# Patient Record
Sex: FEMALE | Race: BLACK OR AFRICAN AMERICAN | NOT HISPANIC OR LATINO | Employment: STUDENT | URBAN - METROPOLITAN AREA
[De-identification: names, ages, dates, MRNs, and addresses within clinical notes are randomized per-mention and may not be internally consistent; named-entity substitution may affect disease eponyms.]

---

## 2021-02-23 ENCOUNTER — LAB VISIT (OUTPATIENT)
Dept: ORTHOPEDICS | Facility: CLINIC | Age: 21
End: 2021-02-23
Payer: OTHER GOVERNMENT

## 2021-02-23 DIAGNOSIS — Z20.822 COVID-19 RULED OUT: Primary | ICD-10-CM

## 2021-02-23 PROCEDURE — U0003 INFECTIOUS AGENT DETECTION BY NUCLEIC ACID (DNA OR RNA); SEVERE ACUTE RESPIRATORY SYNDROME CORONAVIRUS 2 (SARS-COV-2) (CORONAVIRUS DISEASE [COVID-19]), AMPLIFIED PROBE TECHNIQUE, MAKING USE OF HIGH THROUGHPUT TECHNOLOGIES AS DESCRIBED BY CMS-2020-01-R: HCPCS

## 2021-02-23 PROCEDURE — U0005 INFEC AGEN DETEC AMPLI PROBE: HCPCS

## 2021-02-24 LAB — SARS-COV-2 RNA RESP QL NAA+PROBE: NOT DETECTED

## 2021-03-03 ENCOUNTER — LAB VISIT (OUTPATIENT)
Dept: PRIMARY CARE CLINIC | Facility: OTHER | Age: 21
End: 2021-03-03
Attending: INTERNAL MEDICINE
Payer: OTHER GOVERNMENT

## 2021-03-03 DIAGNOSIS — Z20.822 ENCOUNTER FOR LABORATORY TESTING FOR COVID-19 VIRUS: ICD-10-CM

## 2021-03-03 PROCEDURE — U0003 INFECTIOUS AGENT DETECTION BY NUCLEIC ACID (DNA OR RNA); SEVERE ACUTE RESPIRATORY SYNDROME CORONAVIRUS 2 (SARS-COV-2) (CORONAVIRUS DISEASE [COVID-19]), AMPLIFIED PROBE TECHNIQUE, MAKING USE OF HIGH THROUGHPUT TECHNOLOGIES AS DESCRIBED BY CMS-2020-01-R: HCPCS | Performed by: INTERNAL MEDICINE

## 2021-03-04 LAB — SARS-COV-2 RNA RESP QL NAA+PROBE: NOT DETECTED

## 2021-03-10 ENCOUNTER — LAB VISIT (OUTPATIENT)
Dept: PRIMARY CARE CLINIC | Facility: OTHER | Age: 21
End: 2021-03-10
Attending: INTERNAL MEDICINE
Payer: OTHER GOVERNMENT

## 2021-03-10 DIAGNOSIS — Z03.818 ENCOUNTER FOR OBSERVATION FOR SUSPECTED EXPOSURE TO OTHER BIOLOGICAL AGENTS RULED OUT: Primary | ICD-10-CM

## 2021-03-10 PROCEDURE — U0003 INFECTIOUS AGENT DETECTION BY NUCLEIC ACID (DNA OR RNA); SEVERE ACUTE RESPIRATORY SYNDROME CORONAVIRUS 2 (SARS-COV-2) (CORONAVIRUS DISEASE [COVID-19]), AMPLIFIED PROBE TECHNIQUE, MAKING USE OF HIGH THROUGHPUT TECHNOLOGIES AS DESCRIBED BY CMS-2020-01-R: HCPCS | Performed by: INTERNAL MEDICINE

## 2021-03-11 LAB — SARS-COV-2 RNA RESP QL NAA+PROBE: NOT DETECTED

## 2021-03-17 ENCOUNTER — LAB VISIT (OUTPATIENT)
Dept: PRIMARY CARE CLINIC | Facility: OTHER | Age: 21
End: 2021-03-17
Attending: INTERNAL MEDICINE
Payer: OTHER GOVERNMENT

## 2021-03-17 DIAGNOSIS — Z20.822 ENCOUNTER FOR LABORATORY TESTING FOR COVID-19 VIRUS: Primary | ICD-10-CM

## 2021-03-17 PROCEDURE — U0003 INFECTIOUS AGENT DETECTION BY NUCLEIC ACID (DNA OR RNA); SEVERE ACUTE RESPIRATORY SYNDROME CORONAVIRUS 2 (SARS-COV-2) (CORONAVIRUS DISEASE [COVID-19]), AMPLIFIED PROBE TECHNIQUE, MAKING USE OF HIGH THROUGHPUT TECHNOLOGIES AS DESCRIBED BY CMS-2020-01-R: HCPCS | Performed by: STUDENT IN AN ORGANIZED HEALTH CARE EDUCATION/TRAINING PROGRAM

## 2021-03-18 LAB — SARS-COV-2 RNA RESP QL NAA+PROBE: NOT DETECTED

## 2021-03-24 ENCOUNTER — LAB VISIT (OUTPATIENT)
Dept: PRIMARY CARE CLINIC | Facility: OTHER | Age: 21
End: 2021-03-24
Attending: INTERNAL MEDICINE
Payer: OTHER GOVERNMENT

## 2021-03-24 DIAGNOSIS — Z20.822 COVID-19 RULED OUT: Primary | ICD-10-CM

## 2021-03-24 PROCEDURE — U0003 INFECTIOUS AGENT DETECTION BY NUCLEIC ACID (DNA OR RNA); SEVERE ACUTE RESPIRATORY SYNDROME CORONAVIRUS 2 (SARS-COV-2) (CORONAVIRUS DISEASE [COVID-19]), AMPLIFIED PROBE TECHNIQUE, MAKING USE OF HIGH THROUGHPUT TECHNOLOGIES AS DESCRIBED BY CMS-2020-01-R: HCPCS | Performed by: INTERNAL MEDICINE

## 2021-03-25 LAB — SARS-COV-2 RNA RESP QL NAA+PROBE: NOT DETECTED

## 2021-03-31 ENCOUNTER — LAB VISIT (OUTPATIENT)
Dept: PRIMARY CARE CLINIC | Facility: OTHER | Age: 21
End: 2021-03-31
Attending: INTERNAL MEDICINE
Payer: OTHER GOVERNMENT

## 2021-03-31 DIAGNOSIS — Z20.822 COVID-19 RULED OUT: Primary | ICD-10-CM

## 2021-03-31 PROCEDURE — U0003 INFECTIOUS AGENT DETECTION BY NUCLEIC ACID (DNA OR RNA); SEVERE ACUTE RESPIRATORY SYNDROME CORONAVIRUS 2 (SARS-COV-2) (CORONAVIRUS DISEASE [COVID-19]), AMPLIFIED PROBE TECHNIQUE, MAKING USE OF HIGH THROUGHPUT TECHNOLOGIES AS DESCRIBED BY CMS-2020-01-R: HCPCS | Performed by: INTERNAL MEDICINE

## 2021-04-01 LAB — SARS-COV-2 RNA RESP QL NAA+PROBE: NOT DETECTED

## 2021-04-14 ENCOUNTER — LAB VISIT (OUTPATIENT)
Dept: ORTHOPEDICS | Facility: CLINIC | Age: 21
End: 2021-04-14
Payer: OTHER GOVERNMENT

## 2021-04-14 DIAGNOSIS — Z20.822 COVID-19 RULED OUT: Primary | ICD-10-CM

## 2021-04-14 PROCEDURE — U0005 INFEC AGEN DETEC AMPLI PROBE: HCPCS | Performed by: STUDENT IN AN ORGANIZED HEALTH CARE EDUCATION/TRAINING PROGRAM

## 2021-04-14 PROCEDURE — U0003 INFECTIOUS AGENT DETECTION BY NUCLEIC ACID (DNA OR RNA); SEVERE ACUTE RESPIRATORY SYNDROME CORONAVIRUS 2 (SARS-COV-2) (CORONAVIRUS DISEASE [COVID-19]), AMPLIFIED PROBE TECHNIQUE, MAKING USE OF HIGH THROUGHPUT TECHNOLOGIES AS DESCRIBED BY CMS-2020-01-R: HCPCS | Performed by: STUDENT IN AN ORGANIZED HEALTH CARE EDUCATION/TRAINING PROGRAM

## 2021-04-15 LAB — SARS-COV-2 RNA RESP QL NAA+PROBE: NOT DETECTED

## 2021-06-30 ENCOUNTER — PATIENT OUTREACH (OUTPATIENT)
Dept: ADMINISTRATIVE | Facility: HOSPITAL | Age: 21
End: 2021-06-30

## 2021-10-29 ENCOUNTER — HOSPITAL ENCOUNTER (OUTPATIENT)
Dept: RADIOLOGY | Facility: HOSPITAL | Age: 21
Discharge: HOME | End: 2021-10-29
Attending: STUDENT IN AN ORGANIZED HEALTH CARE EDUCATION/TRAINING PROGRAM
Payer: COMMERCIAL

## 2021-10-29 ENCOUNTER — OFFICE VISIT (OUTPATIENT)
Dept: SPORTS MEDICINE | Facility: CLINIC | Age: 21
End: 2021-10-29
Payer: COMMERCIAL

## 2021-10-29 VITALS — WEIGHT: 127.88 LBS | HEIGHT: 63 IN | RESPIRATION RATE: 20 BRPM | BODY MASS INDEX: 22.66 KG/M2

## 2021-10-29 DIAGNOSIS — M79.676 PAIN OF GREAT TOE, UNSPECIFIED LATERALITY: Primary | ICD-10-CM

## 2021-10-29 DIAGNOSIS — L03.032 PARONYCHIA OF GREAT TOE OF LEFT FOOT: Primary | ICD-10-CM

## 2021-10-29 DIAGNOSIS — M79.676 PAIN OF GREAT TOE, UNSPECIFIED LATERALITY: ICD-10-CM

## 2021-10-29 PROCEDURE — 73660 X-RAY EXAM OF TOE(S): CPT | Mod: TC,LT

## 2021-10-29 PROCEDURE — 73660 XR TOE 2 OR MORE VIEWS LEFT: ICD-10-PCS | Mod: 26,LT,, | Performed by: RADIOLOGY

## 2021-10-29 PROCEDURE — 99203 OFFICE O/P NEW LOW 30 MIN: CPT | Mod: S$GLB,,, | Performed by: STUDENT IN AN ORGANIZED HEALTH CARE EDUCATION/TRAINING PROGRAM

## 2021-10-29 PROCEDURE — 99212 OFFICE O/P EST SF 10 MIN: CPT | Mod: PBBFAC | Performed by: STUDENT IN AN ORGANIZED HEALTH CARE EDUCATION/TRAINING PROGRAM

## 2021-10-29 PROCEDURE — 99999 PR PBB SHADOW E&M-EST. PATIENT-LVL II: CPT | Mod: PBBFAC,,, | Performed by: STUDENT IN AN ORGANIZED HEALTH CARE EDUCATION/TRAINING PROGRAM

## 2021-10-29 PROCEDURE — 99203 PR OFFICE/OUTPT VISIT, NEW, LEVL III, 30-44 MIN: ICD-10-PCS | Mod: S$GLB,,, | Performed by: STUDENT IN AN ORGANIZED HEALTH CARE EDUCATION/TRAINING PROGRAM

## 2021-10-29 PROCEDURE — 99999 PR PBB SHADOW E&M-EST. PATIENT-LVL II: ICD-10-PCS | Mod: PBBFAC,,, | Performed by: STUDENT IN AN ORGANIZED HEALTH CARE EDUCATION/TRAINING PROGRAM

## 2021-10-29 PROCEDURE — 73660 X-RAY EXAM OF TOE(S): CPT | Mod: 26,LT,, | Performed by: RADIOLOGY

## 2021-10-29 RX ORDER — SULFAMETHOXAZOLE AND TRIMETHOPRIM 800; 160 MG/1; MG/1
1 TABLET ORAL 2 TIMES DAILY
Qty: 10 TABLET | Refills: 0 | Status: SHIPPED | OUTPATIENT
Start: 2021-10-29 | End: 2022-11-22 | Stop reason: ALTCHOICE

## 2021-10-29 RX ORDER — MEDROXYPROGESTERONE ACETATE 150 MG/ML
150 INJECTION, SUSPENSION INTRAMUSCULAR
COMMUNITY
Start: 2021-06-08 | End: 2022-09-01

## 2021-10-29 RX ORDER — IBUPROFEN 600 MG/1
600 TABLET ORAL
COMMUNITY
Start: 2021-07-22 | End: 2022-11-22 | Stop reason: ALTCHOICE

## 2022-01-20 DIAGNOSIS — M25.311 INSTABILITY OF RIGHT SHOULDER JOINT: Primary | ICD-10-CM

## 2022-01-26 ENCOUNTER — HOSPITAL ENCOUNTER (OUTPATIENT)
Dept: RADIOLOGY | Facility: HOSPITAL | Age: 22
Discharge: HOME | End: 2022-01-26
Attending: ORTHOPAEDIC SURGERY
Payer: COMMERCIAL

## 2022-01-26 DIAGNOSIS — M25.311 INSTABILITY OF RIGHT SHOULDER JOINT: ICD-10-CM

## 2022-01-26 PROCEDURE — 25500020 PHARM REV CODE 255: Performed by: ORTHOPAEDIC SURGERY

## 2022-01-26 PROCEDURE — 73040 CONTRAST X-RAY OF SHOULDER: CPT | Mod: TC,RT

## 2022-01-26 PROCEDURE — A9585 GADOBUTROL INJECTION: HCPCS | Performed by: ORTHOPAEDIC SURGERY

## 2022-01-26 PROCEDURE — 73222 MRI JOINT UPR EXTREM W/DYE: CPT | Mod: TC,RT

## 2022-01-26 RX ORDER — GADOBUTROL 604.72 MG/ML
0.2 INJECTION INTRAVENOUS
Status: COMPLETED | OUTPATIENT
Start: 2022-01-26 | End: 2022-01-26

## 2022-01-26 RX ADMIN — GADOBUTROL 0.2 ML: 604.72 INJECTION INTRAVENOUS at 02:01

## 2022-01-26 RX ADMIN — IOHEXOL 10 ML: 240 INJECTION, SOLUTION INTRATHECAL; INTRAVASCULAR; INTRAVENOUS; ORAL at 02:01

## 2022-01-26 NOTE — H&P
[facility] (Hospital)  History & Physical    Subjective:      Chief Complaint/Reason for Admission: right shoulder pain    Casandra Barragan is a 21 y.o. female.    No past medical history on file.  No past surgical history on file.  Family History   Problem Relation Age of Onset    No Known Problems Mother     No Known Problems Father      Social History     Tobacco Use    Smoking status: Never Smoker   Substance Use Topics    Alcohol use: Never    Drug use: Never       (Not in a hospital admission)    Review of patient's allergies indicates:   Allergen Reactions    Amoxicillin Rash    Clindamycin Other (See Comments)     Palm and foot skin peeling off  Palm and foot skin peeling off          Review of Systems   Constitutional: Negative.    HENT: Negative.    Genitourinary: Negative.    Musculoskeletal: Negative.    Skin: Negative.        Objective:      Vital Signs (Most Recent)       Vital Signs Range (Last 24H):  BP: ()/()   Arterial Line BP: ()/()     Physical Exam  Pulmonary:      Effort: Pulmonary effort is normal.   Musculoskeletal:         General: Normal range of motion.         Data Review:  CBC: No results found for: WBC, RBC, HGB, HCT, PLT   ECG: no prior ECG.     Assessment:      There are no hospital problems to display for this patient.      Plan:    Right shoulder arthrogram

## 2022-01-26 NOTE — DISCHARGE SUMMARY
Pre Op Diagnosis: right shoulder pain     Post Op Diagnosis: same     Procedure:  arthrogram     Procedure performed by: Shiraz BALDERRAMA, Korey KENDRICK     Written Informed Consent Obtained: Yes     Specimen Removed:  no     Estimated Blood Loss:  minimal     Findings: Local anesthesia and moderate sedation were used.     The patient tolerated the procedure well and there were no complications.      Sterile technique was performed in the anterior right shoulder, lidocaine was used as a local anesthetic.  Intra-articular contrast into right shoulder then patient to MRI.  Pt tolerated the procedure well without immediate complications.  Please see radiologist report for details. F/u with PCP and/or ordering physician.

## 2022-01-27 ENCOUNTER — HOSPITAL ENCOUNTER (OUTPATIENT)
Dept: RADIOLOGY | Facility: HOSPITAL | Age: 22
Discharge: HOME | End: 2022-01-27
Attending: ORTHOPAEDIC SURGERY
Payer: COMMERCIAL

## 2022-01-27 ENCOUNTER — OFFICE VISIT (OUTPATIENT)
Dept: ORTHOPEDICS | Facility: CLINIC | Age: 22
End: 2022-01-27
Payer: COMMERCIAL

## 2022-01-27 VITALS — BODY MASS INDEX: 22.5 KG/M2 | WEIGHT: 127 LBS | HEIGHT: 63 IN

## 2022-01-27 DIAGNOSIS — S43.491A HUMERAL AVULSION GLENOHUMERAL LIGAMENT LESION, RIGHT, INITIAL ENCOUNTER: ICD-10-CM

## 2022-01-27 DIAGNOSIS — M21.821 HILL-SACHS LESION OF RIGHT SHOULDER: Primary | ICD-10-CM

## 2022-01-27 DIAGNOSIS — M25.511 CHRONIC RIGHT SHOULDER PAIN: ICD-10-CM

## 2022-01-27 DIAGNOSIS — M25.511 RIGHT SHOULDER PAIN, UNSPECIFIED CHRONICITY: Primary | ICD-10-CM

## 2022-01-27 DIAGNOSIS — M25.511 RIGHT SHOULDER PAIN, UNSPECIFIED CHRONICITY: ICD-10-CM

## 2022-01-27 DIAGNOSIS — G89.29 CHRONIC RIGHT SHOULDER PAIN: ICD-10-CM

## 2022-01-27 PROCEDURE — 73030 X-RAY EXAM OF SHOULDER: CPT | Mod: 26,RT,, | Performed by: RADIOLOGY

## 2022-01-27 PROCEDURE — 99999 PR PBB SHADOW E&M-EST. PATIENT-LVL III: CPT | Mod: PBBFAC,,, | Performed by: ORTHOPAEDIC SURGERY

## 2022-01-27 PROCEDURE — 73030 XR SHOULDER COMPLETE 2 OR MORE VIEWS RIGHT: ICD-10-PCS | Mod: 26,RT,, | Performed by: RADIOLOGY

## 2022-01-27 PROCEDURE — 99213 PR OFFICE/OUTPT VISIT, EST, LEVL III, 20-29 MIN: ICD-10-PCS | Mod: S$GLB,,, | Performed by: ORTHOPAEDIC SURGERY

## 2022-01-27 PROCEDURE — 99213 OFFICE O/P EST LOW 20 MIN: CPT | Mod: S$GLB,,, | Performed by: ORTHOPAEDIC SURGERY

## 2022-01-27 PROCEDURE — 73030 X-RAY EXAM OF SHOULDER: CPT | Mod: TC,RT

## 2022-01-27 PROCEDURE — 99999 PR PBB SHADOW E&M-EST. PATIENT-LVL III: ICD-10-PCS | Mod: PBBFAC,,, | Performed by: ORTHOPAEDIC SURGERY

## 2022-01-27 NOTE — PROGRESS NOTES
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  There was not a specific injury.  Patient states that in March 2021 a flyer hit her in the chest with her hip and elbow.   Prior Treatment:   She has previously been treated by Dr. Fagan for 2nd rib.  Treatment included Therapy @ [facility]  with no improvement.    Current Symptoms: Her pain is currently located right shoulder.  Average level of pain is 9/10.  Pain is unchanged. Diffuse pain to the top and front of right shoulder.  She has experienced the following symptoms: weakness.  Aggravating activities include overhead activities, laying on right side.    Shoulder Pain   The pain is present in the right shoulder. The current episode started more than 1 month ago. The problem occurs intermittently. The problem has been unchanged. The quality of the pain is described as aching and sharp. The pain is at a severity of 4/10. The symptoms are aggravated by lying down and activity. She has tried NSAIDs and OTC pain meds for the symptoms. The treatment provided mild relief. Physical therapy was ineffective (PT @ [facility]).      Past Medical History:   History reviewed. No pertinent past medical history.  History reviewed. No pertinent surgical history.  Family History   Problem Relation Age of Onset    No Known Problems Mother     No Known Problems Father      Social History     Socioeconomic History    Marital status: Single   Tobacco Use    Smoking status: Never Smoker    Smokeless tobacco: Never Used   Substance and Sexual Activity    Alcohol use: Never    Drug use: Never    Sexual activity: Not Currently      Partners: Male     Medication List with Changes/Refills   Current Medications    IBUPROFEN (ADVIL,MOTRIN) 600 MG TABLET    Take 600 mg by mouth.    MEDROXYPROGESTERONE (DEPO-PROVERA) 150 MG/ML INJECTION    Inject 150 mg into the muscle.    SULFAMETHOXAZOLE-TRIMETHOPRIM 800-160MG (BACTRIM DS) 800-160 MG TAB    Take 1 tablet by mouth 2 (two) times daily.     Review of patient's allergies indicates:   Allergen Reactions    Amoxicillin Rash    Clindamycin Other (See Comments)     Palm and foot skin peeling off  Palm and foot skin peeling off         Physical Exam:   Body mass index is 22.5 kg/m².  GENERAL: Well appearing, appropriate for stated age, no acute distress.  CARDIOVASCULAR: Pulses regular by peripheral palpation.  PULMONARY: Respirations are even and non-labored.  NEURO: Awake, alert, and oriented x 3.  PSYCH: Mood & affect are appropriate.  HEENT: Head is normocephalic and atraumatic.    General    Nursing note and vitals reviewed.        Right Shoulder Exam     Tenderness   The patient is tender to palpation of the acromioclavicular joint and greater tuberosity.    Range of Motion   Active abduction: 160   Forward Elevation: 160    Tests & Signs   Apprehension: positive  Cross arm: positive  Brown test: positive  Impingement: positive  Rotator Cuff Painful Arc/Range: moderate  Active Compression Test ([place]'s Sign): positive  Yergason's Test: negative    Other   Sensation: normal    Left Shoulder Exam     Range of Motion   Active abduction: 160   Forward Elevation: 160    Tests & Signs   Cross arm: negative  Brown test: negative  Impingement: negative  Active Compression test ([place]'s Sign): negative  Speed's Test: negative    Other   Sensation: normal       Muscle Strength   Right Upper Extremity   Shoulder Abduction: 5/5   Shoulder Internal Rotation: 5/5   Shoulder External Rotation: 5/5   Supraspinatus: 5/5   Subscapularis: 5/5   Biceps: 5/5   Left Upper Extremity  Shoulder Abduction: 5/5    Shoulder Internal Rotation: 5/5   Shoulder External Rotation: 5/5   Supraspinatus: 5/5   Subscapularis: 5/5   Biceps: 5/5     Vascular Exam     Right Pulses      Radial:                    2+      Left Pulses      Radial:                    2+      Capillary Refill  Right Hand: normal capillary refill  Left Hand: normal capillary refill      Neurovascular: Intact extensor pollicis longus, flexor pollicis longus, finger flexion, finger extension, finger abduction and adduction. Sensation intact to radial, median, ulnar, and axillary nerve distributions. Hand warm and well perfused with capillary refill of less than 2 seconds, and palpable distal radial pulses.     Imaging:  XR Results:  No results found for this or any previous visit.    MRI Results:  Results for orders placed during the hospital encounter of 01/26/22    MRI Arthrogram Shoulder With Contrast Right    Narrative  EXAMINATION:  MRI ARTHROGRAM SHOULDER WITH CONTRAST RIGHT    CLINICAL HISTORY:  right shoulder instability;  Other instability, right shoulder    TECHNIQUE:  Multiplanar, multisequence imaging of the right shoulder was performed after the administration of intra-articular gadolinium contrast.    COMPARISON:  No prior.    FINDINGS:  Rotator cuff: Supraspinatus, infraspinatus, teres minor, and subscapularis tendons are intact.  Muscle bulk is preserved.    Labrum: No labral tear.  Patulous appearance of the posterior aspect of the inferior glenohumeral ligament with some irregularity at its humeral attachment in this region.    Biceps: Long head biceps tendon is intact.    Bone: Moderate osseous edema at the posterior/superior humeral head with a tiny impaction deformity concerning for a small Hill-Sachs lesion.  No suspicious osseous lesion.    Acromioclavicular joint: Satisfactory alignment.  No significant arthrosis.  Type 2 acromial arch with mild lateral downsloping.  No os acromiale.    Cartilage: Articular cartilage of the glenohumeral  joint is preserved.    Miscellaneous: Interposition of the supraspinatus insertion between the posterosuperior glenoid labrum and the humeral head with mild irregularity the labrum in this region.  Findings can be seen in the clinical setting of internal impingement.    Impression  Small Hill-Sachs lesion suggestive of recent anterior shoulder dislocation.  No labral tear.  Findings concerning for partial avulsion of the humeral attachment of the inferior glenohumeral ligament, as above.    Findings which can be seen in the clinical setting of internal impingement, as above.      Electronically signed by: Jeanmarie Olguin  Date:    01/26/2022  Time:    15:36    CT Results:  No results found for this or any previous visit.    Relevant imaging results reviewed and interpreted by me, discussed with the patient and / or family today.     Other Tests:   No other tests performed today.    Patient Instructions     Assessment:  Casandra Barragan is a RHD 21 y.o. 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    Right shoulder pain   Possible Previously right shoulder dislocation January 2021   [place] Sach Lesion right shoulder   Partial avulsion of the humeral attachment of the glenohumeral ligament     Encounter Diagnoses   Name Primary?    Hill-Sachs lesion of right shoulder Yes    Chronic right shoulder pain     Humeral avulsion glenohumeral ligament lesion, right, initial encounter         Plan:   Physical therapy at [facility] 2x   Continue to work with Justin    Deferred on an injection at this time, may reconsider CSI in the future with Dr. Fagan   School Note today   Follow up in 3 months with Dr. Bairon Dozier     Follow-up: 3 months with Dr. Dozier or sooner if there are any problems between now and then.    Thank you for choosing [facility] and Dr. Bairon Dozier for your orthopedic & sports medicine care. It is our goal to provide you with exceptional care that will help keep you healthy, active, and get you back in the game.    If you felt that you received exemplary care today, please consider leaving us feedback on [employer] at [email or web address].     Please do not hesitate to reach out to us via email, phone, or MyChart with any questions, concerns, or feedback.    If you are experiencing pain/discomfort ,or have questions after 5pm and would like to be connected to the [facility] on-call team, please call this number and specify which Sports Medicine provider is treating you: [phone]         Provider Note/Medical Decision Making:      I discussed worrisome and red flag signs and symptoms with the patient. The patient expressed understanding and agreed to alert me immediately or to go to the emergency room if they experience any of these.    Treatment plan was developed with input from the patient/family, and they expressed understanding and agreement with the plan. All questions were answered today.    Disclaimer: This note was prepared using a voice recognition system and is likely to have sound alike errors within the text.

## 2022-01-27 NOTE — PATIENT INSTRUCTIONS
Assessment:  Casandra Barragan is a RHD 21 y.o. female [place] [facility] Cheerleader (Raul) with a chief complaint of Pain of the Right Shoulder  [facility] cheroshanr, presents today for right shoulder pain and right MRI review.    Right shoulder pain   Possible Previously right shoulder dislocation January 2021   [place] Sach Lesion right shoulder   Partial avulsion of the humeral attachment of the glenohumeral ligament     Encounter Diagnoses   Name Primary?    Hill-Sachs lesion of right shoulder Yes    Chronic right shoulder pain     Humeral avulsion glenohumeral ligament lesion, right, initial encounter         Plan:   Physical therapy at [facility] 2x   Continue to work with Justin    Deferred on an injection at this time, may reconsider CSI in the future with Dr. Fagan   School Note today   Follow up in 3 months with Dr. Bairon Dozier     Follow-up: 3 months with Dr. Dozier or sooner if there are any problems between now and then.    Thank you for choosing [facility] and Dr. Bairon Dozier for your orthopedic & sports medicine care. It is our goal to provide you with exceptional care that will help keep you healthy, active, and get you back in the game.    If you felt that you received exemplary care today, please consider leaving us feedback on Healthgrades at [email or web address].     Please do not hesitate to reach out to us via email, phone, or MyChart with any questions, concerns, or feedback.    If you are experiencing pain/discomfort ,or have questions after 5pm and would like to be connected to the [facility] on-call team, please call this number and specify which Sports Medicine provider is treating you: [phone]

## 2022-01-27 NOTE — LETTER
January 27, 2022      [facility]  [id number]  [place] [address]  Phone: [phone]  Fax: [phone]       Patient: Casandra Barragan   YOB: 2000  Date of Visit: 01/27/2022    To Whom It May Concern:    PASCUAL Barragan  was at [facility] on 01/27/2022.  If you have any questions or concerns, or if I can be of further assistance, please do not hesitate to contact me.    Sincerely,          Bairon Dozier MD

## 2022-02-07 ENCOUNTER — CLINICAL SUPPORT (OUTPATIENT)
Dept: REHABILITATION | Facility: HOSPITAL | Age: 22
End: 2022-02-07
Payer: COMMERCIAL

## 2022-02-07 DIAGNOSIS — S43.491A HUMERAL AVULSION GLENOHUMERAL LIGAMENT LESION, RIGHT, INITIAL ENCOUNTER: ICD-10-CM

## 2022-02-07 DIAGNOSIS — M62.81 PROXIMAL MUSCLE WEAKNESS: ICD-10-CM

## 2022-02-07 DIAGNOSIS — M21.821 HILL-SACHS LESION OF RIGHT SHOULDER: ICD-10-CM

## 2022-02-07 DIAGNOSIS — M25.511 CHRONIC RIGHT SHOULDER PAIN: ICD-10-CM

## 2022-02-07 DIAGNOSIS — G89.29 CHRONIC RIGHT SHOULDER PAIN: ICD-10-CM

## 2022-02-07 PROCEDURE — 97110 THERAPEUTIC EXERCISES: CPT

## 2022-02-07 PROCEDURE — 97161 PT EVAL LOW COMPLEX 20 MIN: CPT

## 2022-02-07 NOTE — PLAN OF CARE
[facility]   Physical Therapy Initial Evaluation     Date: 2/7/2022   Name: Damari Barragan  [facility] Number: [id number]    Therapy Diagnosis:   Encounter Diagnoses   Name Primary?    Hill-Sachs lesion of right shoulder     Chronic right shoulder pain     Humeral avulsion glenohumeral ligament lesion, right, initial encounter     Proximal muscle weakness      Physician: Cierra Farrell PA-C    Physician Orders: PT Eval and Treat  Medical Diagnosis from Referral:   M21.821 (ICD-10-CM) - Other specified acquired deformities of right upper arm   M25.511 (ICD-10-CM) - Pain in right shoulder   G89.29 (ICD-10-CM) - Other chronic pain   S43.491A (ICD-10-CM) - Other sprain of right shoulder joint, initial encounter     Evaluation Date: 2/7/2022  Authorization Period Expiration: 12/31/2021  Plan of Care Expiration: 04/04/2022  Progress Note Due: 03/09/2022  Visit # / Visits authorized: 1/ 1   FOTO: 1/ 3     Precautions: Standard    Time In: 11:30 am  Time Out: 12:14 pm  Total Appointment Time (timed & untimed codes): 44 minutes      SUBJECTIVE   Date of onset: October 2021    History of current condition - DAMARI reports: she has a small Hillsach's lesion in her right shoulder. Initially the pain began after a second rib injury - her shoulder pain has been progressively getting worse. She is able to do some things with cheer however her pain level is variable but constantly painful. Patient reports she struggles to move her arm.    Falls: none    Imaging, MRI studies:    Prior Therapy: no  Social History:  lives alone  Occupation: student   Prior Level of Function: independent with all ADLs   Current Level of Function: patient is currently limited witrh her ADLs secondary to dysfunctuion    Pain:  Current 1/10, worst 10/10, best 1/10   Location: right shoulder  right shoulder(s)  Description: Dull and Throbbing  Aggravating Factors: reaching or moving her arm  Easing Factors: nothing    Patients  "goals: "get rid of her pain"     Medical History:   No past medical history on file.    Surgical History:   Casandra Barragan  has no past surgical history on file.    Medications:   Casandra has a current medication list which includes the following prescription(s): ibuprofen, medroxyprogesterone, and sulfamethoxazole-trimethoprim 800-160mg.    Allergies:   Review of patient's allergies indicates:   Allergen Reactions    Amoxicillin Rash    Clindamycin Other (See Comments)     Palm and foot skin peeling off  Palm and foot skin peeling off            OBJECTIVE     Active Range of Motion (Passive Range of Motion) : Measured in degrees      Shoulder Left Right Goal   Flexion full 20 (180) 180   Abduction full 45 (180) 180   ER at 90 full NT (90) 90   IR full NT (60) 80         Upper Extremity Strength    Shoulder Left Right Goals   Shoulder flexion: 4/5 3-/5 5/5   Shoulder Abduction: 4/5 3-/5 5/5   Shoulder ER 4-/5 3-/5 5/5   Shoulder IR 4/5 3-/5 5/5     Cervical range of motion - Within Functional Limits    Special Testing:  Not Tested secondary to pain - reassess next visit     Limitation/Restriction for FOTO Shoulder Survey    Therapist reviewed FOTO scores for Casandra Barragan on 2/7/2022.   FOTO documents entered into [employer] - see Media section.    Limitation Score: 55%         TREATMENT     Total Treatment time (time-based codes) separate from Evaluation: 10 minutes     CASANDRA received the treatments listed below:    THERAPEUTIC EXERCISES to develop  strength, endurance, ROM, flexibility, posture and core stabilization for 10 minutes including assessment, patient education, and the following:    Intervention    Performed Today   Sets/Reps/Resistance     Home Exercise Program x Assessed for proficiency and understanding of each exercise     Plan for Next Visit:        PATIENT EDUCATION AND HOME EXERCISES   Education provided:   Role of Physical Therapist  Physical Therapy Plan Of Care  home exercise program "       Written Home Exercises Provided: yes.  Exercises were reviewed and CASANDRA was able to demonstrate them prior to the end of the session.  CASANDRA demonstrated good  understanding of the education provided.     See EMR under Patient Instructions for exercises provided 2/7/2022    ASSESSMENT     Casandra is a 21 y.o. female referred to outpatient Physical Therapy with a medical diagnosis of Hillsach's lesion of right shoulder. Patient presents with signs and symptoms consistent with a physical therapy diagnosis of right shoulder pain with associated right rotator cuff weakness including the above listed objective test and measures. During today's session patient demonstrated understanding of home exercise program and plan of care.    Patient prognosis is Good.   Patientt will benefit from skilled outpatient Physical Therapy to address the deficits stated above and in the chart below, provide patient /family education, and to maximize patientt's level of independence.     Plan of care discussed with patient: Yes  Patient's spiritual, cultural and educational needs considered and patient is agreeable to the plan of care and goals as stated below:     Anticipated Barriers for therapy: chronicity and coping    Medical Necessity is demonstrated by the following  History  Co-morbidities and personal factors that may impact the plan of care Co-morbidities:   young age    Personal Factors:   age  coping style     low   Examination  Body Structures and Functions, activity limitations and participation restrictions that may impact the plan of care Body Regions:   upper extremities    Body Systems:    gross symmetry  ROM  strength  gross coordinated movement  motor control    Participation Restrictions:   none    Activity limitations:   Learning and applying knowledge  no deficits    General Tasks and Commands  no deficits    Communication  no deficits    Mobility  no deficits    Self care  no deficits    Domestic Life  no  deficits    Interactions/Relationships  no deficits    Life Areas  no deficits    Community and Social Life  community life  recreation and leisure         low   Clinical Presentation stable and uncomplicated low   Decision Making/ Complexity Score: low     Goals:    SHORT TERM GOALS:  4 weeks 2/7/2022   1. Recent signs and systems trend is improving in order to progress towards LTG's.    2. Patient will be independent with HEP in order to further progress and return to maximal function.    3. Pain rating at Worst: 5/10 in order to progress towards increased independence with activity.    4. Patient will be able to correct postural deviations in sitting and standing, to decrease pain and promote postural awareness for injury prevention.       LONG TERM GOALS: 8 weeks 2/7/2022   1. Patient will return to normal ADL, recreational, and work related activities with less pain and limitation.     2. Patient will improve AROM to stated goals in order to return to maximal functional potential.     3. Patient will improve Strength to stated goals of appropriate musculature in order to improve functional independence.     4. Pain Rating at Best: 1/10 to improve Quality of Life.     5. Patient will meet predicted functional outcome (FOTO) score: 71% to increase self-worth & perceived functional ability.    6. Patient will have met/partially met personal goal of: returning to ADLs with minimal pain and improved ability to use her shoulder        PLAN   Plan of care Certification: 2/7/2022 to 04/04/2022    Outpatient Physical Therapy 2 times weekly for 8 weeks to include the following interventions: Electrical Stimulation IFC, Manual Therapy, Moist Heat/ Ice, Neuromuscular Re-ed, Patient Education, Self Care, Therapeutic Activities and Therapeutic Exercise.     Reece Srivastava, PT      I CERTIFY THE NEED FOR THESE SERVICES FURNISHED UNDER THIS PLAN OF TREATMENT AND WHILE UNDER MY CARE   Physician's comments:     Physician's  Signature: ___________________________________________________

## 2022-02-08 PROBLEM — M62.81 PROXIMAL MUSCLE WEAKNESS: Status: ACTIVE | Noted: 2022-02-08

## 2022-02-08 PROBLEM — M25.511 CHRONIC RIGHT SHOULDER PAIN: Status: ACTIVE | Noted: 2022-02-08

## 2022-02-08 PROBLEM — G89.29 CHRONIC RIGHT SHOULDER PAIN: Status: ACTIVE | Noted: 2022-02-08

## 2022-02-09 ENCOUNTER — CLINICAL SUPPORT (OUTPATIENT)
Dept: REHABILITATION | Facility: HOSPITAL | Age: 22
End: 2022-02-09
Payer: COMMERCIAL

## 2022-02-09 DIAGNOSIS — M25.511 CHRONIC RIGHT SHOULDER PAIN: ICD-10-CM

## 2022-02-09 DIAGNOSIS — M62.81 PROXIMAL MUSCLE WEAKNESS: ICD-10-CM

## 2022-02-09 DIAGNOSIS — G89.29 CHRONIC RIGHT SHOULDER PAIN: ICD-10-CM

## 2022-02-09 PROCEDURE — 97110 THERAPEUTIC EXERCISES: CPT

## 2022-02-09 NOTE — PROGRESS NOTES
[facility]   Physical Therapy Treatment Note     Name: Casandra [facility] Number: [id number]    Therapy Diagnosis:   Encounter Diagnoses   Name Primary?    Chronic right shoulder pain     Proximal muscle weakness      Physician: Cierra Farrell PA-C    Visit Date: 2/9/2022       Physician: Cierra Farrell PA-C     Physician Orders: PT Eval and Treat  Medical Diagnosis from Referral:   M21.821 (ICD-10-CM) - Other specified acquired deformities of right upper arm   M25.511 (ICD-10-CM) - Pain in right shoulder   G89.29 (ICD-10-CM) - Other chronic pain   S43.491A (ICD-10-CM) - Other sprain of right shoulder joint, initial encounter      Evaluation Date: 2/7/2022  Authorization Period Expiration: 12/31/2021  Plan of Care Expiration: 04/04/2022  Progress Note Due: 03/09/2022  Visit # / Visits authorized: 1/20 (+ Eval)  FOTO: 1/ 3      Precautions: Standard    PTA Visit #: 0/5     Time In: 2:35 pm  Time Out: 3:15 pm   Total Billable Time: 35 minutes    SUBJECTIVE     Patient reports: no adverse reaction to last visit - patient reports that she went to the training room and was able to perform home exercise program.    He/She was compliant with home exercise program.  Response to previous treatment: n/a  Functional change: n/a    Pain: 5/10     Location: right shoulder    OBJECTIVE     Objective Measures updated at progress report unless specified.       TREATMENT     CASANDRA received the treatments listed below:     MANUAL THERAPY TECHNIQUES including Joint mobilizations, Myofacial release and Soft tissue Mobilization were applied to right shoulder for 0 minutes.    Manual Intervention Performed Today    Soft Tissue Mobilization     Joint Mobilizations     Mobilization with movement          Functional Dry Needling       Plan for Next Visit: prn       THERAPEUTIC EXERCISES to develop  strength, endurance, ROM, flexibility, posture and core stabilization for 35 minutes including patient  education, assessment, and the following:    Intervention    Performed Today Sets/Reps/Resistance     active assited range of motion - mat slides - no incline x 30x   active assited range of motion - mat slides incline  x 30x   external rotation isometrics x 3x30s   Internal rotation isometrics x 3x30s   Side lying external rotation  x 3x10 - 0#   theraband row  x 3x10 - Yellow Theraband    Prone row  x 3x10 - 0#     Plan for Next Visit:        PATIENT EDUCATION AND HOME EXERCISES     Home Exercises Provided and Patient Education Provided     Education provided: (included in home exercise program) minutes  Biomechanical implications of exercise    Written Home Exercises Provided: continue prior home exercise program   Exercises were reviewed and DAMARI was able to demonstrate them prior to the end of the session.  DAMARI demonstrated good  understanding of the education provided. See EMR under Patient Instructions for exercises provided during therapy sessions.      ASSESSMENT     Patient tolerated the addition of all exercises with minor discomfort throughout session - modifications made. Patient required verbal cueing in order to ensure proper positioning during all exercises. Patient remains limited with tolerance to exercise, right upper extremity strength, and pain. Continue to progress patient's POC as tolerated.    DAMARI is progressing well towards her goals.   Pt prognosis is Good.     Pt will continue to benefit from skilled outpatient physical therapy to address the deficits listed in the problem list box on initial evaluation, provide pt/family education and to maximize pt's level of independence in the home and community environment.     Pt's spiritual, cultural and educational needs considered and pt agreeable to plan of care and goals.     Anticipated Barriers for therapy: chronicity and coping       Goals:     SHORT TERM GOALS:  4 weeks 2/7/2022   1. Recent signs and systems trend is improving in  order to progress towards LTG's.     2. Patient will be independent with HEP in order to further progress and return to maximal function.     3. Pain rating at Worst: 5/10 in order to progress towards increased independence with activity.     4. Patient will be able to correct postural deviations in sitting and standing, to decrease pain and promote postural awareness for injury prevention.         LONG TERM GOALS: 8 weeks 2/7/2022   1. Patient will return to normal ADL, recreational, and work related activities with less pain and limitation.      2. Patient will improve AROM to stated goals in order to return to maximal functional potential.      3. Patient will improve Strength to stated goals of appropriate musculature in order to improve functional independence.      4. Pain Rating at Best: 1/10 to improve Quality of Life.      5. Patient will meet predicted functional outcome (FOTO) score: 71% to increase self-worth & perceived functional ability.     6. Patient will have met/partially met personal goal of: returning to ADLs with minimal pain and improved ability to use her shoulder           Goals Key:  PC= progressing/continue; PM= partially met;        DC= discontinue    PLAN     Continue Plan of Care (POC) and progress per patient tolerance. See treatment section for details on planned progressions next session.      Reece Srivastava, PT

## 2022-02-14 ENCOUNTER — CLINICAL SUPPORT (OUTPATIENT)
Dept: REHABILITATION | Facility: HOSPITAL | Age: 22
End: 2022-02-14
Payer: COMMERCIAL

## 2022-02-14 DIAGNOSIS — M62.81 PROXIMAL MUSCLE WEAKNESS: ICD-10-CM

## 2022-02-14 DIAGNOSIS — M25.511 CHRONIC RIGHT SHOULDER PAIN: ICD-10-CM

## 2022-02-14 DIAGNOSIS — G89.29 CHRONIC RIGHT SHOULDER PAIN: ICD-10-CM

## 2022-02-14 PROCEDURE — 97110 THERAPEUTIC EXERCISES: CPT

## 2022-02-14 NOTE — PROGRESS NOTES
[facility]   Physical Therapy Treatment Note     Name: Casandra [facility] Number: [id number]    Therapy Diagnosis:   Encounter Diagnoses   Name Primary?    Chronic right shoulder pain     Proximal muscle weakness      Physician: Cierra Farrell PA-C    Visit Date: 2/14/2022       Physician: Cierra Farrell PA-C     Physician Orders: PT Eval and Treat  Medical Diagnosis from Referral:   M21.821 (ICD-10-CM) - Other specified acquired deformities of right upper arm   M25.511 (ICD-10-CM) - Pain in right shoulder   G89.29 (ICD-10-CM) - Other chronic pain   S43.491A (ICD-10-CM) - Other sprain of right shoulder joint, initial encounter      Evaluation Date: 2/7/2022  Authorization Period Expiration: 12/31/2021  Plan of Care Expiration: 04/04/2022  Progress Note Due: 03/09/2022  Visit # / Visits authorized: 2/20 (+ Eval)  FOTO: 1/ 3      Precautions: Standard    PTA Visit #: 0/5     Time In: 12:16 pm  Time Out: 1:00 pm   Total Billable Time: 38 minutes    SUBJECTIVE     Patient reports: patient reports no significant change in her status since last week. She helped run a cheer camp this weekend but was only involved with the registration.    He/She was compliant with home exercise program.  Response to previous treatment: n/a  Functional change: n/a    Pain: 5/10     Location: right shoulder    OBJECTIVE     Objective Measures updated at progress report unless specified.       TREATMENT     CASANDRA received the treatments listed below:     MANUAL THERAPY TECHNIQUES including Joint mobilizations, Myofacial release and Soft tissue Mobilization were applied to right shoulder for 0 minutes.    Manual Intervention Performed Today    Soft Tissue Mobilization     Joint Mobilizations     Mobilization with movement          Functional Dry Needling       Plan for Next Visit: prn       THERAPEUTIC EXERCISES to develop  strength, endurance, ROM, flexibility, posture and core stabilization for 38  minutes including patient education, assessment, and the following:    Intervention    Performed Today Sets/Reps/Resistance     active assited range of motion - mat slides - no incline x 30x   active assited range of motion - mat slides incline  x 30x   external rotation isometrics x 3x30s   Internal rotation isometrics x 3x30s   Side lying external rotation  x 3x10 - 0#   theraband row  x 3x10 - Red Theraband    Biceps Curls x    Prone row  x 3x10 - 0#     Plan for Next Visit:        PATIENT EDUCATION AND HOME EXERCISES     Home Exercises Provided and Patient Education Provided     Education provided: (included in home exercise program) minutes  Biomechanical implications of exercise    Written Home Exercises Provided: continue prior home exercise program   Exercises were reviewed and DAMARI was able to demonstrate them prior to the end of the session.  DAMARI demonstrated good  understanding of the education provided. See EMR under Patient Instructions for exercises provided during therapy sessions.      ASSESSMENT     Patient tolerated the addition of all exercises with minor discomfort throughout session - modifications made. Patient required verbal cueing in order to ensure proper positioning during all exercises. Addition of biceps curls in order to promote right upper extremity strength gains. Patient remains limited with tolerance to exercise, right upper extremity strength, and pain. Continue to progress patient's POC as tolerated.    DAMARI is progressing well towards her goals.   Pt prognosis is Good.     Pt will continue to benefit from skilled outpatient physical therapy to address the deficits listed in the problem list box on initial evaluation, provide pt/family education and to maximize pt's level of independence in the home and community environment.     Pt's spiritual, cultural and educational needs considered and pt agreeable to plan of care and goals.     Anticipated Barriers for therapy:  chronicity and coping       Goals:     SHORT TERM GOALS:  4 weeks 2/7/2022   1. Recent signs and systems trend is improving in order to progress towards LTG's.  PC   2. Patient will be independent with HEP in order to further progress and return to maximal function.  PC   3. Pain rating at Worst: 5/10 in order to progress towards increased independence with activity.  PC   4. Patient will be able to correct postural deviations in sitting and standing, to decrease pain and promote postural awareness for injury prevention.   PC      LONG TERM GOALS: 8 weeks 2/7/2022   1. Patient will return to normal ADL, recreational, and work related activities with less pain and limitation.   PC   2. Patient will improve AROM to stated goals in order to return to maximal functional potential.   PC   3. Patient will improve Strength to stated goals of appropriate musculature in order to improve functional independence.   PC   4. Pain Rating at Best: 1/10 to improve Quality of Life.   PC   5. Patient will meet predicted functional outcome (FOTO) score: 71% to increase self-worth & perceived functional ability.  PC   6. Patient will have met/partially met personal goal of: returning to ADLs with minimal pain and improved ability to use her shoulder  PC         Goals Key:  PC= progressing/continue; PM= partially met;        DC= discontinue    PLAN     Continue Plan of Care (POC) and progress per patient tolerance. See treatment section for details on planned progressions next session.      Reece Srivastava, PT

## 2022-02-16 ENCOUNTER — CLINICAL SUPPORT (OUTPATIENT)
Dept: REHABILITATION | Facility: HOSPITAL | Age: 22
End: 2022-02-16
Payer: COMMERCIAL

## 2022-02-16 DIAGNOSIS — M25.511 CHRONIC RIGHT SHOULDER PAIN: ICD-10-CM

## 2022-02-16 DIAGNOSIS — G89.29 CHRONIC RIGHT SHOULDER PAIN: ICD-10-CM

## 2022-02-16 DIAGNOSIS — M62.81 PROXIMAL MUSCLE WEAKNESS: ICD-10-CM

## 2022-02-16 PROCEDURE — 97110 THERAPEUTIC EXERCISES: CPT | Performed by: PHYSICAL THERAPIST

## 2022-02-16 NOTE — PROGRESS NOTES
[facility]   Physical Therapy Treatment Note     Name: Casandra [facility] Number: [id number]    Therapy Diagnosis:   Encounter Diagnoses   Name Primary?   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 Feels like she guards movements because it hurts when she does move it.     He/She was compliant with home exercise program.  Response to previous treatment: n/a  Functional change: n/a    Pain: 5/10     Location: right shoulder    OBJECTIVE     Objective Measures updated at progress report unless specified.       TREATMENT     CASANDRA received the treatments listed below:     MANUAL THERAPY TECHNIQUES including Joint mobilizations, Myofacial release and Soft tissue Mobilization were applied to right shoulder for 0 minutes.    Manual Intervention Performed Today    Soft Tissue Mobilization     Joint Mobilizations     Mobilization with movement          Functional Dry Needling       Plan for Next Visit: prn       THERAPEUTIC EXERCISES to develop  strength, endurance, ROM, flexibility, posture and core stabilization for 45 minutes including patient education, assessment, and the  following:    Intervention    Performed Today Sets/Reps/Resistance     active assited range of motion - mat slides - no incline  30x   active assited range of motion - mat slides incline   30x   external rotation isometrics  3x30s   Internal rotation isometrics  3x30s   Side lying external rotation  x 3x10 - 1#   theraband row  x 3x10 - Green Theraband    Biceps Curls     Prone 90 x 20x   Prone EXT x 20x   Prone row x 20x   Shoulder taps on wall x 20x each way, assist to get arm on wall   OH pulleys x 4 mins   Standing ER with band x YTB 20x               Plan for Next Visit:        PATIENT EDUCATION AND HOME EXERCISES     Home Exercises Provided and Patient Education Provided     Education provided: (included in home exercise program) minutes  Biomechanical implications of exercise    Written Home Exercises Provided: continue prior home exercise program   Exercises were reviewed and DAMARI was able to demonstrate them prior to the end of the session.  DAMARI demonstrated good  understanding of the education provided. See EMR under Patient Instructions for exercises provided during therapy sessions.      ASSESSMENT     Patient added more strengthening today to further improve shoulder function. Pt does have pain with all movements only in shoulder no further pain in chest / ribs. Pt educated to continue moving shoulder to decrease stiffness overall and begin graded exposure to improve pain tolerance.     DAMARI is progressing well towards her goals.   Pt prognosis is Good.     Pt will continue to benefit from skilled outpatient physical therapy to address the deficits listed in the problem list box on initial evaluation, provide pt/family education and to maximize pt's level of independence in the home and community environment.     Pt's spiritual, cultural and educational needs considered and pt agreeable to plan of care and goals.     Anticipated Barriers for therapy: chronicity and coping       Goals:     SHORT  TERM GOALS:  4 weeks 2/7/2022   1. Recent signs and systems trend is improving in order to progress towards LTG's.  PC   2. Patient will be independent with HEP in order to further progress and return to maximal function.  PC   3. Pain rating at Worst: 5/10 in order to progress towards increased independence with activity.  PC   4. Patient will be able to correct postural deviations in sitting and standing, to decrease pain and promote postural awareness for injury prevention.   PC      LONG TERM GOALS: 8 weeks 2/7/2022   1. Patient will return to normal ADL, recreational, and work related activities with less pain and limitation.   PC   2. Patient will improve AROM to stated goals in order to return to maximal functional potential.   PC   3. Patient will improve Strength to stated goals of appropriate musculature in order to improve functional independence.   PC   4. Pain Rating at Best: 1/10 to improve Quality of Life.   PC   5. Patient will meet predicted functional outcome (FOTO) score: 71% to increase self-worth & perceived functional ability.  PC   6. Patient will have met/partially met personal goal of: returning to ADLs with minimal pain and improved ability to use her shoulder  PC         Goals Key:  PC= progressing/continue; PM= partially met;        DC= discontinue    PLAN     Continue Plan of Care (POC) and progress per patient tolerance. See treatment section for details on planned progressions next session.      Luiz Castillo, PT

## 2022-02-21 ENCOUNTER — CLINICAL SUPPORT (OUTPATIENT)
Dept: REHABILITATION | Facility: HOSPITAL | Age: 22
End: 2022-02-21
Payer: COMMERCIAL

## 2022-02-21 DIAGNOSIS — M62.81 PROXIMAL MUSCLE WEAKNESS: ICD-10-CM

## 2022-02-21 DIAGNOSIS — M25.511 CHRONIC RIGHT SHOULDER PAIN: Primary | ICD-10-CM

## 2022-02-21 DIAGNOSIS — G89.29 CHRONIC RIGHT SHOULDER PAIN: Primary | ICD-10-CM

## 2022-02-21 PROCEDURE — 97110 THERAPEUTIC EXERCISES: CPT

## 2022-02-21 NOTE — PROGRESS NOTES
[facility]   Physical Therapy Treatment Note     Name: Casandra [facility] Number: [id number]    Therapy Diagnosis:   Encounter Diagnoses   Name Primary?    Chronic right shoulder pain Yes    Proximal muscle weakness      Physician: Cierra Farrell PA-C    Visit Date: 2/21/2022       Physician: Cierra Farrell PA-C     Physician Orders: PT Eval and Treat  Medical Diagnosis from Referral:   M21.821 (ICD-10-CM) - Other specified acquired deformities of right upper arm   M25.511 (ICD-10-CM) - Pain in right shoulder   G89.29 (ICD-10-CM) - Other chronic pain   S43.491A (ICD-10-CM) - Other sprain of right shoulder joint, initial encounter      Evaluation Date: 2/7/2022  Authorization Period Expiration: 12/31/2021  Plan of Care Expiration: 04/04/2022  Progress Note Due: 03/09/2022  Visit # / Visits authorized: 4/20 (+ Eval)  FOTO: 1/ 3      Precautions: Standard    PTA Visit #: 0/5     Time In: 12:25 pm  Time Out: 1:00 pm   Total Billable Time: 30 minutes    SUBJECTIVE     Patient reports: right shoulder pain with movement. Feels like she guards movements because it hurts when she does move it.     He/She was compliant with home exercise program.  Response to previous treatment: n/a  Functional change: n/a    Pain: 5/10     Location: right shoulder    OBJECTIVE     Objective Measures updated at progress report unless specified.       TREATMENT     CASANDRA received the treatments listed below:     MANUAL THERAPY TECHNIQUES including Joint mobilizations, Myofacial release and Soft tissue Mobilization were applied to right shoulder for 0 minutes.    Manual Intervention Performed Today    Soft Tissue Mobilization     Joint Mobilizations     Mobilization with movement          Functional Dry Needling       Plan for Next Visit: prn       THERAPEUTIC EXERCISES to develop  strength, endurance, ROM, flexibility, posture and core stabilization for 30 minutes including patient education, assessment,  and the following:    Intervention    Performed Today Sets/Reps/Resistance     active assited range of motion - mat slides - no incline  30x   active assited range of motion - mat slides incline   30x   external rotation isometrics x 3x30s   Internal rotation isometrics  3x30s   Side lying external rotation  x 3x10 - 1#   theraband row  x 3x10 - Green Theraband    Biceps Curls     Prone 90 x 30x   Prone EXT x 30x   Prone row x 30x   Shoulder taps on wall x 20x each way, assist to get arm on wall   OH pulleys x 4 mins   Standing ER with band x YTB 20x               Plan for Next Visit:        PATIENT EDUCATION AND HOME EXERCISES     Home Exercises Provided and Patient Education Provided     Education provided: (included in home exercise program) minutes  Biomechanical implications of exercise    Written Home Exercises Provided: continue prior home exercise program   Exercises were reviewed and DAMARI was able to demonstrate them prior to the end of the session.  DAMARI demonstrated good  understanding of the education provided. See EMR under Patient Instructions for exercises provided during therapy sessions.      ASSESSMENT     Patient added more strengthening today to further improve shoulder function. Pt does have pain with all movements in both her shoulder and her right upper chest. Pt educated to continue moving shoulder to decrease stiffness overall and begin graded exposure to improve pain tolerance. Discussed potential for referral after the next two visits if no change in status.    DAMARI is progressing well towards her goals.   Pt prognosis is Good.     Pt will continue to benefit from skilled outpatient physical therapy to address the deficits listed in the problem list box on initial evaluation, provide pt/family education and to maximize pt's level of independence in the home and community environment.     Pt's spiritual, cultural and educational needs considered and pt agreeable to plan of care and  goals.     Anticipated Barriers for therapy: chronicity and coping       Goals:     SHORT TERM GOALS:  4 weeks 2/7/2022   1. Recent signs and systems trend is improving in order to progress towards LTG's.  PC   2. Patient will be independent with HEP in order to further progress and return to maximal function.  PC   3. Pain rating at Worst: 5/10 in order to progress towards increased independence with activity.  PC   4. Patient will be able to correct postural deviations in sitting and standing, to decrease pain and promote postural awareness for injury prevention.   PC      LONG TERM GOALS: 8 weeks 2/7/2022   1. Patient will return to normal ADL, recreational, and work related activities with less pain and limitation.   PC   2. Patient will improve AROM to stated goals in order to return to maximal functional potential.   PC   3. Patient will improve Strength to stated goals of appropriate musculature in order to improve functional independence.   PC   4. Pain Rating at Best: 1/10 to improve Quality of Life.   PC   5. Patient will meet predicted functional outcome (FOTO) score: 71% to increase self-worth & perceived functional ability.  PC   6. Patient will have met/partially met personal goal of: returning to ADLs with minimal pain and improved ability to use her shoulder  PC         Goals Key:  PC= progressing/continue; PM= partially met;        DC= discontinue    PLAN     Continue Plan of Care (POC) and progress per patient tolerance. See treatment section for details on planned progressions next session.      Reece Srivastava, PT

## 2022-02-25 ENCOUNTER — CLINICAL SUPPORT (OUTPATIENT)
Dept: REHABILITATION | Facility: HOSPITAL | Age: 22
End: 2022-02-25
Payer: COMMERCIAL

## 2022-02-25 DIAGNOSIS — M62.81 PROXIMAL MUSCLE WEAKNESS: ICD-10-CM

## 2022-02-25 DIAGNOSIS — G89.29 CHRONIC RIGHT SHOULDER PAIN: Primary | ICD-10-CM

## 2022-02-25 DIAGNOSIS — M25.511 CHRONIC RIGHT SHOULDER PAIN: Primary | ICD-10-CM

## 2022-02-25 PROCEDURE — 97110 THERAPEUTIC EXERCISES: CPT

## 2022-02-25 PROCEDURE — 97140 MANUAL THERAPY 1/> REGIONS: CPT

## 2022-02-25 NOTE — PROGRESS NOTES
[facility]   Physical Therapy Treatment Note     Name: Casandra [facility] Number: [id number]    Therapy Diagnosis:   Encounter Diagnoses   Name Primary?   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   M25.511 (ICD-10-CM) - Pain in right shoulder   G89.29 (ICD-10-CM) - Other chronic pain   S43.491A (ICD-10-CM) - Other sprain of right shoulder joint, initial encounter      Evaluation Date: 2/7/2022  Authorization Period Expiration: 12/31/2021  Plan of Care Expiration: 04/04/2022  Progress Note Due: 03/09/2022  Visit # / Visits authorized: 5/20 (+ Eval)  FOTO: 1/ 3      Precautions: Standard    PTA Visit #: 0/5     Time In: 1:02 pm  Time Out: 1:47 pm   Total Billable Time: 40 minutes    SUBJECTIVE     Patient reports: patient reports that her pain has decreased some - notices that she has been able to move it a bit more.     He/She was compliant with home exercise program.  Response to previous treatment: n/a  Functional change: n/a    Pain: 5/10     Location: right shoulder    OBJECTIVE     Objective Measures updated at progress report unless specified.       TREATMENT     CASANDRA received the treatments listed below:     MANUAL THERAPY TECHNIQUES including Joint mobilizations, Myofacial release and Soft tissue Mobilization were applied to right shoulder for 8 minutes.    Manual Intervention Performed Today    Soft Tissue Mobilization     Joint Mobilizations x GH Mobilizations, Scapular mobilization, Passive range of motion all planes   Mobilization with movement          Functional Dry Needling       Plan for Next Visit: prn       THERAPEUTIC EXERCISES to develop  strength, endurance, ROM, flexibility, posture and  core stabilization for 32 minutes including patient education, assessment, and the following:    Intervention    Performed Today Sets/Reps/Resistance     active assited range of motion - mat slides - no incline  30x   active assited range of motion - mat slides incline   30x   external rotation isometrics x 3x30s   Internal rotation isometrics  3x30s   Side lying external rotation  x 3x10 - 1#   theraband row  x 3x10 - Green Theraband    Biceps Curls     Prone 90 x 30x   Prone EXT x 30x   Prone row x 30x - 5#   Shoulder taps on wall x 20x each way, assist to get arm on wall   OH pulleys  4 mins   Standing ER with band x YTB 20x               Plan for Next Visit:        PATIENT EDUCATION AND HOME EXERCISES     Home Exercises Provided and Patient Education Provided     Education provided: (included in home exercise program) minutes  Biomechanical implications of exercise    Written Home Exercises Provided: continue prior home exercise program   Exercises were reviewed and DAMARI was able to demonstrate them prior to the end of the session.  DAMARI demonstrated good  understanding of the education provided. See EMR under Patient Instructions for exercises provided during therapy sessions.      ASSESSMENT     Patient tolerated the addition of load during prone rows with reports of only muscle fatigue. Patient continues to be limited by pain with active range of motion. Scapular mobility improved after manual therapy. Continue to progress plan of care as tolerated with an emphasis on improving strength.    DAMARI is progressing well towards her goals.   Pt prognosis is Good.     Pt will continue to benefit from skilled outpatient physical therapy to address the deficits listed in the problem list box on initial evaluation, provide pt/family education and to maximize pt's level of independence in the home and community environment.     Pt's spiritual, cultural and educational needs considered and pt agreeable to plan of  care and goals.     Anticipated Barriers for therapy: chronicity and coping       Goals:     SHORT TERM GOALS:  4 weeks 2/7/2022   1. Recent signs and systems trend is improving in order to progress towards LTG's.  PC   2. Patient will be independent with HEP in order to further progress and return to maximal function.  PC   3. Pain rating at Worst: 5/10 in order to progress towards increased independence with activity.  PC   4. Patient will be able to correct postural deviations in sitting and standing, to decrease pain and promote postural awareness for injury prevention.   PC      LONG TERM GOALS: 8 weeks 2/7/2022   1. Patient will return to normal ADL, recreational, and work related activities with less pain and limitation.   PC   2. Patient will improve AROM to stated goals in order to return to maximal functional potential.   PC   3. Patient will improve Strength to stated goals of appropriate musculature in order to improve functional independence.   PC   4. Pain Rating at Best: 1/10 to improve Quality of Life.   PC   5. Patient will meet predicted functional outcome (FOTO) score: 71% to increase self-worth & perceived functional ability.  PC   6. Patient will have met/partially met personal goal of: returning to ADLs with minimal pain and improved ability to use her shoulder  PC         Goals Key:  PC= progressing/continue; PM= partially met;        DC= discontinue    PLAN     Continue Plan of Care (POC) and progress per patient tolerance. See treatment section for details on planned progressions next session.      Reece Srivastava, PT

## 2022-03-02 ENCOUNTER — CLINICAL SUPPORT (OUTPATIENT)
Dept: REHABILITATION | Facility: HOSPITAL | Age: 22
End: 2022-03-02
Payer: COMMERCIAL

## 2022-03-02 DIAGNOSIS — M62.81 PROXIMAL MUSCLE WEAKNESS: ICD-10-CM

## 2022-03-02 DIAGNOSIS — M25.511 CHRONIC RIGHT SHOULDER PAIN: Primary | ICD-10-CM

## 2022-03-02 DIAGNOSIS — G89.29 CHRONIC RIGHT SHOULDER PAIN: Primary | ICD-10-CM

## 2022-03-02 PROCEDURE — 97110 THERAPEUTIC EXERCISES: CPT | Performed by: PHYSICAL THERAPIST

## 2022-03-02 PROCEDURE — 97140 MANUAL THERAPY 1/> REGIONS: CPT | Performed by: PHYSICAL THERAPIST

## 2022-03-02 NOTE — PROGRESS NOTES
[facility]   Physical Therapy Treatment Note     Name: Casandra [facility] Number: [id number]    Therapy Diagnosis:   Encounter Diagnoses   Name Primary?   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 Able to move it more than last week    He/She was compliant with home exercise program.  Response to previous treatment: n/a  Functional change: n/a    Pain: 5/10     Location: right shoulder    OBJECTIVE     Objective Measures updated at progress report unless specified.       TREATMENT     CASANDRA received the treatments listed below:     MANUAL THERAPY TECHNIQUES including Joint mobilizations, Myofacial release and Soft tissue Mobilization were applied to right shoulder for 8 minutes.    Manual Intervention Performed Today    Soft Tissue Mobilization     Joint Mobilizations x GH Mobilizations, Scapular mobilization, Passive range of motion all planes   Mobilization with movement          Functional Dry Needling       Plan for Next Visit: prn       THERAPEUTIC EXERCISES to develop  strength, endurance, ROM, flexibility, posture and core stabilization for 38  minutes including patient education, assessment, and the following:    Intervention    Performed Today Sets/Reps/Resistance     active assited range of motion - mat slides - no incline  30x   active assited range of motion - mat slides incline   30x   external rotation isometrics x 3x30s   Internal rotation isometrics  3x30s   Side lying external rotation  x 3x10 - 1#   theraband row  x 3x10 - Green Theraband    Biceps Curls     Prone 90 x 30x 1#   Prone EXT x 30x 1#   Prone row x 30x - 5# KB   Shoulder taps on mat x On mat 20x each   Standing ER with band x YTB 20x   ube X 3 mins foward   Arm raises YTB x Elbow bent: 4x5     Plan for Next Visit:        PATIENT EDUCATION AND HOME EXERCISES     Home Exercises Provided and Patient Education Provided     Education provided: (included in home exercise program) minutes  Biomechanical implications of exercise    Written Home Exercises Provided: continue prior home exercise program   Exercises were reviewed and DAMARI was able to demonstrate them prior to the end of the session.  DAMARI demonstrated good  understanding of the education provided. See EMR under Patient Instructions for exercises provided during therapy sessions.      ASSESSMENT     Pt presents with improved motion and strength since previous week. Increase in fatigue during session but no sharp pains. Pt educated to continue HEP without sharp pain. Pt will continue to benefit from flexibility and strengthening of right shoulder to normalize ADLs.    DAMARI is progressing well towards her goals.   Pt prognosis is Good.     Pt will continue to benefit from skilled outpatient physical therapy to address the deficits listed in the problem list box on initial evaluation, provide pt/family education and to maximize pt's level of independence in the home and community environment.     Pt's spiritual, cultural and educational needs considered and pt agreeable to plan of care and goals.     Anticipated Barriers for  therapy: chronicity and coping       Goals:     SHORT TERM GOALS:  4 weeks 2/7/2022   1. Recent signs and systems trend is improving in order to progress towards LTG's.  PC   2. Patient will be independent with HEP in order to further progress and return to maximal function.  PC   3. Pain rating at Worst: 5/10 in order to progress towards increased independence with activity.  PC   4. Patient will be able to correct postural deviations in sitting and standing, to decrease pain and promote postural awareness for injury prevention.   PC      LONG TERM GOALS: 8 weeks 2/7/2022   1. Patient will return to normal ADL, recreational, and work related activities with less pain and limitation.   PC   2. Patient will improve AROM to stated goals in order to return to maximal functional potential.   PC   3. Patient will improve Strength to stated goals of appropriate musculature in order to improve functional independence.   PC   4. Pain Rating at Best: 1/10 to improve Quality of Life.   PC   5. Patient will meet predicted functional outcome (FOTO) score: 71% to increase self-worth & perceived functional ability.  PC   6. Patient will have met/partially met personal goal of: returning to ADLs with minimal pain and improved ability to use her shoulder  PC         Goals Key:  PC= progressing/continue; PM= partially met;        DC= discontinue    PLAN     Continue Plan of Care (POC) and progress per patient tolerance. See treatment section for details on planned progressions next session.      Luiz Castillo, PT

## 2022-03-04 ENCOUNTER — CLINICAL SUPPORT (OUTPATIENT)
Dept: REHABILITATION | Facility: HOSPITAL | Age: 22
End: 2022-03-04
Payer: COMMERCIAL

## 2022-03-04 DIAGNOSIS — M25.511 CHRONIC RIGHT SHOULDER PAIN: Primary | ICD-10-CM

## 2022-03-04 DIAGNOSIS — G89.29 CHRONIC RIGHT SHOULDER PAIN: Primary | ICD-10-CM

## 2022-03-04 DIAGNOSIS — M62.81 PROXIMAL MUSCLE WEAKNESS: ICD-10-CM

## 2022-03-04 PROCEDURE — 97140 MANUAL THERAPY 1/> REGIONS: CPT

## 2022-03-04 PROCEDURE — 97110 THERAPEUTIC EXERCISES: CPT

## 2022-03-04 NOTE — PROGRESS NOTES
[facility]   Physical Therapy Treatment Note     Name: Casandra [facility] Number: [id number]    Therapy Diagnosis:   Encounter Diagnoses   Name Primary?   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   M25.511 (ICD-10-CM) - Pain in right shoulder   G89.29 (ICD-10-CM) - Other chronic pain   S43.491A (ICD-10-CM) - Other sprain of right shoulder joint, initial encounter      Evaluation Date: 2/7/2022  Authorization Period Expiration: 12/31/2021  Plan of Care Expiration: 04/04/2022  Progress Note Due: 04/02/2022  Visit # / Visits authorized: 7/20 (+ Eval)  FOTO: 1/ 3      Precautions: Standard    PTA Visit #: 0/5     Time In: 11:45 am  Time Out: 12:30 pm   Total Billable Time: 38 minutes    SUBJECTIVE     Patient reports: feels as though we have made some progress with active range of motion and strength. Slow but sure    He/She was compliant with home exercise program.  Response to previous treatment: n/a  Functional change: n/a    Pain: 3/10     Location: right shoulder    OBJECTIVE     Objective Measures updated at progress report unless specified.       TREATMENT     CASANDRA received the treatments listed below:     MANUAL THERAPY TECHNIQUES including Joint mobilizations, Myofacial release and Soft tissue Mobilization were applied to right shoulder for 8 minutes.    Manual Intervention Performed Today    Soft Tissue Mobilization     Joint Mobilizations x GH Mobilizations, Scapular mobilization, Passive range of motion all planes   Mobilization with movement          Functional Dry Needling       Plan for Next Visit: prn       THERAPEUTIC EXERCISES to develop  strength, endurance, ROM, flexibility, posture and core  stabilization for 30 minutes including patient education, assessment, and the following:    Intervention    Performed Today Sets/Reps/Resistance     UBE x 4 minutes - Level 1 - forward   external rotation isometrics x 3x30s   Side lying external rotation  x 3x10 - 1#   theraband row  x 3x10 - Green Theraband    Prone 90 x 30x 1#   Prone EXT x 30x 1#   Prone row x 30x - 5# KB   Shoulder taps on mat x On mat 20x each   Standing ER with band x YTB 20x   Arm raises Yellow Theraband - control eccentric x Elbow bent: 3x5   theraband punches - single arm  x 3x10 - Red Theraband      Plan for Next Visit:        PATIENT EDUCATION AND HOME EXERCISES     Home Exercises Provided and Patient Education Provided     Education provided: (included in home exercise program) minutes  Biomechanical implications of exercise    Written Home Exercises Provided: continue prior home exercise program   Exercises were reviewed and DAMARI was able to demonstrate them prior to the end of the session.  DAMARI demonstrated good  understanding of the education provided. See EMR under Patient Instructions for exercises provided during therapy sessions.      ASSESSMENT     Pt presents with improved motion and strength since previous week. Addition of theraband punches in order to improve shoulder strength. No sharp pains during today's session. Re-assess patient next visit for range of motion and strength. Continue to progress plan of care as tolerated.    DAMARI is progressing well towards her goals.   Pt prognosis is Good.     Pt will continue to benefit from skilled outpatient physical therapy to address the deficits listed in the problem list box on initial evaluation, provide pt/family education and to maximize pt's level of independence in the home and community environment.     Pt's spiritual, cultural and educational needs considered and pt agreeable to plan of care and goals.     Anticipated Barriers for therapy: chronicity and coping        Goals:     SHORT TERM GOALS:  4 weeks 2/7/2022   1. Recent signs and systems trend is improving in order to progress towards LTG's.  PC   2. Patient will be independent with HEP in order to further progress and return to maximal function.  PC   3. Pain rating at Worst: 5/10 in order to progress towards increased independence with activity.  PC   4. Patient will be able to correct postural deviations in sitting and standing, to decrease pain and promote postural awareness for injury prevention.   PC      LONG TERM GOALS: 8 weeks 2/7/2022   1. Patient will return to normal ADL, recreational, and work related activities with less pain and limitation.   PC   2. Patient will improve AROM to stated goals in order to return to maximal functional potential.   PC   3. Patient will improve Strength to stated goals of appropriate musculature in order to improve functional independence.   PC   4. Pain Rating at Best: 1/10 to improve Quality of Life.   PC   5. Patient will meet predicted functional outcome (FOTO) score: 71% to increase self-worth & perceived functional ability.  PC   6. Patient will have met/partially met personal goal of: returning to ADLs with minimal pain and improved ability to use her shoulder  PC         Goals Key:  PC= progressing/continue; PM= partially met;        DC= discontinue    PLAN     Continue Plan of Care (POC) and progress per patient tolerance. See treatment section for details on planned progressions next session.      Reece Srivastava, PT

## 2022-03-07 ENCOUNTER — CLINICAL SUPPORT (OUTPATIENT)
Dept: REHABILITATION | Facility: HOSPITAL | Age: 22
End: 2022-03-07
Payer: COMMERCIAL

## 2022-03-07 DIAGNOSIS — G89.29 CHRONIC RIGHT SHOULDER PAIN: Primary | ICD-10-CM

## 2022-03-07 DIAGNOSIS — M25.511 CHRONIC RIGHT SHOULDER PAIN: Primary | ICD-10-CM

## 2022-03-07 DIAGNOSIS — M62.81 PROXIMAL MUSCLE WEAKNESS: ICD-10-CM

## 2022-03-07 PROCEDURE — 97110 THERAPEUTIC EXERCISES: CPT | Performed by: PHYSICAL THERAPIST

## 2022-03-07 PROCEDURE — 97140 MANUAL THERAPY 1/> REGIONS: CPT | Performed by: PHYSICAL THERAPIST

## 2022-03-07 NOTE — PROGRESS NOTES
[facility]   Physical Therapy Treatment Note     Name: Casandra [facility] Number: [id number]    Therapy Diagnosis:   Encounter Diagnoses   Name Primary?   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 Pt feels improved motion and pain today.     She was compliant with home exercise program.  Response to previous treatment: n/a  Functional change: n/a    Pain: 3/10     Location: right shoulder    OBJECTIVE     Objective Measures updated at progress report unless specified.       TREATMENT     CASANDRA received the treatments listed below:     MANUAL THERAPY TECHNIQUES including Joint mobilizations, Myofacial release and Soft tissue Mobilization were applied to right shoulder for 10 minutes.    Manual Intervention Performed Today    Soft Tissue Mobilization     Joint Mobilizations x GH Mobilizations, Scapular mobilization, Passive range of motion all planes   Mobilization with movement          Functional Dry Needling       Plan for Next Visit: prn       THERAPEUTIC EXERCISES to develop  strength, endurance, ROM, flexibility, posture and core stabilization  for 30 minutes including patient education, assessment, and the following:    Intervention    Performed Today Sets/Reps/Resistance     UBE x 4 minutes - Level 1 - forward   external rotation isometrics  3x30s   Side lying external rotation  x 3x10 - 1#   theraband row  x 3x10 - Green Theraband    Prone 90 x 30x 1#   Prone EXT x 30x 1#   Prone row x 30x - 5# KB   Wall Push ups x 20x   Standing ER with band x YTB 20x   Arm raises Yellow Theraband - control eccentric x Elbow bent: 3x5   theraband punches - single arm   3x10 - Red Theraband      Plan for Next Visit:        PATIENT EDUCATION AND HOME EXERCISES     Home Exercises Provided and Patient Education Provided     Education provided: (included in home exercise program) minutes  Biomechanical implications of exercise    Written Home Exercises Provided: continue prior home exercise program   Exercises were reviewed and DAMARI was able to demonstrate them prior to the end of the session.  DAMARI demonstrated good  understanding of the education provided. See EMR under Patient Instructions for exercises provided during therapy sessions.      ASSESSMENT     Pt tolerated full session without significant pain overall. Pt feels improving motion but still has weakness along shoulder external rotators. Pt feels improving motion overall and feels progress is slow but steady.    DAMARI is progressing well towards her goals.   Pt prognosis is Good.     Pt will continue to benefit from skilled outpatient physical therapy to address the deficits listed in the problem list box on initial evaluation, provide pt/family education and to maximize pt's level of independence in the home and community environment.     Pt's spiritual, cultural and educational needs considered and pt agreeable to plan of care and goals.     Anticipated Barriers for therapy: chronicity and coping       Goals:     SHORT TERM GOALS:  4 weeks 2/7/2022   1. Recent signs and systems trend is improving in order  to progress towards LTG's.  PC   2. Patient will be independent with HEP in order to further progress and return to maximal function.  PC   3. Pain rating at Worst: 5/10 in order to progress towards increased independence with activity.  PC   4. Patient will be able to correct postural deviations in sitting and standing, to decrease pain and promote postural awareness for injury prevention.   PC      LONG TERM GOALS: 8 weeks 2/7/2022   1. Patient will return to normal ADL, recreational, and work related activities with less pain and limitation.   PC   2. Patient will improve AROM to stated goals in order to return to maximal functional potential.   PC   3. Patient will improve Strength to stated goals of appropriate musculature in order to improve functional independence.   PC   4. Pain Rating at Best: 1/10 to improve Quality of Life.   PC   5. Patient will meet predicted functional outcome (FOTO) score: 71% to increase self-worth & perceived functional ability.  PC   6. Patient will have met/partially met personal goal of: returning to ADLs with minimal pain and improved ability to use her shoulder  PC         Goals Key:  PC= progressing/continue; PM= partially met;        DC= discontinue    PLAN     Continue Plan of Care (POC) and progress per patient tolerance. See treatment section for details on planned progressions next session.      Luiz Castillo, PT

## 2022-03-09 ENCOUNTER — CLINICAL SUPPORT (OUTPATIENT)
Dept: REHABILITATION | Facility: HOSPITAL | Age: 22
End: 2022-03-09
Payer: COMMERCIAL

## 2022-03-09 DIAGNOSIS — M25.511 CHRONIC RIGHT SHOULDER PAIN: Primary | ICD-10-CM

## 2022-03-09 DIAGNOSIS — M62.81 PROXIMAL MUSCLE WEAKNESS: ICD-10-CM

## 2022-03-09 DIAGNOSIS — G89.29 CHRONIC RIGHT SHOULDER PAIN: Primary | ICD-10-CM

## 2022-03-09 PROCEDURE — 97110 THERAPEUTIC EXERCISES: CPT | Performed by: PHYSICAL THERAPIST

## 2022-03-09 NOTE — PROGRESS NOTES
[facility]   Physical Therapy Treatment Note     Name: Casandra [facility] Number: [id number]    Therapy Diagnosis:   Encounter Diagnoses   Name Primary?   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 Still very weak but feels motion getting better.    She was compliant with home exercise program.  Response to previous treatment: n/a  Functional change: n/a    Pain: 3/10     Location: right shoulder    OBJECTIVE     Objective Measures updated at progress report unless specified.       TREATMENT     CASANDRA received the treatments listed below:     MANUAL THERAPY TECHNIQUES including Joint mobilizations, Myofacial release and Soft tissue Mobilization were applied to right shoulder for 10 minutes.    Manual Intervention Performed Today    Soft Tissue Mobilization     Joint Mobilizations x GH Mobilizations, Scapular mobilization, Passive range of motion all planes   Mobilization with movement          Functional Dry Needling       Plan for Next Visit: prn       THERAPEUTIC EXERCISES to develop  strength, endurance, ROM, flexibility, posture and core stabilization for 30 minutes including  patient education, assessment, and the following:    Intervention    Performed Today Sets/Reps/Resistance     UBE x 4 minutes - Level 1 - forward   external rotation isometrics  3x30s   Side lying external rotation  x 3x10 - 1#   theraband row  x 3x10 - Green Theraband    Prone 90 x 30x 5# KB   Prone EXT x 30x 1#   Prone row x 30x - 5# KB   Wall Push ups x 20x   Standing ER with band x YTB 20x   Arm raises Yellow Theraband - control eccentric x Elbow bent: 3x5   Shoulder taps x On mat 20x each   UE series x YTB 2x6        theraband punches - single arm   3x10 - Red Theraband      Plan for Next Visit:        PATIENT EDUCATION AND HOME EXERCISES     Home Exercises Provided and Patient Education Provided     Education provided: (included in home exercise program) minutes  Biomechanical implications of exercise    Written Home Exercises Provided: continue prior home exercise program   Exercises were reviewed and DAMARI was able to demonstrate them prior to the end of the session.  DAMARI demonstrated good  understanding of the education provided. See EMR under Patient Instructions for exercises provided during therapy sessions.      ASSESSMENT     Increased RTC strengthening today with improving motions. Pt able to tolerate full session with no sharp pains. Pt has difficulty getting arm up up to shoulder height but no further discomfort with therex.     DAMARI is progressing well towards her goals.   Pt prognosis is Good.     Pt will continue to benefit from skilled outpatient physical therapy to address the deficits listed in the problem list box on initial evaluation, provide pt/family education and to maximize pt's level of independence in the home and community environment.     Pt's spiritual, cultural and educational needs considered and pt agreeable to plan of care and goals.     Anticipated Barriers for therapy: chronicity and coping       Goals:     SHORT TERM GOALS:  4 weeks 2/7/2022   1. Recent signs and  systems trend is improving in order to progress towards LTG's.  PC   2. Patient will be independent with HEP in order to further progress and return to maximal function.  PC   3. Pain rating at Worst: 5/10 in order to progress towards increased independence with activity.  PC   4. Patient will be able to correct postural deviations in sitting and standing, to decrease pain and promote postural awareness for injury prevention.   PC      LONG TERM GOALS: 8 weeks 2/7/2022   1. Patient will return to normal ADL, recreational, and work related activities with less pain and limitation.   PC   2. Patient will improve AROM to stated goals in order to return to maximal functional potential.   PC   3. Patient will improve Strength to stated goals of appropriate musculature in order to improve functional independence.   PC   4. Pain Rating at Best: 1/10 to improve Quality of Life.   PC   5. Patient will meet predicted functional outcome (FOTO) score: 71% to increase self-worth & perceived functional ability.  PC   6. Patient will have met/partially met personal goal of: returning to ADLs with minimal pain and improved ability to use her shoulder  PC         Goals Key:  PC= progressing/continue; PM= partially met;        DC= discontinue    PLAN     Continue Plan of Care (POC) and progress per patient tolerance. See treatment section for details on planned progressions next session.      Luiz Castillo, PT

## 2022-03-14 ENCOUNTER — CLINICAL SUPPORT (OUTPATIENT)
Dept: REHABILITATION | Facility: HOSPITAL | Age: 22
End: 2022-03-14
Payer: COMMERCIAL

## 2022-03-14 DIAGNOSIS — M62.81 PROXIMAL MUSCLE WEAKNESS: ICD-10-CM

## 2022-03-14 DIAGNOSIS — M25.511 CHRONIC RIGHT SHOULDER PAIN: Primary | ICD-10-CM

## 2022-03-14 DIAGNOSIS — G89.29 CHRONIC RIGHT SHOULDER PAIN: Primary | ICD-10-CM

## 2022-03-14 PROCEDURE — 97110 THERAPEUTIC EXERCISES: CPT

## 2022-03-14 NOTE — PROGRESS NOTES
[facility]   Physical Therapy Treatment Note     Name: Casandra [facility] Number: [id number]    Therapy Diagnosis:   Encounter Diagnoses   Name Primary?   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   M25.511 (ICD-10-CM) - Pain in right shoulder   G89.29 (ICD-10-CM) - Other chronic pain   S43.491A (ICD-10-CM) - Other sprain of right shoulder joint, initial encounter      Evaluation Date: 2/7/2022  Authorization Period Expiration: 12/31/2021  Plan of Care Expiration: 04/04/2022  Progress Note Due: 04/02/2022  Visit # / Visits authorized: 10/20 (+ Eval)  FOTO: 1/ 3      Precautions: Standard    PTA Visit #: 0/5     Time In: 12:20 pm  Time Out: 1:05 pm   Total Billable Time: 40 minutes    SUBJECTIVE     Patient reports: minor improvements with overall symptoms - reports some paraesthesia in he right arm.     She was compliant with home exercise program.  Response to previous treatment: n/a  Functional change: n/a    Pain: 3/10     Location: right shoulder    OBJECTIVE     Objective Measures updated at progress report unless specified.       TREATMENT     CASANDRA received the treatments listed below:     MANUAL THERAPY TECHNIQUES including Joint mobilizations, Myofacial release and Soft tissue Mobilization were applied to right shoulder for 0 minutes.    Manual Intervention Performed Today    Soft Tissue Mobilization     Joint Mobilizations  GH Mobilizations, Scapular mobilization, Passive range of motion all planes   Mobilization with movement          Functional Dry Needling       Plan for Next Visit: prn       THERAPEUTIC EXERCISES to develop  strength, endurance, ROM, flexibility, posture and core stabilization for  40 minutes including patient education, assessment, and the following:    Intervention    Performed Today Sets/Reps/Resistance     UBE x 6 minutes - Level 1 - forward   external rotation isometrics  3x30s   Side lying external rotation  x 3x10 - 1#   Cable row  x 3x10 - 20#   Cable Extensions x 3x10 - 20#   Prone 90 x 30x 5# KB   Prone EXT x 30x 1#   Prone row x 30x - 5# KB   Wall Push ups x 20x   Supine ABCs x 1x -1#   Standing ER with band x YTB 20x   Arm raises Yellow Theraband - control eccentric x Elbow bent: 3x5   Shoulder taps x On mat 20x each   UE series x YTB 2x6        theraband punches - single arm   3x10 - Red Theraband      Plan for Next Visit:        PATIENT EDUCATION AND HOME EXERCISES     Home Exercises Provided and Patient Education Provided     Education provided: (included in home exercise program) minutes  Biomechanical implications of exercise    Written Home Exercises Provided: continue prior home exercise program   Exercises were reviewed and DAMARI was able to demonstrate them prior to the end of the session.  DAMARI demonstrated good  understanding of the education provided. See EMR under Patient Instructions for exercises provided during therapy sessions.      ASSESSMENT     Increased RTC strengthening today with improving motions. Addition of cable extensions in order to improve rotator cuff and scapular stability. Patient tolerated increased resistance used with Rows. Continue to progress plan of care as tolerated as movement begins to improve.    DAMARI is progressing well towards her goals.   Pt prognosis is Good.     Pt will continue to benefit from skilled outpatient physical therapy to address the deficits listed in the problem list box on initial evaluation, provide pt/family education and to maximize pt's level of independence in the home and community environment.     Pt's spiritual, cultural and educational needs considered and pt agreeable to plan of care and goals.      Anticipated Barriers for therapy: chronicity and coping       Goals:     SHORT TERM GOALS:  4 weeks 2/7/2022   1. Recent signs and systems trend is improving in order to progress towards LTG's.  PC   2. Patient will be independent with HEP in order to further progress and return to maximal function.  PC   3. Pain rating at Worst: 5/10 in order to progress towards increased independence with activity.  PC   4. Patient will be able to correct postural deviations in sitting and standing, to decrease pain and promote postural awareness for injury prevention.   PC      LONG TERM GOALS: 8 weeks 2/7/2022   1. Patient will return to normal ADL, recreational, and work related activities with less pain and limitation.   PC   2. Patient will improve AROM to stated goals in order to return to maximal functional potential.   PC   3. Patient will improve Strength to stated goals of appropriate musculature in order to improve functional independence.   PC   4. Pain Rating at Best: 1/10 to improve Quality of Life.   PC   5. Patient will meet predicted functional outcome (FOTO) score: 71% to increase self-worth & perceived functional ability.  PC   6. Patient will have met/partially met personal goal of: returning to ADLs with minimal pain and improved ability to use her shoulder  PC         Goals Key:  PC= progressing/continue; PM= partially met;        DC= discontinue    PLAN     Continue Plan of Care (POC) and progress per patient tolerance. See treatment section for details on planned progressions next session.      Reece Srivastava, PT

## 2022-03-16 ENCOUNTER — CLINICAL SUPPORT (OUTPATIENT)
Dept: REHABILITATION | Facility: HOSPITAL | Age: 22
End: 2022-03-16
Payer: COMMERCIAL

## 2022-03-16 DIAGNOSIS — G89.29 CHRONIC RIGHT SHOULDER PAIN: Primary | ICD-10-CM

## 2022-03-16 DIAGNOSIS — M62.81 PROXIMAL MUSCLE WEAKNESS: ICD-10-CM

## 2022-03-16 DIAGNOSIS — M25.511 CHRONIC RIGHT SHOULDER PAIN: Primary | ICD-10-CM

## 2022-03-16 PROCEDURE — 97110 THERAPEUTIC EXERCISES: CPT | Performed by: PHYSICAL THERAPIST

## 2022-03-16 NOTE — PROGRESS NOTES
[facility]   Physical Therapy Treatment Note     Name: Casandra [facility] Number: [id number]    Therapy Diagnosis:   Encounter Diagnoses   Name Primary?   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 Does have pain with movements but no sharp, acute pains.      She was compliant with home exercise program.  Response to previous treatment: n/a  Functional change: n/a    Pain: 3/10     Location: right shoulder    OBJECTIVE     Objective Measures updated at progress report unless specified.       TREATMENT     CASANDRA received the treatments listed below:     MANUAL THERAPY TECHNIQUES including Joint mobilizations, Myofacial release and Soft tissue Mobilization were applied to right shoulder for 0 minutes.    Manual Intervention Performed Today    Soft Tissue Mobilization     Joint Mobilizations  GH Mobilizations, Scapular mobilization, Passive range of motion all planes   Mobilization with movement          Functional Dry Needling       Plan for Next Visit: prn       THERAPEUTIC EXERCISES to develop  strength, endurance, ROM, flexibility, posture and  core stabilization for 45 minutes including patient education, assessment, and the following:    Intervention    Performed Today Sets/Reps/Resistance     UBE x 6 minutes - Level 1 - forward   external rotation isometrics  3x30s   Side lying external rotation   3x10 - 1#   Cable row  x 3x10 - 20#   Cable Extensions x 3x10 - 20#   Prone 90  30x 5# KB   Prone EXT  30x 1#   Prone row  30x - 5# KB   Wall Push ups x 20x   Standing ABCs x 2x  Red ball   Standing ER with band x YTB 20x   Arm raises Yellow Theraband - control eccentric  Elbow bent: 3x5   Mat push ups x On mat 3x6   UE series x YTB 1x12   PROM all directions x 5 minutes   Standing ER/IR x YTB 20x, GTB 20x   theraband punches - single arm   3x10 - Red Theraband      Plan for Next Visit:        PATIENT EDUCATION AND HOME EXERCISES     Home Exercises Provided and Patient Education Provided     Education provided: (included in home exercise program) minutes  Biomechanical implications of exercise    Written Home Exercises Provided: continue prior home exercise program   Exercises were reviewed and DAMARI was able to demonstrate them prior to the end of the session.  DAMARI demonstrated good  understanding of the education provided. See EMR under Patient Instructions for exercises provided during therapy sessions.      ASSESSMENT     Pt presents with improving ROM and strength overall. Pt able to lift arm actively but still struggles with resistive movements. Pt will continue to benefit from strengthening progressions to improve functional activity.     DAMARI is progressing well towards her goals.   Pt prognosis is Good.     Pt will continue to benefit from skilled outpatient physical therapy to address the deficits listed in the problem list box on initial evaluation, provide pt/family education and to maximize pt's level of independence in the home and community environment.     Pt's spiritual, cultural and educational needs considered and pt agreeable to plan  of care and goals.     Anticipated Barriers for therapy: chronicity and coping       Goals:     SHORT TERM GOALS:  4 weeks 2/7/2022   1. Recent signs and systems trend is improving in order to progress towards LTG's.  PC   2. Patient will be independent with HEP in order to further progress and return to maximal function.  PC   3. Pain rating at Worst: 5/10 in order to progress towards increased independence with activity.  PC   4. Patient will be able to correct postural deviations in sitting and standing, to decrease pain and promote postural awareness for injury prevention.   PC      LONG TERM GOALS: 8 weeks 2/7/2022   1. Patient will return to normal ADL, recreational, and work related activities with less pain and limitation.   PC   2. Patient will improve AROM to stated goals in order to return to maximal functional potential.   PC   3. Patient will improve Strength to stated goals of appropriate musculature in order to improve functional independence.   PC   4. Pain Rating at Best: 1/10 to improve Quality of Life.   PC   5. Patient will meet predicted functional outcome (FOTO) score: 71% to increase self-worth & perceived functional ability.  PC   6. Patient will have met/partially met personal goal of: returning to ADLs with minimal pain and improved ability to use her shoulder  PC         Goals Key:  PC= progressing/continue; PM= partially met;        DC= discontinue    PLAN     Continue Plan of Care (POC) and progress per patient tolerance. See treatment section for details on planned progressions next session.      Luiz Castillo, PT

## 2022-03-23 ENCOUNTER — CLINICAL SUPPORT (OUTPATIENT)
Dept: REHABILITATION | Facility: HOSPITAL | Age: 22
End: 2022-03-23
Payer: COMMERCIAL

## 2022-03-23 DIAGNOSIS — M62.81 PROXIMAL MUSCLE WEAKNESS: ICD-10-CM

## 2022-03-23 DIAGNOSIS — M25.511 CHRONIC RIGHT SHOULDER PAIN: Primary | ICD-10-CM

## 2022-03-23 DIAGNOSIS — G89.29 CHRONIC RIGHT SHOULDER PAIN: Primary | ICD-10-CM

## 2022-03-23 PROCEDURE — 97110 THERAPEUTIC EXERCISES: CPT | Performed by: PHYSICAL THERAPIST

## 2022-03-23 NOTE — PROGRESS NOTES
[facility]   Physical Therapy Treatment Note     Name: Casandra [facility] Number: [id number]    Therapy Diagnosis:   Encounter Diagnoses   Name Primary?   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 Will begin getting FDN again from .      She was compliant with home exercise program.  Response to previous treatment: n/a  Functional change: n/a    Pain: 3/10     Location: right shoulder    OBJECTIVE     Objective Measures updated at progress report unless specified.       TREATMENT     CASANDRA received the treatments listed below:     MANUAL THERAPY TECHNIQUES including Joint mobilizations, Myofacial release and Soft tissue Mobilization were applied to right shoulder for 0 minutes.    Manual Intervention Performed Today    Soft Tissue Mobilization     Joint Mobilizations  GH Mobilizations, Scapular mobilization, Passive range of motion all planes   Mobilization with movement          Functional Dry Needling       Plan for Next Visit: prn       THERAPEUTIC EXERCISES to develop  strength, endurance, ROM, flexibility,  posture and core stabilization for 45 minutes including patient education, assessment, and the following:    Intervention    Performed Today Sets/Reps/Resistance     UBE x 6 minutes - Level 1 - forward   external rotation isometrics  3x30s   Side lying external rotation  x 3x10 - 1#   Cable row  x 3x10 - 20#   Cable Extensions x 3x10 - 20#   Prone 90 x 30x     Prone EXT x 30x 1#   Prone row x 30x - 7.5# KB   Wall Push ups x 20x   Standing ABCs x 2x  Red ball   Standing ER with band x YTB 20x   Arm raises Yellow Theraband - control eccentric x Elbow bent: 3x5   Mat push ups x On mat 3x6   UE series x YTB 1x12   PROM all directions x 5 minutes   Standing ER/IR x YTB 20x, GTB 20x   theraband punches - single arm   3x10 - Red Theraband      Plan for Next Visit:        PATIENT EDUCATION AND HOME EXERCISES     Home Exercises Provided and Patient Education Provided     Education provided: (included in home exercise program) minutes  Biomechanical implications of exercise    Written Home Exercises Provided: continue prior home exercise program   Exercises were reviewed and DAMARI was able to demonstrate them prior to the end of the session.  DAMARI demonstrated good  understanding of the education provided. See EMR under Patient Instructions for exercises provided during therapy sessions.      ASSESSMENT     Pt presents with improving strength overall. Pt does still have significant limitation with strength and will begin getting FDN again by . Pt educated to keep strengthening up at home and in training room to sustain gains made through strengthening.     DAMARI is progressing well towards her goals.   Pt prognosis is Good.     Pt will continue to benefit from skilled outpatient physical therapy to address the deficits listed in the problem list box on initial evaluation, provide pt/family education and to maximize pt's level of independence in the home and community environment.     Pt's spiritual,  cultural and educational needs considered and pt agreeable to plan of care and goals.     Anticipated Barriers for therapy: chronicity and coping       Goals:     SHORT TERM GOALS:  4 weeks 2/7/2022   1. Recent signs and systems trend is improving in order to progress towards LTG's.  PC   2. Patient will be independent with HEP in order to further progress and return to maximal function.  PC   3. Pain rating at Worst: 5/10 in order to progress towards increased independence with activity.  PC   4. Patient will be able to correct postural deviations in sitting and standing, to decrease pain and promote postural awareness for injury prevention.   PC      LONG TERM GOALS: 8 weeks 2/7/2022   1. Patient will return to normal ADL, recreational, and work related activities with less pain and limitation.   PC   2. Patient will improve AROM to stated goals in order to return to maximal functional potential.   PC   3. Patient will improve Strength to stated goals of appropriate musculature in order to improve functional independence.   PC   4. Pain Rating at Best: 1/10 to improve Quality of Life.   PC   5. Patient will meet predicted functional outcome (FOTO) score: 71% to increase self-worth & perceived functional ability.  PC   6. Patient will have met/partially met personal goal of: returning to ADLs with minimal pain and improved ability to use her shoulder  PC         Goals Key:  PC= progressing/continue; PM= partially met;        DC= discontinue    PLAN     Continue Plan of Care (POC) and progress per patient tolerance. See treatment section for details on planned progressions next session.      Luiz Castillo, PT

## 2022-04-01 ENCOUNTER — TELEPHONE (OUTPATIENT)
Dept: REHABILITATION | Facility: HOSPITAL | Age: 22
End: 2022-04-01

## 2022-04-01 ENCOUNTER — CLINICAL SUPPORT (OUTPATIENT)
Dept: REHABILITATION | Facility: HOSPITAL | Age: 22
End: 2022-04-01
Payer: COMMERCIAL

## 2022-04-01 DIAGNOSIS — M62.81 PROXIMAL MUSCLE WEAKNESS: ICD-10-CM

## 2022-04-01 DIAGNOSIS — G89.29 CHRONIC RIGHT SHOULDER PAIN: Primary | ICD-10-CM

## 2022-04-01 DIAGNOSIS — M25.511 CHRONIC RIGHT SHOULDER PAIN: Primary | ICD-10-CM

## 2022-04-01 PROCEDURE — 97110 THERAPEUTIC EXERCISES: CPT

## 2022-04-01 NOTE — PLAN OF CARE
Outpatient Therapy Updated Plan of Care     Visit Date: 4/1/2022    Name: Casandra Barragan  [facility] Number: [id number]    Therapy Diagnosis:   Encounter Diagnoses   Name Primary?    Chronic right shoulder pain Yes    Proximal muscle weakness      Physician: Cierra Farrell PA-C        Physician: Cierra Farrell PA-C     Physician Orders: PT Eval and Treat  Medical Diagnosis from Referral:   M21.821 (ICD-10-CM) - Other specified acquired deformities of right upper arm   M25.511 (ICD-10-CM) - Pain in right shoulder   G89.29 (ICD-10-CM) - Other chronic pain   S43.491A (ICD-10-CM) - Other sprain of right shoulder joint, initial encounter      Evaluation Date: 2/7/2022  Authorization Period Expiration: 12/31/2021  Visit # / Visits authorized: 13/20 (+ Eval)  FOTO: 2/ 3      Precautions: Standard    Current Certification Period:  02/09/2022 to 12/31/2022    Precautions: Standard  Functional Level Prior to Evaluation:  Independent with all ADLs    Subjective     Update: please see daily note - 04/01/2022    Objective     Update:   Please see daily note - 04/01/2022    Assessment     Update: Casandra is progressing well with physical therapy, with minimal complaints of pain or discomfort and significant improvements noted in range of motion and strength since initial evaluation; please see exam for details. Casandra continues to have difficulty with gross shoulder stress and range of motion, due to pain and weakness of the rotator cuff.  FOTO scores noted above indicate the patients perceived functional levels are improving since prior assessment. The above impairments and functional deficits will continue to be addressed over the course of this plan of care. Casandra was educated on continued progression of PT, expectations for the duration of care, updates on goals set at initial evaluation, and home exercise program.  Casandra will continue to benefit from physical therapy in order to further address goals, maximize function  and quality of life.    Goals:     SHORT TERM GOALS:  4 weeks 2/7/2022   1. Recent signs and systems trend is improving in order to progress towards LTG's.  Progressing   2. Patient will be independent with HEP in order to further progress and return to maximal function.   Progressing   3. Pain rating at Worst: 5/10 in order to progress towards increased independence with activity.  Progressing    4. Patient will be able to correct postural deviations in sitting and standing, to decrease pain and promote postural awareness for injury prevention.   Progressing       LONG TERM GOALS: 8 weeks 2/7/2022   1. Patient will return to normal ADL, recreational, and work related activities with less pain and limitation.   Progressing    2. Patient will improve AROM to stated goals in order to return to maximal functional potential.   Progressing    3. Patient will improve Strength to stated goals of appropriate musculature in order to improve functional independence.   Progressing    4. Pain Rating at Best: 1/10 to improve Quality of Life.    Progressing   5. Patient will meet predicted functional outcome (FOTO) score: 71% to increase self-worth & perceived functional ability.   Progressing   6. Patient will have met/partially met personal goal of: returning to ADLs with minimal pain and improved ability to use her shoulder   Progressing        Long Term Goal Status:   continue per initial plan of care.  Reasons for Recertification of Therapy:   Pt will continue to benefit from skilled outpatient physical therapy to address the deficits listed in the problem list box on initial evaluation, provide pt/family education and to maximize pt's level of independence in the home and community environment.     Plan     Updated Certification Period: 4/1/2022 to 05/13/2022  Recommended Treatment Plan: 2 times per week for 6 weeks to include any combination of the following interventions: virtual visits, dry needling, modalities,  electrical stimulation (IFC, Pre-Mod, Attended with Functional Dry Needling), Gait Training, Manual Therapy, Moist Heat/ Ice, Neuromuscular Re-ed, Patient Education, Self Care, Therapeutic Exercise and Therapeutic Activites    Reece Srivastava, PT  4/1/2022      I CERTIFY THE NEED FOR THESE SERVICES FURNISHED UNDER THIS PLAN OF TREATMENT AND WHILE UNDER MY CARE    Physician's comments:        Physician's Signature: ___________________________________________________

## 2022-04-01 NOTE — PROGRESS NOTES
[facility]   Physical Therapy Progress Note     Name: Damari [facility] Number: [id number]    Therapy Diagnosis:   Encounter Diagnoses   Name Primary?    Chronic right shoulder pain Yes    Proximal muscle weakness      Physician: Cierra Farrell PA-C    Visit Date: 4/1/2022       Physician: Cierra Farrell PA-C     Physician Orders: PT Eval and Treat  Medical Diagnosis from Referral:   M21.821 (ICD-10-CM) - Other specified acquired deformities of right upper arm   M25.511 (ICD-10-CM) - Pain in right shoulder   G89.29 (ICD-10-CM) - Other chronic pain   S43.491A (ICD-10-CM) - Other sprain of right shoulder joint, initial encounter      Evaluation Date: 2/7/2022  Authorization Period Expiration: 12/31/2022  Plan of Care Expiration: 05/13/2022  Progress Note Due: 05/01/2022  Visit # / Visits authorized: 13/20 (+ Eval)  FOTO: 2/ 3      Precautions: Standard    PTA Visit #: 0/5     Time In: 11:15 am   Time Out: 11:50 am   Total Billable Time: 30 minutes    SUBJECTIVE     Patient reports: she worked out this weekend for the first time in a long time.      She was compliant with home exercise program.  Response to previous treatment: n/a  Functional change: n/a    Pain: 3/10     Location: right shoulder    OBJECTIVE     Objective Measures updated at progress report unless specified.     Active Range of Motion (Passive Range of Motion) : Measured in degrees        Shoulder Left Right Goal   Flexion full 90 (180) 180   Abduction full 70 (180) 180   ER at 90 full 45 (90) 90   IR full 45 (60) 80            Upper Extremity Strength     Shoulder Left Right Goals   Shoulder flexion: 4/5 3-/5 5/5   Shoulder Abduction: 4/5 3-/5 5/5   Shoulder ER 4-/5 3-/5 5/5   Shoulder IR 4/5 3-/5 5/5      Cervical range of motion - Within Functional Limits     Special Testing:  Not Tested secondary to pain         TREATMENT     DAMARI received the treatments listed below:     MANUAL THERAPY TECHNIQUES including  Joint mobilizations, Myofacial release and Soft tissue Mobilization were applied to right shoulder for 0 minutes.    Manual Intervention Performed Today    Soft Tissue Mobilization     Joint Mobilizations  GH Mobilizations, Scapular mobilization, Passive range of motion all planes   Mobilization with movement          Functional Dry Needling       Plan for Next Visit: prn       THERAPEUTIC EXERCISES to develop  strength, endurance, ROM, flexibility, posture and core stabilization for 30 minutes including patient education, assessment, and the following:    Intervention    Performed Today Sets/Reps/Resistance     UBE x 6 minutes - Level 1.5 - forward/reverse   Active range of motion - Flexion x 3x10 - 90 degrees - 0#   Side lying external rotation  x 3x10 - 1#   Cable row  x 3x10 - 20#   Cable Extensions x 3x10 - 20#   Prone 90 x 30x     Prone EXT x 30x - 2#   Prone row x 30x - 7.5# KB   Wall Push ups  20x   Standing ABCs x 2x  Red ball   Standing ER with band  YTB 20x   Arm raises Yellow Theraband - control eccentric  Elbow bent: 3x5   Mat push ups  On mat 3x6   UE series  YTB 1x12   PROM all directions  5 minutes   Standing ER/IR  YTB 20x, GTB 20x   theraband punches - single arm   3x10 - Red Theraband      Plan for Next Visit:        PATIENT EDUCATION AND HOME EXERCISES     Home Exercises Provided and Patient Education Provided     Education provided: (included in home exercise program) minutes  Biomechanical implications of exercise    Written Home Exercises Provided: continue prior home exercise program   Exercises were reviewed and DAMARI was able to demonstrate them prior to the end of the session.  DAMARI demonstrated good  understanding of the education provided. See EMR under Patient Instructions for exercises provided during therapy sessions.      ASSESSMENT     Patient active range of motion is improving along with her rotator cuff and gross upper extremity strength - albeit she still remains limited  overall as demonstrated in the objective findings. Patient's FOTO score has shown improvement and plan of care is to be continued with an emphasis on addressing remaining impairments, improving gross upper extremity strength, and improving motor control of the rotator cuff and scapulothoracic complex. Continue to progress plan of care as tolerated.    DAMARI is progressing well towards her goals.   Pt prognosis is Good.     Pt will continue to benefit from skilled outpatient physical therapy to address the deficits listed in the problem list box on initial evaluation, provide pt/family education and to maximize pt's level of independence in the home and community environment.     Pt's spiritual, cultural and educational needs considered and pt agreeable to plan of care and goals.     Anticipated Barriers for therapy: chronicity and coping       Goals:     SHORT TERM GOALS:  4 weeks 2/7/2022   1. Recent signs and systems trend is improving in order to progress towards LTG's.  PC   2. Patient will be independent with HEP in order to further progress and return to maximal function.  PC   3. Pain rating at Worst: 5/10 in order to progress towards increased independence with activity.  PC   4. Patient will be able to correct postural deviations in sitting and standing, to decrease pain and promote postural awareness for injury prevention.   PC      LONG TERM GOALS: 8 weeks 2/7/2022   1. Patient will return to normal ADL, recreational, and work related activities with less pain and limitation.   PC   2. Patient will improve AROM to stated goals in order to return to maximal functional potential.   PC   3. Patient will improve Strength to stated goals of appropriate musculature in order to improve functional independence.   PC   4. Pain Rating at Best: 1/10 to improve Quality of Life.   PC   5. Patient will meet predicted functional outcome (FOTO) score: 71% to increase self-worth & perceived functional ability.  PC    6. Patient will have met/partially met personal goal of: returning to ADLs with minimal pain and improved ability to use her shoulder  PC         Goals Key:  PC= progressing/continue; PM= partially met;        DC= discontinue    PLAN     Continue Plan of Care (POC) and progress per patient tolerance. See treatment section for details on planned progressions next session.      Reece Srivastava, PT

## 2022-04-04 ENCOUNTER — CLINICAL SUPPORT (OUTPATIENT)
Dept: REHABILITATION | Facility: HOSPITAL | Age: 22
End: 2022-04-04
Payer: COMMERCIAL

## 2022-04-04 DIAGNOSIS — M25.511 CHRONIC RIGHT SHOULDER PAIN: Primary | ICD-10-CM

## 2022-04-04 DIAGNOSIS — M62.81 PROXIMAL MUSCLE WEAKNESS: ICD-10-CM

## 2022-04-04 DIAGNOSIS — G89.29 CHRONIC RIGHT SHOULDER PAIN: Primary | ICD-10-CM

## 2022-04-04 PROCEDURE — 97110 THERAPEUTIC EXERCISES: CPT

## 2022-04-04 NOTE — PROGRESS NOTES
[facility] OUTPATIENT THERAPY AND [facility]LLNESS   Physical Therapy Daily Note     Name: Casandra [facility] Number: [id number]    Therapy Diagnosis:   Encounter Diagnoses   Name Primary?    Chronic right shoulder pain Yes    Proximal muscle weakness      Physician: Cierra Frarell PA-C    Visit Date: 4/4/2022       Physician: Cierra Farrell PA-C     Physician Orders: PT Eval and Treat  Medical Diagnosis from Referral:   M21.821 (ICD-10-CM) - Other specified acquired deformities of right upper arm   M25.511 (ICD-10-CM) - Pain in right shoulder   G89.29 (ICD-10-CM) - Other chronic pain   S43.491A (ICD-10-CM) - Other sprain of right shoulder joint, initial encounter      Evaluation Date: 2/7/2022  Authorization Period Expiration: 12/31/2022  Plan of Care Expiration: 05/13/2022  Progress Note Due: 05/01/2022  Visit # / Visits authorized: 14/20 (+ Eval)  FOTO: 2/ 3      Precautions: Standard    PTA Visit #: 0/5     Time In: 11:30 am   Time Out: 12:14 am   Total Billable Time: 38 minutes    SUBJECTIVE     Patient reports: no adverse reactions since last visit     She was compliant with home exercise program.  Response to previous treatment: n/a  Functional change: n/a    Pain: 3/10     Location: right shoulder    OBJECTIVE     Objective Measures updated at progress report unless specified.     TREATMENT     CASANDRA received the treatments listed below:     MANUAL THERAPY TECHNIQUES including Joint mobilizations, Myofacial release and Soft tissue Mobilization were applied to right shoulder for 0 minutes.    Manual Intervention Performed Today    Soft Tissue Mobilization     Joint Mobilizations  GH Mobilizations, Scapular mobilization, Passive range of motion all planes   Mobilization with movement          Functional Dry Needling       Plan for Next Visit: prn       THERAPEUTIC EXERCISES to develop  strength, endurance, ROM, flexibility, posture and core stabilization for 38 minutes including patient education, assessment,  and the following:    Intervention    Performed Today Sets/Reps/Resistance     UBE x 6 minutes - Level 1.5 - forward/reverse   Active range of motion - Flexion x 3x10 - 90 degrees - 0#   Active range of motion - scaption x 3x10 - as tolerated   Side lying external rotation  x 3x12 - 1#   Cable row  x 3x10 - 20#   Cable Extensions x 3x10 - 20#   Prone 90 x 30x     Prone Extensions x 30x - 3#   Prone row x 30x - 10# KB   Wall Push ups  20x   Standing ABCs x 2x  Red ball   Standing ER with band  YTB 20x   Arm raises Yellow Theraband - control eccentric x Elbow bent: 3x5   Mat push ups x On mat 3x6   UE series  YTB 1x12   PROM all directions x 5 minutes   Standing ER/IR  YTB 20x, GTB 20x   theraband punches - single arm   3x10 - Red Theraband      Plan for Next Visit:        PATIENT EDUCATION AND HOME EXERCISES     Home Exercises Provided and Patient Education Provided     Education provided: (included in home exercise program) minutes  Biomechanical implications of exercise    Written Home Exercises Provided: continue prior home exercise program   Exercises were reviewed and DAMARI was able to demonstrate them prior to the end of the session.  DAMARI demonstrated good  understanding of the education provided. See EMR under Patient Instructions for exercises provided during therapy sessions.      ASSESSMENT     Patient active range of motion is improving along with her rotator cuff and gross upper extremity strength - albeit she still remains limited overall as demonstrated in the objective findings. Increased volume with side lying external rotation and increased load with prone rows in order to improve gross rotator cuff and scapular stabilizers strength. Continue to progress plan of care as tolerated.    DAMARI is progressing well towards her goals.   Pt prognosis is Good.     Pt will continue to benefit from skilled outpatient physical therapy to address the deficits listed in the problem list box on initial  evaluation, provide pt/family education and to maximize pt's level of independence in the home and community environment.     Pt's spiritual, cultural and educational needs considered and pt agreeable to plan of care and goals.     Anticipated Barriers for therapy: chronicity and coping       Goals:     SHORT TERM GOALS:  4 weeks 2/7/2022   1. Recent signs and systems trend is improving in order to progress towards LTG's.  PC   2. Patient will be independent with HEP in order to further progress and return to maximal function.  PC   3. Pain rating at Worst: 5/10 in order to progress towards increased independence with activity.  PC   4. Patient will be able to correct postural deviations in sitting and standing, to decrease pain and promote postural awareness for injury prevention.   PC      LONG TERM GOALS: 8 weeks 2/7/2022   1. Patient will return to normal ADL, recreational, and work related activities with less pain and limitation.   PC   2. Patient will improve AROM to stated goals in order to return to maximal functional potential.   PC   3. Patient will improve Strength to stated goals of appropriate musculature in order to improve functional independence.   PC   4. Pain Rating at Best: 1/10 to improve Quality of Life.   PC   5. Patient will meet predicted functional outcome (FOTO) score: 71% to increase self-worth & perceived functional ability.  PC   6. Patient will have met/partially met personal goal of: returning to ADLs with minimal pain and improved ability to use her shoulder  PC         Goals Key:  PC= progressing/continue; PM= partially met;        DC= discontinue    PLAN     Continue Plan of Care (POC) and progress per patient tolerance. See treatment section for details on planned progressions next session.      Reece Srivastava, PT

## 2022-04-11 ENCOUNTER — CLINICAL SUPPORT (OUTPATIENT)
Dept: REHABILITATION | Facility: HOSPITAL | Age: 22
End: 2022-04-11
Payer: COMMERCIAL

## 2022-04-11 DIAGNOSIS — G89.29 CHRONIC RIGHT SHOULDER PAIN: Primary | ICD-10-CM

## 2022-04-11 DIAGNOSIS — M25.511 CHRONIC RIGHT SHOULDER PAIN: Primary | ICD-10-CM

## 2022-04-11 DIAGNOSIS — M62.81 PROXIMAL MUSCLE WEAKNESS: ICD-10-CM

## 2022-04-11 PROCEDURE — 97110 THERAPEUTIC EXERCISES: CPT

## 2022-04-11 NOTE — PROGRESS NOTES
[facility]   Physical Therapy Daily Note     Name: [facility] Number: [id number]    Therapy Diagnosis:   Encounter Diagnoses   Name Primary?    Chronic right shoulder pain Yes    Proximal muscle weakness      Physician: Cierra Farrell PA-C    Visit Date: 4/11/2022       Physician: Cierra Farrell PA-C     Physician Orders: PT Eval and Treat  Medical Diagnosis from Referral:   M21.821 (ICD-10-CM) - Other specified acquired deformities of right upper arm   M25.511 (ICD-10-CM) - Pain in right shoulder   G89.29 (ICD-10-CM) - Other chronic pain   S43.491A (ICD-10-CM) - Other sprain of right shoulder joint, initial encounter      Evaluation Date: 2/7/2022  Authorization Period Expiration: 12/31/2022  Plan of Care Expiration: 05/13/2022  Progress Note Due: 05/01/2022  Visit # / Visits authorized: 15/20 (+ Eval)  FOTO: 2/ 3      Precautions: Standard    PTA Visit #: 0/5     Time In: 11:45 am   Time Out: 12:30 pm   Total Billable Time: 38 minutes    SUBJECTIVE     Patient reports: no adverse reactions since last visit - she just returned from a cheer competition     She was compliant with home exercise program.  Response to previous treatment: n/a  Functional change: n/a    Pain: 3/10     Location: right shoulder    OBJECTIVE     Objective Measures updated at progress report unless specified.     TREATMENT     CASANDRA received the treatments listed below:     MANUAL THERAPY TECHNIQUES including Joint mobilizations, Myofacial release and Soft tissue Mobilization were applied to right shoulder for 0 minutes.    Manual Intervention Performed Today    Soft Tissue Mobilization     Joint Mobilizations  GH Mobilizations, Scapular mobilization, Passive range of motion all planes   Mobilization with movement          Functional Dry Needling       Plan for Next Visit: prn       THERAPEUTIC EXERCISES to develop  strength, endurance, ROM, flexibility, posture and core stabilization for 38  minutes including patient education, assessment, and the following:    Intervention    Performed Today Sets/Reps/Resistance     UBE x 6 minutes - Level 1.5 - forward/reverse   Active range of motion - Flexion x 3x10 - 90 degrees - 0#   Active range of motion - scaption x 3x10 - as tolerated   Side lying external rotation   3x12 - 1#   Cable row  x 3x10 - 30#   Cable Extensions x 3x10 - 20#   Prone 90 x 30x     Prone Extensions x 30x - 3#   Prone row x 30x - 20# KB   Mat Push ups x 2x15   Standing ABCs x 2x  Red ball   Standing ER with band  YTB 20x   Arm raises Yellow Theraband - control eccentric x 2x10 - 3 second hold    UE series  YTB 1x12   PROM all directions x 5 minutes   Standing ER/IR  YTB 20x, GTB 20x   theraband punches - single arm   3x10 - Red Theraband      Plan for Next Visit:        PATIENT EDUCATION AND HOME EXERCISES     Home Exercises Provided and Patient Education Provided     Education provided: (included in home exercise program) minutes  Biomechanical implications of exercise    Written Home Exercises Provided: continue prior home exercise program   Exercises were reviewed and DAMARI was able to demonstrate them prior to the end of the session.  DAMARI demonstrated good  understanding of the education provided. See EMR under Patient Instructions for exercises provided during therapy sessions.      ASSESSMENT     Patient active range of motion is improving along with her rotator cuff and gross upper extremity strength - albeit she still remains limited overall. Progressed pushup to include weight bearing on the mat in order to improve gross upper extremity strength. Increased weight with prone row in order to improve rotator cuff strength. Continue to progress plan of care as tolerated.    DAMARI is progressing well towards her goals.   Pt prognosis is Good.     Pt will continue to benefit from skilled outpatient physical therapy to address the deficits listed in the problem list box on initial  evaluation, provide pt/family education and to maximize pt's level of independence in the home and community environment.     Pt's spiritual, cultural and educational needs considered and pt agreeable to plan of care and goals.     Anticipated Barriers for therapy: chronicity and coping       Goals:     SHORT TERM GOALS:  4 weeks 2/7/2022   1. Recent signs and systems trend is improving in order to progress towards LTG's.  PC   2. Patient will be independent with HEP in order to further progress and return to maximal function.  PC   3. Pain rating at Worst: 5/10 in order to progress towards increased independence with activity.  PC   4. Patient will be able to correct postural deviations in sitting and standing, to decrease pain and promote postural awareness for injury prevention.   PC      LONG TERM GOALS: 8 weeks 2/7/2022   1. Patient will return to normal ADL, recreational, and work related activities with less pain and limitation.   PC   2. Patient will improve AROM to stated goals in order to return to maximal functional potential.   PC   3. Patient will improve Strength to stated goals of appropriate musculature in order to improve functional independence.   PC   4. Pain Rating at Best: 1/10 to improve Quality of Life.   PC   5. Patient will meet predicted functional outcome (FOTO) score: 71% to increase self-worth & perceived functional ability.  PC   6. Patient will have met/partially met personal goal of: returning to ADLs with minimal pain and improved ability to use her shoulder  PC         Goals Key:  PC= progressing/continue; PM= partially met;        DC= discontinue    PLAN     Continue Plan of Care (POC) and progress per patient tolerance. See treatment section for details on planned progressions next session.      Reece Srivastava, PT

## 2022-04-13 ENCOUNTER — CLINICAL SUPPORT (OUTPATIENT)
Dept: REHABILITATION | Facility: HOSPITAL | Age: 22
End: 2022-04-13
Payer: COMMERCIAL

## 2022-04-13 DIAGNOSIS — M25.511 CHRONIC RIGHT SHOULDER PAIN: Primary | ICD-10-CM

## 2022-04-13 DIAGNOSIS — G89.29 CHRONIC RIGHT SHOULDER PAIN: Primary | ICD-10-CM

## 2022-04-13 DIAGNOSIS — M62.81 PROXIMAL MUSCLE WEAKNESS: ICD-10-CM

## 2022-04-13 PROCEDURE — 97110 THERAPEUTIC EXERCISES: CPT | Performed by: PHYSICAL THERAPIST

## 2022-04-13 NOTE — PROGRESS NOTES
[facility]   Physical Therapy Daily Note     Name: Casandra [facility] Number: [id number]    Therapy Diagnosis:   Encounter Diagnoses   Name Primary?    Chronic right shoulder pain Yes    Proximal muscle weakness      Physician: Cierra Farrell PA-C    Visit Date: 4/13/2022       Physician: Cierra Farrell PA-C     Physician Orders: PT Eval and Treat  Medical Diagnosis from Referral:   M21.821 (ICD-10-CM) - Other specified acquired deformities of right upper arm   M25.511 (ICD-10-CM) - Pain in right shoulder   G89.29 (ICD-10-CM) - Other chronic pain   S43.491A (ICD-10-CM) - Other sprain of right shoulder joint, initial encounter      Evaluation Date: 2/7/2022  Authorization Period Expiration: 12/31/2022  Plan of Care Expiration: 05/13/2022  Progress Note Due: 05/01/2022  Visit # / Visits authorized: 16/20 (+ Eval)  FOTO: 2/ 3      Precautions: Standard    PTA Visit #: 0/5     Time In: 1200  Time Out: 1245    Total Billable Time: 45 minutes    SUBJECTIVE     Patient reports: moving arm better than before. Sore from dry needling of previous day.    She was compliant with home exercise program.  Response to previous treatment: n/a  Functional change: n/a    Pain: 3/10     Location: right shoulder    OBJECTIVE     Objective Measures updated at progress report unless specified.     TREATMENT     CASANDRA received the treatments listed below:     MANUAL THERAPY TECHNIQUES including Joint mobilizations, Myofacial release and Soft tissue Mobilization were applied to right shoulder for 0 minutes.    Manual Intervention Performed Today    Soft Tissue Mobilization     Joint Mobilizations  GH Mobilizations, Scapular mobilization, Passive range of motion all planes   Mobilization with movement          Functional Dry Needling       Plan for Next Visit: prn       THERAPEUTIC EXERCISES to develop  strength, endurance, ROM, flexibility, posture and core stabilization for 45 minutes including patient  education, assessment, and the following:    Intervention    Performed Today Sets/Reps/Resistance     UBE x 6 minutes - Level 1.5 - forward/reverse   Active range of motion - Flexion x 3x10 - 90 degrees - 0#   Active range of motion - scaption x 3x10 - as tolerated   Side lying external rotation  x 3x12 - 1#   Cable row  x 3x10 - 30#   Cable Extensions x 3x10 - 20#   Prone 90 x 30x     Prone Extensions x 30x - 3#   Prone row x 30x - 20# KB   Mat Push ups x 2x15   Standing ABCs x 2x  Red ball   Standing ER with band  YTB 20x   Arm raises Yellow Theraband - control eccentric x 2x10 - 3 second hold    UE series x YTB 1x12   PROM all directions x 5 minutes   Standing ER/IR  YTB 20x, GTB 20x   theraband punches - single arm   3x10 - Red Theraband      Plan for Next Visit:        PATIENT EDUCATION AND HOME EXERCISES     Home Exercises Provided and Patient Education Provided     Education provided: (included in home exercise program) minutes  Biomechanical implications of exercise    Written Home Exercises Provided: continue prior home exercise program   Exercises were reviewed and DAMARI was able to demonstrate them prior to the end of the session.  DAMARI demonstrated good  understanding of the education provided. See EMR under Patient Instructions for exercises provided during therapy sessions.      ASSESSMENT     Patient continues to improve active motions and resisted motions. No increase in pain today just significant fatigue and weakness. No numbness/tingling. Pt will continue to get worked on in training room as well as perform strengthening HEP.    DAMARI is progressing well towards her goals.   Pt prognosis is Good.     Pt will continue to benefit from skilled outpatient physical therapy to address the deficits listed in the problem list box on initial evaluation, provide pt/family education and to maximize pt's level of independence in the home and community environment.     Pt's spiritual, cultural and  educational needs considered and pt agreeable to plan of care and goals.     Anticipated Barriers for therapy: chronicity and coping       Goals:     SHORT TERM GOALS:  4 weeks 2/7/2022   1. Recent signs and systems trend is improving in order to progress towards LTG's.  PC   2. Patient will be independent with HEP in order to further progress and return to maximal function.  PC   3. Pain rating at Worst: 5/10 in order to progress towards increased independence with activity.  PC   4. Patient will be able to correct postural deviations in sitting and standing, to decrease pain and promote postural awareness for injury prevention.   PC      LONG TERM GOALS: 8 weeks 2/7/2022   1. Patient will return to normal ADL, recreational, and work related activities with less pain and limitation.   PC   2. Patient will improve AROM to stated goals in order to return to maximal functional potential.   PC   3. Patient will improve Strength to stated goals of appropriate musculature in order to improve functional independence.   PC   4. Pain Rating at Best: 1/10 to improve Quality of Life.   PC   5. Patient will meet predicted functional outcome (FOTO) score: 71% to increase self-worth & perceived functional ability.  PC   6. Patient will have met/partially met personal goal of: returning to ADLs with minimal pain and improved ability to use her shoulder  PC         Goals Key:  PC= progressing/continue; PM= partially met;        DC= discontinue    PLAN     Continue Plan of Care (POC) and progress per patient tolerance. See treatment section for details on planned progressions next session.      Luiz Castillo, PT

## 2022-04-18 ENCOUNTER — CLINICAL SUPPORT (OUTPATIENT)
Dept: REHABILITATION | Facility: HOSPITAL | Age: 22
End: 2022-04-18
Payer: COMMERCIAL

## 2022-04-18 DIAGNOSIS — M62.81 PROXIMAL MUSCLE WEAKNESS: ICD-10-CM

## 2022-04-18 DIAGNOSIS — M25.511 CHRONIC RIGHT SHOULDER PAIN: Primary | ICD-10-CM

## 2022-04-18 DIAGNOSIS — G89.29 CHRONIC RIGHT SHOULDER PAIN: Primary | ICD-10-CM

## 2022-04-18 PROCEDURE — 97110 THERAPEUTIC EXERCISES: CPT

## 2022-04-18 NOTE — PROGRESS NOTES
[facility]   Physical Therapy Daily Note     Name: Casandra [facility] Number: [id number]    Therapy Diagnosis:   Encounter Diagnoses   Name Primary?   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   M25.511 (ICD-10-CM) - Pain in right shoulder   G89.29 (ICD-10-CM) - Other chronic pain   S43.491A (ICD-10-CM) - Other sprain of right shoulder joint, initial encounter      Evaluation Date: 2/7/2022  Authorization Period Expiration: 12/31/2022  Plan of Care Expiration: 05/13/2022  Progress Note Due: 05/01/2022  Visit # / Visits authorized: 17/20 (+ Eval)  FOTO: 2/ 3      Precautions: Standard    PTA Visit #: 0/5     Time In: 12:19 pm   Time Out: 1:03 pm   Total Billable Time: 38 minutes    SUBJECTIVE     Patient reports: no adverse reactions since last visit    She was compliant with home exercise program.  Response to previous treatment: n/a  Functional change: n/a    Pain: 3/10     Location: right shoulder    OBJECTIVE     Objective Measures updated at progress report unless specified.     TREATMENT     CASANDRA received the treatments listed below:     MANUAL THERAPY TECHNIQUES including Joint mobilizations, Myofacial release and Soft tissue Mobilization were applied to right shoulder for 0 minutes.    Manual Intervention Performed Today    Soft Tissue Mobilization     Joint Mobilizations  GH Mobilizations, Scapular mobilization, Passive range of motion all planes   Mobilization with movement          Functional Dry Needling       Plan for Next Visit: prn       THERAPEUTIC EXERCISES to develop  strength, endurance, ROM, flexibility, posture and core stabilization for 38 minutes including patient education, assessment, and  the following:    Intervention    Performed Today Sets/Reps/Resistance     UBE x 6 minutes - Level 1.5 - forward/reverse   Active range of motion - Flexion x 3x10 - 90 degrees - 0#   Active range of motion - scaption x 3x10 - as tolerated   Side lying external rotation  x 3x12 - 1#   Cable row  x 3x10 - 30#   Cable Extensions x 3x10 - 20#   Prone 90 x 30x - 1#   Prone Extensions x 30x - 3#   Prone row x 30x - 20# KB   Mat Push ups x 2x15   Standing ABCs x 3x - Red ball   Standing ER with band X YTB 20x   Arm raises Yellow Theraband - control eccentric x 2x10 - 3 second hold    UE series  YTB 1x12   PROM all directions x 5 minutes   Standing ER/IR  YTB 20x, GTB 20x   theraband punches - single arm   3x10 - Red Theraband      Plan for Next Visit:        PATIENT EDUCATION AND HOME EXERCISES     Home Exercises Provided and Patient Education Provided     Education provided: (included in home exercise program) minutes  Biomechanical implications of exercise    Written Home Exercises Provided: continue prior home exercise program   Exercises were reviewed and CASANDRA was able to demonstrate them prior to the end of the session.  CASANDRA demonstrated good  understanding of the education provided. See EMR under Patient Instructions for exercises provided during therapy sessions.      ASSESSMENT     Casandra tolerated the addition of all exercises with reports of only muscle fatigue. Patient's volitional control is improving although she continues to remain limited with gross shoulder strength and range of motion. Patient to continue to benefit from skilled PT in order to address these limitations. Continue to progress plan of care as tolerated.    CASANDRA is progressing well towards her goals.   Pt prognosis is Good.     Pt will continue to benefit from skilled outpatient physical therapy to address the deficits listed in the problem list box on initial evaluation, provide pt/family education and to maximize pt's level of  independence in the home and community environment.     Pt's spiritual, cultural and educational needs considered and pt agreeable to plan of care and goals.     Anticipated Barriers for therapy: chronicity and coping       Goals:     SHORT TERM GOALS:  4 weeks 2/7/2022   1. Recent signs and systems trend is improving in order to progress towards LTG's.  PC   2. Patient will be independent with HEP in order to further progress and return to maximal function.  PC   3. Pain rating at Worst: 5/10 in order to progress towards increased independence with activity.  PC   4. Patient will be able to correct postural deviations in sitting and standing, to decrease pain and promote postural awareness for injury prevention.   PC      LONG TERM GOALS: 8 weeks 2/7/2022   1. Patient will return to normal ADL, recreational, and work related activities with less pain and limitation.   PC   2. Patient will improve AROM to stated goals in order to return to maximal functional potential.   PC   3. Patient will improve Strength to stated goals of appropriate musculature in order to improve functional independence.   PC   4. Pain Rating at Best: 1/10 to improve Quality of Life.   PC   5. Patient will meet predicted functional outcome (FOTO) score: 71% to increase self-worth & perceived functional ability.  PC   6. Patient will have met/partially met personal goal of: returning to ADLs with minimal pain and improved ability to use her shoulder  PC         Goals Key:  PC= progressing/continue; PM= partially met;        DC= discontinue    PLAN     Continue Plan of Care (POC) and progress per patient tolerance. See treatment section for details on planned progressions next session.      Reece Srivastava, PT

## 2022-04-19 ENCOUNTER — PATIENT MESSAGE (OUTPATIENT)
Dept: ORTHOPEDICS | Facility: CLINIC | Age: 22
End: 2022-04-19
Payer: COMMERCIAL

## 2022-04-25 ENCOUNTER — CLINICAL SUPPORT (OUTPATIENT)
Dept: REHABILITATION | Facility: HOSPITAL | Age: 22
End: 2022-04-25
Payer: COMMERCIAL

## 2022-04-25 DIAGNOSIS — M62.81 PROXIMAL MUSCLE WEAKNESS: ICD-10-CM

## 2022-04-25 DIAGNOSIS — M25.511 CHRONIC RIGHT SHOULDER PAIN: Primary | ICD-10-CM

## 2022-04-25 DIAGNOSIS — G89.29 CHRONIC RIGHT SHOULDER PAIN: Primary | ICD-10-CM

## 2022-04-25 PROCEDURE — 97110 THERAPEUTIC EXERCISES: CPT

## 2022-04-25 NOTE — PROGRESS NOTES
[facility]   Physical Therapy Daily Note     Name: Casandra [facility] Number: [id number]    Therapy Diagnosis:   Encounter Diagnoses   Name Primary?   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   M25.511 (ICD-10-CM) - Pain in right shoulder   G89.29 (ICD-10-CM) - Other chronic pain   S43.491A (ICD-10-CM) - Other sprain of right shoulder joint, initial encounter      Evaluation Date: 2/7/2022  Authorization Period Expiration: 12/31/2022  Plan of Care Expiration: 05/13/2022  Progress Note Due: 05/01/2022  Visit # / Visits authorized: 18/20 (+ Eval)  FOTO: 2/ 3      Precautions: Standard    PTA Visit #: 0/5     Time In: 2:15 pm   Time Out: 3:00 pm   Total Billable Time: 38 minutes    SUBJECTIVE     Patient reports: no adverse reactions since last visit    She was compliant with home exercise program.  Response to previous treatment: n/a  Functional change: n/a    Pain: 3/10     Location: right shoulder    OBJECTIVE     Objective Measures updated at progress report unless specified.     TREATMENT     CASANDRA received the treatments listed below:     MANUAL THERAPY TECHNIQUES including Joint mobilizations, Myofacial release and Soft tissue Mobilization were applied to right shoulder for 0 minutes.    Manual Intervention Performed Today    Soft Tissue Mobilization     Joint Mobilizations  GH Mobilizations, Scapular mobilization, Passive range of motion all planes   Mobilization with movement          Functional Dry Needling       Plan for Next Visit: prn       THERAPEUTIC EXERCISES to develop  strength, endurance, ROM, flexibility, posture and core stabilization for 38 minutes including patient education, assessment, and  the following:    Intervention    Performed Today Sets/Reps/Resistance     UBE x 6 minutes - Level 1.5 - forward/reverse   Active range of motion - Flexion x 3x10 - 110 degrees acheived - 0#   Active range of motion - scaption x 3x10 - as tolerated   Side lying external rotation  x 3x12 - 0# - 3 second hold   Cable row  x 3x10 - 30#   Cable Extensions x 3x10 - 20#   Prone 90 x 30x - 1#   Prone Extensions x 30x - 3#   Prone row x 30x - 20# KB   Mat Push ups x 2x15   Wall Circles x 15x CW/15xCCW - Red Ball   Standing ABCs x 3x - Red ball   Standing ER with band X Red Theraband 20x   Arm raises Yellow Theraband - control eccentric x 2x10 - 3 second hold    UE series  YTB 1x12   PROM all directions x 5 minutes   Standing ER/IR  YTB 20x, GTB 20x   theraband punches - single arm   3x10 - Red Theraband      Plan for Next Visit:        PATIENT EDUCATION AND HOME EXERCISES     Home Exercises Provided and Patient Education Provided     Education provided: (included in home exercise program) minutes  Biomechanical implications of exercise    Written Home Exercises Provided: continue prior home exercise program.  Exercises were reviewed and CASANDRA was able to demonstrate them prior to the end of the session.  CASANDRA demonstrated good  understanding of the education provided. See EMR under Patient Instructions for exercises provided during therapy sessions.      ASSESSMENT     Casandra tolerated the addition of all exercises with reports of only muscle fatigue. Patient's volitional control is improving having reached 110 degrees of active flexion although she continues to remain limited with gross shoulder strength and range of motion. Patient presents with pain at end range passive external rotation and passive shoulder flexion. Continue to progress plan of care as tolerated with an emphasis on pushing rotator cuff strength and shoulder muscle endurance.    CASANDRA is progressing well towards her goals.   Pt prognosis is Good.      Pt will continue to benefit from skilled outpatient physical therapy to address the deficits listed in the problem list box on initial evaluation, provide pt/family education and to maximize pt's level of independence in the home and community environment.     Pt's spiritual, cultural and educational needs considered and pt agreeable to plan of care and goals.     Anticipated Barriers for therapy: chronicity and coping       Goals:     SHORT TERM GOALS:  4 weeks 2/7/2022   1. Recent signs and systems trend is improving in order to progress towards LTG's.  PC   2. Patient will be independent with HEP in order to further progress and return to maximal function.  PC   3. Pain rating at Worst: 5/10 in order to progress towards increased independence with activity.  PC   4. Patient will be able to correct postural deviations in sitting and standing, to decrease pain and promote postural awareness for injury prevention.   PC      LONG TERM GOALS: 8 weeks 2/7/2022   1. Patient will return to normal ADL, recreational, and work related activities with less pain and limitation.   PC   2. Patient will improve AROM to stated goals in order to return to maximal functional potential.   PC   3. Patient will improve Strength to stated goals of appropriate musculature in order to improve functional independence.   PC   4. Pain Rating at Best: 1/10 to improve Quality of Life.   PC   5. Patient will meet predicted functional outcome (FOTO) score: 71% to increase self-worth & perceived functional ability.  PC   6. Patient will have met/partially met personal goal of: returning to ADLs with minimal pain and improved ability to use her shoulder  PC         Goals Key:  PC= progressing/continue; PM= partially met;        DC= discontinue    PLAN     Continue Plan of Care (POC) and progress per patient tolerance. See treatment section for details on planned progressions next session.      Reece Srivastava, PT

## 2022-04-27 ENCOUNTER — CLINICAL SUPPORT (OUTPATIENT)
Dept: REHABILITATION | Facility: HOSPITAL | Age: 22
End: 2022-04-27
Payer: COMMERCIAL

## 2022-04-27 DIAGNOSIS — M25.511 CHRONIC RIGHT SHOULDER PAIN: Primary | ICD-10-CM

## 2022-04-27 DIAGNOSIS — G89.29 CHRONIC RIGHT SHOULDER PAIN: Primary | ICD-10-CM

## 2022-04-27 DIAGNOSIS — M62.81 PROXIMAL MUSCLE WEAKNESS: ICD-10-CM

## 2022-04-27 PROCEDURE — 97110 THERAPEUTIC EXERCISES: CPT

## 2022-04-27 NOTE — PROGRESS NOTES
[facility]   Physical Therapy Progress Note     Name: Damari [facility] Number: [id number]    Therapy Diagnosis:   No diagnosis found.  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   M25.511 (ICD-10-CM) - Pain in right shoulder   G89.29 (ICD-10-CM) - Other chronic pain   S43.491A (ICD-10-CM) - Other sprain of right shoulder joint, initial encounter      Evaluation Date: 2/7/2022  Authorization Period Expiration: 12/31/2022  Plan of Care Expiration: 05/13/2022  Progress Note Due: 05/01/2022  Visit # / Visits authorized: 19/20 (+ Eval)  FOTO: 2/ 3      Precautions: Standard    PTA Visit #: 0/5     Time In: 2:15 pm   Time Out: 3:05 pm   Total Billable Time: 42 minutes    SUBJECTIVE     Patient reports: no adverse reactions since last visit - only tired today     She was compliant with home exercise program.  Response to previous treatment: n/a  Functional change: n/a    Pain: 0/10     Location: right shoulder    OBJECTIVE     Objective Measures updated at progress report unless specified.         Active Range of Motion (Passive Range of Motion) : Measured in degrees        Shoulder Left Right Goal   Flexion full 135 (180) 180   Abduction full 110 (180) 180   ER at 90 full 80 (90) 90   IR full 50 (80) 80            Upper Extremity Strength     Shoulder Left Right Goals   Shoulder flexion: 4/5 4-/5 5/5   Shoulder Abduction: 4/5 4-/5 5/5   Shoulder ER 4-/5 4-/5 5/5   Shoulder IR 4/5 4-/5 5/5      Cervical range of motion - Within Functional Limits     Special Testing:  Not Tested secondary to pain - reassess next visit       TREATMENT     DAMARI received the treatments listed below:     MANUAL THERAPY TECHNIQUES including Joint mobilizations, Myofacial release and Soft tissue Mobilization  were applied to right shoulder for 0 minutes.    Manual Intervention Performed Today    Soft Tissue Mobilization     Joint Mobilizations  GH Mobilizations, Scapular mobilization, Passive range of motion all planes   Mobilization with movement          Functional Dry Needling       Plan for Next Visit: prn       THERAPEUTIC EXERCISES to develop  strength, endurance, ROM, flexibility, posture and core stabilization for 42 minutes including patient education, assessment, and the following:    Intervention    Performed Today Sets/Reps/Resistance     UBE x 6 minutes - Level 1.5 - forward/reverse   Active range of motion - Flexion x 3x10 - 110 degrees acheived - 0#   Active range of motion - scaption x 3x10 - as tolerated   Side lying external rotation  x 3x12 - 0# - 3 second hold   Cable row  x 3x10 - 30#   Cable Extensions x 3x10 - 20#   Prone Y x 3x10 - 0#   Prone 90 x 30x - 1#   Prone Extensions x 30x - 3#   Prone row x 3x10 - 20# KB   Mat Push ups x 2x15   Wall Circles x 15x CW/15xCCW - Red Ball   Standing ABCs x 3x - Red ball   Standing ER with band X Red Theraband 20x   Arm raises Yellow Theraband - control eccentric x 2x10 - 3 second hold    UE series  YTB 1x12   Standing ER/IR  YTB 20x, GTB 20x   theraband punches - single arm   3x10 - Red Theraband      Plan for Next Visit:        PATIENT EDUCATION AND HOME EXERCISES     Home Exercises Provided and Patient Education Provided     Education provided: (included in home exercise program) minutes  Biomechanical implications of exercise    Written Home Exercises Provided: continue prior home exercise program.  Exercises were reviewed and DAMARI was able to demonstrate them prior to the end of the session.  DAMARI demonstrated good  understanding of the education provided. See EMR under Patient Instructions for exercises provided during therapy sessions.      ASSESSMENT     Patient tolerated all exercises with reports of muscle fatigue. Addition of prone Y's in order  to improve patients upper extremity strength. Patient's active range of motion is progressing and will continue to benefit from skilled PT in order to address the impairments listed above. Continue rotator cuff strengthening as tolerated.    DAMARI is progressing well towards her goals.   Pt prognosis is Good.     Pt will continue to benefit from skilled outpatient physical therapy to address the deficits listed in the problem list box on initial evaluation, provide pt/family education and to maximize pt's level of independence in the home and community environment.     Pt's spiritual, cultural and educational needs considered and pt agreeable to plan of care and goals.     Anticipated Barriers for therapy: chronicity and coping       Goals:     SHORT TERM GOALS:  4 weeks 2/7/2022   1. Recent signs and systems trend is improving in order to progress towards LTG's.  PC   2. Patient will be independent with HEP in order to further progress and return to maximal function.  PC   3. Pain rating at Worst: 5/10 in order to progress towards increased independence with activity.  PC   4. Patient will be able to correct postural deviations in sitting and standing, to decrease pain and promote postural awareness for injury prevention.   PC      LONG TERM GOALS: 8 weeks 2/7/2022   1. Patient will return to normal ADL, recreational, and work related activities with less pain and limitation.   PC   2. Patient will improve AROM to stated goals in order to return to maximal functional potential.   PC   3. Patient will improve Strength to stated goals of appropriate musculature in order to improve functional independence.   PC   4. Pain Rating at Best: 1/10 to improve Quality of Life.   PC   5. Patient will meet predicted functional outcome (FOTO) score: 71% to increase self-worth & perceived functional ability.  PC   6. Patient will have met/partially met personal goal of: returning to ADLs with minimal pain and improved ability to  use her shoulder  PC         Goals Key:  PC= progressing/continue; PM= partially met;        DC= discontinue    PLAN     Continue Plan of Care (POC) and progress per patient tolerance. See treatment section for details on planned progressions next session.      Reece Srivastava, PT

## 2022-05-03 ENCOUNTER — CLINICAL SUPPORT (OUTPATIENT)
Dept: REHABILITATION | Facility: HOSPITAL | Age: 22
End: 2022-05-03
Payer: COMMERCIAL

## 2022-05-03 DIAGNOSIS — M62.81 PROXIMAL MUSCLE WEAKNESS: ICD-10-CM

## 2022-05-03 DIAGNOSIS — G89.29 CHRONIC RIGHT SHOULDER PAIN: Primary | ICD-10-CM

## 2022-05-03 DIAGNOSIS — M25.511 CHRONIC RIGHT SHOULDER PAIN: Primary | ICD-10-CM

## 2022-05-03 PROCEDURE — 97110 THERAPEUTIC EXERCISES: CPT

## 2022-05-03 PROCEDURE — 97140 MANUAL THERAPY 1/> REGIONS: CPT

## 2022-05-03 NOTE — PROGRESS NOTES
[facility]   Physical Therapy Daily Note     Name: Casandra [facility] Number: [id number]    Therapy Diagnosis:   Encounter Diagnoses   Name Primary?    Chronic right shoulder pain Yes    Proximal muscle weakness      Physician: Cierra Farrell PA-C    Visit Date: 5/3/2022       Physician: Cierra Farrell PA-C     Physician Orders: PT Eval and Treat  Medical Diagnosis from Referral:   M21.821 (ICD-10-CM) - Other specified acquired deformities of right upper arm   M25.511 (ICD-10-CM) - Pain in right shoulder   G89.29 (ICD-10-CM) - Other chronic pain   S43.491A (ICD-10-CM) - Other sprain of right shoulder joint, initial encounter      Evaluation Date: 2/7/2022  Authorization Period Expiration: 12/31/2022  Plan of Care Expiration: 05/13/2022  Progress Note Due: 06/01/2022  Visit # / Visits authorized: 20/20 (+ Eval)  FOTO: 2/ 3      Precautions: Standard    PTA Visit #: 0/5     Time In: 11:00 am   Time Out: 11:45 am   Total Billable Time: 38 minutes    SUBJECTIVE     Patient reports: overall she feels good - slow improvements continue - no adverse reactions to last therapy session    She was compliant with home exercise program.  Response to previous treatment: n/a  Functional change: n/a    Pain: 0/10     Location: right shoulder    OBJECTIVE     Objective Measures updated at progress report unless specified.     TREATMENT     CASANDRA received the treatments listed below:     MANUAL THERAPY TECHNIQUES including Joint mobilizations, Myofacial release and Soft tissue Mobilization were applied to right shoulder for 8 minutes.    Manual Intervention Performed Today    Soft Tissue Mobilization     Joint Mobilizations x GH Mobilizations, Scapular mobilization, Passive range of motion all planes  Cervical UPAs, Cervical Distraction   Mobilization with movement          Functional Dry Needling       Plan for Next Visit: prn       THERAPEUTIC EXERCISES to develop  strength, endurance, ROM,  flexibility, posture and core stabilization for 30 minutes including patient education, assessment, and the following:    Intervention    Performed Today Sets/Reps/Resistance     UBE x 6 minutes - Level 1.5 - forward/reverse   Active range of motion - Flexion x 3x10 - 120 degrees acheived - 0#   Active range of motion - scaption x 3x10 - 110 degrees - as tolerated   Side lying external rotation   3x12 - 0# - 3 second hold   Cable row  x 3x10 - 30#   Cable Extensions x 3x10 - 20#   Prone Y x 3x10 - 0#   Prone 90 x 30x - 1#   Prone Extensions x 30x - 3#   Prone row x 3x10 - 20# KB   Mat Push ups x 2x15   Wall Circles  15x CW/15xCCW - Red Ball   Standing ABCs  3x - Red ball   Standing ER with band X Red Theraband 20x   Arm raises Yellow Theraband - control eccentric  2x10 - 3 second hold    Wall Slides - Flexion x 20x   Standing ER/IR  YTB 20x, GTB 20x   theraband punches - single arm   3x10 - Red Theraband      Plan for Next Visit:        PATIENT EDUCATION AND HOME EXERCISES     Home Exercises Provided and Patient Education Provided     Education provided: (included in home exercise program) minutes  Biomechanical implications of exercise    Written Home Exercises Provided: continue prior home exercise program.  Exercises were reviewed and DAMARI was able to demonstrate them prior to the end of the session.  DAMARI demonstrated good  understanding of the education provided. See EMR under Patient Instructions for exercises provided during therapy sessions.      ASSESSMENT     Patient tolerated all exercises with reports of muscle fatigue. range of motion improved slightly after performance of cervical unilateral posterior to anterior mobilization. Patient's range of motion is progressing slowly albeit she is making steady progress each visit. Continue to progress plan of care as tolerated.    DAMARI is progressing well towards her goals.   Pt prognosis is Good.     Pt will continue to benefit from skilled outpatient  physical therapy to address the deficits listed in the problem list box on initial evaluation, provide pt/family education and to maximize pt's level of independence in the home and community environment.     Pt's spiritual, cultural and educational needs considered and pt agreeable to plan of care and goals.     Anticipated Barriers for therapy: chronicity and coping       Goals:     SHORT TERM GOALS:  4 weeks 2/7/2022   1. Recent signs and systems trend is improving in order to progress towards LTG's.  PC   2. Patient will be independent with HEP in order to further progress and return to maximal function.  PC   3. Pain rating at Worst: 5/10 in order to progress towards increased independence with activity.  PC   4. Patient will be able to correct postural deviations in sitting and standing, to decrease pain and promote postural awareness for injury prevention.   PC      LONG TERM GOALS: 8 weeks 2/7/2022   1. Patient will return to normal ADL, recreational, and work related activities with less pain and limitation.   PC   2. Patient will improve AROM to stated goals in order to return to maximal functional potential.   PC   3. Patient will improve Strength to stated goals of appropriate musculature in order to improve functional independence.   PC   4. Pain Rating at Best: 1/10 to improve Quality of Life.   PC   5. Patient will meet predicted functional outcome (FOTO) score: 71% to increase self-worth & perceived functional ability.  PC   6. Patient will have met/partially met personal goal of: returning to ADLs with minimal pain and improved ability to use her shoulder  PC         Goals Key:  PC= progressing/continue; PM= partially met;        DC= discontinue    PLAN     Continue Plan of Care (POC) and progress per patient tolerance. See treatment section for details on planned progressions next session.      Reece Srivastava, PT

## 2022-05-04 ENCOUNTER — CLINICAL SUPPORT (OUTPATIENT)
Dept: REHABILITATION | Facility: HOSPITAL | Age: 22
End: 2022-05-04
Payer: COMMERCIAL

## 2022-05-04 DIAGNOSIS — G89.29 CHRONIC RIGHT SHOULDER PAIN: Primary | ICD-10-CM

## 2022-05-04 DIAGNOSIS — M62.81 PROXIMAL MUSCLE WEAKNESS: ICD-10-CM

## 2022-05-04 DIAGNOSIS — M25.511 CHRONIC RIGHT SHOULDER PAIN: Primary | ICD-10-CM

## 2022-05-04 PROCEDURE — 97110 THERAPEUTIC EXERCISES: CPT

## 2022-05-04 NOTE — PROGRESS NOTES
[facility]   Physical Therapy Daily Note     Name: Casandra [facility] Number: [id number]    Therapy Diagnosis:   Encounter Diagnoses   Name Primary?    Chronic right shoulder pain Yes    Proximal muscle weakness      Physician: Cierra Farrell PA-C    Visit Date: 5/4/2022       Physician: Cierra Farrell PA-C     Physician Orders: PT Eval and Treat  Medical Diagnosis from Referral:   M21.821 (ICD-10-CM) - Other specified acquired deformities of right upper arm   M25.511 (ICD-10-CM) - Pain in right shoulder   G89.29 (ICD-10-CM) - Other chronic pain   S43.491A (ICD-10-CM) - Other sprain of right shoulder joint, initial encounter      Evaluation Date: 2/7/2022  Authorization Period Expiration: 12/31/2022  Plan of Care Expiration: 05/13/2022  Progress Note Due: 06/01/2022  Visit # / Visits authorized: 21/30 (+ Eval)  FOTO: 2/ 3      Precautions: Standard    PTA Visit #: 0/5     Time In: 3:15 pm   Time Out: 4:00 pm   Total Billable Time: 39 minutes    SUBJECTIVE     Patient reports: overall she feels good - slow improvements continue - no adverse reactions to last therapy session    She was compliant with home exercise program.  Response to previous treatment: n/a  Functional change: n/a    Pain: 0/10     Location: right shoulder    OBJECTIVE     Objective Measures updated at progress report unless specified.     TREATMENT     CASANDRA received the treatments listed below:     MANUAL THERAPY TECHNIQUES including Joint mobilizations, Myofacial release and Soft tissue Mobilization were applied to right shoulder for 0 minutes.    Manual Intervention Performed Today    Soft Tissue Mobilization     Joint Mobilizations  GH Mobilizations, Scapular mobilization, Passive range of motion all planes  Cervical UPAs, Cervical Distraction   Mobilization with movement          Functional Dry Needling       Plan for Next Visit: prn       THERAPEUTIC EXERCISES to develop  strength, endurance, ROM,  flexibility, posture and core stabilization for 39 minutes including patient education, assessment, and the following:    Intervention    Performed Today Sets/Reps/Resistance     UBE x 6 minutes - Level 1.5 - forward/reverse   Active range of motion - Flexion x 2x10 - 1#    Active range of motion - scaption x 2x10 - 1#   Side lying external rotation   3x12 - 0# - 3 second hold   Cable row   3x10 - 30#   Cable Extensions  3x10 - 20#   Prone Y x 3x10 - 0#   Prone 90 x 30x - 1#   Prone Extensions  30x - 3#   Prone row x 3x10 - 20# KB   Mat Push ups x 3x12   Wall Circles  15x CW/15xCCW - Red Ball   Standing ABCs  3x - Red ball   Standing ER with band X Red Theraband 20x   Arm raises Yellow Theraband - control eccentric  2x10 - 3 second hold    Serratus Wall Slide  x 2x12 - Foam Roller   Wall Slides - Flexion x 20x   Standing ER/IR  YTB 20x, GTB 20x   theraband punches - single arm   3x10 - Red Theraband      Plan for Next Visit:        PATIENT EDUCATION AND HOME EXERCISES     Home Exercises Provided and Patient Education Provided     Education provided: (included in home exercise program) minutes  Biomechanical implications of exercise    Written Home Exercises Provided: continue prior home exercise program.  Exercises were reviewed and DAMARI was able to demonstrate them prior to the end of the session.  DAMARI demonstrated good  understanding of the education provided. See EMR under Patient Instructions for exercises provided during therapy sessions.      ASSESSMENT     Patient tolerated all exercises with reports of muscle fatigue. Patient's range of motion is progressing slowly albeit she is making steady progress each visit. Progressed exercises to incorporate serratus wall slides in order to improve functional strength and motor control of rotator cuff and associated stabilizers. Increased volume of mat push-ups in order to improve gross upper extremity strength. Continue to progress plan of care as  tolerated.    DAMARI is progressing well towards her goals.   Pt prognosis is Good.     Pt will continue to benefit from skilled outpatient physical therapy to address the deficits listed in the problem list box on initial evaluation, provide pt/family education and to maximize pt's level of independence in the home and community environment.     Pt's spiritual, cultural and educational needs considered and pt agreeable to plan of care and goals.     Anticipated Barriers for therapy: chronicity and coping       Goals:     SHORT TERM GOALS:  4 weeks 2/7/2022   1. Recent signs and systems trend is improving in order to progress towards LTG's.  PC   2. Patient will be independent with HEP in order to further progress and return to maximal function.  PC   3. Pain rating at Worst: 5/10 in order to progress towards increased independence with activity.  PC   4. Patient will be able to correct postural deviations in sitting and standing, to decrease pain and promote postural awareness for injury prevention.   PC      LONG TERM GOALS: 8 weeks 2/7/2022   1. Patient will return to normal ADL, recreational, and work related activities with less pain and limitation.   PC   2. Patient will improve AROM to stated goals in order to return to maximal functional potential.   PC   3. Patient will improve Strength to stated goals of appropriate musculature in order to improve functional independence.   PC   4. Pain Rating at Best: 1/10 to improve Quality of Life.   PC   5. Patient will meet predicted functional outcome (FOTO) score: 71% to increase self-worth & perceived functional ability.  PC   6. Patient will have met/partially met personal goal of: returning to ADLs with minimal pain and improved ability to use her shoulder  PC         Goals Key:  PC= progressing/continue; PM= partially met;        DC= discontinue    PLAN     Continue Plan of Care (POC) and progress per patient tolerance. See treatment section for details on  planned progressions next session.      Reece Srivastava, PT

## 2022-05-09 ENCOUNTER — OFFICE VISIT (OUTPATIENT)
Dept: ORTHOPEDICS | Facility: CLINIC | Age: 22
End: 2022-05-09
Payer: COMMERCIAL

## 2022-05-09 ENCOUNTER — CLINICAL SUPPORT (OUTPATIENT)
Dept: REHABILITATION | Facility: HOSPITAL | Age: 22
End: 2022-05-09
Payer: COMMERCIAL

## 2022-05-09 VITALS — BODY MASS INDEX: 22.5 KG/M2 | HEIGHT: 63 IN | WEIGHT: 127 LBS

## 2022-05-09 DIAGNOSIS — M25.511 CHRONIC RIGHT SHOULDER PAIN: Primary | ICD-10-CM

## 2022-05-09 DIAGNOSIS — M62.81 PROXIMAL MUSCLE WEAKNESS: ICD-10-CM

## 2022-05-09 DIAGNOSIS — M21.821 HILL-SACHS LESION OF RIGHT SHOULDER: Primary | ICD-10-CM

## 2022-05-09 DIAGNOSIS — G89.29 CHRONIC RIGHT SHOULDER PAIN: Primary | ICD-10-CM

## 2022-05-09 DIAGNOSIS — S43.491A HUMERAL AVULSION GLENOHUMERAL LIGAMENT LESION, RIGHT, INITIAL ENCOUNTER: ICD-10-CM

## 2022-05-09 PROCEDURE — 99999 PR PBB SHADOW E&M-EST. PATIENT-LVL III: ICD-10-PCS | Mod: PBBFAC,,, | Performed by: ORTHOPAEDIC SURGERY

## 2022-05-09 PROCEDURE — 99214 PR OFFICE/OUTPT VISIT, EST, LEVL IV, 30-39 MIN: ICD-10-PCS | Mod: S$GLB,,, | Performed by: ORTHOPAEDIC SURGERY

## 2022-05-09 PROCEDURE — 99999 PR PBB SHADOW E&M-EST. PATIENT-LVL III: CPT | Mod: PBBFAC,,, | Performed by: ORTHOPAEDIC SURGERY

## 2022-05-09 PROCEDURE — 97110 THERAPEUTIC EXERCISES: CPT

## 2022-05-09 PROCEDURE — 99214 OFFICE O/P EST MOD 30 MIN: CPT | Mod: S$GLB,,, | Performed by: ORTHOPAEDIC SURGERY

## 2022-05-09 NOTE — PROGRESS NOTES
[facility]   Physical Therapy Daily Note     Name: Casandra [facility] Number: [id number]    Therapy Diagnosis:   Encounter Diagnoses   Name Primary?    Chronic right shoulder pain Yes    Proximal muscle weakness      Physician: Cierra Farrell PA-C    Visit Date: 5/9/2022       Physician: Cierra Farrell PA-C     Physician Orders: PT Eval and Treat  Medical Diagnosis from Referral:   M21.821 (ICD-10-CM) - Other specified acquired deformities of right upper arm   M25.511 (ICD-10-CM) - Pain in right shoulder   G89.29 (ICD-10-CM) - Other chronic pain   S43.491A (ICD-10-CM) - Other sprain of right shoulder joint, initial encounter      Evaluation Date: 2/7/2022  Authorization Period Expiration: 12/31/2022  Plan of Care Expiration: 05/13/2022  Progress Note Due: 06/01/2022  Visit # / Visits authorized: 22/30 (+ Eval)  FOTO: 2/ 3      Precautions: Standard    PTA Visit #: 0/5     Time In: 8:36 am  Time Out:  9:15 am  Total Billable Time: 33 minutes    SUBJECTIVE     Patient reports: overall she feels okay - 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 She is willing to give therapy a try today.  She was compliant with home exercise program.  Response to previous treatment: n/a  Functional change: n/a    Pain: 0/10     Location: right shoulder    OBJECTIVE     Objective Measures updated at progress report unless specified.     TREATMENT     CASANDRA received the treatments listed below:     MANUAL THERAPY TECHNIQUES including Joint mobilizations, Myofacial release and Soft tissue Mobilization were applied to right shoulder for 0 minutes.    Manual Intervention Performed Today    Soft Tissue Mobilization     Joint Mobilizations  GH Mobilizations, Scapular mobilization, Passive range of motion all planes  Cervical UPAs, Cervical Distraction   Mobilization with movement          Functional Dry Needling       Plan for Next Visit: prn       THERAPEUTIC EXERCISES  to develop  strength, endurance, ROM, flexibility, posture and core stabilization for 33 minutes including patient education, assessment, and the following:    Intervention    Performed Today Sets/Reps/Resistance     UBE x 6 minutes - Level 1.5 - forward/reverse   Active range of motion - Flexion x 3x10 - 1#    Active range of motion - scaption x 3x10 - 1#   Side lying external rotation   3x12 - 0# - 3 second hold   Cable row   3x10 - 30#   Cable Extensions  3x10 - 20#   Prone Y x 3x8 - 1#   Prone 90 x 30x - 1#   Prone Extensions  30x - 3#   Prone row x 3x12-15 - 20# KB   Mat Push ups x 3x12   Wall Circles  15x CW/15xCCW - Red Ball   Standing ABCs  3x - Red ball   Breuggars  X 2x10 - Yellow Theraband    Arm raises Yellow Theraband - control eccentric  2x10 - 3 second hold    Serratus Wall Slide  x 2x12 - Foam Roller   Wall Slides - Flexion x 20x   theraband punches - single arm   3x10 - Red Theraband      Plan for Next Visit:        PATIENT EDUCATION AND HOME EXERCISES     Home Exercises Provided and Patient Education Provided     Education provided: (included in home exercise program) minutes  Biomechanical implications of exercise    Written Home Exercises Provided: continue prior home exercise program.  Exercises were reviewed and DAMARI was able to demonstrate them prior to the end of the session.  DAMARI demonstrated good  understanding of the education provided. See EMR under Patient Instructions for exercises provided during therapy sessions.      ASSESSMENT     Patient tolerated all exercises with reports of muscle fatigue. Patient's range of motion is progressing slowly albeit she is making steady progress each visit reached 130 degrees of shoulder flexion today. Progressed resistance used with exercises today in order to target rotator cuff strengthening and improve motor control. Continue to progress plan of care as tolerated.    DAMARI is progressing well towards her goals.   Pt prognosis is Good.     Pt  will continue to benefit from skilled outpatient physical therapy to address the deficits listed in the problem list box on initial evaluation, provide pt/family education and to maximize pt's level of independence in the home and community environment.     Pt's spiritual, cultural and educational needs considered and pt agreeable to plan of care and goals.     Anticipated Barriers for therapy: chronicity and coping       Goals:     SHORT TERM GOALS:  4 weeks 2/7/2022   1. Recent signs and systems trend is improving in order to progress towards LTG's.  PC   2. Patient will be independent with HEP in order to further progress and return to maximal function.  PC   3. Pain rating at Worst: 5/10 in order to progress towards increased independence with activity.  PC   4. Patient will be able to correct postural deviations in sitting and standing, to decrease pain and promote postural awareness for injury prevention.   PC      LONG TERM GOALS: 8 weeks 2/7/2022   1. Patient will return to normal ADL, recreational, and work related activities with less pain and limitation.   PC   2. Patient will improve AROM to stated goals in order to return to maximal functional potential.   PC   3. Patient will improve Strength to stated goals of appropriate musculature in order to improve functional independence.   PC   4. Pain Rating at Best: 1/10 to improve Quality of Life.   PC   5. Patient will meet predicted functional outcome (FOTO) score: 71% to increase self-worth & perceived functional ability.  PC   6. Patient will have met/partially met personal goal of: returning to ADLs with minimal pain and improved ability to use her shoulder  PC         Goals Key:  PC= progressing/continue; PM= partially met;        DC= discontinue    PLAN     Continue Plan of Care (POC) and progress per patient tolerance. See treatment section for details on planned progressions next session.      Reece Srivastava, PT

## 2022-05-09 NOTE — PROGRESS NOTES
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 Her pain is located vertically on her Anterior Right Shoulder. She goes to Physical therapy at [facility] with Taz. She is also being treated by the [facility] with dry needling and other modalities. Overall, she feels that she has improved since her last visit.     HPI    Past Medical History:   History reviewed. No pertinent past medical history.  History reviewed. No pertinent surgical history.  Family History   Problem Relation Age of Onset    No Known Problems Mother     No Known Problems Father      Social History     Socioeconomic History    Marital status: Single   Tobacco Use    Smoking status: Never Smoker    Smokeless tobacco: Never Used   Substance and Sexual Activity    Alcohol use: Never    Drug use: Never    Sexual activity: Not Currently     Partners: Male     Medication List with Changes/Refills   Current Medications    IBUPROFEN (ADVIL,MOTRIN) 600 MG TABLET    Take 600 mg by mouth.    MEDROXYPROGESTERONE (DEPO-PROVERA) 150 MG/ML INJECTION    Inject 150 mg into the muscle.    NORETHINDRONE-ETHINYL ESTRADIOL (ORTHO-NOVUM 7/7/7, 28,) 0.5/0.75/1 MG- 35 MCG PER TABLET    Take 1 tablet by mouth once daily.    SULFAMETHOXAZOLE-TRIMETHOPRIM 800-160MG (BACTRIM DS) 800-160 MG TAB    Take 1 tablet by mouth 2 (two) times daily.     Review of patient's allergies indicates:   Allergen Reactions    Amoxicillin Rash    Clindamycin Other (See Comments)     Palm and foot skin peeling off  Palm and foot skin peeling off       ROS    Physical Exam:   Body mass index is 22.5  kg/m².  There were no vitals filed for this visit.   GENERAL: Well appearing, appropriate for stated age, no acute distress.  CARDIOVASCULAR: Pulses regular by peripheral palpation.  PULMONARY: Respirations are even and non-labored.  NEURO: Awake, alert, and oriented x 3.  PSYCH: Mood & affect are appropriate.  HEENT: Head is normocephalic and atraumatic.  Ortho/SPM Exam  Right shoulder: has some pain with circumduction  Full and symmetric ROM  No gross instability with anterior or posterior load and shift, but significant guarding  5/5 cuff strength  + ABER, some pain with werner/Jerk but not as significant    Imaging:    X-ray Shoulder 2 or More Views Right  Narrative: EXAMINATION:  XR SHOULDER COMPLETE 2 OR MORE VIEWS RIGHT    CLINICAL HISTORY:  Pain in right shoulder    TECHNIQUE:  Two or three views of the right shoulder were performed.    COMPARISON:  None    FINDINGS:  AC and glenohumeral joints are intact.  No acute osseous findings.  Visualized right lung is clear.  Soft tissues are within normal limits.  Impression: Please see above    Electronically signed by: Brian Latham MD  Date:    01/27/2022  Time:    08:51      Relevant imaging results reviewed and interpreted by me, discussed with the patient and / or family today.     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   · Right shoulder pain  · Possible Previously right shoulder dislocation January 2021  · [place] Sach Lesion right shoulder  · Partial avulsion of the humeral attachment of the glenohumeral ligament           Encounter Diagnoses   Name Primary?    Hill-Sachs lesion of right shoulder Yes    Chronic right shoulder pain      Humeral avulsion glenohumeral ligament lesion, right, initial encounter            Plan:  · Although she is still symptomatic, she is making good progress  · Discussed her plans for possibly returning to [facility] this fall  · She would like to see how her symptoms come along and participate if possible  · Continue rehab at [facility] and [facility] training room 2x/wk  · Refer to Dr. Fagan for US guided GH injection  · School Note today  · Follow up in late July/early Aug with Dr. Bairon Dozier       Thank you for choosing [facility] and Dr. Bairon Dozier for your orthopedic & sports medicine care. It is our goal to provide you with exceptional care that will help keep you healthy, active, and get you back in the game.    If you felt that you received exemplary care today, please consider leaving us feedback on [employer] at [email or web address].     Please do not hesitate to reach out to us via email, phone, or MyChart with any questions, concerns, or feedback.    If you are experiencing pain/discomfort ,or have questions after 5pm and would like to be connected to the [facility] on-call team, please call this number and specify which Sports Medicine provider is treating you: [phone]         Provider Note/Medical Decision Making:  She is still symptomatic but her strength and range of motion has improved significantly.  Due the fact that she seems to be inflammatory in terms of shoulder motion going to send her to Dr. Fagan to see if she would be a good candidate for an ultrasound-guided injection.  We discussed the pros and cons of this.  She did appear to have at least a partial injury to the humeral side of her glenohumeral ligament and she is symptomatic with provocative measures today but no gross instability in her shoulder and overall she has made progress.  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       I discussed worrisome and red flag signs and symptoms with the patient. The patient expressed understanding and agreed to alert me immediately or to go to the emergency room if they experience any of these.    Treatment plan was developed with input from the patient/family, and they expressed understanding and agreement with the plan. All questions were answered today.    Disclaimer: This note was prepared using a voice recognition system and is likely to have sound alike errors within the text.

## 2022-05-09 NOTE — PATIENT INSTRUCTIONS
ssessment:  Casandra Barragan is a RHD 21 y.o. 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   Right shoulder pain  Possible Previously right shoulder dislocation January 2021  [place] Sach Lesion right shoulder  Partial avulsion of the humeral attachment of the glenohumeral ligament           Encounter Diagnoses   Name Primary?    Hill-Sachs lesion of right shoulder Yes    Chronic right shoulder pain      Humeral avulsion glenohumeral ligament lesion, right, initial encounter           Plan:  Although she is still symptomatic, she is making good progress  Discussed her plans for possibly returning to [facility] this fall  She would like to see how her symptoms come along and participate if possible  Continue rehab at [facility] and [facility] training room 2x/wk  Refer to Dr. Fagan for US guided GH injection  School Note today  Follow up in late July/early Aug with Dr. Bairon Dozier       Thank you for choosing [facility] and Dr. Bairon Dozier for your orthopedic & sports medicine care. It is our goal to provide you with exceptional care that will help keep you healthy, active, and get you back in the game.    If you felt that you received exemplary care today, please consider leaving us feedback on Healthgrades at [email or web address].     Please do not hesitate to reach out to us via email, phone, or MyChart with any questions, concerns, or feedback.    If you are experiencing pain/discomfort ,or have questions after 5pm and would like to be connected to the [facility] on-call team, please call this number and specify which Sports Medicine provider is treating you: [phone]

## 2022-05-11 ENCOUNTER — CLINICAL SUPPORT (OUTPATIENT)
Dept: REHABILITATION | Facility: HOSPITAL | Age: 22
End: 2022-05-11
Payer: COMMERCIAL

## 2022-05-11 DIAGNOSIS — M62.81 PROXIMAL MUSCLE WEAKNESS: ICD-10-CM

## 2022-05-11 DIAGNOSIS — G89.29 CHRONIC RIGHT SHOULDER PAIN: Primary | ICD-10-CM

## 2022-05-11 DIAGNOSIS — M25.511 CHRONIC RIGHT SHOULDER PAIN: Primary | ICD-10-CM

## 2022-05-11 PROCEDURE — 97110 THERAPEUTIC EXERCISES: CPT

## 2022-05-11 NOTE — PROGRESS NOTES
[facility]   Physical Therapy Daily Note     Name: Casandra [facility] Number: [id number]    Therapy Diagnosis:   Encounter Diagnoses   Name Primary?    Chronic right shoulder pain Yes    Proximal muscle weakness      Physician: Cierra Farrell PA-C    Visit Date: 5/11/2022       Physician: Cierra Farrell PA-C     Physician Orders: PT Eval and Treat  Medical Diagnosis from Referral:   M21.821 (ICD-10-CM) - Other specified acquired deformities of right upper arm   M25.511 (ICD-10-CM) - Pain in right shoulder   G89.29 (ICD-10-CM) - Other chronic pain   S43.491A (ICD-10-CM) - Other sprain of right shoulder joint, initial encounter      Evaluation Date: 2/7/2022  Authorization Period Expiration: 12/31/2022  Plan of Care Expiration: 05/13/2022  Progress Note Due: 06/01/2022  Visit # / Visits authorized: 23/30 (+ Eval)  FOTO: 2/ 3      Precautions: Standard    PTA Visit #: 0/5     Time In: 1:45 pm  Time Out:  2:30 pm  Total Billable Time: 38 minutes    SUBJECTIVE     Patient reports: that she feels as though she may have regressed some slightly since our last visit. She went to the training room and now she is in some discomfort.  She was compliant with home exercise program.  Response to previous treatment: n/a  Functional change: n/a    Pain: 0/10     Location: right shoulder    OBJECTIVE     Objective Measures updated at progress report unless specified.     TREATMENT     CASANDRA received the treatments listed below:     MANUAL THERAPY TECHNIQUES including Joint mobilizations, Myofacial release and Soft tissue Mobilization were applied to right shoulder for 0 minutes.    Manual Intervention Performed Today    Soft Tissue Mobilization     Joint Mobilizations  GH Mobilizations, Scapular mobilization, Passive range of motion all planes  Cervical UPAs, Cervical Distraction   Mobilization with movement          Functional Dry Needling       Plan for Next Visit: prn       THERAPEUTIC  EXERCISES to develop  strength, endurance, ROM, flexibility, posture and core stabilization for 38 minutes including patient education, assessment, and the following:    Intervention    Performed Today Sets/Reps/Resistance     UBE x 6 minutes - Level 1.5 - forward/reverse   Active range of motion - Flexion x 3x10 - 1#    Active range of motion - scaption x 3x10 - 1#   Side lying external rotation   3x12 - 0# - 3 second hold   Cable row  X 3x10 - 30#   Cable Extensions X 3x10 - 20#   Prone Y x 3x8 - 1#   Prone 90 x 30x - 1#   Prone Extensions  30x - 3#   Prone row x 3x12-15 - 20# KB   Mat Push ups x 3x12   Wall Circles  15x CW/15xCCW - Red Ball   Standing ABCs  3x - Red ball   Breuggars  X 2x10 - Yellow Theraband    Arm raises Yellow Theraband - control eccentric  2x10 - 3 second hold    Serratus Wall Slide  x 2x12 - Foam Roller   Wall Slides - Flexion x 20x   theraband punches - single arm   3x10 - Red Theraband      Plan for Next Visit:        PATIENT EDUCATION AND HOME EXERCISES     Home Exercises Provided and Patient Education Provided     Education provided: (included in home exercise program) minutes  Biomechanical implications of exercise    Written Home Exercises Provided: continue prior home exercise program.  Exercises were reviewed and DAMARI was able to demonstrate them prior to the end of the session.  DAMARI demonstrated good  understanding of the education provided. See EMR under Patient Instructions for exercises provided during therapy sessions.      ASSESSMENT     Patient tolerated all exercises with reports of muscle fatigue. Patient was quite sore after her follow up appointment with the doctor. Continued to work strengthening during today's session. Continue to progress plan of care as tolerated.    DAMARI is progressing well towards her goals.   Pt prognosis is Good.     Pt will continue to benefit from skilled outpatient physical therapy to address the deficits listed in the problem list box on  initial evaluation, provide pt/family education and to maximize pt's level of independence in the home and community environment.     Pt's spiritual, cultural and educational needs considered and pt agreeable to plan of care and goals.     Anticipated Barriers for therapy: chronicity and coping       Goals:     SHORT TERM GOALS:  4 weeks 2/7/2022   1. Recent signs and systems trend is improving in order to progress towards LTG's.  PC   2. Patient will be independent with HEP in order to further progress and return to maximal function.  PC   3. Pain rating at Worst: 5/10 in order to progress towards increased independence with activity.  PC   4. Patient will be able to correct postural deviations in sitting and standing, to decrease pain and promote postural awareness for injury prevention.   PC      LONG TERM GOALS: 8 weeks 2/7/2022   1. Patient will return to normal ADL, recreational, and work related activities with less pain and limitation.   PC   2. Patient will improve AROM to stated goals in order to return to maximal functional potential.   PC   3. Patient will improve Strength to stated goals of appropriate musculature in order to improve functional independence.   PC   4. Pain Rating at Best: 1/10 to improve Quality of Life.   PC   5. Patient will meet predicted functional outcome (FOTO) score: 71% to increase self-worth & perceived functional ability.  PC   6. Patient will have met/partially met personal goal of: returning to ADLs with minimal pain and improved ability to use her shoulder  PC         Goals Key:  PC= progressing/continue; PM= partially met;        DC= discontinue    PLAN     Continue Plan of Care (POC) and progress per patient tolerance. See treatment section for details on planned progressions next session.      Reece Srivastava, PT

## 2022-05-16 ENCOUNTER — CLINICAL SUPPORT (OUTPATIENT)
Dept: REHABILITATION | Facility: HOSPITAL | Age: 22
End: 2022-05-16
Payer: COMMERCIAL

## 2022-05-16 DIAGNOSIS — G89.29 CHRONIC RIGHT SHOULDER PAIN: Primary | ICD-10-CM

## 2022-05-16 DIAGNOSIS — M25.511 CHRONIC RIGHT SHOULDER PAIN: Primary | ICD-10-CM

## 2022-05-16 DIAGNOSIS — M62.81 PROXIMAL MUSCLE WEAKNESS: ICD-10-CM

## 2022-05-16 PROCEDURE — 97110 THERAPEUTIC EXERCISES: CPT

## 2022-05-16 NOTE — PLAN OF CARE
Outpatient Therapy Updated Plan of Care     Visit Date: 5/16/2022    Name: Casandra [facility] Number: [id number]    Therapy Diagnosis:   Encounter Diagnoses   Name Primary?    Chronic right shoulder pain Yes    Proximal muscle weakness      Physician: Cierra Farrell PA-C        Physician: Cierra Farrell PA-C     Physician Orders: PT Eval and Treat  Medical Diagnosis from Referral:   M21.821 (ICD-10-CM) - Other specified acquired deformities of right upper arm   M25.511 (ICD-10-CM) - Pain in right shoulder   G89.29 (ICD-10-CM) - Other chronic pain   S43.491A (ICD-10-CM) - Other sprain of right shoulder joint, initial encounter      Evaluation Date: 2/7/2022  Authorization Period Expiration: 12/31/2021  Visit # / Visits authorized: 24/30 (+ Eval)     Precautions: Standard         Current Certification Period:  02/09/2022 to 12/31/2022    Precautions: Standard  Functional Level Prior to Evaluation:  Independent with all ADLs and recreational activities    Subjective     Update: Patient reports that she has been noticing improvements in her range of motion although her strength continues to lack behind. She is scheduled for an injection with Dr. Fagan this week.    Objective     Update:   See daily note - 05/16/2022    Assessment     Update: Casandra is progressing well with physical therapy, with minimal complaints of pain or discomfort and significant improvements noted in range of motion and strength since initial evaluation; please see exam for details. Casandra continues to have difficulty with volitional control of her right upper extremity especially with overhead movements, due to weakness and pain.  FOTO scores noted above indicate the patients perceived functional levels are improving since prior assessment. The above impairments and functional deficits will continue to be addressed over the course of this plan of care. Casandra was educated on continued progression of PT, expectations for the duration  of care, updates on goals set at initial evaluation, and home exercise program.  Casandra will continue to benefit from physical therapy in order to further address goals, maximize function and quality of life.    Goals:     SHORT TERM GOALS:  4 weeks 2/7/2022   1. Recent signs and systems trend is improving in order to progress towards LTG's. PM    2. Patient will be independent with HEP in order to further progress and return to maximal function.  MET   3. Pain rating at Worst: 5/10 in order to progress towards increased independence with activity.  MET   4. Patient will be able to correct postural deviations in sitting and standing, to decrease pain and promote postural awareness for injury prevention.   MET      LONG TERM GOALS: 8 weeks 2/7/2022   1. Patient will return to normal ADL, recreational, and work related activities with less pain and limitation.   PM   2. Patient will improve AROM to stated goals in order to return to maximal functional potential.   PM   3. Patient will improve Strength to stated goals of appropriate musculature in order to improve functional independence.   PM   4. Pain Rating at Best: 1/10 to improve Quality of Life.   PC   5. Patient will meet predicted functional outcome (FOTO) score: 71% to increase self-worth & perceived functional ability.  PM   6. Patient will have met/partially met personal goal of: returning to ADLs with minimal pain and improved ability to use her shoulder  PM        Long Term Goal Status:   continue per initial plan of care.  Reasons for Recertification of Therapy:   Pt will continue to benefit from skilled outpatient physical therapy to address the deficits listed in the problem list box on initial evaluation, provide pt/family education and to maximize pt's level of independence in the home and community environment.     Plan     Updated Certification Period: 5/16/2022 to 07/04/2022  Recommended Treatment Plan: 2 times per week for 8 weeks to include any  combination of the following interventions: virtual visits, dry needling, modalities, electrical stimulation (IFC, Pre-Mod, Attended with Functional Dry Needling), Manual Therapy, Moist Heat/ Ice, Neuromuscular Re-ed, Patient Education, Self Care, Therapeutic Exercise and Therapeutic Activites    Reece Srivastava, PT  5/16/2022      I CERTIFY THE NEED FOR THESE SERVICES FURNISHED UNDER THIS PLAN OF TREATMENT AND WHILE UNDER MY CARE    Physician's comments:        Physician's Signature: ___________________________________________________

## 2022-05-16 NOTE — PROGRESS NOTES
[facility]   Physical Therapy Daily Note     Name: Casandra [facility] Number: [id number]    Therapy Diagnosis:   Encounter Diagnoses   Name Primary?    Chronic right shoulder pain Yes    Proximal muscle weakness      Physician: Cierra Farrell PA-C    Visit Date: 5/16/2022       Physician: Cierra Farrell PA-C     Physician Orders: PT Eval and Treat  Medical Diagnosis from Referral:   M21.821 (ICD-10-CM) - Other specified acquired deformities of right upper arm   M25.511 (ICD-10-CM) - Pain in right shoulder   G89.29 (ICD-10-CM) - Other chronic pain   S43.491A (ICD-10-CM) - Other sprain of right shoulder joint, initial encounter      Evaluation Date: 2/7/2022  Authorization Period Expiration: 12/31/2022  Plan of Care Expiration: 05/13/2022  Progress Note Due: 06/01/2022  Visit # / Visits authorized: 24/30 (+ Eval)  FOTO: 2/ 3      Precautions: Standard    PTA Visit #: 0/5     Time In: 3:04 pm  Time Out:  3:44 pm  Total Billable Time: 38 minutes    SUBJECTIVE     Patient reports: that she is feeling a little better than last visit - no adverse reactions to last visit.  She was compliant with home exercise program.  Response to previous treatment: n/a  Functional change: n/a    Pain: 0/10     Location: right shoulder    OBJECTIVE     Objective Measures updated at progress report unless specified.        Active Range of Motion (Passive Range of Motion) : Measured in degrees        Shoulder Left Right Goal   Flexion full 135 (180) 180   Abduction full 110 (180) 180   ER at 90 full 80 (90) 90   IR full 50 (80) 80            Upper Extremity Strength     Shoulder Left Right Goals   Shoulder flexion: 4/5 4-/5 5/5   Shoulder Abduction: 4/5 4-/5 5/5   Shoulder ER 4-/5 4-/5 5/5   Shoulder IR 4/5 4-/5 5/5         TREATMENT     CASANDRA received the treatments listed below:     MANUAL THERAPY TECHNIQUES including Joint mobilizations, Myofacial release and Soft tissue Mobilization were applied to  right shoulder for 0 minutes.    Manual Intervention Performed Today    Soft Tissue Mobilization     Joint Mobilizations  GH Mobilizations, Scapular mobilization, Passive range of motion all planes  Cervical UPAs, Cervical Distraction   Mobilization with movement          Functional Dry Needling       Plan for Next Visit: prn       THERAPEUTIC EXERCISES to develop  strength, endurance, ROM, flexibility, posture and core stabilization for 38 minutes including patient education, assessment, and the following:    Intervention    Performed Today Sets/Reps/Resistance     UBE x 6 minutes - Level 1.5 - forward/reverse   Active range of motion - Flexion x 3x10 - 1#    Active range of motion - scaption x 3x10 - 1#   Side lying external rotation   3x12 - 0# - 3 second hold   Cable row  X 3x10 - 40#   Cable Extensions X 3x10 - 30#   Prone Y x 2x10 - 1#   Prone 90 x 30x - 1#   Prone Extensions  30x - 3#   Prone row x 3x12-15 - 20# KB   Mat Push ups x 2x15   Wall Circles  15x CW/15xCCW - Red Ball   Wall Slides + Lift Off x 20x -    Breuggars  X 3x10 - Yellow Theraband    Arm raises Yellow Theraband - control eccentric  2x10 - 3 second hold    Serratus Wall Slide  NEXT VISIT 2x12 - Foam Roller   theraband punches - single arm   3x10 - Red Theraband      Plan for Next Visit:        PATIENT EDUCATION AND HOME EXERCISES     Home Exercises Provided and Patient Education Provided     Education provided: (included in home exercise program) minutes  Biomechanical implications of exercise    Written Home Exercises Provided: continue prior home exercise program.  Exercises were reviewed and DAMARI was able to demonstrate them prior to the end of the session.  DAMARI demonstrated good  understanding of the education provided. See EMR under Patient Instructions for exercises provided during therapy sessions.      ASSESSMENT     See plan of care update - 05/16/2022    DAMARI is progressing well towards her goals.   Pt prognosis is Good.      Pt will continue to benefit from skilled outpatient physical therapy to address the deficits listed in the problem list box on initial evaluation, provide pt/family education and to maximize pt's level of independence in the home and community environment.     Pt's spiritual, cultural and educational needs considered and pt agreeable to plan of care and goals.     Anticipated Barriers for therapy: chronicity and coping       Goals:     SHORT TERM GOALS:  4 weeks 2/7/2022   1. Recent signs and systems trend is improving in order to progress towards LTG's.  PC   2. Patient will be independent with HEP in order to further progress and return to maximal function.  PC   3. Pain rating at Worst: 5/10 in order to progress towards increased independence with activity.  PC   4. Patient will be able to correct postural deviations in sitting and standing, to decrease pain and promote postural awareness for injury prevention.   PC      LONG TERM GOALS: 8 weeks 2/7/2022   1. Patient will return to normal ADL, recreational, and work related activities with less pain and limitation.   PC   2. Patient will improve AROM to stated goals in order to return to maximal functional potential.   PC   3. Patient will improve Strength to stated goals of appropriate musculature in order to improve functional independence.   PC   4. Pain Rating at Best: 1/10 to improve Quality of Life.   PC   5. Patient will meet predicted functional outcome (FOTO) score: 71% to increase self-worth & perceived functional ability.  PC   6. Patient will have met/partially met personal goal of: returning to ADLs with minimal pain and improved ability to use her shoulder  PC         Goals Key:  PC= progressing/continue; PM= partially met;        DC= discontinue    PLAN     Continue Plan of Care (POC) and progress per patient tolerance. See treatment section for details on planned progressions next session.      Reece Srivastava, PT

## 2022-05-18 ENCOUNTER — CLINICAL SUPPORT (OUTPATIENT)
Dept: REHABILITATION | Facility: HOSPITAL | Age: 22
End: 2022-05-18
Payer: COMMERCIAL

## 2022-05-18 DIAGNOSIS — G89.29 CHRONIC RIGHT SHOULDER PAIN: Primary | ICD-10-CM

## 2022-05-18 DIAGNOSIS — M62.81 PROXIMAL MUSCLE WEAKNESS: ICD-10-CM

## 2022-05-18 DIAGNOSIS — M25.511 CHRONIC RIGHT SHOULDER PAIN: Primary | ICD-10-CM

## 2022-05-18 PROCEDURE — 97110 THERAPEUTIC EXERCISES: CPT

## 2022-05-18 NOTE — PROGRESS NOTES
[facility]   Physical Therapy Daily Note     Name: Casandra [facility] Number: [id number]    Therapy Diagnosis:   Encounter Diagnoses   Name Primary?    Chronic right shoulder pain Yes    Proximal muscle weakness      Physician: Cierra Farrell PA-C    Visit Date: 5/18/2022       Physician: Cierra Farrell PA-C     Physician Orders: PT Eval and Treat  Medical Diagnosis from Referral:   M21.821 (ICD-10-CM) - Other specified acquired deformities of right upper arm   M25.511 (ICD-10-CM) - Pain in right shoulder   G89.29 (ICD-10-CM) - Other chronic pain   S43.491A (ICD-10-CM) - Other sprain of right shoulder joint, initial encounter      Evaluation Date: 2/7/2022  Authorization Period Expiration: 12/31/2022  Plan of Care Expiration: 07/04/2022  Progress Note Due: 06/16/2022  Visit # / Visits authorized: 25/30 (+ Eval)  FOTO: 2/ 3      Precautions: Standard    PTA Visit #: 0/5     Time In: 1:49 pm  Time Out:  2:29 pm  Total Billable Time: 35 minutes    SUBJECTIVE     Patient reports: noticing more improvement - no adverse reactions to last visit. Has an injection tomorrow morning with Dr. Fagan  She was compliant with home exercise program.  Response to previous treatment: n/a  Functional change: n/a    Pain: 0/10     Location: right shoulder    OBJECTIVE     Objective Measures updated at progress report unless specified.     TREATMENT     CASANDRA received the treatments listed below:     MANUAL THERAPY TECHNIQUES including Joint mobilizations, Myofacial release and Soft tissue Mobilization were applied to right shoulder for 0 minutes.    Manual Intervention Performed Today    Soft Tissue Mobilization     Joint Mobilizations  GH Mobilizations, Scapular mobilization, Passive range of motion all planes  Cervical UPAs, Cervical Distraction   Mobilization with movement          Functional Dry Needling       Plan for Next Visit: prn       THERAPEUTIC EXERCISES to develop  strength,  endurance, ROM, flexibility, posture and core stabilization for 35 minutes including patient education, assessment, and the following:    Intervention    Performed Today Sets/Reps/Resistance     UBE x 6 minutes - Level 1.5 - forward/reverse   Active range of motion - Flexion x 3x10 - 1#    Active range of motion - scaption x 3x10 - 1#   Theraband X's x 2x10 - Yellow Theraband    Side lying external rotation   3x12 - 0# - 3 second hold   Cable row   3x10 - 40#   Cable Extensions  3x10 - 30#   Prone Y  2x10 - 1#   Prone 90  30x - 1#   Prone Extensions  30x - 3#   Prone row  3x12-15 - 20# KB   Mat Push ups x 2x15   Wall Circles  15x CW/15xCCW - Red Ball   Wall Slides + Lift Off  20x -    Breuggars  X 3x10 - Yellow Theraband    Arm raises Yellow Theraband - control eccentric x 2x10 - 3 second hold    Serratus Wall Slide  x 2x12 - Foam Roller   theraband punches - single arm   3x10 - Red Theraband      Plan for Next Visit:        PATIENT EDUCATION AND HOME EXERCISES     Home Exercises Provided and Patient Education Provided     Education provided: (included in home exercise program) minutes  Biomechanical implications of exercise    Written Home Exercises Provided: continue prior home exercise program.  Exercises were reviewed and DAMARI was able to demonstrate them prior to the end of the session.  DAMARI demonstrated good  understanding of the education provided. See EMR under Patient Instructions for exercises provided during therapy sessions.      ASSESSMENT     See plan of care update - 05/16/2022    DAMARI is progressing well towards her goals.   Pt prognosis is Good.     Pt will continue to benefit from skilled outpatient physical therapy to address the deficits listed in the problem list box on initial evaluation, provide pt/family education and to maximize pt's level of independence in the home and community environment.     Pt's spiritual, cultural and educational needs considered and pt agreeable to plan of  care and goals.     Anticipated Barriers for therapy: chronicity and coping       Goals:     SHORT TERM GOALS:  4 weeks 2/7/2022   1. Recent signs and systems trend is improving in order to progress towards LTG's.  PC   2. Patient will be independent with HEP in order to further progress and return to maximal function.  PC   3. Pain rating at Worst: 5/10 in order to progress towards increased independence with activity.  PC   4. Patient will be able to correct postural deviations in sitting and standing, to decrease pain and promote postural awareness for injury prevention.   PC      LONG TERM GOALS: 8 weeks 2/7/2022   1. Patient will return to normal ADL, recreational, and work related activities with less pain and limitation.   PC   2. Patient will improve AROM to stated goals in order to return to maximal functional potential.   PC   3. Patient will improve Strength to stated goals of appropriate musculature in order to improve functional independence.   PC   4. Pain Rating at Best: 1/10 to improve Quality of Life.   PC   5. Patient will meet predicted functional outcome (FOTO) score: 71% to increase self-worth & perceived functional ability.  PC   6. Patient will have met/partially met personal goal of: returning to ADLs with minimal pain and improved ability to use her shoulder  PC         Goals Key:  PC= progressing/continue; PM= partially met;        DC= discontinue    PLAN     Continue Plan of Care (POC) and progress per patient tolerance. See treatment section for details on planned progressions next session.      Reece Srivastava, PT

## 2022-05-19 ENCOUNTER — OFFICE VISIT (OUTPATIENT)
Dept: SPORTS MEDICINE | Facility: CLINIC | Age: 22
End: 2022-05-19
Payer: COMMERCIAL

## 2022-05-19 VITALS — HEIGHT: 63 IN | WEIGHT: 117.31 LBS | BODY MASS INDEX: 20.79 KG/M2 | RESPIRATION RATE: 20 BRPM

## 2022-05-19 DIAGNOSIS — S43.084D: Primary | ICD-10-CM

## 2022-05-19 PROCEDURE — 20611 DRAIN/INJ JOINT/BURSA W/US: CPT | Mod: RT,S$GLB,, | Performed by: STUDENT IN AN ORGANIZED HEALTH CARE EDUCATION/TRAINING PROGRAM

## 2022-05-19 PROCEDURE — 99999 PR PBB SHADOW E&M-EST. PATIENT-LVL III: ICD-10-PCS | Mod: PBBFAC,,, | Performed by: STUDENT IN AN ORGANIZED HEALTH CARE EDUCATION/TRAINING PROGRAM

## 2022-05-19 PROCEDURE — 99499 NO LOS: ICD-10-PCS | Mod: S$GLB,,, | Performed by: STUDENT IN AN ORGANIZED HEALTH CARE EDUCATION/TRAINING PROGRAM

## 2022-05-19 PROCEDURE — 99999 PR PBB SHADOW E&M-EST. PATIENT-LVL III: CPT | Mod: PBBFAC,,, | Performed by: STUDENT IN AN ORGANIZED HEALTH CARE EDUCATION/TRAINING PROGRAM

## 2022-05-19 PROCEDURE — 99499 UNLISTED E&M SERVICE: CPT | Mod: S$GLB,,, | Performed by: STUDENT IN AN ORGANIZED HEALTH CARE EDUCATION/TRAINING PROGRAM

## 2022-05-19 PROCEDURE — 20611 LARGE JOINT ASPIRATION/INJECTION: R GLENOHUMERAL: ICD-10-PCS | Mod: RT,S$GLB,, | Performed by: STUDENT IN AN ORGANIZED HEALTH CARE EDUCATION/TRAINING PROGRAM

## 2022-05-19 RX ORDER — BUPIVACAINE HYDROCHLORIDE 5 MG/ML
2 INJECTION, SOLUTION PERINEURAL
Status: DISCONTINUED | OUTPATIENT
Start: 2022-05-19 | End: 2022-05-19 | Stop reason: HOSPADM

## 2022-05-19 RX ORDER — LIDOCAINE HYDROCHLORIDE 10 MG/ML
2 INJECTION INFILTRATION; PERINEURAL
Status: DISCONTINUED | OUTPATIENT
Start: 2022-05-19 | End: 2022-05-19 | Stop reason: HOSPADM

## 2022-05-19 RX ORDER — TRIAMCINOLONE ACETONIDE 40 MG/ML
40 INJECTION, SUSPENSION INTRA-ARTICULAR; INTRAMUSCULAR
Status: DISCONTINUED | OUTPATIENT
Start: 2022-05-19 | End: 2022-05-19 | Stop reason: HOSPADM

## 2022-05-19 RX ADMIN — LIDOCAINE HYDROCHLORIDE 2 ML: 10 INJECTION INFILTRATION; PERINEURAL at 07:05

## 2022-05-19 RX ADMIN — TRIAMCINOLONE ACETONIDE 40 MG: 40 INJECTION, SUSPENSION INTRA-ARTICULAR; INTRAMUSCULAR at 07:05

## 2022-05-19 RX ADMIN — BUPIVACAINE HYDROCHLORIDE 2 ML: 5 INJECTION, SOLUTION PERINEURAL at 07:05

## 2022-05-19 NOTE — PROCEDURES
Large Joint Aspiration/Injection: R glenohumeral    Date/Time: 5/19/2022 7:30 AM  Performed by: Andrea Fagan MD  Authorized by: Andrea Fagan MD     Consent Done?:  Yes (Verbal)  Indications:  Pain  Site marked: the procedure site was marked    Timeout: prior to procedure the correct patient, procedure, and site was verified    Prep: patient was prepped and draped in usual sterile fashion      Local anesthesia used?: Yes    Local anesthetic:  Topical anesthetic    Details:  Needle Size:  21 G  Ultrasonic Guidance for needle placement?: Yes    Images are saved and documented.  Approach:  Anterolateral  Location:  Shoulder  Site:  R glenohumeral  Medications:  2 mL bupivacaine 0.5 % (5 mg/mL); 2 mL lidocaine HCL 10 mg/ml (1%) 10 mg/mL (1 %); 40 mg triamcinolone acetonide 40 mg/mL  Patient tolerance:  Patient tolerated the procedure well with no immediate complications     Ultrasound guidance was used for needle localization. Images were saved and stored for documentation. The appropriate structures were visualized. Dynamic visualization of the needle was continuous throughout the procedures and maintained good position.     We discussed the proper protocols after the injection such as no submerging pools, baths tubs, or hot tubs for 24 hr.  Showering is okay today.  We also discussed that blood sugars can be elevated after an injection and asked patient to properly checked her sugars over the next few days and contact their PCP if there are any concerns.  We discussed red flags such as fevers, chills, red, warm, tender joint at the area of injection to please seek medical care immediately.

## 2022-05-19 NOTE — PROGRESS NOTES
Patient ID: Casandra Barragan  YOB: 2000  MRN: [id number]    Chief Complaint: Pain of the Right Shoulder      History of Present Illness: Casandra Barragan is a right-hand dominant 21 y.o. female who presents today with 6/10 pain c/o Right Shoulder Pain .     The patient is active in [employer].  Occupation: Ath[employer]     ***    Past Medical History:   History reviewed. No pertinent past medical history.  History reviewed. No pertinent surgical history.  Family History   Problem Relation Age of Onset    No Known Problems Mother     No Known Problems Father      Social History     Socioeconomic History    Marital status: Single   Tobacco Use    Smoking status: Never Smoker    Smokeless tobacco: Never Used   Substance and Sexual Activity    Alcohol use: Never    Drug use: Never    Sexual activity: Not Currently     Partners: Male     Medication List with Changes/Refills   Current Medications    IBUPROFEN (ADVIL,MOTRIN) 600 MG TABLET    Take 600 mg by mouth.    MEDROXYPROGESTERONE (DEPO-PROVERA) 150 MG/ML INJECTION    Inject 150 mg into the muscle.    NORETHINDRONE-ETHINYL ESTRADIOL (ORTHO-NOVUM 7/7/7, 28,) 0.5/0.75/1 MG- 35 MCG PER TABLET    Take 1 tablet by mouth once daily.    SULFAMETHOXAZOLE-TRIMETHOPRIM 800-160MG (BACTRIM DS) 800-160 MG TAB    Take 1 tablet by mouth 2 (two) times daily.     Review of patient's allergies indicates:   Allergen Reactions    Amoxicillin Rash    Clindamycin Other (See Comments)     Palm and foot skin peeling off  Palm and foot skin peeling off         Physical Exam:   Body mass index is 20.78 kg/m².    GENERAL: Well appearing, in no acute distress.  HEAD: Normocephalic and atraumatic.  ENT: External ears and nose grossly normal.  EYES: EOMI bilaterally  PULMONARY: Respirations are grossly even and non-labored.  NEURO: Awake, alert, and oriented x 3.  SKIN: No obvious rashes appreciated.  PSYCH: Mood & affect are appropriate.    Detailed MSK  exam:     ***    Imaging:    ***    Relevant imaging results were reviewed and interpreted by me and per my read ***.  This was discussed with the patient and / or family today.     Assessment:  Casandra Barragan is a 21 y.o. female ***    There are no diagnoses linked to this encounter.       Andrea Fagan MD    Disclaimer: This note was prepared using a voice recognition system and is likely to have sound alike errors within the text.

## 2022-06-06 ENCOUNTER — CLINICAL SUPPORT (OUTPATIENT)
Dept: REHABILITATION | Facility: HOSPITAL | Age: 22
End: 2022-06-06
Payer: COMMERCIAL

## 2022-06-06 DIAGNOSIS — M25.511 CHRONIC RIGHT SHOULDER PAIN: Primary | ICD-10-CM

## 2022-06-06 DIAGNOSIS — M62.81 PROXIMAL MUSCLE WEAKNESS: ICD-10-CM

## 2022-06-06 DIAGNOSIS — G89.29 CHRONIC RIGHT SHOULDER PAIN: Primary | ICD-10-CM

## 2022-06-06 PROCEDURE — 97140 MANUAL THERAPY 1/> REGIONS: CPT

## 2022-06-06 PROCEDURE — 97110 THERAPEUTIC EXERCISES: CPT

## 2022-06-06 NOTE — PROGRESS NOTES
[facility]   Physical Therapy Daily Note     Name: Casandra [facility] Number: [id number]    Therapy Diagnosis:   Encounter Diagnoses   Name Primary?    Chronic right shoulder pain Yes    Proximal muscle weakness      Physician: Cierra Farrell PA-C    Visit Date: 6/6/2022       Physician: Cierra Farrell PA-C     Physician Orders: PT Eval and Treat  Medical Diagnosis from Referral:   M21.821 (ICD-10-CM) - Other specified acquired deformities of right upper arm   M25.511 (ICD-10-CM) - Pain in right shoulder   G89.29 (ICD-10-CM) - Other chronic pain   S43.491A (ICD-10-CM) - Other sprain of right shoulder joint, initial encounter      Evaluation Date: 2/7/2022  Authorization Period Expiration: 12/31/2022  Plan of Care Expiration: 07/04/2022  Progress Note Due: 06/16/2022  Visit # / Visits authorized: 26/30 (+ Eval)  FOTO: 2/ 3      Precautions: Standard    PTA Visit #: 0/5     Time In: 4:01 pm  Time Out:  4:48 pm  Total Billable Time: 40 minutes    SUBJECTIVE     Patient reports: her shoulder has been about the same with no real improvement - she reports the injection decreased her symptoms briefly but it was not long lasting.  She was compliant with home exercise program.  Response to previous treatment: n/a  Functional change: n/a    Pain: 0/10     Location: right shoulder    OBJECTIVE     Objective Measures updated at progress report unless specified.     TREATMENT     CASANDRA received the treatments listed below:     MANUAL THERAPY TECHNIQUES including Joint mobilizations, Myofacial release and Soft tissue Mobilization were applied to right shoulder for 12 minutes.    Manual Intervention Performed Today    Soft Tissue Mobilization     Joint Mobilizations x GH Mobilizations, Scapular mobilization, Passive range of motion all planes  Cervical UPAs, Cervical Distraction   Mobilization with movement          Functional Dry Needling       Plan for Next Visit: prn       THERAPEUTIC  "EXERCISES to develop  strength, endurance, ROM, flexibility, posture and core stabilization for 28 minutes including patient education, assessment, and the following:    UBE - 3'/3' - Endurance  Upper Trap Stretch - 3x30" - bilateral   Levator Scap Stretch - 3x30" - bilateral   Chin Tucks - 3 minutes   Open Books - 10x5" - bilateral   Thoracic Extension Seated - 3 minutes  Chin Tucks + Lift - 2 minutes   Prone Row - 3x10 - 7.5#  theraband scaption - 3x10 - Yellow Theraband     Plan for Next Visit:        PATIENT EDUCATION AND HOME EXERCISES     Home Exercises Provided and Patient Education Provided     Education provided: (included in home exercise program) minutes  Biomechanical implications of exercise    Written Home Exercises Provided: continue prior home exercise program.  Exercises were reviewed and CASANDRA was able to demonstrate them prior to the end of the session.  CASANDRA demonstrated good  understanding of the education provided. See EMR under Patient Instructions for exercises provided during therapy sessions.      ASSESSMENT     CASANDRA Barragan tolerated PT session well with minimal  complaints of pain or discomfort, secondary to improving GH range of motion. Objective findings are improving with of range of motion and functional mobility albeit Casandra continues to remain limited with gross shoulder strength.  Therapy exercises were reviewed by revisiting exercises given from previous home exercise program while adding thoracic and cervical .  Handouts were not issued during today's visit. Casandra demonstrated good understanding of new exercises and will continue to progress at home until next follow-up.        CASANDRA is progressing well towards her goals.   Pt prognosis is Good.     Pt will continue to benefit from skilled outpatient physical therapy to address the deficits listed in the problem list box on initial evaluation, provide pt/family education and to maximize pt's level of independence in the " home and community environment.     Pt's spiritual, cultural and educational needs considered and pt agreeable to plan of care and goals.     Anticipated Barriers for therapy: chronicity and coping       Goals:     SHORT TERM GOALS:  4 weeks 2/7/2022   1. Recent signs and systems trend is improving in order to progress towards LTG's.  PC   2. Patient will be independent with HEP in order to further progress and return to maximal function.  PC   3. Pain rating at Worst: 5/10 in order to progress towards increased independence with activity.  PC   4. Patient will be able to correct postural deviations in sitting and standing, to decrease pain and promote postural awareness for injury prevention.   PC      LONG TERM GOALS: 8 weeks 2/7/2022   1. Patient will return to normal ADL, recreational, and work related activities with less pain and limitation.   PC   2. Patient will improve AROM to stated goals in order to return to maximal functional potential.   PC   3. Patient will improve Strength to stated goals of appropriate musculature in order to improve functional independence.   PC   4. Pain Rating at Best: 1/10 to improve Quality of Life.   PC   5. Patient will meet predicted functional outcome (FOTO) score: 71% to increase self-worth & perceived functional ability.  PC   6. Patient will have met/partially met personal goal of: returning to ADLs with minimal pain and improved ability to use her shoulder  PC         Goals Key:  PC= progressing/continue; PM= partially met;        DC= discontinue    PLAN     Continue Plan of Care (POC) and progress per patient tolerance. See treatment section for details on planned progressions next session.      Reece Srivastava, PT

## 2022-06-08 ENCOUNTER — CLINICAL SUPPORT (OUTPATIENT)
Dept: REHABILITATION | Facility: HOSPITAL | Age: 22
End: 2022-06-08
Payer: COMMERCIAL

## 2022-06-08 DIAGNOSIS — G89.29 CHRONIC RIGHT SHOULDER PAIN: Primary | ICD-10-CM

## 2022-06-08 DIAGNOSIS — M62.81 PROXIMAL MUSCLE WEAKNESS: ICD-10-CM

## 2022-06-08 DIAGNOSIS — M25.511 CHRONIC RIGHT SHOULDER PAIN: Primary | ICD-10-CM

## 2022-06-08 PROCEDURE — 97110 THERAPEUTIC EXERCISES: CPT | Performed by: PHYSICAL THERAPIST

## 2022-06-08 NOTE — PROGRESS NOTES
[facility]   Physical Therapy Daily Note     Name: Casandra Barragan  [facility] Number: [id number]    Therapy Diagnosis:   Encounter Diagnoses   Name Primary?    Chronic right shoulder pain Yes    Proximal muscle weakness      Physician: Cierra Farrell PA-C    Visit Date: 6/8/2022       Physician: Cierra Farrell PA-C     Physician Orders: PT Eval and Treat  Medical Diagnosis from Referral:   M21.821 (ICD-10-CM) - Other specified acquired deformities of right upper arm   M25.511 (ICD-10-CM) - Pain in right shoulder   G89.29 (ICD-10-CM) - Other chronic pain   S43.491A (ICD-10-CM) - Other sprain of right shoulder joint, initial encounter      Evaluation Date: 2/7/2022  Authorization Period Expiration: 12/31/2022  Plan of Care Expiration: 07/04/2022  Progress Note Due: 06/16/2022  Visit # / Visits authorized: 27/30 (+ Eval)  FOTO: 2/ 3      Precautions: Standard    PTA Visit #: 0/5     Time In: 2:00 pm  Time Out:  3:03 pm  Total Billable Time: 60 minutes    SUBJECTIVE     Patient reports: her shoulder pain is inconsistent but has decreased at rest. She is unsure if she is staying in the area or moving to [place] for the summer.     She was compliant with home exercise program.  Response to previous treatment: n/a  Functional change: n/a    Pain: 0/10     Location: right shoulder    OBJECTIVE     Objective Measures updated at progress report unless specified.    Active Range of Motion (Passive Range of Motion) : Measured in degrees        Shoulder Left Right Goal   Flexion full 145 (180) 180   Abduction full 115 (180) 180   ER at 90 full 80 (90) 90   IR full 50 (80) 80            Upper Extremity Strength     Shoulder Left Right Goals   Shoulder flexion: 4/5 4-/5 5/5   Shoulder Abduction: 4/5 4-/5 5/5   Shoulder ER 4-/5 4-/5 5/5   Shoulder IR 4/5 4-/5 5/5      Full AROM of cervical spine noted with no increase in symptoms in shoulder/arm    Full thoracic rotation with no increase in  "symptoms    2nd rib alignment altered from right shoulder to left shoulder with right rib displaced more anteriorly.        TREATMENT     CASANDRA received the treatments listed below:     MANUAL THERAPY TECHNIQUES including Joint mobilizations, Myofacial release and Soft tissue Mobilization were applied to right shoulder for 0 minutes.    Manual Intervention Performed Today    Soft Tissue Mobilization     Joint Mobilizations     Mobilization with movement          Functional Dry Needling       Plan for Next Visit: prn       THERAPEUTIC EXERCISES to develop  strength, endurance, ROM, flexibility, posture and core stabilization for 60 minutes including patient education, assessment, and the following:    UBE - 3'/3' - Endurance  Upper Trap Stretch - 3x30" - bilateral   Levator Scap Stretch - 3x30" - bilateral   Chin Tucks - 3 minutes   Open Books - 10x5" - bilateral   Thoracic Extension Seated - 3 minutes  Chin Tucks + Lift - 2 minutes   Prone Row - 3x10 - 15#   Prone Scaption - 3x10 - 2#   Prone Extension - 3x10 - 2#   Prone Abduction - 3x10 - BW  theraband scaption - 3x10 - Yellow Theraband   ER Walkouts - 2x10 - Yellow TB   Scapular Push Ups - mat - 2x15   Mat Push Ups - mat - 2x15     Plan for Next Visit: GH Mobilizations, PROM all planes of motion         PATIENT EDUCATION AND HOME EXERCISES     Home Exercises Provided and Patient Education Provided     Education provided: (included in home exercise program) minutes  Biomechanical implications of exercise    Written Home Exercises Provided: continue prior home exercise program.  Exercises were reviewed and CASANDRA was able to demonstrate them prior to the end of the session.  CASANDRA demonstrated good  understanding of the education provided. See EMR under Patient Instructions for exercises provided during therapy sessions.      ASSESSMENT     CASANDRA Barragan tolerated PT session well with minimal complaints of pain or discomfort. Casandra continues to remain limited " with gross shoulder strength and her gross range of motion is improving. She demonstrated a good understanding of new exercises and of the need to continue progressing both in therapy and at home.       DAMARI is progressing well towards her goals.   Pt prognosis is Good.     Pt will continue to benefit from skilled outpatient physical therapy to address the deficits listed in the problem list box on initial evaluation, provide pt/family education and to maximize pt's level of independence in the home and community environment.     Pt's spiritual, cultural and educational needs considered and pt agreeable to plan of care and goals.     Anticipated Barriers for therapy: chronicity and coping       Goals:     SHORT TERM GOALS:  4 weeks 2/7/2022   1. Recent signs and systems trend is improving in order to progress towards LTG's.  PC   2. Patient will be independent with HEP in order to further progress and return to maximal function.  PC   3. Pain rating at Worst: 5/10 in order to progress towards increased independence with activity.  PC   4. Patient will be able to correct postural deviations in sitting and standing, to decrease pain and promote postural awareness for injury prevention.   PC      LONG TERM GOALS: 8 weeks 2/7/2022   1. Patient will return to normal ADL, recreational, and work related activities with less pain and limitation.   PC   2. Patient will improve AROM to stated goals in order to return to maximal functional potential.   PC   3. Patient will improve Strength to stated goals of appropriate musculature in order to improve functional independence.   PC   4. Pain Rating at Best: 1/10 to improve Quality of Life.   PC   5. Patient will meet predicted functional outcome (FOTO) score: 71% to increase self-worth & perceived functional ability.  PC   6. Patient will have met/partially met personal goal of: returning to ADLs with minimal pain and improved ability to use her shoulder  PC         Goals  Key:  PC= progressing/continue; PM= partially met;        DC= discontinue    PLAN     Continue Plan of Care (POC) and progress per patient tolerance. See treatment section for details on planned progressions next session.      Luiz Castillo PT

## 2022-06-30 ENCOUNTER — CLINICAL SUPPORT (OUTPATIENT)
Dept: REHABILITATION | Facility: HOSPITAL | Age: 22
End: 2022-06-30
Payer: COMMERCIAL

## 2022-06-30 DIAGNOSIS — M62.81 PROXIMAL MUSCLE WEAKNESS: ICD-10-CM

## 2022-06-30 DIAGNOSIS — G89.29 CHRONIC RIGHT SHOULDER PAIN: Primary | ICD-10-CM

## 2022-06-30 DIAGNOSIS — M25.511 CHRONIC RIGHT SHOULDER PAIN: Primary | ICD-10-CM

## 2022-06-30 PROCEDURE — 97110 THERAPEUTIC EXERCISES: CPT

## 2022-06-30 NOTE — PROGRESS NOTES
[facility]   Physical Therapy Progress Note     Name: Casandra Barragan  [facility] Number: [id number]    Therapy Diagnosis:   Encounter Diagnoses   Name Primary?    Chronic right shoulder pain Yes    Proximal muscle weakness      Physician: Cierra Farrell PA-C    Visit Date: 6/30/2022       Physician: Cierra Farrell PA-C     Physician Orders: PT Eval and Treat  Medical Diagnosis from Referral:   M21.821 (ICD-10-CM) - Other specified acquired deformities of right upper arm   M25.511 (ICD-10-CM) - Pain in right shoulder   G89.29 (ICD-10-CM) - Other chronic pain   S43.491A (ICD-10-CM) - Other sprain of right shoulder joint, initial encounter      Evaluation Date: 2/7/2022  Authorization Period Expiration: 12/31/2022  Plan of Care Expiration: 07/04/2022  Progress Note Due: 07/30/2022  Visit # / Visits authorized: 28/30 (+ Eval)  FOTO: 3/ 3      Precautions: Standard    PTA Visit #: 0/5     Time In: 2:08 pm  Time Out:  3:00 pm  Total Billable Time: 46 minutes    SUBJECTIVE     Patient reports: overall her shoulder has been feeling goof however she has been having some rib pain again - variable throughout the day and with variable activities.    She was compliant with home exercise program.  Response to previous treatment: n/a  Functional change: n/a    Pain: 0/10     Location: right shoulder    OBJECTIVE     Objective Measures updated at progress report unless specified.    Active Range of Motion (Passive Range of Motion) : Measured in degrees      Shoulder Left Right Goal   Flexion full 155 (180) 180   Abduction full 130 (180) 180   ER at 90 full 90 (90) 90   IR full 80 (80) 80      Upper Extremity Strength     Shoulder Left Right Goals   Shoulder flexion: 4/5 4-/5 5/5   Shoulder Abduction: 4/5 4-/5 5/5   Shoulder ER 4-/5 4-/5 5/5   Shoulder IR 4/5 4-/5 5/5      Full AROM of cervical spine noted with no increase in symptoms in shoulder/arm    Full thoracic rotation with no increase in  "symptoms    2nd rib alignment altered from right shoulder to left shoulder with right rib displaced more anteriorly - tenderness to palpation of right rib (6/10)         TREATMENT     DAMARI received the treatments listed below:     MANUAL THERAPY TECHNIQUES including Joint mobilizations, Myofacial release and Soft tissue Mobilization were applied to right shoulder for 0 minutes.    Manual Intervention Performed Today    Soft Tissue Mobilization     Joint Mobilizations     Mobilization with movement          Functional Dry Needling       Plan for Next Visit: prn       THERAPEUTIC EXERCISES to develop  strength, endurance, ROM, flexibility, posture and core stabilization for 46 minutes including patient education, assessment, and the following:    UBE - 3'/3' - Endurance  Upper Trap Stretch - 3x30" - bilateral   Levator Scap Stretch - 3x30" - bilateral   Cable Row - 3x10 - 30# - 3x10  Cable Extensions - 30# - 3x10  Cable external rotation - 10# - 2x10  Prone Row - 3x10 - 15#   theraband scaption - 3x10 - Yellow Theraband   ER Walkouts - 2x10 - Yellow TB   Scapular Push Ups - mat - 2x15   Mat Push Ups - mat - 2x15   Prone Y - 2x10 - 0#    HELD:  Prone Scaption - 3x10 - 2#   Prone Extension - 3x10 - 2#   Prone Abduction - 3x10 - BW    Plan for Next Visit: GH Mobilizations, PROM all planes of motion         PATIENT EDUCATION AND HOME EXERCISES     Home Exercises Provided and Patient Education Provided     Education provided: (included in home exercise program) minutes  Biomechanical implications of exercise    Written Home Exercises Provided: continue prior home exercise program.  Exercises were reviewed and DAMARI was able to demonstrate them prior to the end of the session.  DAMARI demonstrated good  understanding of the education provided. See EMR under Patient Instructions for exercises provided during therapy sessions.      ASSESSMENT     DAMARI Barragan tolerated PT session well with minimal complaints of pain or " discomfort, secondary to pain over the second rib. Objective findings are improving with of range of motion, strength and functional mobility since our last progress note.  Therapy exercises were reviewed by revisiting exercises given from previous home exercise program while adding cable rows, extensions, and external rotations.  Handouts were not issued during today's visit. Casandra demonstrated good understanding of new exercises and will continue to progress at home until next follow-up.      CASANDRA is progressing well towards her goals.   Pt prognosis is Good.     Pt will continue to benefit from skilled outpatient physical therapy to address the deficits listed in the problem list box on initial evaluation, provide pt/family education and to maximize pt's level of independence in the home and community environment.     Pt's spiritual, cultural and educational needs considered and pt agreeable to plan of care and goals.     Anticipated Barriers for therapy: chronicity and coping       Goals:     SHORT TERM GOALS:  4 weeks 2/7/2022   1. Recent signs and systems trend is improving in order to progress towards LTG's.  PC   2. Patient will be independent with HEP in order to further progress and return to maximal function.  PC   3. Pain rating at Worst: 5/10 in order to progress towards increased independence with activity.  PC   4. Patient will be able to correct postural deviations in sitting and standing, to decrease pain and promote postural awareness for injury prevention.   PC      LONG TERM GOALS: 8 weeks 2/7/2022   1. Patient will return to normal ADL, recreational, and work related activities with less pain and limitation.   PC   2. Patient will improve AROM to stated goals in order to return to maximal functional potential.   PC   3. Patient will improve Strength to stated goals of appropriate musculature in order to improve functional independence.   PC   4. Pain Rating at Best: 1/10 to improve Quality of  Life.   PC   5. Patient will meet predicted functional outcome (FOTO) score: 71% to increase self-worth & perceived functional ability.  PC   6. Patient will have met/partially met personal goal of: returning to ADLs with minimal pain and improved ability to use her shoulder  PC         Goals Key:  PC= progressing/continue; PM= partially met;        DC= discontinue    PLAN     Continue Plan of Care (POC) and progress per patient tolerance. See treatment section for details on planned progressions next session.      Reece Srivastava, PT

## 2022-07-25 ENCOUNTER — OFFICE VISIT (OUTPATIENT)
Dept: ORTHOPEDICS | Facility: CLINIC | Age: 22
End: 2022-07-25
Payer: COMMERCIAL

## 2022-07-25 VITALS — WEIGHT: 117 LBS | HEIGHT: 63 IN | BODY MASS INDEX: 20.73 KG/M2

## 2022-07-25 DIAGNOSIS — R07.1 CHEST PAIN ON BREATHING: Primary | ICD-10-CM

## 2022-07-25 PROCEDURE — 99213 OFFICE O/P EST LOW 20 MIN: CPT | Mod: S$GLB,,, | Performed by: ORTHOPAEDIC SURGERY

## 2022-07-25 PROCEDURE — 99213 OFFICE O/P EST LOW 20 MIN: CPT | Mod: PBBFAC | Performed by: ORTHOPAEDIC SURGERY

## 2022-07-25 PROCEDURE — 99213 PR OFFICE/OUTPT VISIT, EST, LEVL III, 20-29 MIN: ICD-10-PCS | Mod: S$GLB,,, | Performed by: ORTHOPAEDIC SURGERY

## 2022-07-25 PROCEDURE — 99999 PR PBB SHADOW E&M-EST. PATIENT-LVL III: ICD-10-PCS | Mod: PBBFAC,,, | Performed by: ORTHOPAEDIC SURGERY

## 2022-07-25 PROCEDURE — 99999 PR PBB SHADOW E&M-EST. PATIENT-LVL III: CPT | Mod: PBBFAC,,, | Performed by: ORTHOPAEDIC SURGERY

## 2022-07-25 NOTE — PATIENT INSTRUCTIONS
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   Right shoulder pain, improving   Possible Previously right shoulder dislocation January 2021  Chest pain with breathing          Encounter Diagnosis   Name Primary?    Chest pain on breathing Yes           Plan:  Referral to Dr. Fagan to discuss chest pain; negative for thoracic outlet   Okay to try out for cheer from a shoulder standpoint; will refer to urszula on RTP from chest standpoint  Continue rehab at [facility] and [facility] training room     Follow up: with Dr. Fagan to discuss anterior chest pain.         Thank you for choosing [facility] and Dr. Bairon Dozier for your orthopedic & sports medicine care. It is our goal to provide you with exceptional care that will help keep you healthy, active, and get you back in the game.    If you felt that you received exemplary care today, please consider leaving us feedback on [employer] at [email or web address].     Please do not hesitate to reach out to us via email, phone, or MyChart with any questions, concerns, or feedback.    If you are experiencing pain/discomfort ,or have questions after 5pm and would like to be connected to the [facility] on-call team, please call this number and specify which Sports Medicine provider is treating you: [phone]

## 2022-07-25 NOTE — PROGRESS NOTES
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 Her pain is located vertically on her Anterior Right Shoulder. She goes to Physical therapy at [facility] with Taz. She is also being treated by the [facility] with dry needling and other modalities. Overall, she feels that she has improved since her last visit.  Chest pain is positional.  Not with exertion but just with certain motions of the arm.  Describes as radiating across the front of the chest.  Has been evaluated by Dr. Fagan in past.  She says her shoulder pain is essentially resolved and that the chest pain while not worsening has not improved.    HPI    Past Medical History:   History reviewed. No pertinent past medical history.  History reviewed. No pertinent surgical history.  Family History   Problem Relation Age of Onset    No Known Problems Mother     No Known Problems Father      Social History     Socioeconomic History    Marital status: Single   Tobacco Use    Smoking status: Never Smoker    Smokeless tobacco: Never Used   Substance and Sexual Activity    Alcohol use: Never    Drug use: Never    Sexual activity: Not Currently     Partners: Male     Medication List with Changes/Refills   Current Medications    IBUPROFEN (ADVIL,MOTRIN) 600 MG TABLET    Take 600 mg by mouth.    MEDROXYPROGESTERONE (DEPO-PROVERA) 150 MG/ML INJECTION    Inject 150 mg into the muscle.    NORETHINDRONE-ETHINYL ESTRADIOL (ORTHO-NOVUM 7/7/7, 28,) 0.5/0.75/1 MG- 35 MCG PER TABLET    Take 1 tablet by mouth once daily.    SULFAMETHOXAZOLE-TRIMETHOPRIM 800-160MG (BACTRIM DS)  800-160 MG TAB    Take 1 tablet by mouth 2 (two) times daily.     Review of patient's allergies indicates:   Allergen Reactions    Amoxicillin Rash    Clindamycin Other (See Comments)     Palm and foot skin peeling off  Palm and foot skin peeling off       ROS    Physical Exam:   Body mass index is 20.73 kg/m².  There were no vitals filed for this visit.   GENERAL: Well appearing, appropriate for stated age, no acute distress.  CARDIOVASCULAR: Pulses regular by peripheral palpation.  PULMONARY: Respirations are even and non-labored.  NEURO: Awake, alert, and oriented x 3.  PSYCH: Mood & affect are appropriate.  HEENT: Head is normocephalic and atraumatic.  Ortho/SPM Exam  Right shoulder: has some pain with circumduction  Full and symmetric ROM  No gross instability with anterior or posterior load and shift  5/5 cuff strength:   + OREILLY   + ABER, some pain with werner/Jerk but not as significant  - Tabatha  Tenderness over 2nd rib    Imaging:    X-ray Shoulder 2 or More Views Right  Narrative: EXAMINATION:  XR SHOULDER COMPLETE 2 OR MORE VIEWS RIGHT    CLINICAL HISTORY:  Pain in right shoulder    TECHNIQUE:  Two or three views of the right shoulder were performed.    COMPARISON:  None    FINDINGS:  AC and glenohumeral joints are intact.  No acute osseous findings.  Visualized right lung is clear.  Soft tissues are within normal limits.  Impression: Please see above    Electronically signed by: Brian Latham MD  Date:    01/27/2022  Time:    08:51      Relevant imaging results reviewed and interpreted by me, discussed with the patient and / or family today.     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   · Right shoulder pain, improving   · Possible Previously right  shoulder dislocation January 2021  · Chest pain with breathing          Encounter Diagnosis   Name Primary?    Chest pain on breathing Yes           Plan:  · Referral to Dr. Fagan to discuss chest pain; negative for thoracic outlet   · Okay to try out for cheer from a shoulder standpoint; will refer to urszula on RTP from chest standpoint  · Continue rehab at [facility] and [facility] training room     Follow up: with Dr. Fagan to discuss anterior chest pain.         Thank you for choosing [facility] and Dr. Bairon Dozier for your orthopedic & sports medicine care. It is our goal to provide you with exceptional care that will help keep you healthy, active, and get you back in the game.    If you felt that you received exemplary care today, please consider leaving us feedback on [employer] at [email or web address].     Please do not hesitate to reach out to us via email, phone, or MyChart with any questions, concerns, or feedback.    If you are experiencing pain/discomfort ,or have questions after 5pm and would like to be connected to the [facility] on-call team, please call this number and specify which Sports Medicine provider is treating you: [phone]         Provider Note/Medical Decision Making:  She is still symptomatic but her strength and range of motion has improved significantly.  Due the fact that she seems to be inflammatory in terms of shoulder motion going to send her to Dr. Fagan to see if she would be a good candidate for an ultrasound-guided injection.  We discussed the pros and cons of this.  She did appear to have at least a partial injury to the humeral side of her glenohumeral ligament and she is symptomatic with provocative measures today but no gross instability in her shoulder and overall she has made progress.  She is not currently tearing but may consider tearing in the fall the pin on  her shoulder though she will be a senior.       I discussed worrisome and red flag signs and symptoms with the patient. The patient expressed understanding and agreed to alert me immediately or to go to the emergency room if they experience any of these.    Treatment plan was developed with input from the patient/family, and they expressed understanding and agreement with the plan. All questions were answered today.    Disclaimer: This note was prepared using a voice recognition system and is likely to have sound alike errors within the text.     I, Dayana Lee, acted as a scribe for Bairon Dozier MD for the duration of this office visit.

## 2022-07-27 ENCOUNTER — CLINICAL SUPPORT (OUTPATIENT)
Dept: REHABILITATION | Facility: HOSPITAL | Age: 22
End: 2022-07-27
Payer: COMMERCIAL

## 2022-07-27 DIAGNOSIS — G89.29 CHRONIC RIGHT SHOULDER PAIN: Primary | ICD-10-CM

## 2022-07-27 DIAGNOSIS — M62.81 PROXIMAL MUSCLE WEAKNESS: ICD-10-CM

## 2022-07-27 DIAGNOSIS — M25.511 CHRONIC RIGHT SHOULDER PAIN: Primary | ICD-10-CM

## 2022-07-27 PROCEDURE — 97110 THERAPEUTIC EXERCISES: CPT

## 2022-07-27 NOTE — PROGRESS NOTES
[facility]   Physical Therapy Daily Note     Name: [facility] Number: [id number]    Therapy Diagnosis:   Encounter Diagnoses   Name Primary?    Chronic right shoulder pain Yes    Proximal muscle weakness      Physician: Cierra Boss, *    Visit Date: 7/27/2022       Physician: Cierra Farrell PA-C     Physician Orders: PT Eval and Treat  Medical Diagnosis from Referral:   M21.821 (ICD-10-CM) - Other specified acquired deformities of right upper arm   M25.511 (ICD-10-CM) - Pain in right shoulder   G89.29 (ICD-10-CM) - Other chronic pain   S43.491A (ICD-10-CM) - Other sprain of right shoulder joint, initial encounter      Evaluation Date: 2/7/2022  Authorization Period Expiration: 12/31/2022  Plan of Care Expiration: 07/04/2022  Progress Note Due: 07/30/2022  Visit # / Visits authorized: 29/30 (+ Eval)  FOTO: 3/ 3      Precautions: Standard    PTA Visit #: 0/5     Time In: 1:02 pm  Time Out:  2:00 pm  Total Billable Time: 53 minutes    SUBJECTIVE     Patient reports: overall her shoulder has been feeling good however she has been having some rib pain again - variable throughout the day and with variable activities.    She was compliant with home exercise program.  Response to previous treatment: n/a  Functional change: n/a    Pain: 0/10     Location: right shoulder    OBJECTIVE     Objective Measures updated at progress report unless specified.     TREATMENT     Casandra received the treatments listed below:     MANUAL THERAPY TECHNIQUES including Joint mobilizations, Myofacial release and Soft tissue Mobilization were applied to right shoulder for 0 minutes.    Manual Intervention Performed Today    Soft Tissue Mobilization     Joint Mobilizations     Mobilization with movement          Functional Dry Needling       Plan for Next Visit: prn       THERAPEUTIC EXERCISES to develop  strength, endurance, ROM, flexibility, posture and core stabilization for 53 minutes  "including patient education, assessment, and the following:    UBE - 3'/3'  Shoulder Flexion - 2# - 3x10  Upper Trap Stretch - 3x30" - bilateral    Levator Scap Stretch - 3x30" - bilateral   Cable Row - 3x10 - 40# - 3x10  Cable Extensions - 40# - 3x10  external rotation - Yellow Theraband - 2x10  Prone Row - 3x10 - 15#   theraband scaption - 3x10 - Yellow Theraband   Scapular Push Ups - mat - 2x15   Mat Push Ups - mat - 2x15   Prone Y - 2x10 - 0#    HELD:  Prone Scaption - 3x10 - 2#   Prone Extension - 3x10 - 2#   Prone Abduction - 3x10 - BW      PATIENT EDUCATION AND HOME EXERCISES     Home Exercises Provided and Patient Education Provided     Education provided: (included in home exercise program) minutes  Biomechanical implications of exercise    Written Home Exercises Provided: continue prior home exercise program.  Exercises were reviewed and Casandra was able to demonstrate them prior to the end of the session.  Casandra demonstrated good  understanding of the education provided. See EMR under Patient Instructions for exercises provided during therapy sessions.      ASSESSMENT     Casandra Barragan tolerated PT session well with minimal complaints of pain or discomfort, secondary to pain over the second rib. Objective findings are improving with of range of motion, strength and functional mobility since our last progress note.  Therapy exercises were reviewed by revisiting exercises given from previous home exercise program while adding rotator cuff and continuing to improve range of motion and strength.  Handouts were not issued during today's visit. Casandra demonstrated good understanding of new exercises and will continue to progress at home until next follow-up.      Casandra is progressing well towards her goals.   Pt prognosis is Good.     Pt will continue to benefit from skilled outpatient physical therapy to address the deficits listed in the problem list box on initial evaluation, provide pt/family education " and to maximize pt's level of independence in the home and community environment.     Pt's spiritual, cultural and educational needs considered and pt agreeable to plan of care and goals.     Anticipated Barriers for therapy: chronicity and coping       Goals:     SHORT TERM GOALS:  4 weeks 2/7/2022   1. Recent signs and systems trend is improving in order to progress towards LTG's.  PC   2. Patient will be independent with HEP in order to further progress and return to maximal function.  PC   3. Pain rating at Worst: 5/10 in order to progress towards increased independence with activity.  PC   4. Patient will be able to correct postural deviations in sitting and standing, to decrease pain and promote postural awareness for injury prevention.   PC      LONG TERM GOALS: 8 weeks 2/7/2022   1. Patient will return to normal ADL, recreational, and work related activities with less pain and limitation.   PC   2. Patient will improve AROM to stated goals in order to return to maximal functional potential.   PC   3. Patient will improve Strength to stated goals of appropriate musculature in order to improve functional independence.   PC   4. Pain Rating at Best: 1/10 to improve Quality of Life.   PC   5. Patient will meet predicted functional outcome (FOTO) score: 71% to increase self-worth & perceived functional ability.  PC   6. Patient will have met/partially met personal goal of: returning to ADLs with minimal pain and improved ability to use her shoulder  PC         Goals Key:  PC= progressing/continue; PM= partially met;        DC= discontinue    PLAN     Continue Plan of Care (POC) and progress per patient tolerance. See treatment section for details on planned progressions next session.      Reece Srivastava, PT

## 2022-07-29 ENCOUNTER — CLINICAL SUPPORT (OUTPATIENT)
Dept: REHABILITATION | Facility: HOSPITAL | Age: 22
End: 2022-07-29
Payer: COMMERCIAL

## 2022-07-29 DIAGNOSIS — G89.29 CHRONIC RIGHT SHOULDER PAIN: Primary | ICD-10-CM

## 2022-07-29 DIAGNOSIS — M62.81 PROXIMAL MUSCLE WEAKNESS: ICD-10-CM

## 2022-07-29 DIAGNOSIS — M25.511 CHRONIC RIGHT SHOULDER PAIN: Primary | ICD-10-CM

## 2022-07-29 PROCEDURE — 97110 THERAPEUTIC EXERCISES: CPT

## 2022-07-29 NOTE — PROGRESS NOTES
[facility]   Physical Therapy Daily Note     Name: Casandra [facility] Number: [id number]    Therapy Diagnosis:   Encounter Diagnoses   Name Primary?    Chronic right shoulder pain Yes    Proximal muscle weakness      Physician: Cierra Boss, *    Visit Date: 7/29/2022       Physician: Cierra Farrell PA-C     Physician Orders: PT Eval and Treat  Medical Diagnosis from Referral:   M21.821 (ICD-10-CM) - Other specified acquired deformities of right upper arm   M25.511 (ICD-10-CM) - Pain in right shoulder   G89.29 (ICD-10-CM) - Other chronic pain   S43.491A (ICD-10-CM) - Other sprain of right shoulder joint, initial encounter      Evaluation Date: 2/7/2022  Authorization Period Expiration: 12/31/2022  Plan of Care Expiration: 07/04/2022  Progress Note Due: 07/30/2022  Visit # / Visits authorized: 30/40 (+ Eval)  FOTO: 3/ 3      Precautions: Standard    PTA Visit #: 0/5     Time In: 1:46 pm  Time Out:  2:30 pm  Total Billable Time: 38 minutes    SUBJECTIVE     Patient reports: overall her shoulder has been feeling good - continues to have some rib pain.    She was compliant with home exercise program.  Response to previous treatment: n/a  Functional change: n/a    Pain: 0/10     Location: right shoulder    OBJECTIVE     Objective Measures updated at progress report unless specified.     TREATMENT     Casandra received the treatments listed below:     MANUAL THERAPY TECHNIQUES including Joint mobilizations, Myofacial release and Soft tissue Mobilization were applied to right shoulder for 0 minutes.    Manual Intervention Performed Today    Soft Tissue Mobilization     Joint Mobilizations     Mobilization with movement          Functional Dry Needling       Plan for Next Visit: prn       THERAPEUTIC EXERCISES to develop  strength, endurance, ROM, flexibility, posture and core stabilization for 38 minutes including patient education, assessment, and the following:    UBE -  "3'/3'  Shoulder Flexion - Yellow Theraband - 3x10  Shoulder abduction - Yellow Theraband - 3x10   Cable Row - 3x10 - 40# - 3x10  Cable Extensions - 40# - 3x10  external rotation - Yellow Theraband - 2x10  Prone Row - 3x12 - 20#   side lying external rotation - 3x10 - 2#  theraband scaption - 3x10 - Yellow Theraband   High Plank Shoulder Taps - 16" Box - 2x10  Prone Y - 2x10 - 0#    HELD:  Upper Trap Stretch - 3x30" - bilateral    Levator Scap Stretch - 3x30" - bilateral   Prone Scaption - 3x10 - 2#   Prone Extension - 3x10 - 2#   Prone Abduction - 3x10 - BW      PATIENT EDUCATION AND HOME EXERCISES     Home Exercises Provided and Patient Education Provided     Education provided: (included in home exercise program) minutes  Biomechanical implications of exercise    Written Home Exercises Provided: continue prior home exercise program.  Exercises were reviewed and Casandra was able to demonstrate them prior to the end of the session.  Casandra demonstrated good  understanding of the education provided. See EMR under Patient Instructions for exercises provided during therapy sessions.      ASSESSMENT     Casandra Barragan tolerated PT session well with minimal complaints of pain or discomfort, secondary to pain over the second rib. Objective findings are improving with of range of motion, strength and functional mobility since our last progress note.  Therapy exercises were reviewed by revisiting exercises given from previous home exercise program while reintroducing side lying external rotation and increasing resistance used with prone rows in order to continue to progress rotator cuff strengthening.  Handouts were not issued during today's visit. Casandra demonstrated good understanding of new exercises and will continue to progress at home until next follow-up.      Casandra is progressing well towards her goals.   Pt prognosis is Good.     Pt will continue to benefit from skilled outpatient physical therapy to address the " deficits listed in the problem list box on initial evaluation, provide pt/family education and to maximize pt's level of independence in the home and community environment.     Pt's spiritual, cultural and educational needs considered and pt agreeable to plan of care and goals.     Anticipated Barriers for therapy: chronicity and coping       Goals:     SHORT TERM GOALS:  4 weeks 2/7/2022   1. Recent signs and systems trend is improving in order to progress towards LTG's.  PC   2. Patient will be independent with HEP in order to further progress and return to maximal function.  PC   3. Pain rating at Worst: 5/10 in order to progress towards increased independence with activity.  PC   4. Patient will be able to correct postural deviations in sitting and standing, to decrease pain and promote postural awareness for injury prevention.   PC      LONG TERM GOALS: 8 weeks 2/7/2022   1. Patient will return to normal ADL, recreational, and work related activities with less pain and limitation.   PC   2. Patient will improve AROM to stated goals in order to return to maximal functional potential.   PC   3. Patient will improve Strength to stated goals of appropriate musculature in order to improve functional independence.   PC   4. Pain Rating at Best: 1/10 to improve Quality of Life.   PC   5. Patient will meet predicted functional outcome (FOTO) score: 71% to increase self-worth & perceived functional ability.  PC   6. Patient will have met/partially met personal goal of: returning to ADLs with minimal pain and improved ability to use her shoulder  PC         Goals Key:  PC= progressing/continue; PM= partially met;        DC= discontinue    PLAN     Continue Plan of Care (POC) and progress per patient tolerance. See treatment section for details on planned progressions next session.      Reece Srivastava, PT

## 2022-08-08 ENCOUNTER — TELEPHONE (OUTPATIENT)
Dept: SPORTS MEDICINE | Facility: CLINIC | Age: 22
End: 2022-08-08
Payer: COMMERCIAL

## 2022-08-08 NOTE — TELEPHONE ENCOUNTER
Spoke with patient to reschedule 8/10 appt. Pt agreed to 8/12 at 2pm with Dr. Fagan.    ----- Message from Dee Dee Brady sent at 8/8/2022 10:24 AM CDT -----  Type:  Sooner Apoointment Request    Caller is requesting a sooner appointment.  Caller declined first available appointment listed below.  Caller will not accept being placed on the waitlist and is requesting a message be sent to doctor.  Name of Caller:patient  When is the first available appointment?8/10  Symptoms:follow up for pain  Would the patient rather a call back or a response via [facility]? Call back  Best Call Back Number:[phone]  Additional Information: pt need next available and after 12noon if its' Wednesday

## 2022-08-12 ENCOUNTER — OFFICE VISIT (OUTPATIENT)
Dept: SPORTS MEDICINE | Facility: CLINIC | Age: 22
End: 2022-08-12
Payer: COMMERCIAL

## 2022-08-12 ENCOUNTER — HOSPITAL ENCOUNTER (OUTPATIENT)
Dept: RADIOLOGY | Facility: HOSPITAL | Age: 22
Discharge: HOME | End: 2022-08-12
Attending: STUDENT IN AN ORGANIZED HEALTH CARE EDUCATION/TRAINING PROGRAM
Payer: COMMERCIAL

## 2022-08-12 VITALS
DIASTOLIC BLOOD PRESSURE: 69 MMHG | WEIGHT: 115.75 LBS | HEART RATE: 90 BPM | SYSTOLIC BLOOD PRESSURE: 103 MMHG | BODY MASS INDEX: 20.51 KG/M2 | HEIGHT: 63 IN

## 2022-08-12 DIAGNOSIS — M25.511 PAIN OF RIGHT STERNOCLAVICULAR JOINT: ICD-10-CM

## 2022-08-12 DIAGNOSIS — R07.81 PAIN IN RIB: Primary | ICD-10-CM

## 2022-08-12 DIAGNOSIS — R07.81 PAIN IN RIB: ICD-10-CM

## 2022-08-12 PROCEDURE — 99999 PR PBB SHADOW E&M-EST. PATIENT-LVL IV: ICD-10-PCS | Mod: PBBFAC,,, | Performed by: STUDENT IN AN ORGANIZED HEALTH CARE EDUCATION/TRAINING PROGRAM

## 2022-08-12 PROCEDURE — 99999 PR PBB SHADOW E&M-EST. PATIENT-LVL IV: CPT | Mod: PBBFAC,,, | Performed by: STUDENT IN AN ORGANIZED HEALTH CARE EDUCATION/TRAINING PROGRAM

## 2022-08-12 PROCEDURE — 99214 OFFICE O/P EST MOD 30 MIN: CPT | Mod: S$GLB,,, | Performed by: STUDENT IN AN ORGANIZED HEALTH CARE EDUCATION/TRAINING PROGRAM

## 2022-08-12 PROCEDURE — 99214 PR OFFICE/OUTPT VISIT, EST, LEVL IV, 30-39 MIN: ICD-10-PCS | Mod: S$GLB,,, | Performed by: STUDENT IN AN ORGANIZED HEALTH CARE EDUCATION/TRAINING PROGRAM

## 2022-08-12 PROCEDURE — 71048 X-RAY EXAM CHEST 4+ VIEWS: CPT | Mod: 26,,, | Performed by: RADIOLOGY

## 2022-08-12 PROCEDURE — 71048 X-RAY EXAM CHEST 4+ VIEWS: CPT | Mod: TC

## 2022-08-12 PROCEDURE — 71048 XR CHEST 4 OR MORE VIEW: ICD-10-PCS | Mod: 26,,, | Performed by: RADIOLOGY

## 2022-08-12 NOTE — PROGRESS NOTES
Patient ID: Casandra Barragan  YOB: 2000  MRN: [id number]    Chief Complaint: Chest Pain    History of Present Illness: Casandra Barragan is a right-hand dominant 21 y.o. female who presents today with 7/10 pain intermittently aching . Patient indicates pain worsen with deep breathing and coughing. She tried prescribed medication Ibuprofen 600mg with mild relief.     The patient is active in [employer].  Occupation: [employer]     21-year-old female presenting today for recurrent left upper chest pain sternal pain.  Has been seen by Dr. Dozier the past conservative treatment for shoulder dislocation doing better but still has occasional pains.  Finished therapy a few weeks ago.  She still notes pain over the right anterior chest and SC joint.  Denies any persistent numbness and tingling in the right upper extremity.  Has been taking meloxicam in the past but has been bothering her now for a few months.    Past Medical History:   History reviewed. No pertinent past medical history.  History reviewed. No pertinent surgical history.  Family History   Problem Relation Age of Onset    No Known Problems Mother     No Known Problems Father      Social History     Socioeconomic History    Marital status: Single   Tobacco Use    Smoking status: Never Smoker    Smokeless tobacco: Never Used   Substance and Sexual Activity    Alcohol use: Never    Drug use: Never    Sexual activity: Not Currently     Partners: Male     Medication List with Changes/Refills   Current Medications    IBUPROFEN (ADVIL,MOTRIN) 600 MG TABLET    Take 600 mg by mouth.    MEDROXYPROGESTERONE (DEPO-PROVERA) 150 MG/ML INJECTION    Inject 150 mg into the muscle.    NORETHINDRONE-ETHINYL ESTRADIOL (ORTHO-NOVUM 7/7/7, 28,) 0.5/0.75/1 MG- 35 MCG PER TABLET    Take 1 tablet by mouth once daily.    SULFAMETHOXAZOLE-TRIMETHOPRIM 800-160MG (BACTRIM DS) 800-160 MG TAB    Take 1 tablet by mouth 2 (two) times daily.     Review of  patient's allergies indicates:   Allergen Reactions    Amoxicillin Rash    Clindamycin Other (See Comments)     Palm and foot skin peeling off  Palm and foot skin peeling off         Physical Exam:   Body mass index is 20.5 kg/m².    GENERAL: Well appearing, in no acute distress.  HEAD: Normocephalic and atraumatic.  ENT: External ears and nose grossly normal.  EYES: EOMI bilaterally  PULMONARY: Respirations are grossly even and non-labored.  NEURO: Awake, alert, and oriented x 3.  SKIN: No obvious rashes appreciated.  PSYCH: Mood & affect are appropriate.    Detailed MSK exam:     Tenderness over the SC joint and mild anterior protuberances of the clavicle at the SC joint tenderness over the 1st and 2nd rib space as well as over the 2nd rib and sternal junction.  No redness or warmth appreciated over the SC joint.    Imaging:  X-ray Shoulder 2 or More Views Right  Narrative: EXAMINATION:  XR SHOULDER COMPLETE 2 OR MORE VIEWS RIGHT    CLINICAL HISTORY:  Pain in right shoulder    TECHNIQUE:  Two or three views of the right shoulder were performed.    COMPARISON:  None    FINDINGS:  AC and glenohumeral joints are intact.  No acute osseous findings.  Visualized right lung is clear.  Soft tissues are within normal limits.  Impression: Please see above    Electronically signed by: Brian Latham MD  Date:    01/27/2022  Time:    08:51      Relevant imaging results were reviewed and interpreted by me and per my read as above.  This was discussed with the patient and / or family today.     Assessment:  Casandra Barragan is a 21 y.o. female presents today for a probable anterior subluxation of the right SC joint.  She has some costochondral pains as well over the 1st 2nd rib areas anteriorly.  Could be sequelae from recent shoulder dislocation verses a chronic injury.  She has done therapy for this in the past would like to get a CT for further evaluation and clarification of the underlying pathology and displacement.   Follow-up with me after CT scan chest x-ray today.    Pain in rib  -     X-Ray Chest 4 Or More Views; Future; Expected date: 08/12/2022  -     CT Chest Abdomen Pelvis W W/O Contrast (XPD); Future; Expected date: 08/12/2022    Pain of right sternoclavicular joint  -     X-Ray Chest 4 Or More Views; Future; Expected date: 08/12/2022  -     CT Chest Abdomen Pelvis W W/O Contrast (XPD); Future; Expected date: 08/12/2022           Andrea Fagan MD    Disclaimer: This note was prepared using a voice recognition system and is likely to have sound alike errors within the text.

## 2022-08-12 NOTE — PATIENT INSTRUCTIONS
Assessment:  Casandra Barragan is a 21 y.o. female   Chief Complaint   Patient presents with    Chest Pain       Encounter Diagnoses   Name Primary?    Pain in rib Yes    Pain of right sternoclavicular joint         Plan:  Reviewed your x-rays with you today and discussed pertinent findings.   Apply topical diclofenac (Voltaren) up to 4 times a day to the affected area.  It can be bought over the counter at any local pharmacy.    Placed a referral for an CT scan within the system today. We will follow-up in clinic 24-48 hours after these images are obtained.   Please reach out to us through [facility] messages if you have any questions or concerns. We will gladly answer you in a timely manner.     Follow-up: After CT scan or sooner if there are any problems between now and then.    Thank you for choosing [facility] and Dr. Andrea Fagan for your orthopedic & sports medicine care. It is our goal to provide you with exceptional care that will help keep you healthy, active, and get you back in the game.    Please do not hesitate to reach out to us via email, phone, or [facility] with any questions, concerns, or feedback.    If you felt that you received exemplary care today, please consider leaving us feedback on [employer] at:  [email or web address]    If you are experiencing pain/discomfort ,or have questions after 5pm and would like to be connected to the [facility] on-call team, please call this number and specify which Sports Medicine provider is treating you: [phone]

## 2022-08-25 DIAGNOSIS — R07.1 CHEST PAIN ON BREATHING: ICD-10-CM

## 2022-08-26 ENCOUNTER — PATIENT MESSAGE (OUTPATIENT)
Dept: SPORTS MEDICINE | Facility: CLINIC | Age: 22
End: 2022-08-26
Payer: COMMERCIAL

## 2022-08-30 ENCOUNTER — OFFICE VISIT (OUTPATIENT)
Dept: ORTHOPEDICS | Facility: CLINIC | Age: 22
End: 2022-08-30
Payer: COMMERCIAL

## 2022-08-30 DIAGNOSIS — R20.2 NUMBNESS AND TINGLING OF RIGHT UPPER EXTREMITY: ICD-10-CM

## 2022-08-30 DIAGNOSIS — R20.0 NUMBNESS AND TINGLING OF RIGHT UPPER EXTREMITY: ICD-10-CM

## 2022-08-30 DIAGNOSIS — S43.61XS: Primary | ICD-10-CM

## 2022-08-30 PROCEDURE — 99213 OFFICE O/P EST LOW 20 MIN: CPT | Mod: 95,,, | Performed by: STUDENT IN AN ORGANIZED HEALTH CARE EDUCATION/TRAINING PROGRAM

## 2022-08-30 PROCEDURE — 99213 PR OFFICE/OUTPT VISIT, EST, LEVL III, 20-29 MIN: ICD-10-PCS | Mod: 95,,, | Performed by: STUDENT IN AN ORGANIZED HEALTH CARE EDUCATION/TRAINING PROGRAM

## 2022-08-30 NOTE — PROGRESS NOTES
The patient location is:  [facility]  The chief complaint leading to consultation is:  Right arm and upper extremity numbness and tingling    Visit type: audiovisual    Face to Face time with patient: 10-15  20 minutes of total time spent on the encounter, which includes face to face time and non-face to face time preparing to see the patient (eg, review of tests), Obtaining and/or reviewing separately obtained history, Documenting clinical information in the electronic or other health record, Independently interpreting results (not separately reported) and communicating results to the patient/family/caregiver, or Care coordination (not separately reported).         Each patient to whom he or she provides medical services by telemedicine is:  (1) informed of the relationship between the physician and patient and the respective role of any other health care provider with respect to management of the patient; and (2) notified that he or she may decline to receive medical services by telemedicine and may withdraw from such care at any time.    Notes:     HPI:  Patient concerned as she has been having some numbness and tingling extending down the biceps muscle into anterior elbow at times sometimes with specific positioning but also notes that it sometimes will extend all the way down her forearm to her wrist.  Has not had issues like this in the past and notes the other day she had some significant symptoms in her upper trapezius and right cervical paraspinal muscles as well she denies any MVAs whiplash injuries in the past.  Notes that she may be holding herself nerve positions try protector SC joint which we have CT scan for on Friday.    No new imaging     Assessment and plan   Discussed assessing her in clinic and gave red flags for things were about for right upper extremity numbness tingling weakness over the next few days.  We will see her in clinic to follow up her CT scan over SC joint injury and re-evaluate her  in her upper extremity at that time consider EMG in the future.    Andrea Fagan

## 2022-08-31 ENCOUNTER — CLINICAL SUPPORT (OUTPATIENT)
Dept: REHABILITATION | Facility: HOSPITAL | Age: 22
End: 2022-08-31
Payer: COMMERCIAL

## 2022-08-31 DIAGNOSIS — G89.29 CHRONIC RIGHT SHOULDER PAIN: Primary | ICD-10-CM

## 2022-08-31 DIAGNOSIS — M25.511 CHRONIC RIGHT SHOULDER PAIN: Primary | ICD-10-CM

## 2022-08-31 DIAGNOSIS — M62.81 PROXIMAL MUSCLE WEAKNESS: ICD-10-CM

## 2022-08-31 PROCEDURE — 97110 THERAPEUTIC EXERCISES: CPT | Performed by: PHYSICAL THERAPIST

## 2022-08-31 NOTE — PROGRESS NOTES
[facility]   Physical Therapy Daily Note     Name: Casandra Barragan  [facility] [place] Number: [id number]    Therapy Diagnosis:   Encounter Diagnoses   Name Primary?    Chronic right shoulder pain Yes    Proximal muscle weakness      Physician: Cierra Boss, *    Visit Date: 8/31/2022       Physician: Cierra Farrell PA-C     Physician Orders: PT Eval and Treat  Medical Diagnosis from Referral:   M21.821 (ICD-10-CM) - Other specified acquired deformities of right upper arm   M25.511 (ICD-10-CM) - Pain in right shoulder   G89.29 (ICD-10-CM) - Other chronic pain   S43.491A (ICD-10-CM) - Other sprain of right shoulder joint, initial encounter      Evaluation Date: 2/7/2022  Authorization Period Expiration: 12/31/2022  Plan of Care Expiration: 10/26/2022  Progress Note Due: 9/30/2022  Visit # / Visits authorized: 31/40 (+ Eval)  FOTO: 3/ 3      Precautions: Standard    PTA Visit #: 0/5     Time In: 4:00 pm  Time Out:  4:43 pm  Total Billable Time: 40 minutes    SUBJECTIVE     Patient reports: she has been having some rib and right arm pain lately - she is scheduled for a CT scan on Friday of her chest.    She was compliant with home exercise program.  Response to previous treatment: n/a  Functional change: n/a    Pain: 0/10     Location: right shoulder    OBJECTIVE     Objective Measures updated at progress report unless specified.      Active Range of Motion (Passive Range of Motion) : Measured in degrees      Shoulder Left Right Goal   Flexion full 155 (180) 180   Abduction full 130 (180) 180   ER at 90 full 90 (90) 90   IR full 80 (80) 80      Upper Extremity Strength     Shoulder Left Right Goals   Shoulder flexion: 4/5 4-/5 5/5   Shoulder Abduction: 4/5 4-/5 5/5   Shoulder ER 4-/5 4-/5 5/5   Shoulder IR 4/5 4-/5 5/5      Full AROM of cervical spine noted with no increase in symptoms in shoulder/arm    Full thoracic rotation with no increase in symptoms    2nd rib alignment altered from right  "shoulder to left shoulder with right rib displaced more anteriorly - tenderness to palpation of right rib (6/10)         TREATMENT     Casandra received the treatments listed below:     MANUAL THERAPY TECHNIQUES including Joint mobilizations, Myofacial release and Soft tissue Mobilization were applied to right shoulder for 0 minutes.    Manual Intervention Performed Today    Soft Tissue Mobilization     Joint Mobilizations     Mobilization with movement          Functional Dry Needling       Plan for Next Visit: prn       THERAPEUTIC EXERCISES to develop  strength, endurance, ROM, flexibility, posture and core stabilization for 38 minutes including patient education, assessment, and the following:    Reassessment    UBE - 3'/3'  Shoulder Flexion - Yellow Theraband - 3x10  Shoulder abduction - Yellow Theraband - 3x10   Theraband Rows - Green Theraband - 3x10   Theraband extensions - Green Theraband - 2x10   external rotation - Yellow Theraband - 2x10  Prone Row - 3x12 - 7.5#   Prone Y - 2x10  Prone T - 2x10     HELD  side lying external rotation - 3x10 - 2#  theraband scaption - 3x10 - Yellow Theraband   High Plank Shoulder Taps - 16" Box - 2x10  Prone Y - 2x10 - 0#    Upper Trap Stretch - 3x30" - bilateral    Levator Scap Stretch - 3x30" - bilateral   Prone Scaption - 3x10 - 2#   Prone Extension - 3x10 - 2#   Prone Abduction - 3x10 - BW      PATIENT EDUCATION AND HOME EXERCISES     Home Exercises Provided and Patient Education Provided     Education provided: (included in home exercise program) minutes  Biomechanical implications of exercise    Written Home Exercises Provided: continue prior home exercise program.  Exercises were reviewed and Casandra was able to demonstrate them prior to the end of the session.  Casandra demonstrated good  understanding of the education provided. See EMR under Patient Instructions for exercises provided during therapy sessions.      ASSESSMENT     See plan of care update - " 08/31/2022    Casandra is progressing well towards her goals.   Pt prognosis is Good.     Pt will continue to benefit from skilled outpatient physical therapy to address the deficits listed in the problem list box on initial evaluation, provide pt/family education and to maximize pt's level of independence in the home and community environment.     Pt's spiritual, cultural and educational needs considered and pt agreeable to plan of care and goals.     Anticipated Barriers for therapy: chronicity and coping       Goals:     SHORT TERM GOALS:  4 weeks 2/7/2022   Recent signs and systems trend is improving in order to progress towards LTG's.  PC   Patient will be independent with HEP in order to further progress and return to maximal function.  PC   Pain rating at Worst: 5/10 in order to progress towards increased independence with activity.  PC   Patient will be able to correct postural deviations in sitting and standing, to decrease pain and promote postural awareness for injury prevention.   PC      LONG TERM GOALS: 8 weeks 2/7/2022   Patient will return to normal ADL, recreational, and work related activities with less pain and limitation.   PC   Patient will improve AROM to stated goals in order to return to maximal functional potential.   PC   Patient will improve Strength to stated goals of appropriate musculature in order to improve functional independence.   PC   Pain Rating at Best: 1/10 to improve Quality of Life.   PC   Patient will meet predicted functional outcome (FOTO) score: 71% to increase self-worth & perceived functional ability.  PC   Patient will have met/partially met personal goal of: returning to ADLs with minimal pain and improved ability to use her shoulder  PC         Goals Key:  PC= progressing/continue; PM= partially met;        DC= discontinue    PLAN     Continue Plan of Care (POC) and progress per patient tolerance. See treatment section for details on planned progressions next  session.      Reece Srivastava, PT

## 2022-08-31 NOTE — PLAN OF CARE
Outpatient Therapy Updated Plan of Care     Visit Date: 8/31/2022    Name: Casandra [facility] Number: [id number]    Therapy Diagnosis:   Encounter Diagnoses   Name Primary?    Chronic right shoulder pain Yes    Proximal muscle weakness      Physician: Cierra Boss, *        Physician: Cierra Farrell PA-C     Physician Orders: PT Eval and Treat  Medical Diagnosis from Referral:   M21.821 (ICD-10-CM) - Other specified acquired deformities of right upper arm   M25.511 (ICD-10-CM) - Pain in right shoulder   G89.29 (ICD-10-CM) - Other chronic pain   S43.491A (ICD-10-CM) - Other sprain of right shoulder joint, initial encounter      Evaluation Date: 2/7/2022  Authorization Period Expiration: 12/31/2021  Visit # / Visits authorized: 31/40      Precautions: Standard       Current Certification Period:  2/9/2022 to 8/31/2022    Precautions: Standard  Functional Level Prior to Evaluation:  independent with ADLs    Subjective     Update: patient continues to report right arm and chest pain - she is currently experiencing a recent exacerbation in symptoms. She is scheduled to have a CT scan on Friday 9/2/2022    Objective     Update:   See daily note 8/31/2022    Assessment     Update: Casandra is progressing well with physical therapy, with moderate complaints of pain or discomfort and significant improvements noted in right arm since initial evaluation; please see exam for details. Casandra continues to have difficulty with gross shoulder strength, shoulder function, due to decreased endurance and right shoulder pain.  FOTO scores noted above indicate the patients perceived functional levels show no change since prior assessment. The above impairments and functional deficits will continue to be addressed over the course of this plan of care. Casandra was educated on continued progression of PT, expectations for the duration of care, updates on goals set at initial evaluation, and home exercise program.  Casandra will  continue to benefit from physical therapy in order to further address goals, maximize function and quality of life.    See Daily Note - 08/31/2022  Long Term Goal Status:   continue per initial plan of care.  Reasons for Recertification of Therapy:   Pt will continue to benefit from skilled outpatient physical therapy to address the deficits listed in the problem list box on initial evaluation, provide pt/family education and to maximize pt's level of independence in the home and community environment.     Plan     Updated Certification Period: 8/31/2022 to 10/26/2022  Recommended Treatment Plan: 2 times per week for 8 weeks to include any combination of the following interventions: virtual visits, dry needling, modalities, electrical stimulation (IFC, Pre-Mod, Attended with Functional Dry Needling), Manual Therapy, Moist Heat/ Ice, Neuromuscular Re-ed, Patient Education, Self Care, Therapeutic Exercise, and Therapeutic Activites    Reece Srivastava, PT  8/31/2022      I CERTIFY THE NEED FOR THESE SERVICES FURNISHED UNDER THIS PLAN OF TREATMENT AND WHILE UNDER MY CARE    Physician's comments:        Physician's Signature: ___________________________________________________

## 2022-09-02 ENCOUNTER — HOSPITAL ENCOUNTER (OUTPATIENT)
Dept: RADIOLOGY | Facility: HOSPITAL | Age: 22
Discharge: HOME | End: 2022-09-02
Attending: STUDENT IN AN ORGANIZED HEALTH CARE EDUCATION/TRAINING PROGRAM
Payer: COMMERCIAL

## 2022-09-02 DIAGNOSIS — R07.1 CHEST PAIN ON BREATHING: ICD-10-CM

## 2022-09-02 PROCEDURE — 71250 CT THORAX DX C-: CPT | Mod: TC

## 2022-09-02 PROCEDURE — 71250 CT CHEST WITHOUT CONTRAST: ICD-10-PCS | Mod: 26,,, | Performed by: RADIOLOGY

## 2022-09-02 PROCEDURE — 71250 CT THORAX DX C-: CPT | Mod: 26,,, | Performed by: RADIOLOGY

## 2022-09-07 ENCOUNTER — OFFICE VISIT (OUTPATIENT)
Dept: SPORTS MEDICINE | Facility: CLINIC | Age: 22
End: 2022-09-07
Payer: COMMERCIAL

## 2022-09-07 VITALS — WEIGHT: 120.56 LBS | RESPIRATION RATE: 20 BRPM | BODY MASS INDEX: 21.36 KG/M2 | HEIGHT: 63 IN

## 2022-09-07 DIAGNOSIS — S43.61XD STERNOCLAVICULAR (JOINT) (LIGAMENT) SPRAIN, RIGHT, SUBSEQUENT ENCOUNTER: Primary | ICD-10-CM

## 2022-09-07 PROCEDURE — 20606 INTERMEDIATE JOINT ASPIRATION/INJECTION: ICD-10-PCS | Mod: RT,S$GLB,, | Performed by: STUDENT IN AN ORGANIZED HEALTH CARE EDUCATION/TRAINING PROGRAM

## 2022-09-07 PROCEDURE — 99214 OFFICE O/P EST MOD 30 MIN: CPT | Mod: 25,S$GLB,, | Performed by: STUDENT IN AN ORGANIZED HEALTH CARE EDUCATION/TRAINING PROGRAM

## 2022-09-07 PROCEDURE — 20606 DRAIN/INJ JOINT/BURSA W/US: CPT | Mod: RT,S$GLB,, | Performed by: STUDENT IN AN ORGANIZED HEALTH CARE EDUCATION/TRAINING PROGRAM

## 2022-09-07 PROCEDURE — 99999 PR PBB SHADOW E&M-EST. PATIENT-LVL III: CPT | Mod: PBBFAC,,, | Performed by: STUDENT IN AN ORGANIZED HEALTH CARE EDUCATION/TRAINING PROGRAM

## 2022-09-07 PROCEDURE — 99999 PR PBB SHADOW E&M-EST. PATIENT-LVL III: ICD-10-PCS | Mod: PBBFAC,,, | Performed by: STUDENT IN AN ORGANIZED HEALTH CARE EDUCATION/TRAINING PROGRAM

## 2022-09-07 PROCEDURE — 99214 PR OFFICE/OUTPT VISIT, EST, LEVL IV, 30-39 MIN: ICD-10-PCS | Mod: 25,S$GLB,, | Performed by: STUDENT IN AN ORGANIZED HEALTH CARE EDUCATION/TRAINING PROGRAM

## 2022-09-07 RX ORDER — TRIAMCINOLONE ACETONIDE 40 MG/ML
40 INJECTION, SUSPENSION INTRA-ARTICULAR; INTRAMUSCULAR
Status: DISCONTINUED | OUTPATIENT
Start: 2022-09-07 | End: 2022-09-07 | Stop reason: HOSPADM

## 2022-09-07 RX ORDER — LIDOCAINE HYDROCHLORIDE 10 MG/ML
1 INJECTION INFILTRATION; PERINEURAL
Status: DISCONTINUED | OUTPATIENT
Start: 2022-09-07 | End: 2022-09-07 | Stop reason: HOSPADM

## 2022-09-07 RX ADMIN — TRIAMCINOLONE ACETONIDE 40 MG: 40 INJECTION, SUSPENSION INTRA-ARTICULAR; INTRAMUSCULAR at 09:09

## 2022-09-07 RX ADMIN — LIDOCAINE HYDROCHLORIDE 1 ML: 10 INJECTION INFILTRATION; PERINEURAL at 09:09

## 2022-09-07 NOTE — PROCEDURES
Intermediate Joint Aspiration/Injection    Date/Time: 9/7/2022 9:00 AM  Performed by: Andrea Fagan MD  Authorized by: Andrea Fagan MD     Consent Done?:  Yes (Verbal)  Indications:  Diagnostic evaluation, joint swelling and pain  Site marked: The procedure site was marked    Timeout: Prior to procedure the correct patient, procedure, and site was verified      Location:  Shoulder  Acromioclavicular joint: Right sternoclavicular joint.  Prep: Patient was prepped and draped in usual sterile fashion    Ultrasonic Guidance for needle placement: Yes  Images are saved and documented.    Needle size:  18 G  Approach:  Anterolateral  Medications:  1 mL LIDOcaine HCL 10 mg/ml (1%) 10 mg/mL (1 %); 40 mg triamcinolone acetonide 40 mg/mL  Aspirate amount (ml):  0  Patient tolerance:  Patient tolerated the procedure well with no immediate complications       Additional Comments: Ultrasound guidance was used for needle localization. Images were saved and stored for documentation. The appropriate structures were visualized. Dynamic visualization of the needle was continuous throughout the procedures and maintained good position.       We discussed the proper protocols after the injection such as no submerging pools, baths tubs, or hot tubs for 24 hr.  Showering is okay today.  We also discussed that blood sugars can be elevated after an injection and asked patient to properly checked her sugars over the next few days and contact their PCP if there are any concerns.  We discussed red flags such as fevers, chills, red, warm, tender joint at the area of injection to please seek medical care immediately.

## 2022-09-07 NOTE — PATIENT INSTRUCTIONS
Assessment:  Casandra Barragan is a 21 y.o. female   Chief Complaint   Patient presents with    Follow-up     Pain of right sternoclavicular joint ,discuss MRI findings        Encounter Diagnosis   Name Primary?    Sternoclavicular (joint) (ligament) sprain, right, subsequent encounter Yes        Plan:  We discussed the proper protocols after the injection such as no submerging pools, baths tubs, or hot tubs for 24 hr.  Showering is okay today.  We also discussed that blood sugars can be elevated after an injection and asked patient to properly checked her sugars over the next few days and contact their PCP if there are any concerns.  We discussed red flags such as fevers, chills, red, warm, tender joint at the area of injection to please seek medical care immediately.    Please reach out to us through [employer] messages if you have any questions or concerns. We will gladly answer you in a timely manner.   Time was set aside to ask questions during the encounter. All questions were answered and a verbal understanding of your care plan was given.     Follow-up: 3 weeks or sooner if there are any problems between now and then.    Thank you for choosing [facility] and Dr. Andrea Fagan for your orthopedic & sports medicine care. It is our goal to provide you with exceptional care that will help keep you healthy, active, and get you back in the game.    Please do not hesitate to reach out to us via email, phone, or [employer] with any questions, concerns, or feedback.    If you felt that you received exemplary care today, please consider leaving us feedback on [employer] at:  [email or web address]    If you are experiencing pain/discomfort ,or have questions after 5pm and would like to be connected to the [facility] on-call team, please call this number and specify which Sports Medicine provider is treating you: [phone]

## 2022-09-07 NOTE — PROGRESS NOTES
Patient ID: Casandra Barragan  YOB: 2000  MRN: [id number]    Chief Complaint: Follow-up (Pain of right sternoclavicular joint ,discuss MRI findings )    History of Present Illness: Casandra Barragan is a right-hand dominant 21 y.o. female who presents today with 5/10 pain c/o  Follow-up Pain of right sternoclavicular joint ,discuss MRI findings .     The patient is active in cheerleading.  Occupation: [employer]     21-year-old female presenting today for follow-up for right sternoclavicular joint pain.  CT results in follow-up.  Notes some positional changes at times with some arm numbness and tingling that goes away when she moves again does not note any of the symptoms today.  Only start of last few days and she notes that her sc pain is been going on for over last 3-4 months.     Past Medical History:   History reviewed. No pertinent past medical history.  History reviewed. No pertinent surgical history.  Family History   Problem Relation Age of Onset    No Known Problems Mother     No Known Problems Father      Social History     Socioeconomic History    Marital status: Single   Tobacco Use    Smoking status: Never     Passive exposure: Never    Smokeless tobacco: Never   Substance and Sexual Activity    Alcohol use: Never    Drug use: Never    Sexual activity: Not Currently     Partners: Male     Medication List with Changes/Refills   Current Medications    IBUPROFEN (ADVIL,MOTRIN) 600 MG TABLET    Take 600 mg by mouth.    MEDROXYPROGESTERONE (DEPO-PROVERA) 150 MG/ML INJECTION    Inject 150 mg into the muscle.    NORETHINDRONE-ETHINYL ESTRADIOL (ORTHO-NOVUM 7/7/7, 28,) 0.5/0.75/1 MG- 35 MCG PER TABLET    Take 1 tablet by mouth once daily.    SULFAMETHOXAZOLE-TRIMETHOPRIM 800-160MG (BACTRIM DS) 800-160 MG TAB    Take 1 tablet by mouth 2 (two) times daily.     Review of patient's allergies indicates:   Allergen Reactions    Amoxicillin Rash    Clindamycin Other (See Comments)      Palm and foot skin peeling off  Palm and foot skin peeling off         Physical Exam:   Body mass index is 21.36 kg/m².    GENERAL: Well appearing, in no acute distress.  HEAD: Normocephalic and atraumatic.  ENT: External ears and nose grossly normal.  EYES: EOMI bilaterally  PULMONARY: Respirations are grossly even and non-labored.  NEURO: Awake, alert, and oriented x 3.  SKIN: No obvious rashes appreciated.  PSYCH: Mood & affect are appropriate.    Detailed MSK exam:     Tenderness and swelling over the SC joint appreciated    Imaging:  CT Chest Without Contrast  Narrative: EXAMINATION:  CT CHEST WITHOUT CONTRAST    CLINICAL HISTORY:  Nonspecific sternoclavicular joint pain, chest pain    TECHNIQUE:  Axial CT images were obtained through the chest without contrast.  Coronal and sagittal reconstructions submitted and interpreted.  Total .  Automated exposure control utilized.    COMPARISON:  Chest x-ray 08/12/2022    FINDINGS:  No focal consolidation, pneumothorax or pleural effusion.  No suspicious lung nodule or mass.    Heart is normal size.  Thoracic aorta is normal in caliber.  Central airways are clear.    No enlarged lymph nodes.    No acute osseous findings.  Alignment is normal.  At the right sternoclavicular joint, mild periarticular erosive changes are present with sclerotic margins and mild hypertrophic changes.  No soft tissue mass or destructive lesion identified.    Visualized portions of the upper abdomen are normal.  Impression: 1. Hypertrophic right sternoclavicular joint changes with periarticular erosions and sclerotic margins could represent an inflammatory process.  No fluid collection or soft tissue mass.  2. No acute findings in the chest.    Electronically signed by: Benito Clinton MD  Date:    09/02/2022  Time:    09:09    Relevant imaging results were reviewed and interpreted by me and per my read as above.  This was discussed with the patient and / or family today.     Assessment:   Casandra Barragan is a 21 y.o. female presents today for inflammatory changes to the right sternoclavicular joint. need to rule out infection discussed aspiration and potential injection today if dry tap.  Please refer to procedure note for the details.  Unable to aspirate any fluid and injected 1 cc of Kenalog.  We will check for symptomatic relief.  Nonspecific postural upper extremity changes but low threshold for CT angiogram of worsening or persisting.  Good radial pulse today and no symptoms noted today on exam.    Sternoclavicular (joint) (ligament) sprain, right, subsequent encounter  -     Intermediate Joint Aspiration/Injection         Andrea Fagan MD    Disclaimer: This note was prepared using a voice recognition system and is likely to have sound alike errors within the text.

## 2022-09-22 ENCOUNTER — TELEPHONE (OUTPATIENT)
Dept: PHYSICAL MEDICINE AND REHAB | Facility: CLINIC | Age: 22
End: 2022-09-22
Payer: COMMERCIAL

## 2022-09-22 DIAGNOSIS — G56.91 UNSPECIFIED MONONEUROPATHY OF RIGHT UPPER LIMB: Primary | ICD-10-CM

## 2022-09-22 NOTE — TELEPHONE ENCOUNTER
----- Message from Karen Enciso sent at 9/22/2022  3:41 PM CDT -----  Regarding: RE: EMG  Can we book that? I'll speak with Harshad to see if anything else is needed in interim.   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 Would it be possible to try and work her in soon? She will either follow up with Rylan or Jonh. He's a new PCSM with us!    Thank you,  Karen OLVERA, ATC, OTC  Sports Medicine Assistant to Andrea Fagan MD  [facility]

## 2022-09-27 ENCOUNTER — PATIENT MESSAGE (OUTPATIENT)
Dept: ORTHOPEDICS | Facility: CLINIC | Age: 22
End: 2022-09-27
Payer: COMMERCIAL

## 2022-09-27 DIAGNOSIS — S43.61XS: Primary | ICD-10-CM

## 2022-11-22 ENCOUNTER — HOSPITAL ENCOUNTER (OUTPATIENT)
Dept: RADIOLOGY | Facility: HOSPITAL | Age: 22
Discharge: HOME | End: 2022-11-22
Attending: STUDENT IN AN ORGANIZED HEALTH CARE EDUCATION/TRAINING PROGRAM
Payer: COMMERCIAL

## 2022-11-22 ENCOUNTER — OFFICE VISIT (OUTPATIENT)
Dept: SPORTS MEDICINE | Facility: CLINIC | Age: 22
End: 2022-11-22
Payer: COMMERCIAL

## 2022-11-22 VITALS — BODY MASS INDEX: 21.26 KG/M2 | WEIGHT: 120 LBS | HEIGHT: 63 IN

## 2022-11-22 DIAGNOSIS — G89.29 CHRONIC BILATERAL LOW BACK PAIN WITH BILATERAL SCIATICA: ICD-10-CM

## 2022-11-22 DIAGNOSIS — M54.50 ACUTE LOW BACK PAIN, UNSPECIFIED BACK PAIN LATERALITY, UNSPECIFIED WHETHER SCIATICA PRESENT: ICD-10-CM

## 2022-11-22 DIAGNOSIS — M54.41 CHRONIC BILATERAL LOW BACK PAIN WITH BILATERAL SCIATICA: ICD-10-CM

## 2022-11-22 DIAGNOSIS — M54.42 CHRONIC BILATERAL LOW BACK PAIN WITH BILATERAL SCIATICA: ICD-10-CM

## 2022-11-22 DIAGNOSIS — M53.3 SACROILIAC JOINT DYSFUNCTION OF BOTH SIDES: ICD-10-CM

## 2022-11-22 DIAGNOSIS — M54.16 LUMBAR RADICULOPATHY: ICD-10-CM

## 2022-11-22 DIAGNOSIS — M54.50 ACUTE LOW BACK PAIN, UNSPECIFIED BACK PAIN LATERALITY, UNSPECIFIED WHETHER SCIATICA PRESENT: Primary | ICD-10-CM

## 2022-11-22 DIAGNOSIS — M54.12 RIGHT CERVICAL RADICULOPATHY: ICD-10-CM

## 2022-11-22 DIAGNOSIS — M54.9 DORSALGIA, UNSPECIFIED: Primary | ICD-10-CM

## 2022-11-22 PROCEDURE — 99999 PR PBB SHADOW E&M-EST. PATIENT-LVL IV: ICD-10-PCS | Mod: PBBFAC,,, | Performed by: STUDENT IN AN ORGANIZED HEALTH CARE EDUCATION/TRAINING PROGRAM

## 2022-11-22 PROCEDURE — 99214 PR OFFICE/OUTPT VISIT, EST, LEVL IV, 30-39 MIN: ICD-10-PCS | Mod: S$GLB,,, | Performed by: STUDENT IN AN ORGANIZED HEALTH CARE EDUCATION/TRAINING PROGRAM

## 2022-11-22 PROCEDURE — 72114 XR LUMBAR SPINE 5 VIEW WITH FLEX AND EXT: ICD-10-PCS | Mod: 26,,, | Performed by: RADIOLOGY

## 2022-11-22 PROCEDURE — 72114 X-RAY EXAM L-S SPINE BENDING: CPT | Mod: 26,,, | Performed by: RADIOLOGY

## 2022-11-22 PROCEDURE — 72114 X-RAY EXAM L-S SPINE BENDING: CPT | Mod: TC

## 2022-11-22 PROCEDURE — 99214 OFFICE O/P EST MOD 30 MIN: CPT | Mod: S$GLB,,, | Performed by: STUDENT IN AN ORGANIZED HEALTH CARE EDUCATION/TRAINING PROGRAM

## 2022-11-22 PROCEDURE — 99999 PR PBB SHADOW E&M-EST. PATIENT-LVL IV: CPT | Mod: PBBFAC,,, | Performed by: STUDENT IN AN ORGANIZED HEALTH CARE EDUCATION/TRAINING PROGRAM

## 2022-11-22 RX ORDER — INDOMETHACIN 50 MG/1
50 CAPSULE ORAL 2 TIMES DAILY WITH MEALS
Qty: 60 CAPSULE | Refills: 2 | Status: SHIPPED | OUTPATIENT
Start: 2022-11-22 | End: 2023-05-02

## 2022-11-22 NOTE — PROGRESS NOTES
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  female being seen in clinic today for evaluation of Pain and Injury of the Spine   Patient presents to clinic with 5/10 pain in the lumbar region. Patient states that pain began in 2019 after she was dropped twice while cheerleading at [employer]. She previously saw the chiropractor at [employer] with minimal relief. She feels sharp pain in the lower lumbar region with movement. Pain in creases with bending, walking, going up and down stairs, and prolonged sitting. Patient is not currently taking pain medication. She has stopped cheering this year do to her injuries.      The problem began 4 years ago in 2019. She feels sharp and aching pain in her lower back . The symptoms show no change. She has tried chiropractic with improvement. She has not tried therapy .     History obtained from patient.      Past family, medical, social, surgical history, and vital signs reviewed in chart.      ROS          General Musculoskeletal Exam   Gait: antalgic     Back (L-Spine & T-Spine) / Neck (C-Spine) Exam     Tenderness Posterior midline palpation reveals tenderness of the Lower L-Spine and Sacrum. Right paramedian tenderness of the Lower L-Spine and Sacrum. Left paramedian tenderness of the Lower L-Spine and Sacrum.     Back (L-Spine & T-Spine) Range of Motion   Extension:  normal Back extension: with exacerbation of pain.  Flexion:  normal Back flexion: with exacerbation of pain.  Lateral bend right:  normal   Lateral bend left:  normal   Rotation right:  normal   Rotation left:  normal     Spinal Sensation   Right Side Sensation  L-Spine Level: normal  S-Spine Level: normal  Left Side Sensation  L-Spine  Level: normal  S-Spine Level: normal    Other   Spinal Kyphosis:  Absent  Spinal Lordosis:  Absent    Comments:  + b/l Stork test  + b/l Slump test  + b/l straight leg raise test    +b/l SI tenderness  +b/l VITO/FADIR reproducing SI joint pain    +b/l posterior iliac crest pain      Muscle Strength   Right Lower Extremity   Hip Abduction: 5/5   Hip Flexion: 5/5   Hip Extensors: 5/5  Quadriceps:  5/5   Hamstrin/5   Left Lower Extremity   Hip Abduction: 5/5   Hip Flexion: 5/5   Hip Extensors: 5/5  Quadriceps:  5/5   Hamstrin/5       IMAGING:  X-ray  X-Ray Lumbar Complete Including Flex And Ext  Narrative: EXAMINATION:  XR LUMBAR SPINE 5 VIEW WITH FLEX AND EXT    CLINICAL HISTORY:  Low back pain, unspecified    TECHNIQUE:  Five views of the lumbar spine plus flexion extension views were performed.    COMPARISON:  None.    FINDINGS:  Lumbar vertebral bodies are normal in height and alignment.  No acute fracture or subluxation.  Intervertebral disc spaces are well preserved.  Bilateral oblique views demonstrate the facet joints to be intact.    Flexion and extension views demonstrate no significant subluxation.    SI joints are intact.  There is a large amount of stool throughout the visualized loops of colon and rectum.  Impression: 1. No acute radiographic findings of the lumbar spine.  2. Increased colonic stool volume.  Correlate clinically for constipation.    Electronically signed by: Jt Spann  Date:    2022  Time:    14:14        Relevant imaging results were reviewed and interpreted by me and per my read: Normal appearing radiographs of the lumbar spine.  Normal alignment on the AP and lateral views.  Normal flexion and extension views.  No significant degenerative changes.  No apparent pars defects.  No spondylolisthesis.    This was discussed with the patient and / or family today.         IMPRESSION/PLAN: This is a 22 y.o.  female with:    Patient Instructions   Assessment:  Casandra  Yung is a 22 y.o. female with a chief complaint of Pain and Injury of the Spine    Encounter Diagnoses   Name Primary?    Dorsalgia, unspecified Yes    Chronic bilateral low back pain with bilateral sciatica     Lumbar radiculopathy     Right cervical radiculopathy     Sacroiliac joint dysfunction of both sides       Plan:  XR lumbar spine reviewed today - no acute findings  Patient with persistent lumbar spine pain with b/l sciatica vs radicular symptoms in addition to existing RUE parasthesia that has not been fully evaluated to date.  I am concerned for possible stress injury/fracture of her lumbar spine, as well as degenerative disc disease/radiculopathy, and SI joint dysfunction contributing to her low back pain  Recommend MRI of lumbar spine to evaluate for structural spinal injury.  Will call with results.  Given continued RUE paresthesias, will get EMG to further evaluate. This will be performed by Dr. Yeni Alvarado.  Her staff will call you to schedule this appointment.  We will call back with results.  Indocin 50 mg BID Rx given today.  Not cleared for activity at this time.    Follow-up: I will call with results of MRI/EMG to determine treatment plan or sooner if there are any problems between now and then.    With patient's permission, will communicate treatment plan with athletic trainers at [facility]    Thank you for choosing [facility] and Dr. Marcos Borja for your orthopedic & sports medicine care. It is our goal to provide you with exceptional care that will help keep you healthy, active, and get you back in the game.    Please do not hesitate to reach out to us via email, phone, or MyChart with any questions, concerns, or feedback.    If you are experiencing pain/discomfort ,or have questions after 5pm and would like to be connected to the [facility] on-call team, please call this number and specify which Sports Medicine provider is  treating you: [phone]        The diagnosis and treatment options were discussed with Casandra in detail.      She was provided with this plan in writing. All of her questions were answered. She will follow up with me after MRI/EMG.       Marcos Borja MD  Primary Care Sports Medicine

## 2022-11-22 NOTE — PATIENT INSTRUCTIONS
Assessment:  Casandra Barragan is a 22 y.o. female with a chief complaint of Pain and Injury of the Spine    Encounter Diagnoses   Name Primary?    Dorsalgia, unspecified Yes    Chronic bilateral low back pain with bilateral sciatica     Lumbar radiculopathy     Right cervical radiculopathy     Sacroiliac joint dysfunction of both sides       Plan:  XR lumbar spine reviewed today - no acute findings  Patient with persistent lumbar spine pain with b/l sciatica vs radicular symptoms in addition to existing RUE parasthesia that has not been fully evaluated to date.  I am concerned for possible stress injury/fracture of her lumbar spine, as well as degenerative disc disease/radiculopathy, and SI joint dysfunction contributing to her low back pain  Recommend MRI of lumbar spine to evaluate for structural spinal injury.  Will call with results.  Given continued RUE paresthesias, will get EMG to further evaluate. This will be performed by Dr. Yeni Alvarado.  Her staff will call you to schedule this appointment.  We will call back with results.  Indocin 50 mg BID Rx given today.  Not cleared for activity at this time.    Follow-up: I will call with results of MRI/EMG to determine treatment plan or sooner if there are any problems between now and then.    With patient's permission, will communicate treatment plan with athletic trainers at [facility]    Thank you for choosing [facility] and Dr. Marcos Borja for your orthopedic & sports medicine care. It is our goal to provide you with exceptional care that will help keep you healthy, active, and get you back in the game.    Please do not hesitate to reach out to us via email, phone, or MyChart with any questions, concerns, or feedback.    If you are experiencing pain/discomfort ,or have questions after 5pm and would like to be connected to the [facility] on-call team, please call this number and specify which Sports  Medicine provider is treating you: [phone]

## 2022-11-25 ENCOUNTER — HOSPITAL ENCOUNTER (OUTPATIENT)
Dept: RADIOLOGY | Facility: HOSPITAL | Age: 22
Discharge: HOME | End: 2022-11-25
Attending: STUDENT IN AN ORGANIZED HEALTH CARE EDUCATION/TRAINING PROGRAM
Payer: COMMERCIAL

## 2022-11-25 DIAGNOSIS — M54.9 DORSALGIA, UNSPECIFIED: ICD-10-CM

## 2022-11-25 PROCEDURE — 72148 MRI LUMBAR SPINE WITHOUT CONTRAST: ICD-10-PCS | Mod: 26,,, | Performed by: RADIOLOGY

## 2022-11-25 PROCEDURE — 72148 MRI LUMBAR SPINE W/O DYE: CPT | Mod: TC

## 2022-11-25 PROCEDURE — 72148 MRI LUMBAR SPINE W/O DYE: CPT | Mod: 26,,, | Performed by: RADIOLOGY

## 2022-11-28 ENCOUNTER — TELEPHONE (OUTPATIENT)
Dept: PHYSICAL MEDICINE AND REHAB | Facility: CLINIC | Age: 22
End: 2022-11-28
Payer: COMMERCIAL

## 2022-11-28 DIAGNOSIS — M54.41 CHRONIC BILATERAL LOW BACK PAIN WITH BILATERAL SCIATICA: Primary | ICD-10-CM

## 2022-11-28 DIAGNOSIS — S43.61XD STERNOCLAVICULAR (JOINT) (LIGAMENT) SPRAIN, RIGHT, SUBSEQUENT ENCOUNTER: ICD-10-CM

## 2022-11-28 DIAGNOSIS — G89.29 CHRONIC BILATERAL LOW BACK PAIN WITH BILATERAL SCIATICA: Primary | ICD-10-CM

## 2022-11-28 DIAGNOSIS — M54.12 RIGHT CERVICAL RADICULOPATHY: ICD-10-CM

## 2022-11-28 DIAGNOSIS — M53.3 SACROILIAC JOINT DYSFUNCTION OF BOTH SIDES: ICD-10-CM

## 2022-11-28 DIAGNOSIS — M54.42 CHRONIC BILATERAL LOW BACK PAIN WITH BILATERAL SCIATICA: Primary | ICD-10-CM

## 2022-12-07 ENCOUNTER — OFFICE VISIT (OUTPATIENT)
Dept: PHYSICAL MEDICINE AND REHAB | Facility: CLINIC | Age: 22
End: 2022-12-07
Payer: COMMERCIAL

## 2022-12-07 VITALS
SYSTOLIC BLOOD PRESSURE: 108 MMHG | WEIGHT: 120 LBS | HEART RATE: 106 BPM | RESPIRATION RATE: 13 BRPM | HEIGHT: 63 IN | BODY MASS INDEX: 21.26 KG/M2 | DIASTOLIC BLOOD PRESSURE: 68 MMHG

## 2022-12-07 DIAGNOSIS — R29.898 ARM WEAKNESS: ICD-10-CM

## 2022-12-07 DIAGNOSIS — G56.01 CARPAL TUNNEL SYNDROME OF RIGHT WRIST: ICD-10-CM

## 2022-12-07 DIAGNOSIS — M79.18 CERVICAL MYOFASCIAL PAIN SYNDROME: Primary | ICD-10-CM

## 2022-12-07 PROCEDURE — 99202 PR OFFICE/OUTPT VISIT, NEW, LEVL II, 15-29 MIN: ICD-10-PCS | Mod: 25,S$GLB,, | Performed by: PHYSICAL MEDICINE & REHABILITATION

## 2022-12-07 PROCEDURE — 95886 PR EMG COMPLETE, W/ NERVE CONDUCTION STUDIES, 5+ MUSCLES: ICD-10-PCS | Mod: S$GLB,,, | Performed by: PHYSICAL MEDICINE & REHABILITATION

## 2022-12-07 PROCEDURE — 99999 PR PBB SHADOW E&M-EST. PATIENT-LVL IV: CPT | Mod: PBBFAC,,, | Performed by: PHYSICAL MEDICINE & REHABILITATION

## 2022-12-07 PROCEDURE — 95912 PR NERVE CONDUCTION STUDY; 11 -12 STUDIES: ICD-10-PCS | Mod: S$GLB,,, | Performed by: PHYSICAL MEDICINE & REHABILITATION

## 2022-12-07 PROCEDURE — 95912 NRV CNDJ TEST 11-12 STUDIES: CPT | Mod: S$GLB,,, | Performed by: PHYSICAL MEDICINE & REHABILITATION

## 2022-12-07 PROCEDURE — 99202 OFFICE O/P NEW SF 15 MIN: CPT | Mod: 25,S$GLB,, | Performed by: PHYSICAL MEDICINE & REHABILITATION

## 2022-12-07 PROCEDURE — 95886 MUSC TEST DONE W/N TEST COMP: CPT | Mod: S$GLB,,, | Performed by: PHYSICAL MEDICINE & REHABILITATION

## 2022-12-07 PROCEDURE — 99999 PR PBB SHADOW E&M-EST. PATIENT-LVL IV: ICD-10-PCS | Mod: PBBFAC,,, | Performed by: PHYSICAL MEDICINE & REHABILITATION

## 2022-12-07 RX ORDER — NORELGESTROMIN AND ETHINYL ESTRADIOL 150; 35 UG/D; UG/D
1 PATCH TRANSDERMAL
COMMUNITY
Start: 2022-09-24 | End: 2023-08-22

## 2022-12-07 NOTE — PATIENT INSTRUCTIONS
Myofascial Pain Syndrome: Fibrositis  Your pain is caused by a state of chronic muscle tension. This condition is called by various names: myofascial pain, fibrositis and trigger point pain. This can also be due to mechanical stress (such as working at a computer terminal for long periods; or work that requires repetitive motions of the arms or hands) or emotional stress (such as problems on the job or in your personal life). Sometimes there is no obvious cause. The pain can occur in the area of the muscle spasm or at a site distant to it. For example, spasm of a neck muscle can cause headache. Spasm of the muscle near the shoulder blade can cause pain shooting down the arm.  Home Care:  Try to identify the factors that may be causing your problem and change them:  If you feel that emotional stress is a cause of your pain, learn methods to deal more effectively with the stress in your life. These may include regular exercise, muscle relaxation techniques, meditation or simply taking time out for yourself. Consult your doctor or go to a local bookstore and review the many books and tapes available on the subject of stress reduction.  If you feel that physical stress is a cause for your pain, try to modify any poor work habits.  You may use acetaminophen (Tylenol) or ibuprofen (Motrin, Advil) to control pain, unless another medicine was prescribed. [NOTE: If you have chronic liver or kidney disease or ever had a stomach ulcer or GI bleeding, talk with your doctor before using these medicines.]  The use of heat to the muscle (hot compress or heating pad) will be helpful to reduce muscle spasm. Some persons get relief with ice packs. Apply an ice pack (crushed or cubed ice in a plastic bag, wrapped in a towel) for 20 minutes at a time as needed. Use the method that feels best to you.  Massaging the trigger point and stretching out the muscle are an important parts of prevention and treatment. Trigger point massage can  be done by first applying heat to the area to warm and prepare the muscle. Have someone apply steady thumb pressure directly on the knot in the muscle (the most tender point) for 30 seconds. Release the pressure, then massage the surrounding muscle. Repeat the process, applying more pressure to the trigger point each time. Do this up to the limit of pain. With each treatment, the trigger point should become less tender and the pain should decrease. You can apply local pressure to trigger points in the back by lying on the floor with a tennis ball under the trigger point.  Follow Up  with your doctor as advised or if not improving within the next week. It may be necessary for you to receive physical therapy if you do not respond to home treatment alone.  Get Prompt Medical Attention  if any of the following occur:  If your trigger point is in the chest muscles, observe for pain that becomes more severe, lasts longer, or spreads into your shoulder/arm, neck or back; you develop trouble breathing, sweating, nausea or vomiting in association with chest pain  If you develop weakness or numbness in an extremity  If your pain worsens, regardless of its location  © 4902-5129 [employer]. [address] [place] [address]. All rights reserved. This information is not intended as a substitute for professional medical care. Always follow your healthcare professional's instructions.          Trigger Point Injection  The cause of your muscle pain or spasms may be one or more trigger points. Your health care provider may decide to inject the painful spots to relax the muscle. This can help relieve your pain. Relaxing the muscle can also make movement easier. You may then be able to exercise to strengthen the muscle and help it heal.    What is a trigger point?  A trigger point is a tight, painful knot of muscle fiber. It can form where a muscle is strained or injured. The knot can sometimes be felt under  the skin. A trigger point is very tender to the touch. Pain may also spread to other parts of the affected muscle. Muscles around a knee, shoulder blade, or other bones are prone to trigger points. This is because these muscles are more likely to be injured.    About the injections  Any muscle in the body can have one or more trigger points. Several injections may be needed in each trigger point to best relieve pain. These injections may be given in sessions about 2 weeks apart, depending on the preference of your health care provider. In some cases, you may not feel much change in your symptoms until after the third injection.     © 4471-7475 [employer]. [address] [place] [address]. All rights reserved. This information is not intended as a substitute for professional medical care. Always follow your healthcare professional's instructions.            Your Neck Muscles  The muscles in the neck and shoulders need to be strong to hold the neck and head in place. These muscles also help move the neck and shoulders. Your health care provider can recommend exercises to help stretch and strengthen your neck muscles.    © 3773-8238 [employer]. [address] [place] [address]. All rights reserved. This information is not intended as a substitute for professional medical care. Always follow your healthcare professional's instructions.          Neck Problems: Relieving Your Symptoms  The first goal of treatment is to relieve your symptoms. Your health care provider may recommend self-care treatments. These include resting, applying ice and heat, taking medication, and doing exercises. Your health care provider may also recommend that you see a physical therapist, who can teach you ways to care for and strengthen your neck.    Self-Care Treatments  Pain can end quickly or last awhile. Either way, youll want relief as soon as possible. Your health care provider can tell you  which treatments to do at home to help relieve your pain.  Lying down for a short time takes pressure from the head off the neck.  Ice and heat can help reduce pain. To bring down swelling, rest an ice pack wrapped in a thin towel on your neck for 15 minutes. To relax sore muscles, apply a warm, wet towel to the area. Or take a warm bath or shower.  Over-the-counter medications, such as ibuprofen, naproxen, and aspirin, can help reduce pain and swelling. Acetaminophen can help relieve pain. Use these only as directed.  Exercises can relax muscles and prevent stiffness. To prepare, drape a warm, wet towel around your neck and shoulders for 5 minutes. Remove the towel. Then do any exercises recommended to you by your health care provider.  Physical Therapy  If self-care treatments arent helping relieve neck pain, your health care provider may suggest one or more sessions of physical therapy. Physical therapy is performed by a specialist trained to treat injuries. Your physical therapist (PT) will teach you how to strengthen muscles, improve the spines alignment, and help you move properly. Treatment methods used in physical therapy may include:  Heat. A special heating pad called a neck pack may be applied to your neck.  Exercises. Your PT will teach you exercises to help strengthen your neck and improve its range of motion.  Joint mobilization. The PT gently moves your vertebrae to help restore motion in your neck joints and reduce neck pain.  Soft tissue mobilization. The PT massages and stretches the muscles in your neck and shoulders.  Electrical stimulation. Electrical impulses are sent into your neck. This helps reduce soreness and inflammation.  Education in body mechanics. The PT shows you ways to position and move your body that protect the neck.  Other Treatments  If physical therapy doesnt relieve your neck pain, your health care provider may suggest other treatments. For example, medications or  injections can help relieve pain and swelling. In some cases, surgery may be needed to treat neck problems.  © 5291-0287 [employer]. [address] [place] [address], [place] [address]. All rights reserved. This information is not intended as a substitute for professional medical care. Always follow your healthcare professional's instructions.          Understanding Neck Problems       If you suffer from neck pain, youre not alone. Many people have neck pain at some point in their lives. Problems such as poor posture, injury, and wear and tear can lead to neck pain. Your health care provider will work with you to find the treatment thats best for your neck.  Types of Neck Problems  The following problems can cause pain or injury in your neck:  Strains and sprains: Strains (stretched or torn muscles) and sprains (stretched or torn ligaments) can cause neck pain. Strains and sprains can occur during an accident, or when you overuse your neck through repetitive motion. They can also cause your muscles and ligaments to become inflamed (swollen and painful).  Whiplash and other injuries: Whiplash can result when an impact throws your head, forcing your neck too far forward (hyperflexion), then too far backward (hyperextension). When combined, the two motions can cause a painful injury to different parts of your neck, such as muscles, ligaments, or joints. The most common cause of whiplash is a car accident. But it can also happen during a fall or sports injury.  Weakened disks: A simple action, such as a sneeze or a cough, can cause one of your disks to bulge (herniate). A herniated disk can put pressure on your nerve and cause pain. Over time, disks can also thin out (degenerate). Flattened disks dont cushion vertebrae well and can cause vertebrae to rub together. Rubbing vertebrae can pinch nerves and cause pain.  Weakened joints: Aging and injury can cause joints to slowly degenerate. Thinned joints can also  cause vertebrae to rub together. This can cause abnormal growths of bone (bone spurs) to form on vertebrae. Bone spurs put pressure on nerves, causing pain.  Common Symptoms  If you have a neck problem, you may have one or more of the following symptoms:  Muscle tension and spasm: You may not be able to move your neck, arms, or shoulders comfortably if you have muscle tension or stiffness in your neck. If your symptoms arent relieved, you may experience muscle spasms, or knots of contracted tissue (trigger points) in areas of your neck and shoulders.  Aches and pains: Dull aches in your head or neck, sharp pains, and swelling of the soft tissue of your neck and shoulders are common symptoms. If theres pressure on the nerves in your neck, you may feel pain in your arms or hands (referred pain).  Numbness or weakness: If you injure the nerves in your neck, you may experience numbness, tingling, or weakness in your shoulders, arms, or hands. These symptoms arise when disks or bone spurs press on the nerves in your neck.  © 9119-6178 [employer]. [address] [place], [place] [id number]. All rights reserved. This information is not intended as a substitute for professional medical care. Always follow your healthcare professional's instructions.          Neck Spasm [No Trauma]    Spasm of the neck muscles can occur after a sudden awkward neck movement. Sleeping with your neck in a crooked position can also cause spasm. Some persons respond to emotional stress by tensing the muscles of their neck, shoulders and upper back. If neck spasm lasts long enough, it can cause headache.  The treatment described below will usually help the pain to go away in 5-7 days. Pain that continues may require further evaluation or other types of treatment such as physical therapy.  Home Care:  Rest and relax the muscles. Use a comfortable pillow that supports the head and keeps the spine in a neutral position. The position  of the head should not be tilted forward or backward. A rolled up towel may help for a custom fit.  Some persons find relief with heat (hot shower, hot bath or heating pad) and massage, while others prefer cold packs (crushed or cubed ice in a plastic bag, wrapped in a towel). Try both and use the method that feels best for 20 minutes several times a day.  You may use acetaminophen (Tylenol) or ibuprofen (Motrin, Advil) to control pain, unless another medicine was prescribed. [NOTE: If you have chronic liver or kidney disease or ever had a stomach ulcer or GI bleeding, talk with your doctor before using these medicines.]  Follow Up  with your doctor or this facility if your symptoms do not show signs of improvement after one week. Physical therapy or further evaluation may be needed.  [NOTE: If x-rays were taken, they will be reviewed by a radiologist. You will be notified of any new findings that may affect your care.]  Return Promptly  or contact your doctor if any of the following occurs:  Pain becomes worse or spreads into one or both arms  Weakness or numbness in one or both arms  Increasing headache with nausea or vomiting  Fever over 100.4ºF (38.0ºC)  © 0349-0084 [employer]. [address] [place] [address], [place] [address]. All rights reserved. This information is not intended as a substitute for professional medical care. Always follow your healthcare professional's instructions.          Know Your Neck: The Cervical Spine  By learning about the parts of the neck, you can better understand your neck problem. The bones of the neck are called cervical vertebrae, commonly identified as C1 through C7. Together, they form a bony column called the spine. Vertebrae also protect the spinal cord, a pathway for messages to reach the brain. Surrounding the spine are soft tissues such as muscles, tendons, and nerves.        Flexibility Is Key  For the neck to function normally, it has to be flexible enough to  move without discomfort. A healthy neck can move easily in six different directions.    © 1505-4369 [employer]. [address] [place] [address], [place] [address]. All rights reserved. This information is not intended as a substitute for professional medical care. Always follow your healthcare professional's instructions.          Protecting Your Neck: Posture and Body Mechanics  Protecting your neck from injuries and pain involves practicing good posture and body mechanics. This may mean correcting bad habits you have related to the way you hold and move your body. The tips below can help you improve your posture and body mechanics.    What Is Posture and Why Does It Matter?  Posture is the way you hold your body. For many of us, this means hunching over, thrusting the chin forward, and slouching the shoulders. But this kind of poor posture keeps muscles from properly supporting the neck and puts stress on muscles, disks, ligaments, and joints in your neck. As a result, injury and pain can occur.  How Is Your Posture?  Use a full-length mirror to check your posture. To begin, stand normally. Then slowly back up against a wall. Is there space between your head and the wall? Do you slouch your shoulders? Is your chin pointing up or down? All these can cause neck pain and injury.  Improving Your Posture  Follow these steps to improve your posture:  Pull your shoulders back.  Think of the ears, shoulders, and hips as a series of dots. Now, adjust your body to connect the dots in a straight line.  Keep your chin level.  What Are Body Mechanics and Why Do They Matter?  The way you move and position your body during daily activities is called body mechanics. Good body mechanics help protect the neck. This means learning the right ways to stand, sit, and even sleep. So do whats best for your neck and practice good body mechanics.  Standing   To protect your neck while standing:  Carry objects close to your  body.  Keep your ears and shoulders in a line while standing or walking.  To lower yourself, bend at the knees with a straight back. Do this instead of looking down and reaching for objects.  Work at eye level. Dont reach above your head or tilt your head back.  Sitting   To protect your neck while sitting:  Set up your workstation so your monitor is at eye level. Also, use a document ayon when viewing papers or books.  Keep your knees at or slightly below the level of your hips.  Sit up straight, with feet flat on the floor. If your feet dont touch the floor, use a footrest.  Avoid sitting or driving for long periods. Take frequent breaks.  Sleeping   To protect your neck while sleeping:  Sleep on your back with a pillow under your knees, or on your side with a pillow between bent knees. This helps align the spine.  Avoid using pillows that are too high or too low. Instead, use a neck roll or pillow under your neck while you sleep to keep the neck straight.  Sleep on a mattress that supports you, with a pillow under your neck.  © 6913-8519 [employer]. [address] [place] [address]. All rights reserved. This information is not intended as a substitute for professional medical care. Always follow your healthcare professional's instructions.          Exercises at Your Workstation: Eyes, Neck, and Head     Tired eyes? Stiff neck? A few easy moves can help prevent these kinds of problems. Take a few minutes during your day to do these exercises--right at your desk. They'll loosen up your muscles, keep you more alert, and make a big difference in how you work and feel.    For your eyes  Eye cup  Lean forward with your elbows on your desk.  Cup your hands and place them lightly over your closed eyes. Hold for a minute, while breathing deeply in and out.  Slowly uncover and open your eyes. Repeat 2 times.  Eye roll  Close your eyes. Slowly roll your eyeballs clockwise all the way around.  Repeat 3 times.  Now slowly roll them all the way around counterclockwise. Repeat 3 times.  Eye rest  Every 20 minutes, look away from the computer screen. Focus on an object at least 20 feet away. Stay focused on this object for a full 20 seconds.    For your neck and head  Warm-up  Drop your head gently to your chest. While breathing in, slowly roll your head up to your left shoulder. While breathing out, slowly roll your head back to center. Repeat to the right.  Repeat 3 times on each side.  Head tilt  Sit up straight. Tuck in your chin.  Slowly tip your head to the left. Return to the center. Then, tip your head to the right.  Repeat 3 times on each side.    Head turn  Sit up straight.  Slowly turn your head and look over your left shoulder. Hold for a few seconds. Go back to the center, then repeat to your right.  Repeat 3 times on each side.  © 2770-1795 [employer]. [address] [place] [address]. All rights reserved. This information is not intended as a substitute for professional medical care. Always follow your healthcare professional's instructions.          Reach and Hold Exercise    Do this exercise on your hands and knees. Keep your knees under your hips and your hands under your shoulders. Keep your spine in a neutral position (not arched or sagging). Keep your ears in line with your shoulders. Hold for a few seconds before starting the exercise:  Tighten your abdominal muscles and raise one arm straight in front of you, palm down. Hold for 5 seconds, then lower. Repeat 5 times.  Do the exercise again, this time lifting your arm to the side. Repeat 5 times.  Do the exercise again, this time lifting your arm backward, palm up. Repeat 5 times.  Switch sides and do each exercise with the other arm.  © 5625-5112 [employer]. [address] [place] [address]. All rights reserved. This information is not intended as a substitute for professional medical  care. Always follow your healthcare professional's instructions.        Shoulder and Upper Back Stretch  To start, stand tall with your ears, shoulders, and hips in line. Your feet should be slightly apart, positioned just under your hips. Focus your eyes directly in front of you.  this position for a few seconds before starting your exercise. This helps increase your awareness of proper posture.  Reach overhead and slightly back with both arms. Keep your shoulders and neck aligned and your elbows behind your shoulders:  With your palms facing the ceiling, turn your fingers inward.  Take a deep breath. Breathe out, and lower your elbows toward your buttocks. Hold for 5 seconds, then return to starting position.  Repeat 3 times.    © 8506-9819 [employer]. [address] [place] [address]. All rights reserved. This information is not intended as a substitute for professional medical care. Always follow your healthcare professional's instructions.          Shoulder Clock Exercise  To start, stand tall with your ears, shoulders, and hips in line. Your feet should be slightly apart, positioned just under your hips. Focus your eyes directly in front of you.  this position for a few seconds before starting your exercise. This helps increase your awareness of proper posture.  Imagine that your right shoulder is the center of a clock. With the outer point of your shoulder, roll it around to slowly trace the outer edge of the clock.  Move clockwise first, then counterclockwise.  Repeat 3 times. Switch shoulders.   © 9729-8935 [employer]. [address] [place] [address]. All rights reserved. This information is not intended as a substitute for professional medical care. Always follow your healthcare professional's instructions.          Shoulder Girdle Stretch     To start, sit in a chair with your feet flat on the floor. Your weight should be slightly forward so  that youre balanced evenly on your buttocks. Relax your shoulders and keep your head level. Using a chair with arms may help you keep your balance:  Place 1 hand on the outside elbow of the other arm.  Pull the arm across your body. Hold for 30 to 60 seconds. Repeat once.  Switch sides.    © 4951-0493 [employer] [address] [place] [address]. All rights reserved. This information is not intended as a substitute for professional medical care. Always follow your healthcare professional's instructions.          Shoulder Exercises      To start, sit in a chair with your feet flat on the floor. Your weight should be slightly forward so that youre balanced evenly on your buttocks. Relax your shoulders and keep your head level. Avoid arching your back or rounding your shoulders. Using a chair with arms may help you keep your balance.  Raise your arms, elbows bent, to shoulder height.  Slowly move your forearms together. Hold for 5 seconds.  Return to starting position. Repeat 5 times.  © 9640-4635 [employer] [address] [place] [address]. All rights reserved. This information is not intended as a substitute for professional medical care. Always follow your healthcare professional's instructions.        Shoulder Shrug Exercise  To start, sit in a chair with your feet flat on the floor. Shift your weight slightly forward to avoid rounding your back. Relax. Keep your ears, shoulders, and hips aligned:  Raise both of your shoulders as high as you can, as if you were trying to touch them to your ears. Keep your head and neck still and relaxed.  Hold for a count of 10. Release.  Repeat 5 times.    © 7649-6282 [employer] [address] [place] [address]. All rights reserved. This information is not intended as a substitute for professional medical care. Always follow your healthcare professional's instructions.          Shoulder Squeeze Exercise      To start, sit in a chair with your feet flat on the floor. Shift your weight slightly forward to avoid rounding your back. Relax. Keep your ears, shoulders, and hips aligned:  Raise your arms to shoulder height, elbows bent and palms forward.  Move your arms back, squeezing your shoulder blades together.  Hold for 10 seconds. Return to starting position.   Repeat 5 times.     © 0969-2284 [employer]. [address] [place] [address], [place] [address]. All rights reserved. This information is not intended as a substitute for professional medical care. Always follow your healthcare professional's instructions.

## 2022-12-07 NOTE — PROGRESS NOTES
[facility]   [address]  [place] [address]  Phone: [phone]        Full Name: Casandra Barragan YOB: 2000  Patient ID: [id number]      Visit Date: 12/7/2022 14:01  Age: 22 Years  Examining Physician:   Referring Physician:   Conclusion: ue pain    Chief Complaint   Patient presents with    Arm Pain     HPI: This is a 22 y.o.  female being seen in clinic today for evaluation of chronic right arm/neck achy pain and tightness that was worse with increased activity and certain positions.  More recently it hasn't bothered her as much, but is still present.    History obtained from patient    Past family, medical, social, and surgical history reviewed in chart    Review of Systems:     General- denies lethargy, weight change, fever, chills  Head/neck- denies swallowing difficulties  ENT- denies hearing changes  Cardiovascular-denies chest pain  Pulmonary- denies shortness of breath  GI- denies constipation or bowel incontinence  - denies bladder incontinence  Skin- denies wounds or rashes  Musculoskeletal- denies weakness, denies pain  Neurologic- denies numbness and tingling  Psychiatric- denies depressive or psychotic features, denies anxiety  Lymphatic-denies swelling  Endocrine- denies hypoglycemic symptoms/DM history  All other pertinent systems negative     Physical Examination:  General: Well developed, well nourished female, NAD  HEENT:NCAT EOMI bilaterally   Pulmonary:Normal respirations    Spinal Examination: CERVICAL  Active ROM is within normal limits.  Inspection: No deformity of spinal alignment.  Palpation: No vertebral tenderness to percussion.  Extremely ttp and tight at right trapezius, levator scapula     Musculoskeletal Tests:  Phalen: +on right  Elbow compression (ulnar): neg  Tinels at wrist: neg    Bilateral Upper and Lower Extremities:  Pulses are 2+ at radial bilaterally.  Shoulder/Elbow/Wrist/Hand ROM wnl except rotator cuff  weakness on right  Hip/Knee/Ankle ROM   Bilateral Extremities show normal capillary refill.  No signs of cyanosis, rubor, edema, skin changes, or dysvascular changes of appendages.  Nails appear intact.    Neurological Exam:  Cranial Nerves:  II-XII grossly intact    Manual Muscle Testing: (Motor 5=normal)  5/5 strength bilateral upper extremities    No focal atrophy is noted of either upper extremity.    Bilateral Reflexes:  Rocha's response is absent bilaterally.  Sensation: tested to light touch  - intact in arms except dec at right 1st digit    Gait: Narrow base and good arm swing.      Entire procedure explained to patient prior to proceeding.  Verbal consent obtained        Sensory NCS      Nerve / Sites Rec. Site Onset Lat Peak Lat NP Amp PP Amp Segments Distance Velocity     ms ms µV µV  mm m/s   R Median - Digit II (Antidromic)      Wrist Dig II 2.69 3.21 29.7 31.5 Wrist - Dig  52   L Median - Digit II (Antidromic)      Wrist Dig II 2.67 3.38 23.1 47.9 Wrist - Dig  53   R Ulnar - Digit V (Antidromic)      Wrist Dig V 2.27 2.90 42.8 42.9 Wrist - Dig V 140 62   L Ulnar - Digit V (Antidromic)      Wrist Dig V 2.56 3.15 34.6 36.6 Wrist - Dig V 140 55   R Radial - Anatomical snuff box (Forearm)      Forearm Wrist 1.65 2.35 15.6 10.9 Forearm - Wrist 100 61   L Radial - Anatomical snuff box (Forearm)      Forearm Wrist 1.73 2.44 17.2 10.5 Forearm - Wrist 100 58                   Combined Sensory Index      Nerve / Sites Rec. Site Peak Lat NP Amp PP Amp Segments Peak Diff     ms µV µV  ms   R Median - CSI      Median Thumb 2.73 64.1 40.5 Median - Radial 0.52      Radial Thumb 2.21 16.9 17.1 Median - Ulnar 0.17      Median Ring 2.96 15.3 75.7 Median palm - Ulnar palm 0.46      Ulnar Ring 2.79 29.8 54.7        Median palm Wrist 1.96 38.2 53.4        Ulnar palm Wrist 1.50 38.7 54.8        CSI     CSI 1.15   L Median - CSI      Median Thumb 2.50 47.9 44.6 Median - Radial 0.10      Radial Thumb 2.40 13.1  5.5 Median - Ulnar 0.08      Median Ring 3.35 27.4 30.2 Median palm - Ulnar palm 0.21      Ulnar Ring 3.27 36.9 52.5        Median palm Wrist 1.77 24.5 40.5        Ulnar palm Wrist 1.56 16.5 17.6        CSI     CSI 0.40           Motor NCS      Nerve / Sites Muscle Latency Amplitude Amp % Duration Segments Distance Lat Diff Velocity     ms mV % ms  mm ms m/s   R Median - APB      Wrist APB 2.77 10.5 100 5.38 Wrist - APB 80        Elbow APB 6.50 7.8 74.1 5.54 Elbow - Wrist 210 3.73 56   L Median - APB      Wrist APB 2.94 7.5 100 5.21 Wrist - APB 80        Elbow APB 6.75 8.1 107 5.15 Elbow - Wrist 210 3.81 55   R Ulnar - ADM      Wrist ADM 2.13 13.3 100 5.17 Wrist - ADM 80        B.Elbow ADM 5.40 11.2 84.7 5.25 B.Elbow - Wrist 220 3.27 67      A.Elbow ADM 6.96 11.3 84.9 5.38 A.Elbow - B.Elbow 95 1.56 61   L Ulnar - ADM      Wrist ADM 2.29 13.0 100 6.48 Wrist - ADM 80        B.Elbow ADM 5.29 11.1 85.4 6.46 B.Elbow - Wrist 210 3.00 70      A.Elbow ADM 6.98 11.0 84.5 6.48 A.Elbow - B.Elbow 120 1.69 71               EMG Summary Table     Spontaneous MUAP Recruitment   Muscle Nerve Roots IA Fib PSW Fasc H.F. Amp Dur. PPP Pattern   R. Deltoid Axillary C5-C6 N None None None None N N N N   R. Brachioradialis Radial C5-C6 N None None None None N N N N   R. Pronator teres Median C6-C7 N None None None None N N N N   R. Adductor pollicis Ulnar C8-T1 N None None None None N N N N   R. First dorsal interosseous Ulnar C8-T1 N None None None None N N N N   R. Trapezius (upper) Accessory (spinal) C3-C4 N None None None None N N N N       INTERPRETATION  -Bilateral median motor nerve conduction study showed normal latency, amplitude, and conduction velocity  -Bilateral median sensory nerve conduction study showed normal peak latency and amplitude  -Bilateral ulnar motor nerve conduction study showed normal latency, amplitude, and conduction velocity  -Bilateral ulnar sensory nerve conduction study showed normal peak latency and  amplitude  -Bilateral radial sensory nerve conduction study showed normal peak latency and amplitude  -Right combined sensory index was significant, left was non significant  -Needle EMG examination performed to above mentioned muscles     IMPRESSION  ABNORMAL study  2. There is electrodiagnostic evidence of a very mild demyelinating median neuropathy (Carpal tunnel syndrome) across the right wrist  3. There is clinical evidence of cervical myofascial pain and rotator cuff weakness    PLAN  Discussed in detail for greater than 30 minutes about diagnosis and treatment plan    1. Follow up with referring provider: Dr. Marcos Borja  2. Handouts on CTS , cervical myofasical pain, exercise, stretch provided. Pt has upcoming appt with PT-consider including focus on myofascial issues as well.  3. This study is good for one year. If symptoms worsen or do not improve, please re-consult.    Yeni Alvarado M.D.  Physical Medicine and Rehab

## 2022-12-08 ENCOUNTER — CLINICAL SUPPORT (OUTPATIENT)
Dept: REHABILITATION | Facility: HOSPITAL | Age: 22
End: 2022-12-08
Payer: COMMERCIAL

## 2022-12-08 DIAGNOSIS — R68.89 DECREASED STRENGTH, ENDURANCE, AND MOBILITY: ICD-10-CM

## 2022-12-08 DIAGNOSIS — Z74.09 DECREASED STRENGTH, ENDURANCE, AND MOBILITY: ICD-10-CM

## 2022-12-08 DIAGNOSIS — M54.42 CHRONIC BILATERAL LOW BACK PAIN WITH BILATERAL SCIATICA: Primary | ICD-10-CM

## 2022-12-08 DIAGNOSIS — M54.41 CHRONIC BILATERAL LOW BACK PAIN WITH BILATERAL SCIATICA: Primary | ICD-10-CM

## 2022-12-08 DIAGNOSIS — M54.12 RIGHT CERVICAL RADICULOPATHY: ICD-10-CM

## 2022-12-08 DIAGNOSIS — M53.3 SACROILIAC JOINT DYSFUNCTION OF BOTH SIDES: ICD-10-CM

## 2022-12-08 DIAGNOSIS — G89.29 CHRONIC BILATERAL LOW BACK PAIN WITH BILATERAL SCIATICA: Primary | ICD-10-CM

## 2022-12-08 DIAGNOSIS — R53.1 DECREASED STRENGTH, ENDURANCE, AND MOBILITY: ICD-10-CM

## 2022-12-08 PROCEDURE — 97110 THERAPEUTIC EXERCISES: CPT

## 2022-12-08 PROCEDURE — 97162 PT EVAL MOD COMPLEX 30 MIN: CPT

## 2022-12-08 NOTE — PATIENT INSTRUCTIONS
LBP- Pilates Table Top Program - No Resistance [T86TZLR]    Table top -  Repeat 10 Times, Hold 10 Seconds, Perform 3 Times a Week    Dead Bug Level One -  Repeat 10 Times, Complete 3 Sets, Perform 3 Times a Week    Table top toe taps -  Repeat 10 Times, Complete 3 Sets, Perform 3 Times a Week

## 2022-12-08 NOTE — PLAN OF CARE
[facility]   Physical Therapy Initial Evaluation     Date: 12/8/2022     Name: Casandra Barragan  [facility] Number: [id number]  Therapy Diagnosis:   Encounter Diagnoses   Name Primary?    Chronic bilateral low back pain with bilateral sciatica Yes    Sacroiliac joint dysfunction of both sides     Right cervical radiculopathy     Decreased strength, endurance, and mobility       Physician: Marcos Borja MD      Physician Orders: PT Eval and Treat  Medical Diagnosis from Referral: Chronic bilateral low back pain with bilateral sciatica [M54.42, M54.41, G89.29], Sacroiliac joint dysfunction of both sides [M53.3], Right cervical radiculopathy [M54.12]  Evaluation Date: 12/8/2022  Authorization Period Expiration: 11/28/2023  Plan of Care Expiration: 3/8/2023    Progress Update: 1/8/2023   Visit # / Visits authorized: 1 / 1   FOTO: Visit #1 12/8/2022 - Scored: 1 / 3     Cheered:     PRECAUTIONS: Standard Precautions     MD Follow up: No scheduled follow-up    Time In: 1440  Time Out: 1530  Total Appointment Time (timed & untimed codes): 50    SUBJECTIVE     Date of onset: over a year ago    History of current condition - Casandra is a 22 y.o. female whom presets to Physical Therapy with complaints of multi-level back pain with a long history of events and pain that led to her symptoms. She has had therapy on a few different occasions at the [place], but would like to continue working on strength and core control for daily and recreational activities.     Lumbar- 2019:- dropped when doing a secondary base lift, landing on her side two times. S/s currently are radiating down her left leg    Concussion: knee 'ed in the head 2021 (spring of 2021)- cleared by Gibran (fellow) - hit head again later 2 weeks. Symptoms were worsened and she was held out for a longer time.     Neck: 2nd rib out of place, started having issues on the right shoulder. 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  School/Work Restrictions: [] none  Prior Level of Function: Independent with all activities of daily living  Current Level of Function: 30% of prior level of function  - Gym/Home Equipment: Weight Room at the apartment- resistance bands (from Physical Therapy)    Pain:  Current 5/10, worst 8/10, best 0 /10   Location: [x] Right [] Left : shoulder, left leg and low back and neck  Description: nub mess, tingling, dull/achy  Aggravating Factors: sleeping on it  Easing Factors: activity avoidance, rest    Pts goals: Pt reported goals are to decrease pain to increase functional with daily and recreational activities.     _______________________________________________________  Medical History:   No past medical history on file.    Surgical History:   Casandra Barragan  has no past surgical history on file.    Medications:   Casandra has a current medication list which includes the following prescription(s): indomethacin, medroxyprogesterone, norethindrone-ethinyl estradiol, and xulane.    Allergies:   Review of patient's allergies indicates:   Allergen Reactions    Amoxicillin Rash    Clindamycin Other (See Comments)     Palm and foot skin peeling off  Palm and foot skin peeling off            OBJECTIVE     Posture:  Pt presents with postural abnormalities which include:    [x] Forward Head   [] Increased Lumbar Lordosis   [x] Rounded Shoulder   []  Flat Back Posture   [] Increased Thoracic Kyphosis [] Inominate Rotation   [] Increased Trunk Sway  [] Genu Recurvatum   [] Increased Trunk Rotation  [] Pes Planus   [] Other:     Sensation:  Sensation is [x] Intact [] Impaired-- to light touch    Palpation: Increased tone and tenderness noted with palpation to: right shoulder    CERVICAL-   Cervical  Range of Motion Right   (spine) Left    Function & Pain Goal   Cervical Flexion (60º)   [x]Functional  []Dysfunctional  []Painful  [x]Non-Painful 45º  Functional   Nonpainful   Cervical Extension (90º)   [x]Functional  []Dysfunctional  []Painful  [x]Non-Painful 45º  Functional   Nonpainful   Cervical Side Bending (45º)   [x]Functional  []Dysfunctional  []Painful  [x]Non-Painful 45º  Functional   Nonpainful   Cervical Rotation (75º)   [x]Functional  []Dysfunctional  []Painful  [x]Non-Painful 60º  Functional   Nonpainful      SHOULDER-   Shoulder   Range of Motion Right  Active     Passive Left  Active     Passive Goal   Forward Flexion (180º) 130  180  170º   External Rotation at 90º (90º) 65  90  80º   External Rotation at 45º (45º)  60  67  45º   Internal Rotation at 90º (90º) -  -  70º   Functional External Rotation (C7) T5  T8  C7   Functional Internal Rotation (T10) T12  T5  T10   (*) pain and/or dysfunction) pain and/or dysfunction   LUMBAR-   Lumbar   Range of Motion Right  (spine) Left Function   & Pain Goal   Lumbar Flexion (60º) 60  []Functional  []Dysfunctional  []Painful  []Non-Painful 50º  Functional   Nonpainful   Lumbar Extension (30º)   []Functional  []Dysfunctional  []Painful  []Non-Painful 20º  Functional   Nonpainful   Lumbar Side Bending (25º)   []Functional  []Dysfunctional  []Painful  []Non-Painful 20º  Functional   Nonpainful   Lumbar Rotation   []Functional  []Dysfunctional  []Painful  []Non-Painful 45º  Functional   Nonpainful      HIP-   Hip   Range of Motion Right   Left   Goal   Hip Flexion (120º)   120º   Hip Abduction (45º) Not tested Not  tested  30º   Hip Extension (20º) Not tested  Not tested  15º   Hip internal rotaiton  (45º)   40º   Hip external rotation  (45º)   40º    (*) pain and/or dysfunction(*) pain and/or dysfunction      LE STRENGTH -   Lower Extremity  Strength RIGHT   Goal   Hip Flexion  []1  []2  []3  [x]4  []5                []+ []- 5/5 B    Hip Extension  []1  []2  []3  [x]4  []5                []+ []- 5/5 B   Hip Abduction  []1  []2  []3  [x]4  []5                []+ []- 5/5 B   Hip Internal rotaiton  []1  []2  []3  [x]4  []5                []+ []- 5/5 B   Hip External rotation  []1  []2  []3  [x]4  []5                []+ []- 5/5 B   Knee Flexion []1  []2  []3  [x]4  []5                []+ []- 5/5 B   Knee Extension []1  []2  []3  [x]4  []5                []+ []- 5/5 B   Ankle Dorsiflexion []1  []2  []3  [x]4  []5                []+ []- 5/5 B   Ankle Plantarflexion []1  []2  []3  [x]4  []5                []+ []- 5/5 B   Ankle Inversion []1  []2  []3  [x]4  []5                []+ []- 5/5 B   Ankle Eversion []1  []2  []3  [x]4  []5                []+ []- 5/5 B   Big Toe Extension []1  []2  []3  [x]4  []5                []+ []- 5/5 B     Lower Extremity  Strength LEFT   Goal   Hip Flexion  []1  []2  []3  [x]4  []5                []+ []- 5/5 B    Hip Extension  []1  []2  []3  [x]4  []5                []+ []- 5/5 B   Hip Abduction  []1  []2  []3  [x]4  []5                []+ []- 5/5 B   Hip Internal rotaiton  []1  []2  []3  [x]4  []5                []+ []- 5/5 B   Hip External rotation  []1  []2  []3  [x]4  []5                []+ []- 5/5 B   Knee Flexion []1  []2  []3  [x]4  []5                []+ []- 5/5 B   Knee Extension []1  []2  []3  [x]4  []5                []+ []- 5/5 B   Ankle Dorsiflexion []1  []2  []3  [x]4  []5                []+ []- 5/5 B   Ankle Plantarflexion []1  []2  []3  [x]4  []5                []+ []- 5/5 B   Ankle Inversion []1  []2  []3  [x]4  []5                []+ []- 5/5 B   Ankle Eversion []1  []2  []3  [x]4   []5                []+ []- 5/5 B   Big Toe Extension []1  []2  []3  [x]4  []5                []+ []- 5/5 B        UE FLEXIBILITY-   Muscle Length  Upper Extremity Right   Limitation Left    Limitation Goal   Upper Trapezius  [] Normal  [x] Limited   inches [] Normal  [x] Limited   inches Normal     Levator Scapulae  [] Normal  [x] Limited  [] Normal  [x] Limited  Normal    Pectoralis Minor  [] Normal  [x] Limited  [] Normal  [x] Limited  Normal   Pectoralis Major [] Normal  [x] Limited  [] Normal  [x] Limited  Normal       Additional Objective Tests and Meausres: Functional Tests-   Movement Analysis:  Functional Test  Outcome   Double Leg Squat []Functional  [x]Dysfunctional: increased head raise, improper weight shifting  []Painful  [x]Non-Painful   Single Leg Step Down []Functional  [x]Dysfunctional: increased valgus and trunk lean  []Painful  [x]Non-Painful     Core Endurance Test  Table Top: fair, 10s holds, but shaking  Endurance drop: 45*, fair      FUNCTION:     CMS Impairment/Limitation/Restriction for FOTO Neck Survey    Therapist reviewed FOTO scores for Casandra Barragan on 12/8/2022.   FOTO documents entered into [employer] - see Media section.    Limitation Score: 28%           TREATMENT     Total Treatment time separate from Evaluation: (15) minutes    Casandra received the following interventions:     THERAPEUTIC EXERCISES: to develop strength, endurance, range of motion, flexibility, posture and core stabilization for (0)  minutes including: x = performed today    TherEx Performed    Mobility / Chondral: Bike next Home exercise program review - all                    BOLD = new this visit  Plan for Next Visit: miniband series     NEUROMUSCULAR RE-EDUCATION activities to improve: Coordination, Kinesthetic, Sense, and Proprioception for (15) minutes. The following activities were included: x = exercises performed     Neuro Re-Ed 12/8/2022    Transverse Abdominal Bracing (TA) in Table Top x Home exercise program  review    TA Brace in Table Top + Arm Lifts x Home exercise program review    TA Brace in Table Top + Marching x Home exercise program review          BOLD= new this visit  Plan for Next Visit: progress bracing series, begin hip program         PATIENT EDUCATION AND HOME EXERCISES     Education/Self-Care provided:  (included in treatment) minutes   Patient educated on the impairments noted above and the effects of physical therapy intervention to improve overall condition and Quality of Life  Patient was educated on all the above exercise prior/during/after for proper posture, positioning, and execution for safe performance with home exercise program.     Written Home Exercises Provided: yes. Prefers: [x] Printed [x] Electronic  Exercises were reviewed and Casandra was able to demonstrate them prior to the end of the session.  Casandra demonstrated good understanding of the education provided. See EMR under Patient Instructions for exercises provided during therapy sessions.      ASSESSMENT     Casandra is a 22 y.o. female referred to outpatient Physical Therapy with a medical diagnosis of   Encounter Diagnoses   Name Primary?    Chronic bilateral low back pain with bilateral sciatica Yes    Sacroiliac joint dysfunction of both sides     Right cervical radiculopathy     Decreased strength, endurance, and mobility    .    Physical exam supports multi-spinal and back impairments including: decreased range of motion, decreased muscular strength, decreased muscular and core endurance, decreased muscular length/flexibility, impaired joint mobility, and impaired functional mobility. The above impairments will be addressed through manual therapy techniques, therapeutic exercises, functional training, and modalities as necessary. Patient was treated and educated on exercises for home program, progression of therapy, and benefits of therapy to achieve full functional mobility.       Pt prognosis is Good.   Pt will benefit from  "skilled outpatient Physical Therapy to address the deficits stated above and in the chart below, provide pt/family education, and to maximize pt's level of independence.     Plan of care discussed with patient: Yes  Pt's spiritual, cultural and educational needs considered and patient is agreeable to the plan of care and goals as stated below:     Anticipated Barriers for therapy: chronicity of condition, lack of understanding of condition, adherence to treatment plan, and coping style    Medical Necessity is demonstrated by the following:   Medical Necessity Boxes: GENERAL   History  Co-morbidities and personal factors that may impact the plan of care Co-morbidities:     No past medical history on file.     Personal Factors:   coping style  social background  lifestyle  character     moderate   Examination  Body Structures and Functions, activity limitations and participation restrictions that may impact the plan of care Body Regions:   head  neck  back  upper extremities  trunk    Body Systems:    gross symmetry  ROM  strength  gross coordinated movement  balance  transitions  motor control  motor learning    Participation Restrictions:   See above in "Current Level of Function"     Activity limitations:   Learning and applying knowledge  no deficits    General Tasks and Commands  no deficits    Communication  no deficits    Mobility  no deficits    Self care  no deficits    Domestic Life  no deficits    Interactions/Relationships  no deficits    Life Areas  no deficits    Community and Social Life  community life  recreation and leisure         high   Clinical Presentation evolving clinical presentation with changing clinical characteristics moderate   Decision Making/ Complexity Score: moderate        SHORT TERM GOALS:  4 weeks (1/8/23) Progress   Recent signs and systems trend is improving in order to progress towards Long term goals.  [] Met  [] Not Met  [] Progressing   Patient will be independent with Home " Exercise Program  in order to further progress and return to maximal function. [] Met  [] Not Met  [] Progressing   Pain rating at Worst: 5 /10 in order to progress towards increased independence with activity. [] Met  [] Not Met  [] Progressing   Patient will be able to correct postural deviations in sitting and standing, to decrease pain and promote postural awareness for injury prevention.  [] Met  [] Not Met  [] Progressing   Patient will improve functional outcome (FOTO) score: by 5% to increase self-worth & perceived functional ability towards long term goals [] Met  [] Not Met  [] Progressing     LONG TERM GOALS: 8 weeks (1/8/23) Progress   Patient will return to normal activites of daily living, recreational, and work related activities with less pain and limitation.  [] Met  [] Not Met  [] Progressing   Patient will improve range of motion  to stated goals in order to return to maximal functional potential.  [] Met  [] Not Met  [] Progressing   Patient will improve Strength to stated goals of appropriate musculature in order to improve functional independence.  [] Met  [] Not Met  [] Progressing   Pain Rating at Best: 1/10 to improve Quality of Life.  [] Met  [] Not Met  [] Progressing   Patient will meet predicted functional outcome (FOTO) score: 28% to increase self-worth & perceived functional ability. [] Met  [] Not Met  [] Progressing   Patient will have met/partially met personal goal of: decrease pain to increase functional with daily and recreational activities.  [] Met  [] Not Met  [] Progressing         PLAN   Plan of care Certification: 12/8/2022 to 2/8/2023    Outpatient Physical Therapy 1 times weekly for 8 weeks to include any combination of the following interventions: virtual visits, dry needling, modalities, electrical stimulation (IFC, Pre-Mod, Attended with Functional Dry Needling), Manual Therapy, Moist Heat/ Ice, Neuromuscular Re-ed, Patient Education, Self Care, Therapeutic Exercise,  Functional Training, and Therapeutic Activites     Thank you for this referral.    Genia Fagan, PT      I CERTIFY THE NEED FOR THESE SERVICES FURNISHED UNDER THIS PLAN OF TREATMENT AND WHILE UNDER MY CARE   Physician's comments:     Physician's Signature: ___________________________________________________

## 2022-12-12 ENCOUNTER — CLINICAL SUPPORT (OUTPATIENT)
Dept: REHABILITATION | Facility: HOSPITAL | Age: 22
End: 2022-12-12
Payer: COMMERCIAL

## 2022-12-12 DIAGNOSIS — M25.511 CHRONIC RIGHT SHOULDER PAIN: Primary | ICD-10-CM

## 2022-12-12 DIAGNOSIS — G89.29 CHRONIC RIGHT SHOULDER PAIN: Primary | ICD-10-CM

## 2022-12-12 DIAGNOSIS — M62.81 PROXIMAL MUSCLE WEAKNESS: ICD-10-CM

## 2022-12-12 PROCEDURE — 97110 THERAPEUTIC EXERCISES: CPT

## 2022-12-12 PROCEDURE — 97112 NEUROMUSCULAR REEDUCATION: CPT

## 2022-12-12 NOTE — PROGRESS NOTES
[facility]   Physical Therapy Treatment Note     Name: Casandra [facility] Number: [id number]    Therapy Diagnosis:   Encounter Diagnoses   Name Primary?    Chronic right shoulder pain Yes    Proximal muscle weakness      Physician: Marcos Borja MD    Visit Date: 12/12/2022    Physician Orders: PT Eval and Treat  Medical Diagnosis from Referral: Chronic bilateral low back pain with bilateral sciatica [M54.42, M54.41, G89.29], Sacroiliac joint dysfunction of both sides [M53.3], Right cervical radiculopathy [M54.12]  Evaluation Date: 12/8/2022  Authorization Period Expiration: 11/28/2023  Plan of Care Expiration: 3/8/2023                  Progress Update: 1/8/2023     Visit # / Visits authorized: 1 / 20 (eval)          FOTO: Visit #1 12/8/2022 - Scored: 1 / 3      Cheered:      PRECAUTIONS: Standard Precautions      MD Follow up: No scheduled follow-up    PTA Visit #: 0/5     Time In: 1505  Time Out: 1555  Total Billable Time: 50 minutes    SUBJECTIVE     Pt reports:  Exercises done every other day, she feels good overall, and hasn't had any increased lumbar or neck pain  Compliance with Hep: Every Other Day  Response to previous treatment: no adverse reactions to treatment/updated HEP  Functional change: Better    Pain: 0/10   Worst: 5/10  Location: Neck and Back.      OBJECTIVE     Objective Measures updated at progress report unless specified otherwise.    Treatment     Gym/Equipment Access: [facility] gym, apartment  Time to Complete Exercises: as expected    Casandra received the treatments listed below:     THERAPEUTIC EXERCISES: to develop strength, endurance, ROM, flexibility, posture and core stabilization for (15)  minutes including: x = performed today    TherEx 12/12/2022    Mobility/Chondral: Treadmill x Core focus, 6 min   Mobility: lumbar spine x Double Knees to Chest  Single Knee to Chest  Quadratus Lumborum Stretch   Mobility: Thoracic and Cervical next  Series 6.  1 minute- each  ----------------------  Hugs on Foam Roll- alternating top arm  Arm Scissors  W- Raises Overhead  Serratus Push Up  Pec Stretch- W & T           BOLD = new this visit  Plan for Next Visit:         NEUROMUSCULAR RE-EDUCATION activities to improve: Coordination, Kinesthetic, Sense, and Proprioception for (35) minutes. The following activities were included: x = exercises performed     Neuro Re-Ed 12/12/2022    Table Top (TT) + Chin Tuck w/ Abdominal Brace x  10s holds x10   TT + Arm Raises x x20   TT + Marching x x20   TT + Leg Extension x x20   TT + Pilates 100s x x100   Deadbug- opposite arm opposite leg x X20 with bolster on top of spine   Multifidi- Double Leg lift off table (whale tale) x 2x20   MiniBand Series with Core Brace x 2 min each- Red Band at knees  Hip Extension  Lateral Walks              BOLD= new this visit  Plan for Next Visit:          ASSESSMENT     Casandra Barragan tolerated PT session well with minimal  complaints of pain or discomfort, secondary to fatigue and muscular weakness. Objective findings are improving with of range of motion endurance and functional mobility.  Therapy exercises were reviewed by revisiting exercises given from previous home exercise program while adding pilates based core exercises and lumbar spine strengthening specific to stabilizers.  Handouts were not issued during today's visit. Casandra demonstrated good understanding of new exercises and will continue to progress at home until next follow-up.       Casandra is progressing well towards her goals.   Pt prognosis is Good.     Pt will continue to benefit from skilled outpatient physical therapy to address the deficits listed in the problem list box on initial evaluation, provide pt/family education and to maximize pt's level of independence in the home and community environment.     Pt's spiritual, cultural and educational needs considered and pt agreeable to plan of care and  goals.    Anticipated barriers to physical therapy: increased for cueing    SHORT TERM GOALS:  4 weeks (1/8/23) Progress Date Met   Recent signs and systems trend is improving in order to progress towards Long term goals.  [] Met  [] Not Met  [] Progressing    Patient will be independent with Home Exercise Program  in order to further progress and return to maximal function. [] Met  [] Not Met  [] Progressing    Pain rating at Worst: 5 /10 in order to progress towards increased independence with activity. [] Met  [] Not Met  [] Progressing    Patient will be able to correct postural deviations in sitting and standing, to decrease pain and promote postural awareness for injury prevention.  [] Met  [] Not Met  [] Progressing    Patient will improve functional outcome (FOTO) score: by 5% to increase self-worth & perceived functional ability towards long term goals [] Met  [] Not Met  [] Progressing       LONG TERM GOALS: 8 weeks (1/8/23) Progress Date Met   Patient will return to normal activites of daily living, recreational, and work related activities with less pain and limitation.  [] Met  [] Not Met  [] Progressing    Patient will improve range of motion  to stated goals in order to return to maximal functional potential.  [] Met  [] Not Met  [] Progressing    Patient will improve Strength to stated goals of appropriate musculature in order to improve functional independence.  [] Met  [] Not Met  [] Progressing    Pain Rating at Best: 1/10 to improve Quality of Life.  [] Met  [] Not Met  [] Progressing    Patient will meet predicted functional outcome (FOTO) score: 28% to increase self-worth & perceived functional ability. [] Met  [] Not Met  [] Progressing    Patient will have met/partially met personal goal of: decrease pain to increase functional with daily and recreational activities.  [] Met  [] Not Met  [] Progressing         PLAN     Continue Plan of Care (POC) and progress per patient tolerance. See  Treatment section for exercise progression.    Genia Fagan, PT

## 2022-12-19 PROBLEM — Z74.09 DECREASED STRENGTH, ENDURANCE, AND MOBILITY: Status: ACTIVE | Noted: 2022-12-19

## 2022-12-19 PROBLEM — R53.1 DECREASED STRENGTH, ENDURANCE, AND MOBILITY: Status: ACTIVE | Noted: 2022-12-19

## 2022-12-19 PROBLEM — R68.89 DECREASED STRENGTH, ENDURANCE, AND MOBILITY: Status: ACTIVE | Noted: 2022-12-19

## 2022-12-19 PROBLEM — M25.511 CHRONIC RIGHT SHOULDER PAIN: Status: RESOLVED | Noted: 2022-02-08 | Resolved: 2022-12-19

## 2022-12-19 PROBLEM — G89.29 CHRONIC RIGHT SHOULDER PAIN: Status: RESOLVED | Noted: 2022-02-08 | Resolved: 2022-12-19

## 2022-12-19 PROBLEM — M62.81 PROXIMAL MUSCLE WEAKNESS: Status: RESOLVED | Noted: 2022-02-08 | Resolved: 2022-12-19

## 2023-01-13 ENCOUNTER — CLINICAL SUPPORT (OUTPATIENT)
Dept: REHABILITATION | Facility: HOSPITAL | Age: 23
End: 2023-01-13
Payer: COMMERCIAL

## 2023-01-13 DIAGNOSIS — R53.1 DECREASED STRENGTH, ENDURANCE, AND MOBILITY: Primary | ICD-10-CM

## 2023-01-13 DIAGNOSIS — R68.89 DECREASED STRENGTH, ENDURANCE, AND MOBILITY: Primary | ICD-10-CM

## 2023-01-13 DIAGNOSIS — Z74.09 DECREASED STRENGTH, ENDURANCE, AND MOBILITY: Primary | ICD-10-CM

## 2023-01-13 PROCEDURE — 97110 THERAPEUTIC EXERCISES: CPT

## 2023-01-13 PROCEDURE — 97112 NEUROMUSCULAR REEDUCATION: CPT

## 2023-01-13 NOTE — PROGRESS NOTES
[facility]   Physical Therapy Treatment + Progress Note     Name: Casandra Barragan  [facility] Number: [id number]    Therapy Diagnosis:   Encounter Diagnosis   Name Primary?    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 sciatica [M54.42, M54.41, G89.29], Sacroiliac joint dysfunction of both sides [M53.3], Right cervical radiculopathy [M54.12]  Evaluation Date: 12/8/2022  Authorization Period Expiration: 11/28/2023  Plan of Care Expiration: 3/8/2023                  Progress Update: 2/8/2023     Visit # / Visits authorized: 1 / 20 (eval)          FOTO: Visit #1 1/13/2022 - Scored: 1 / 3      [facility]: [email or web address]      PRECAUTIONS: Standard Precautions      MD Follow up: No scheduled follow-up    PTA Visit #: 0/5     Time In: 1345  Time Out: 1445  Total Billable Time: 60 minutes    SUBJECTIVE     Pt reports:  Beginning of the week her low back pain was a 6/10, but that is better overall from the best it has been in a long time. Her next pain has been more discomfort instead of pain on a frequent basis, but overall better. Her right shoulder is also bothering her.   Compliance with Hep: Every Other Day  Response to previous treatment: no adverse reactions to treatment/updated HEP  Functional change: Better    Pain: 0/10   Worst: 5/10  Location: Neck and Back.      OBJECTIVE     Objective Measures updated at progress report unless specified otherwise.    CERVICAL-   Cervical  Range of Motion Right   (spine) Left     Function & Pain Goal   Cervical Flexion (60º)     [x]Functional  []Dysfunctional  []Painful  [x]Non-Painful 45º  Functional   Nonpainful   Cervical Extension (90º)     [x]Functional  []Dysfunctional  []Painful  [x]Non-Painful 45º  Functional   Nonpainful   Cervical Side Bending (45º)      [x]Functional  []Dysfunctional  []Painful  [x]Non-Painful 45º  Functional   Nonpainful   Cervical Rotation (75º)     [x]Functional  []Dysfunctional  []Painful  [x]Non-Painful 60º  Functional   Nonpainful      SHOULDER-           Shoulder   Range of Motion Right  Active     Passive Left  Active     Passive Goal   Forward Flexion (180º) 130   180   170º   External Rotation at 90º (90º) 65   90   80º   External Rotation at 45º (45º)  60   67   45º   Internal Rotation at 90º (90º) -   -   70º   Functional External Rotation (C7) T5   T8   C7   Functional Internal Rotation (T10) T12   T5   T10   (*) pain and/or dysfunction) pain and/or dysfunction     LUMBAR-   Lumbar   Range of Motion Right  (spine) Left Function   & Pain Goal   Lumbar Flexion (60º) 60   []Functional  []Dysfunctional  []Painful  []Non-Painful 50º  Functional   Nonpainful   Lumbar Extension (30º)     []Functional  []Dysfunctional  []Painful  []Non-Painful 20º  Functional   Nonpainful   Lumbar Side Bending (25º)     []Functional  []Dysfunctional  []Painful  []Non-Painful 20º  Functional   Nonpainful   Lumbar Rotation     []Functional  []Dysfunctional  []Painful  []Non-Painful 45º  Functional   Nonpainful      HIP-   Hip   Range of Motion Right    Left    Goal   Hip Flexion (120º)     120º   Hip Abduction (45º) Not tested Not tested  30º   Hip Extension (20º) Not tested  Not tested  15º   Hip internal rotaiton  (45º)     40º   Hip external rotation  (45º)     40º               (*) pain and/or dysfunction(*) pain and/or dysfunction       LE STRENGTH -   Lower Extremity  Strength RIGHT    Goal   Hip Flexion  []1  []2  []3  [x]4  []5                []+ []- 5/5 B    Hip Extension  []1  []2  []3  [x]4  []5                []+ []- 5/5 B   Hip Abduction  []1  []2  []3  [x]4  []5                []+ []- 5/5 B   Hip Internal rotaiton  []1  []2  []3  [x]4  []5                []+ []- 5/5 B   Hip External rotation  []1  []2  []3  [x]4  []5                []+ []- 5/5 B    Knee Flexion []1  []2  []3  [x]4  []5                []+ []- 5/5 B   Knee Extension []1  []2  []3  [x]4  []5                []+ []- 5/5 B   Ankle Dorsiflexion []1  []2  []3  [x]4  []5                []+ []- 5/5 B   Ankle Plantarflexion []1  []2  []3  [x]4  []5                []+ []- 5/5 B   Ankle Inversion []1  []2  []3  [x]4  []5                []+ []- 5/5 B   Ankle Eversion []1  []2  []3  [x]4  []5                []+ []- 5/5 B   Big Toe Extension []1  []2  []3  [x]4  []5                []+ []- 5/5 B      Lower Extremity  Strength LEFT    Goal   Hip Flexion  []1  []2  []3  [x]4  []5                []+ []- 5/5 B    Hip Extension  []1  []2  []3  [x]4  []5                []+ []- 5/5 B   Hip Abduction  []1  []2  []3  [x]4  []5                []+ []- 5/5 B   Hip Internal rotaiton  []1  []2  []3  [x]4  []5                []+ []- 5/5 B   Hip External rotation  []1  []2  []3  [x]4  []5                []+ []- 5/5 B   Knee Flexion []1  []2  []3  [x]4  []5                []+ []- 5/5 B   Knee Extension []1  []2  []3  [x]4  []5                []+ []- 5/5 B   Ankle Dorsiflexion []1  []2  []3  [x]4  []5                []+ []- 5/5 B   Ankle Plantarflexion []1  []2  []3  [x]4  []5                []+ []- 5/5 B   Ankle Inversion []1  []2  []3  [x]4  []5                []+ []- 5/5 B   Ankle Eversion []1  []2  []3  [x]4  []5                []+ []- 5/5 B   Big Toe Extension []1  []2  []3  [x]4  []5                []+ []- 5/5 B         UE FLEXIBILITY-   Muscle Length  Upper Extremity Right    Limitation Left     Limitation Goal   Upper Trapezius  [] Normal  [x] Limited    inches [] Normal  [x] Limited    inches Normal     Levator Scapulae  [] Normal  [x] Limited   [] Normal  [x] Limited   Normal    Pectoralis Minor  [] Normal  [x] Limited   [] Normal  [x] Limited   Normal   Pectoralis Major [] Normal  [x] Limited   [] Normal  [x] Limited   Normal         Additional Objective Tests and Meausres: Functional Tests-   Movement  Analysis:  Functional Test  Outcome   Double Leg Squat []Functional  [x]Dysfunctional: increased head raise, improper weight shifting  []Painful  [x]Non-Painful   Single Leg Step Down []Functional  [x]Dysfunctional: increased valgus and trunk lean  []Painful  [x]Non-Painful      Core Endurance Test  Table Top: fair, 10s holds, but shaking  Endurance drop: 45*, fair    Treatment     Gym/Equipment Access: [facility] gym, apartment  Time to Complete Exercises: as expected    Casandra received the treatments listed below:     THERAPEUTIC EXERCISES: to develop strength, endurance, ROM, flexibility, posture and core stabilization for (25)  minutes including: x = performed today    TherEx 1/13/2023    Mobility/Chondral: Treadmill x Core focus, 8 min, 5%   Mobility: lumbar spine x Double Knees to Chest  Single Knee to Chest  Quadratus Lumborum Stretch   Mobility: Thoracic and Cervical x Series 6.  1 minute- each  ----------------------  Hugs on Foam Roll- alternating top arm  Arm Scissors  W- Raises Overhead  Serratus Push Up  Pec Stretch- W & T           BOLD = new this visit  Plan for Next Visit:         NEUROMUSCULAR RE-EDUCATION activities to improve: Coordination, Kinesthetic, Sense, and Proprioception for (35) minutes. The following activities were included: x = exercises performed     Neuro Re-Ed 1/13/2023    Table Top (TT) + Chin Tuck w/ Abdominal Brace x 10s holds x10   TT + Arm Raises x 3x20   TT + Marching x 3x20   TT + Leg Extension x 3x20   TT + Pilates 100s x x100   Deadbug- opposite arm opposite leg  X20 with bolster on top of spine   Multifidi- Double Leg lift off table (whale tale) x 2x20   MiniBand Series with Core Brace x 2 min each- Green Band at knees  Hip Extension  Lateral Walks              BOLD= new this visit  Plan for Next Visit:          ASSESSMENT     Casandra Barragan tolerated Physical Therapy  session well with minimal  complaints of pain or discomfort, secondary to fatigue and muscular  weakness. Objective findings are improving with of range of motion and functional mobility. Therapy exercises were reviewed by revisiting exercises given from previous home exercise program while adding series 6 scapular and spine series as well as increasing resistance in mini-band series.  Handouts were not issued during today's visit. Casandra demonstrated good understanding of new exercises and will continue to progress at home until next follow-up.       Casandra is progressing well towards her goals.   Pt prognosis is Good.     Pt will continue to benefit from skilled outpatient physical therapy to address the deficits listed in the problem list box on initial evaluation, provide pt/family education and to maximize pt's level of independence in the home and community environment.     Pt's spiritual, cultural and educational needs considered and pt agreeable to plan of care and goals.    Anticipated barriers to physical therapy: increased for cueing    SHORT TERM GOALS:  updated (2/8/23) Progress Date Met   Recent signs and systems trend is improving in order to progress towards Long term goals.  [x] Met  [] Not Met  [] Progressing 1/13/22   Patient will be independent with Home Exercise Program in order to further progress and return to maximal function. [] Met  [] Not Met  [x] Progressing    Pain rating at Worst: 5 /10 in order to progress towards increased independence with activity. [] Met  [] Not Met  [x] Progressing    Patient will be able to correct postural deviations in sitting and standing, to decrease pain and promote postural awareness for injury prevention.  [x] Met  [] Not Met  [] Progressing 1/13/22   Patient will improve functional outcome (FOTO) score: by 5% to increase self-worth & perceived functional ability towards long term goals [] Met  [] Not Met  [x] Progressing       LONG TERM GOALS: 8 weeks (3/8/23) Progress Date Met   Patient will return to normal activites of daily living, recreational,  and work related activities with less pain and limitation.  [] Met  [] Not Met  [x] Progressing    Patient will improve range of motion  to stated goals in order to return to maximal functional potential.  [] Met  [] Not Met  [x] Progressing    Patient will improve Strength to stated goals of appropriate musculature in order to improve functional independence.  [] Met  [] Not Met  [x] Progressing    Pain Rating at Best: 1/10 to improve Quality of Life.  [] Met  [] Not Met  [x] Progressing    Patient will meet predicted functional outcome (FOTO) score: 28% to increase self-worth & perceived functional ability. [] Met  [] Not Met  [x] Progressing    Patient will have met/partially met personal goal of: decrease pain to increase functional with daily and recreational activities.  [] Met  [] Not Met  [x] Progressing         PLAN     Continue Plan of Care (POC) and progress per patient tolerance. See Treatment section for exercise progression.    Genia Fagan, PT

## 2023-01-19 ENCOUNTER — PATIENT MESSAGE (OUTPATIENT)
Dept: REHABILITATION | Facility: HOSPITAL | Age: 23
End: 2023-01-19
Payer: COMMERCIAL

## 2023-01-24 ENCOUNTER — CLINICAL SUPPORT (OUTPATIENT)
Dept: REHABILITATION | Facility: HOSPITAL | Age: 23
End: 2023-01-24
Payer: COMMERCIAL

## 2023-01-24 DIAGNOSIS — Z74.09 DECREASED STRENGTH, ENDURANCE, AND MOBILITY: Primary | ICD-10-CM

## 2023-01-24 DIAGNOSIS — R53.1 DECREASED STRENGTH, ENDURANCE, AND MOBILITY: Primary | ICD-10-CM

## 2023-01-24 DIAGNOSIS — R68.89 DECREASED STRENGTH, ENDURANCE, AND MOBILITY: Primary | ICD-10-CM

## 2023-01-24 PROCEDURE — 97110 THERAPEUTIC EXERCISES: CPT

## 2023-01-24 PROCEDURE — 97112 NEUROMUSCULAR REEDUCATION: CPT

## 2023-01-24 NOTE — PROGRESS NOTES
[facility] OUTPATIENT THERAPY AND [facility]LLNESS   Physical Therapy Treatment + Progress Note     Name: Casandra Barragan  [facility] Number: [id number]    Therapy Diagnosis:   Encounter Diagnosis   Name Primary?    Decreased strength, endurance, and mobility Yes     Physician: Marcos Borja MD    Visit Date: 1/24/2023    Physician Orders: PT Eval and Treat  Medical Diagnosis from Referral: Chronic bilateral low back pain with bilateral sciatica [M54.42, M54.41, G89.29], Sacroiliac joint dysfunction of both sides [M53.3], Right cervical radiculopathy [M54.12]  Evaluation Date: 12/8/2022  Authorization Period Expiration: 11/28/2023  Plan of Care Expiration: 3/8/2023                  Progress Update: 2/8/2023     Visit # / Visits authorized: 2 / 20 (eval)          FOTO: Visit #1 1/13/2022 - Scored: 1 / 3      [facility]: Cheer[facility]      PRECAUTIONS: Standard Precautions      MD Follow up: No scheduled follow-up    PTA Visit #: 0/5     Time In: 0955  Time Out: 1040  Total Billable Time: 45 minutes    SUBJECTIVE     Pt reports:  She really feels like she cannot get back to [employer] because of her shoulder.  She states that she can work through the symptoms in her back, but cant with her shoulder.   Compliance with Hep: Every Other Day  Response to previous treatment: no adverse reactions to treatment/updated HEP  Functional change: Better    Pain: 0/10   Worst: 5/10  Location: Neck and Back.      OBJECTIVE     Objective Measures updated at progress report unless specified otherwise.  SHOULDER-         Shoulder   Range of Motion Right  Active Left  Active Goal   Forward Flexion (180º) 130 180 170º   External Rotation at 90º (90º) 65 90 80º   External Rotation at 45º (45º)  60 67 45º   Internal Rotation at 90º (90º) - - 70º   Functional External Rotation (C7) T5 T8 C7   Functional Internal Rotation (T10) T12 T5 T10   (*) pain and/or dysfunction) pain and/or dysfunction       Upper Extremity  Strength Right   Goal    Shoulder Flexion []1  []2  []3  [x]4  []5                []+ []- 5/5 B   Shoulder Abduction []1  []2  []3  [x]4  []5                []+ []- 5/5 B   Shoulder IR []1  []2  []3  [x]4  []5                []+ []- 5/5 B   Shoulder ER []1  []2  []3  [x]4  []5                []+ []- 5/5 B   Elbow Flexion  []1  []2  []3  [x]4  []5                []+ []- 5/5 B   Elbow Extension []1  []2  []3  [x]4  []5                []+ []- 5/5 B     Upper Extremity  Strength Left   Goal   Shoulder Flexion []1  []2  []3  [x]4  []5                []+ []- 5/5 B   Shoulder Abduction []1  []2  []3  [x]4  []5                []+ []- 5/5 B   Shoulder IR []1  []2  []3  [x]4  []5                []+ []- 5/5 B   Shoulder ER []1  []2  []3  [x]4  []5                []+ []- 5/5 B   Elbow Flexion  []1  []2  []3  [x]4  []5                []+ []- 5/5 B   Elbow Extension []1  []2  []3  [x]4  []5                []+ []- 5/5 B       Treatment     Gym/Equipment Access: [facility] gym, apartment  Time to Complete Exercises: as expected    Casandra received the treatments listed below:     THERAPEUTIC EXERCISES: to develop strength, endurance, ROM, flexibility, posture and core stabilization for (25)  minutes including: x = performed today    TherEx 1/24/2023    Mobility/Chondral: Treadmill x Core focus, 8 min, 5%   Range of motion work: Arm Bike x 3 forward / 3 backward   Mobility: lumbar spine  Double Knees to Chest  Single Knee to Chest  Quadratus Lumborum Stretch   Mobility: Thoracic and Cervical  Series 6.  1 minute- each  ----------------------  Hugs on Foam Roll- alternating top arm  Arm Scissors  W- Raises Overhead  Serratus Push Up  Pec Stretch- W & T     Wall Walking x 10s holds x10   Pectoral stretch x 10s holds x10   Internal rotation stretching- sleepers x 45/90* - 4 min each with cueing for set up   Posterior Capsule Stretch x 3-5s holds x4 min    BOLD = new this visit  Plan for Next Visit: repeat above capsular stretching         NEUROMUSCULAR RE-EDUCATION activities to improve: Coordination, Kinesthetic, Sense, and Proprioception for (25) minutes. The following activities were included: x = exercises performed     Neuro Re-Ed- Shoulder 1/24/2023    Blackburns- Prone x Scapular Retractions (SR) - 5s x20 each below  SR + Extension  SR + 45* Extension  SR + Horizontal Abduction   Sidelying external rotation  x Endurance based, 2x 2 min                 Neuro Re-Ed- Low back Program 1/24/2023    Table Top (TT) + Chin Tuck w/ Abdominal Brace  10s holds x10   TT + Arm Raises  3x20   TT + Marching  3x20   TT + Leg Extension  3x20   TT + Pilates 100s  x100   Deadbug- opposite arm opposite leg  X20 with bolster on top of spine   Multifidi- Double Leg lift off table (whale tale)  2x20   MiniBand Series with Core Brace  2 min each- Green Band at knees  Hip Extension  Lateral Walks              BOLD= new this visit  Plan for Next Visit:          ASSESSMENT     Casandra Barragan tolerated Physical Therapy  session well with moderate  complaints of pain or discomfort, secondary to right shoulder pain at end range rotation. Objective findings are improving with of range of motion, core control, low back pain, and functional mobility.    Casandra Barragan continues to have difficulty with shoulder pain through the right shoulder.  Therapy exercises were reviewed by revisiting exercises given from previous home exercise program while adding blackburn's and mobility exercises for the shoulder.  Handouts were issued during today's visit. Casandra demonstrated good understanding of new exercises and will continue to progress at home until next follow-up.      Casandra is progressing well towards her goals.   Pt prognosis is Good.     Pt will continue to benefit from skilled outpatient physical therapy to address the deficits listed in the problem list box on initial evaluation, provide pt/family education and to maximize pt's level of independence in the home and  community environment.     Pt's spiritual, cultural and educational needs considered and pt agreeable to plan of care and goals.    Anticipated barriers to physical therapy: increased for cueing    SHORT TERM GOALS:  updated (2/8/23) Progress Date Met   Recent signs and systems trend is improving in order to progress towards Long term goals.  [x] Met  [] Not Met  [] Progressing 1/13/22   Patient will be independent with Home Exercise Program in order to further progress and return to maximal function. [] Met  [] Not Met  [x] Progressing    Pain rating at Worst: 5 /10 in order to progress towards increased independence with activity. [] Met  [] Not Met  [x] Progressing    Patient will be able to correct postural deviations in sitting and standing, to decrease pain and promote postural awareness for injury prevention.  [x] Met  [] Not Met  [] Progressing 1/13/22   Patient will improve functional outcome (FOTO) score: by 5% to increase self-worth & perceived functional ability towards long term goals [] Met  [] Not Met  [x] Progressing       LONG TERM GOALS: 8 weeks (3/8/23) Progress Date Met   Patient will return to normal activites of daily living, recreational, and work related activities with less pain and limitation.  [] Met  [] Not Met  [x] Progressing    Patient will improve range of motion  to stated goals in order to return to maximal functional potential.  [] Met  [] Not Met  [x] Progressing    Patient will improve Strength to stated goals of appropriate musculature in order to improve functional independence.  [] Met  [] Not Met  [x] Progressing    Pain Rating at Best: 1/10 to improve Quality of Life.  [] Met  [] Not Met  [x] Progressing    Patient will meet predicted functional outcome (FOTO) score: 28% to increase self-worth & perceived functional ability. [] Met  [] Not Met  [x] Progressing    Patient will have met/partially met personal goal of: decrease pain to increase functional with daily and  recreational activities.  [] Met  [] Not Met  [x] Progressing         PLAN     Continue Plan of Care (POC) and progress per patient tolerance. See Treatment section for exercise progression.    Genia Fagan, PT

## 2023-01-31 ENCOUNTER — PATIENT MESSAGE (OUTPATIENT)
Dept: REHABILITATION | Facility: HOSPITAL | Age: 23
End: 2023-01-31
Payer: COMMERCIAL

## 2023-02-03 ENCOUNTER — CLINICAL SUPPORT (OUTPATIENT)
Dept: REHABILITATION | Facility: HOSPITAL | Age: 23
End: 2023-02-03
Payer: COMMERCIAL

## 2023-02-03 DIAGNOSIS — Z74.09 DECREASED STRENGTH, ENDURANCE, AND MOBILITY: Primary | ICD-10-CM

## 2023-02-03 DIAGNOSIS — R53.1 DECREASED STRENGTH, ENDURANCE, AND MOBILITY: Primary | ICD-10-CM

## 2023-02-03 DIAGNOSIS — R68.89 DECREASED STRENGTH, ENDURANCE, AND MOBILITY: Primary | ICD-10-CM

## 2023-02-03 PROCEDURE — 97110 THERAPEUTIC EXERCISES: CPT

## 2023-02-03 PROCEDURE — 97112 NEUROMUSCULAR REEDUCATION: CPT

## 2023-02-03 NOTE — PROGRESS NOTES
[facility] OUTPATIENT THERAPY AND WELLNESS   Physical Therapy Treatment + Progress Note     Name: Casandra [facility] Number: [id number]    Therapy Diagnosis:   Encounter Diagnosis   Name Primary?    Decreased strength, endurance, and mobility Yes     Physician: Marcos Borja MD    Visit Date: 2/3/2023    Physician Orders: PT Eval and Treat  Medical Diagnosis from Referral: Chronic bilateral low back pain with bilateral sciatica [M54.42, M54.41, G89.29], Sacroiliac joint dysfunction of both sides [M53.3], Right cervical radiculopathy [M54.12]  Evaluation Date: 12/8/2022  Authorization Period Expiration: 11/28/2023  Plan of Care Expiration: 3/8/2023                  Progress Update: 2/8/2023     Visit # / Visits authorized: 3 / 20 (eval)          FOTO: Visit #1 1/13/2022 - Scored: 1 / 3      [facility]: Cheer[employer]      PRECAUTIONS: Standard Precautions      MD Follow up: No scheduled follow-up    PTA Visit #: 0/5     Time In: 1505  Time Out: 1600  Total Billable Time: 55 minutes    SUBJECTIVE     Pt reports:  She reports that she has not been as diligent with her home program as she would like, and that it is still causing her issues.   Compliance with Hep: Every Other Day  Response to previous treatment: no adverse reactions to treatment/updated HEP  Functional change: Better    Pain: 0/10   Worst: 5/10  Location: Neck and Back.      OBJECTIVE     Objective Measures updated at progress report unless specified otherwise.    Treatment     Gym/Equipment Access: [facility] gym, apartment  Time to Complete Exercises: as expected    Casandra received the treatments listed below:     THERAPEUTIC EXERCISES: to develop strength, endurance, range of motion, flexibility, posture and core stabilization for (25)  minutes including: x = performed today    TherEx 2/3/2023    Mobility/Chondral: Treadmill x Core focus, 8 min, 5%   Range of motion work: Arm Bike x 3 forward / 3 backward   Mobility: lumbar spine   Double Knees to Chest  Single Knee to Chest  Quadratus Lumborum Stretch   Mobility: Thoracic and Cervical  Series 6.  1 minute- each  ----------------------  Hugs on Foam Roll- alternating top arm  Arm Scissors  W- Raises Overhead  Serratus Push Up  Pec Stretch- W & T     Wall Walking x 10s holds x10   Pectoral stretch x 10s holds x10   Internal rotation stretching- sleepers x 45/90* - 4 min each with cueing for set up   Posterior Capsule Stretch x 3-5s holds x4 min    BOLD = new this visit  Plan for Next Visit: repeat above capsular stretching        NEUROMUSCULAR RE-EDUCATION activities to improve: Coordination, Kinesthetic, Sense, and Proprioception for (25) minutes. The following activities were included: x = exercises performed     Neuro Re-Ed- Shoulder 2/3/2023    Blackburns- Prone x Scapular Retractions (SR) - 5s x20 each below  SR + Extension  SR + 45* Extension  SR + Horizontal Abduction   Sidelying external rotation  x Endurance based, 2x 2 min                 Neuro Re-Ed- Low back Program 2/3/2023    Table Top (TT) + Chin Tuck w/ Abdominal Brace  10s holds x10   TT + Arm Raises  3x20   TT + Marching  3x20   TT + Leg Extension  3x20   TT + Pilates 100s  x100   Deadbug- opposite arm opposite leg  X20 with bolster on top of spine   Multifidi- Double Leg lift off table (whale tale)  2x20   MiniBand Series with Core Brace  2 min each- Green Band at knees  Hip Extension  Lateral Walks              BOLD= new this visit  Plan for Next Visit:          ASSESSMENT     Casandra Barragan tolerated Physical Therapy session well with moderate complaints of pain or discomfort, secondary to right shoulder pain at end range rotation especially with internal rotation. Objective findings are improving with of range of motion, core control, low back pain, and functional mobility.   Casandra Barragan continues to have difficulty with shoulder pain through the right shoulder.  Therapy exercises were reviewed by revisiting exercises  given from previous home exercise program while adding blackburn's and endurance based exercises for muscle.  Handouts were issued during today's visit. Casandra demonstrated good understanding of new exercises and will continue to progress at home until next follow-up.      Casandra is progressing well towards her goals.   Pt prognosis is Good.     Pt will continue to benefit from skilled outpatient physical therapy to address the deficits listed in the problem list box on initial evaluation, provide pt/family education and to maximize pt's level of independence in the home and community environment.     Pt's spiritual, cultural and educational needs considered and pt agreeable to plan of care and goals.    Anticipated barriers to physical therapy: increased for cueing    SHORT TERM GOALS:  updated (2/8/23) Progress Date Met   Recent signs and systems trend is improving in order to progress towards Long term goals.  [x] Met  [] Not Met  [] Progressing 1/13/22   Patient will be independent with Home Exercise Program in order to further progress and return to maximal function. [] Met  [] Not Met  [x] Progressing    Pain rating at Worst: 5 /10 in order to progress towards increased independence with activity. [] Met  [] Not Met  [x] Progressing    Patient will be able to correct postural deviations in sitting and standing, to decrease pain and promote postural awareness for injury prevention.  [x] Met  [] Not Met  [] Progressing 1/13/22   Patient will improve functional outcome (FOTO) score: by 5% to increase self-worth & perceived functional ability towards long term goals [] Met  [] Not Met  [x] Progressing       LONG TERM GOALS: 8 weeks (3/8/23) Progress Date Met   Patient will return to normal activites of daily living, recreational, and work related activities with less pain and limitation.  [] Met  [] Not Met  [x] Progressing    Patient will improve range of motion  to stated goals in order to return to maximal  functional potential.  [] Met  [] Not Met  [x] Progressing    Patient will improve Strength to stated goals of appropriate musculature in order to improve functional independence.  [] Met  [] Not Met  [x] Progressing    Pain Rating at Best: 1/10 to improve Quality of Life.  [] Met  [] Not Met  [x] Progressing    Patient will meet predicted functional outcome (FOTO) score: 28% to increase self-worth & perceived functional ability. [] Met  [] Not Met  [x] Progressing    Patient will have met/partially met personal goal of: decrease pain to increase functional with daily and recreational activities.  [] Met  [] Not Met  [x] Progressing         PLAN     Continue Plan of Care (POC) and progress per patient tolerance. See Treatment section for exercise progression.    Genia Fagan, PT

## 2023-02-07 ENCOUNTER — CLINICAL SUPPORT (OUTPATIENT)
Dept: REHABILITATION | Facility: HOSPITAL | Age: 23
End: 2023-02-07
Payer: COMMERCIAL

## 2023-02-07 DIAGNOSIS — Z74.09 DECREASED STRENGTH, ENDURANCE, AND MOBILITY: Primary | ICD-10-CM

## 2023-02-07 DIAGNOSIS — R68.89 DECREASED STRENGTH, ENDURANCE, AND MOBILITY: Primary | ICD-10-CM

## 2023-02-07 DIAGNOSIS — R53.1 DECREASED STRENGTH, ENDURANCE, AND MOBILITY: Primary | ICD-10-CM

## 2023-02-07 PROCEDURE — 97140 MANUAL THERAPY 1/> REGIONS: CPT

## 2023-02-07 PROCEDURE — 97110 THERAPEUTIC EXERCISES: CPT

## 2023-02-07 PROCEDURE — 97112 NEUROMUSCULAR REEDUCATION: CPT

## 2023-02-07 NOTE — PROGRESS NOTES
[facility]   Physical Therapy Treatment Note     Name: Casandra [facility] Number: [id number]    Therapy Diagnosis:   Encounter Diagnosis   Name Primary?    Decreased strength, endurance, and mobility Yes     Physician: Marcos Borja MD    Visit Date: 2/7/2023    Physician Orders: PT Eval and Treat  Medical Diagnosis from Referral: Chronic bilateral low back pain with bilateral sciatica [M54.42, M54.41, G89.29], Sacroiliac joint dysfunction of both sides [M53.3], Right cervical radiculopathy [M54.12]  Evaluation Date: 12/8/2022  Authorization Period Expiration: 11/28/2023  Plan of Care Expiration: 3/8/2023                  Progress Update: 2/8/2023     Visit # / Visits authorized: 4 / 20 (eval)          FOTO: Visit #1 1/13/2022 - Scored: 1 / 3      [facility]: [employer]      PRECAUTIONS: Standard Precautions      MD Follow up: No scheduled follow-up    PTA Visit #: 0/5     Time In: 1112  Time Out:1210  Total Billable Time: 58 minutes    SUBJECTIVE     Pt reports:    Compliance with Hep: Every Other Day  Response to previous treatment: no adverse reactions to treatment/updated HEP  Functional change: Better    Pain: 0/10   Worst: 5/10  Location: Neck and Back.      OBJECTIVE     Objective Measures updated at progress report unless specified otherwise.    Treatment     Gym/Equipment Access: [facility] gym, apartment  Time to Complete Exercises: as expected    Casandra received the treatments listed below:     MANUAL THERAPY TECHNIQUES: Joint mobilizations, Soft tissue Mobilization, and mobilization with movement were applied to the area listed below for (8) minutes, including: x = exercises performed     Manual Intervention 2/7/2023    Soft Tissue Mobilization     Joint Mobilizations x Lumbar mobilization - high velocity low amplitude. Palpation and assessment performed to determine necessity.   Mobilization with movement     Functional Dry Needling  Location:    Technique: manual & electrical  Response: no adverse reaction    BOLD = new this visit  Plan for Next Visit:         THERAPEUTIC EXERCISES: to develop strength, endurance, range of motion, flexibility, posture and core stabilization for (25)  minutes including: x = performed today    TherEx 2/7/2023    Mobility/Chondral: Treadmill  Core focus, 8 min, 5%   Range of motion work: Arm Bike x 3 forward / 3 backward   Mobility: lumbar spine  Double Knees to Chest  Single Knee to Chest  Quadratus Lumborum Stretch   Mobility: Thoracic and Cervical  Series 6.  1 minute- each  ----------------------  Hugs on Foam Roll- alternating top arm  Arm Scissors  W- Raises Overhead  Serratus Push Up  Pec Stretch- W & T     Wall Walking x 10s holds x10   Pectoral stretch x 10s holds x10   Internal rotation stretching- sleepers x 45/90* - 4 min each with cueing for set up   Posterior Capsule Stretch x 3-5s holds x4 min, Sidelying cross body   Internal Rotation Stretch- towel x 15s holds x5    BOLD = new this visit  Plan for Next Visit: repeat above capsular stretching        NEUROMUSCULAR RE-EDUCATION activities to improve: Coordination, Kinesthetic, Sense, and Proprioception for (25) minutes. The following activities were included: x = exercises performed     Neuro Re-Ed- Shoulder 2/7/2023    Blackburns- Prone x Scapular Retractions (SR) - 5s x20 each below  SR + Extension 2#  SR + 45* Extension 1#  SR + Horizontal Abduction 1#-0#   Sidelying external rotation  x Endurance based, 2x 20, 1#                 Neuro Re-Ed- Low back Program 2/7/2023    Table Top (TT) + Chin Tuck w/ Abdominal Brace  10s holds x10   TT + Arm Raises  3x20   TT + Marching  3x20   TT + Leg Extension  3x20   TT + Pilates 100s  x100   Deadbug- opposite arm opposite leg  X20 with bolster on top of spine   Multifidi- Double Leg lift off table (whale tale)  2x20   MiniBand Series with Core Brace  2 min each- Green Band at knees  Hip Extension  Lateral Walks               BOLD= new this visit  Plan for Next Visit:          ASSESSMENT     Casandra Barragan tolerated Physical Therapy  session well with minimal  complaints of pain or discomfort, secondary to shoulder fatigue and muscular weakness. Objective findings are improving with of range of motion endurance and functional mobility.  Casandra Barragan continues to have difficulty with horizontal abduction strength and motor control.  Therapy exercises were reviewed by revisiting exercises given from previous home exercise program while adding weight to ortega's to address underlying strength deficits.  Handouts were not issued during today's visit. Casandra demonstrated good understanding of new exercises and will continue to progress at home until next follow-up.      Casandra is progressing well towards her goals.   Pt prognosis is Good.     Pt will continue to benefit from skilled outpatient physical therapy to address the deficits listed in the problem list box on initial evaluation, provide pt/family education and to maximize pt's level of independence in the home and community environment.     Pt's spiritual, cultural and educational needs considered and pt agreeable to plan of care and goals.    Anticipated barriers to physical therapy: increased for cueing    SHORT TERM GOALS:  updated (2/8/23) Progress Date Met   Recent signs and systems trend is improving in order to progress towards Long term goals.  [x] Met  [] Not Met  [] Progressing 1/13/22   Patient will be independent with Home Exercise Program in order to further progress and return to maximal function. [] Met  [] Not Met  [x] Progressing    Pain rating at Worst: 5 /10 in order to progress towards increased independence with activity. [] Met  [] Not Met  [x] Progressing    Patient will be able to correct postural deviations in sitting and standing, to decrease pain and promote postural awareness for injury prevention.  [x] Met  [] Not Met  [] Progressing 1/13/22    Patient will improve functional outcome (FOTO) score: by 5% to increase self-worth & perceived functional ability towards long term goals [] Met  [] Not Met  [x] Progressing       LONG TERM GOALS: 8 weeks (3/8/23) Progress Date Met   Patient will return to normal activites of daily living, recreational, and work related activities with less pain and limitation.  [] Met  [] Not Met  [x] Progressing    Patient will improve range of motion  to stated goals in order to return to maximal functional potential.  [] Met  [] Not Met  [x] Progressing    Patient will improve Strength to stated goals of appropriate musculature in order to improve functional independence.  [] Met  [] Not Met  [x] Progressing    Pain Rating at Best: 1/10 to improve Quality of Life.  [] Met  [] Not Met  [x] Progressing    Patient will meet predicted functional outcome (FOTO) score: 28% to increase self-worth & perceived functional ability. [] Met  [] Not Met  [x] Progressing    Patient will have met/partially met personal goal of: decrease pain to increase functional with daily and recreational activities.  [] Met  [] Not Met  [x] Progressing         PLAN     Continue Plan of Care (POC) and progress per patient tolerance. See Treatment section for exercise progression.    Genia Fagan, PT

## 2023-02-16 ENCOUNTER — CLINICAL SUPPORT (OUTPATIENT)
Dept: REHABILITATION | Facility: HOSPITAL | Age: 23
End: 2023-02-16
Payer: COMMERCIAL

## 2023-02-16 DIAGNOSIS — Z74.09 DECREASED STRENGTH, ENDURANCE, AND MOBILITY: Primary | ICD-10-CM

## 2023-02-16 DIAGNOSIS — R53.1 DECREASED STRENGTH, ENDURANCE, AND MOBILITY: Primary | ICD-10-CM

## 2023-02-16 DIAGNOSIS — R68.89 DECREASED STRENGTH, ENDURANCE, AND MOBILITY: Primary | ICD-10-CM

## 2023-02-16 PROCEDURE — 97110 THERAPEUTIC EXERCISES: CPT

## 2023-02-16 PROCEDURE — 97112 NEUROMUSCULAR REEDUCATION: CPT

## 2023-02-16 PROCEDURE — 97140 MANUAL THERAPY 1/> REGIONS: CPT

## 2023-02-16 NOTE — PROGRESS NOTES
"[facility]   Physical Therapy Treatment + Progress Note     Name: Casandra Barragan  [facility] Number: [id number]    Therapy Diagnosis:   Encounter Diagnosis   Name Primary?    Decreased strength, endurance, and mobility Yes     Physician: Marcos Borja MD    Visit Date: 2/16/2023    Physician Orders: PT Eval and Treat  Medical Diagnosis from Referral: Chronic bilateral low back pain with bilateral sciatica [M54.42, M54.41, G89.29], Sacroiliac joint dysfunction of both sides [M53.3], Right cervical radiculopathy [M54.12]  Evaluation Date: 12/8/2022  Authorization Period Expiration: 11/28/2023  Plan of Care Expiration: 3/8/2023                  Progress Update: 3/8/2023     Visit # / Visits authorized: 5 / 20 (eval)          FOTO: Visit #1 1/13/2022 - Scored: 1 / 3      [facility]: [place]      PRECAUTIONS: Standard Precautions      MD Follow up: No scheduled follow-up    PTA Visit #: 0/5     Time In: 1015- needs FOTO next visit (2/23)  Time Out:1115  Total Billable Time: 60 minutes    SUBJECTIVE     Pt reports:  She is more sore today through her low back.  She is not sure why but a lot of movements are uncomfortable for her right now because her pain is all in the "SIJoint" region.   Compliance with Hep: Every Other Day  Response to previous treatment: no adverse reactions to treatment/updated HEP  Functional change: Better    Pain: 0/10   Worst: 5/10  Location: Neck and Back.      OBJECTIVE     Objective Measures updated at progress report unless specified otherwise.  LUMBAR-   Lumbar   Range of Motion Function   & Pain Goal   Lumbar Flexion (60º) [x]Functional  []Dysfunctional  [x]Painful  []Non-Painful 50º  Functional   Nonpainful   Lumbar Extension (30º) [x]Functional  []Dysfunctional  [x]Painful  []Non-Painful 20º  Functional   Nonpainful   Lumbar Side Bending (25º) [x]Functional  []Dysfunctional  [x]Painful  []Non-Painful 20º  Functional   Nonpainful   Lumbar Rotation " [x]Functional  []Dysfunctional  [x]Painful  []Non-Painful 45º  Functional   Nonpainful       LE STRENGTH -   Lower Extremity  Strength RIGHT    Goal   Hip Flexion  []1  []2  []3  [x]4  []5                []+ [x]- 5/5 B    Hip Extension  []1  []2  []3  [x]4  []5                []+ [x]- 5/5 B   Hip Abduction  []1  []2  []3  [x]4  []5                []+ [x]- 5/5 B   Hip Internal rotaiton  []1  []2  []3  [x]4  []5                []+ [x]- 5/5 B   Hip External rotation  []1  []2  []3  [x]4  []5                []+ [x]- 5/5 B   Knee Flexion []1  []2  []3  [x]4  []5                []+ []- 5/5 B   Knee Extension []1  []2  []3  [x]4  []5                []+ []- 5/5 B   Ankle Dorsiflexion []1  []2  []3  [x]4  []5                []+ []- 5/5 B   Ankle Plantarflexion []1  []2  []3  [x]4  []5                []+ []- 5/5 B   Ankle Inversion []1  []2  []3  [x]4  []5                []+ []- 5/5 B   Ankle Eversion []1  []2  []3  [x]4  []5                []+ []- 5/5 B   Big Toe Extension []1  []2  []3  [x]4  []5                []+ []- 5/5 B      Lower Extremity  Strength LEFT    Goal   Hip Flexion  []1  []2  []3  [x]4  []5                []+ [x]- 5/5 B    Hip Extension  []1  []2  []3  [x]4  []5                []+ [x]- 5/5 B   Hip Abduction  []1  []2  []3  [x]4  []5                []+ [x]- 5/5 B   Hip Internal rotaiton  []1  []2  []3  [x]4  []5                []+ [x]- 5/5 B   Hip External rotation  []1  []2  []3  [x]4  []5                []+ [x]- 5/5 B   Knee Flexion []1  []2  []3  [x]4  []5                []+ []- 5/5 B   Knee Extension []1  []2  []3  [x]4  []5                []+ []- 5/5 B   Ankle Dorsiflexion []1  []2  []3  [x]4  []5                []+ []- 5/5 B   Ankle Plantarflexion []1  []2  []3  [x]4  []5                []+ []- 5/5 B   Ankle Inversion []1  []2  []3  [x]4  []5                []+ []- 5/5 B   Ankle Eversion []1  []2  []3  [x]4  []5                []+ []- 5/5 B   Big Toe Extension []1  []2  []3  [x]4  []5                 []+ []- 5/5 B       Treatment     Gym/Equipment Access: [facility] gym, apartment  Time to Complete Exercises: as expected    Casandra received the treatments listed below:     MANUAL THERAPY TECHNIQUES: Joint mobilizations, Soft tissue Mobilization, and mobilization with movement were applied to the area listed below for (15) minutes, including: x = exercises performed     Manual Intervention 2/16/2023    Soft Tissue Mobilization     Joint Mobilizations x - Lumbar mobilization - high velocity low amplitude. Palpation and assessment performed to determine necessity.  - Sacroiliac Joint mobilization- Grade IV-V- Bro-Hold (Assist from [place] [facility]rap[facility]) as well as long axis distraction manip  -    Mobilization with movement     Functional Dry Needling  Location:   Technique: manual & electrical  Response: no adverse reaction    BOLD = new this visit  Plan for Next Visit:         THERAPEUTIC EXERCISES: to develop strength, endurance, range of motion, flexibility, posture and core stabilization for (15)  minutes including: x = performed today    TherEx 2/16/2023    Mobility/[place]: Treadmill  Core focus, 8 min, 5%   Range of motion work: Arm Bike  3 forward / 3 backward   Mobility: lumbar spine x Double Knees to Chest  Single Knee to Chest  Quadratus Lumborum Stretch  Double knee drops (lumbar rotation)   Mobility: Thoracic and Cervical  Series 6.  1 minute- each  ----------------------  Hugs on Foam Roll- alternating top arm  Arm Scissors  W- Raises Overhead  Serratus Push Up  Pec Stretch- W & T     Wall Walking  10s holds x10   Pectoral stretch  10s holds x10   Internal rotation stretching- sleepers  45/90* - 4 min each with cueing for set up   Posterior Capsule Stretch  3-5s holds x4 min, Sidelying cross body   Internal Rotation Stretch- towel  15s holds x5    BOLD = new this visit  Plan for Next Visit: repeat above capsular stretching        NEUROMUSCULAR RE-EDUCATION activities to improve:  Coordination, Kinesthetic, Sense, and Proprioception for (25) minutes.   The following activities were included: x = exercises performed     Neuro Re-Ed- Shoulder 2/16/2023    [place]- Prone  Scapular Retractions (SR) - 5s x20 each below  SR + Extension 2#  SR + 45* Extension 1#  SR + Horizontal Abduction 1#-0#   Sidelying external rotation   Endurance based, 2x 20, 1#               Neuro Re-Ed- Low back Program 2/16/2023    Table Top (TT) + Chin Tuck w/ Abdominal Brace  10s holds x10   TT + Arm Raises x 3x20- performed in hooklying due to pain   TT + Marching x 3x20 performed in hooklying due to pain   TT + Leg Extension x 3x20 performed in hooklying due to pain   Bridge + Abdominal bracing x 5s holds, 2x10 reps   TT + Pilates 100s  x100   Deadbug- opposite arm opposite leg  X20 with bolster on top of spine   Multifidi- Double Leg lift off table (whale tale)  2x20   MiniBand Series with Core Brace  2 min each- Green Band at knees  Hip Extension  Lateral Walks              BOLD= new this visit  Plan for Next Visit:          ASSESSMENT     Casandra Barragan tolerated Physical Therapy  session well with moderate  complaints of pain or discomfort, secondary to mal-alignments of the SIJ at the beginning of her session. Objective findings are improving with of range of motion exercise tolerance and functional mobility.  Casandra Barragan continues to have difficulty with intermittent bouts of rotational mal-alignment through the SI Joint,but responds well to adjustments and stabilization afterwards..  Therapy exercises were reviewed by revisiting exercises given from previous home exercise program while adding focus to lumbar stability program due to increased discomfort during today session..  Handouts were not issued during today's visit. Casandra demonstrated good understanding of new exercises and will continue to progress at home until next follow-up.      Casandra is progressing well towards her goals.   Pt prognosis is  Good.     Pt will continue to benefit from skilled outpatient physical therapy to address the deficits listed in the problem list box on initial evaluation, provide pt/family education and to maximize pt's level of independence in the home and community environment.     Pt's spiritual, cultural and educational needs considered and pt agreeable to plan of care and goals.    Anticipated barriers to physical therapy: increased for cueing    SHORT TERM GOALS:  updated (2/8/23) Progress Date Met   Recent signs and systems trend is improving in order to progress towards Long term goals.  [x] Met  [] Not Met  [] Progressing 1/13/22   Patient will be independent with Home Exercise Program in order to further progress and return to maximal function. [] Met  [] Not Met  [x] Progressing    Pain rating at Worst: 5 /10 in order to progress towards increased independence with activity. [] Met  [] Not Met  [x] Progressing    Patient will be able to correct postural deviations in sitting and standing, to decrease pain and promote postural awareness for injury prevention.  [x] Met  [] Not Met  [] Progressing 1/13/22   Patient will improve functional outcome (FOTO) score: by 5% to increase self-worth & perceived functional ability towards long term goals [] Met  [] Not Met  [x] Progressing       LONG TERM GOALS: 8 weeks (3/8/23) Progress Date Met   Patient will return to normal activites of daily living, recreational, and work related activities with less pain and limitation.  [] Met  [] Not Met  [x] Progressing    Patient will improve range of motion  to stated goals in order to return to maximal functional potential.  [] Met  [] Not Met  [x] Progressing    Patient will improve Strength to stated goals of appropriate musculature in order to improve functional independence.  [] Met  [] Not Met  [x] Progressing    Pain Rating at Best: 1/10 to improve Quality of Life.  [] Met  [] Not Met  [x] Progressing    Patient will meet predicted  functional outcome (FOTO) score: 28% to increase self-worth & perceived functional ability. [] Met  [] Not Met  [x] Progressing    Patient will have met/partially met personal goal of: decrease pain to increase functional with daily and recreational activities.  [] Met  [] Not Met  [x] Progressing         PLAN     Continue Plan of Care (POC) and progress per patient tolerance. See Treatment section for exercise progression.    Genia Fagan, PT

## 2023-02-23 ENCOUNTER — CLINICAL SUPPORT (OUTPATIENT)
Dept: REHABILITATION | Facility: HOSPITAL | Age: 23
End: 2023-02-23
Payer: COMMERCIAL

## 2023-02-23 DIAGNOSIS — R53.1 DECREASED STRENGTH, ENDURANCE, AND MOBILITY: Primary | ICD-10-CM

## 2023-02-23 DIAGNOSIS — R68.89 DECREASED STRENGTH, ENDURANCE, AND MOBILITY: Primary | ICD-10-CM

## 2023-02-23 DIAGNOSIS — Z74.09 DECREASED STRENGTH, ENDURANCE, AND MOBILITY: Primary | ICD-10-CM

## 2023-02-23 PROCEDURE — 97112 NEUROMUSCULAR REEDUCATION: CPT | Performed by: PHYSICAL THERAPIST

## 2023-02-23 PROCEDURE — 97110 THERAPEUTIC EXERCISES: CPT | Performed by: PHYSICAL THERAPIST

## 2023-02-23 PROCEDURE — 97140 MANUAL THERAPY 1/> REGIONS: CPT | Performed by: PHYSICAL THERAPIST

## 2023-02-23 NOTE — PROGRESS NOTES
[facility]   Physical Therapy Treatment + Progress Note     Name: Casandra Barragan  [facility] Number: [id number]    Therapy Diagnosis:   Encounter Diagnosis   Name Primary?    Decreased strength, endurance, and mobility Yes     Physician: Marcos Borja MD    Visit Date: 2/23/2023    Physician Orders: PT Eval and Treat  Medical Diagnosis from Referral: Chronic bilateral low back pain with bilateral sciatica [M54.42, M54.41, G89.29], Sacroiliac joint dysfunction of both sides [M53.3], Right cervical radiculopathy [M54.12]  Evaluation Date: 12/8/2022  Authorization Period Expiration: 11/28/2023  Plan of Care Expiration: 3/8/2023                  Progress Update: 3/8/2023     Visit # / Visits authorized: 6/5 (eval)          FOTO: Visit #1 1/13/2022 - Scored: 1 / 3      [facility]: [place]      PRECAUTIONS: Standard Precautions      MD Follow up: No scheduled follow-up    PTA Visit #: 0/5     Time In: 1015- needs FOTO next visit (2/23)  Time Out:1115  Total Billable Time: 60 minutes    SUBJECTIVE     Pt reports:  Lower back is very sore the past few days on the left side and middle of her back. Her hips are also very sore and painful.  Compliance with Hep: Every Other Day  Response to previous treatment: no adverse reactions to treatment/updated HEP  Functional change: Better    Pain: 0/10   Worst: 5/10  Location: Neck and Back.      OBJECTIVE     Objective Measures updated at progress report unless specified otherwise.  LUMBAR-   Lumbar   Range of Motion Function   & Pain Goal   Lumbar Flexion (60º) [x]Functional  []Dysfunctional  [x]Painful  []Non-Painful 50º  Functional   Nonpainful   Lumbar Extension (30º) [x]Functional  []Dysfunctional  [x]Painful  []Non-Painful 20º  Functional   Nonpainful   Lumbar Side Bending (25º) [x]Functional  []Dysfunctional  [x]Painful  []Non-Painful 20º  Functional   Nonpainful   Lumbar Rotation  [x]Functional  []Dysfunctional  [x]Painful  []Non-Painful 45º  Functional   Nonpainful       LE STRENGTH -   Lower Extremity  Strength RIGHT    Goal   Hip Flexion  []1  []2  []3  [x]4  []5                []+ [x]- 5/5 B    Hip Extension  []1  []2  []3  [x]4  []5                []+ [x]- 5/5 B   Hip Abduction  []1  []2  []3  [x]4  []5                []+ [x]- 5/5 B   Hip Internal rotaiton  []1  []2  []3  [x]4  []5                []+ [x]- 5/5 B   Hip External rotation  []1  []2  []3  [x]4  []5                []+ [x]- 5/5 B   Knee Flexion []1  []2  []3  [x]4  []5                []+ []- 5/5 B   Knee Extension []1  []2  []3  [x]4  []5                []+ []- 5/5 B   Ankle Dorsiflexion []1  []2  []3  [x]4  []5                []+ []- 5/5 B   Ankle Plantarflexion []1  []2  []3  [x]4  []5                []+ []- 5/5 B   Ankle Inversion []1  []2  []3  [x]4  []5                []+ []- 5/5 B   Ankle Eversion []1  []2  []3  [x]4  []5                []+ []- 5/5 B   Big Toe Extension []1  []2  []3  [x]4  []5                []+ []- 5/5 B      Lower Extremity  Strength LEFT    Goal   Hip Flexion  []1  []2  []3  [x]4  []5                []+ [x]- 5/5 B    Hip Extension  []1  []2  []3  [x]4  []5                []+ [x]- 5/5 B   Hip Abduction  []1  []2  []3  [x]4  []5                []+ [x]- 5/5 B   Hip Internal rotaiton  []1  []2  []3  [x]4  []5                []+ [x]- 5/5 B   Hip External rotation  []1  []2  []3  [x]4  []5                []+ [x]- 5/5 B   Knee Flexion []1  []2  []3  [x]4  []5                []+ []- 5/5 B   Knee Extension []1  []2  []3  [x]4  []5                []+ []- 5/5 B   Ankle Dorsiflexion []1  []2  []3  [x]4  []5                []+ []- 5/5 B   Ankle Plantarflexion []1  []2  []3  [x]4  []5                []+ []- 5/5 B   Ankle Inversion []1  []2  []3  [x]4  []5                []+ []- 5/5 B   Ankle Eversion []1  []2  []3  [x]4  []5                []+ []- 5/5 B   Big Toe Extension []1  []2  []3  [x]4  []5                 []+ []- 5/5 B       Treatment     Gym/Equipment Access: [facility] gym, apartment  Time to Complete Exercises: as expected    Casandra received the treatments listed below:     MANUAL THERAPY TECHNIQUES: Joint mobilizations, Soft tissue Mobilization, and mobilization with movement were applied to the area listed below for (15) minutes, including: x = exercises performed     Manual Intervention 2/23/2023    Soft Tissue Mobilization     Joint Mobilizations x - Sidelying lumbar manipulation both sides  - Supine thoracic manipulation mid and lower   Mobilization with movement     Functional Dry Needling  Location:   Technique: manual & electrical  Response: no adverse reaction    BOLD = new this visit  Plan for Next Visit:         THERAPEUTIC EXERCISES: to develop strength, endurance, range of motion, flexibility, posture and core stabilization for (15)  minutes including: x = performed today  Elliptical 10' L10  Yoga Series   Cat/Cow   Child's Pose   Upward Dog   Downward Dog   Licking Pose   Pretzel Stretch    NEUROMUSCULAR RE-EDUCATION activities to improve: Coordination, Kinesthetic, Sense, and Proprioception for (25) minutes.   The following activities were included: x = exercises performed   Bridge c/ TB 4' 10s holds  Sidelying Clamshell c/ TB 4' 10s holds  Suitcase Carry 20# 3 laps each  Anterior Carry 15# 3 laps    ASSESSMENT     A majority of the patients pain seems to be myofascial in origin. She had improved pain and mobility following manual therapy but only temporary changes. Mobility and pain improved further following stretches and strengthening exercises. Recommended that she do a yoga mobility series every single morning.     Casandra is progressing well towards her goals.   Pt prognosis is Good.     Pt will continue to benefit from skilled outpatient physical therapy to address the deficits listed in the problem list box on initial evaluation, provide pt/family education and to maximize pt's level  of independence in the home and community environment.     Pt's spiritual, cultural and educational needs considered and pt agreeable to plan of care and goals.    Anticipated barriers to physical therapy: increased for cueing    SHORT TERM GOALS:  updated (2/8/23) Progress Date Met   Recent signs and systems trend is improving in order to progress towards Long term goals.  [x] Met  [] Not Met  [] Progressing 1/13/22   Patient will be independent with Home Exercise Program in order to further progress and return to maximal function. [] Met  [] Not Met  [x] Progressing    Pain rating at Worst: 5 /10 in order to progress towards increased independence with activity. [] Met  [] Not Met  [x] Progressing    Patient will be able to correct postural deviations in sitting and standing, to decrease pain and promote postural awareness for injury prevention.  [x] Met  [] Not Met  [] Progressing 1/13/22   Patient will improve functional outcome (FOTO) score: by 5% to increase self-worth & perceived functional ability towards long term goals [] Met  [] Not Met  [x] Progressing       LONG TERM GOALS: 8 weeks (3/8/23) Progress Date Met   Patient will return to normal activites of daily living, recreational, and work related activities with less pain and limitation.  [] Met  [] Not Met  [x] Progressing    Patient will improve range of motion  to stated goals in order to return to maximal functional potential.  [] Met  [] Not Met  [x] Progressing    Patient will improve Strength to stated goals of appropriate musculature in order to improve functional independence.  [] Met  [] Not Met  [x] Progressing    Pain Rating at Best: 1/10 to improve Quality of Life.  [] Met  [] Not Met  [x] Progressing    Patient will meet predicted functional outcome (FOTO) score: 28% to increase self-worth & perceived functional ability. [] Met  [] Not Met  [x] Progressing    Patient will have met/partially met personal goal of: decrease pain to increase  functional with daily and recreational activities.  [] Met  [] Not Met  [x] Progressing         PLAN     Continue Plan of Care (POC) and progress per patient tolerance. See Treatment section for exercise progression.    Guicho Zambrano, PT

## 2023-03-07 ENCOUNTER — CLINICAL SUPPORT (OUTPATIENT)
Dept: REHABILITATION | Facility: HOSPITAL | Age: 23
End: 2023-03-07
Payer: COMMERCIAL

## 2023-03-07 DIAGNOSIS — R68.89 DECREASED STRENGTH, ENDURANCE, AND MOBILITY: Primary | ICD-10-CM

## 2023-03-07 DIAGNOSIS — R53.1 DECREASED STRENGTH, ENDURANCE, AND MOBILITY: Primary | ICD-10-CM

## 2023-03-07 DIAGNOSIS — Z74.09 DECREASED STRENGTH, ENDURANCE, AND MOBILITY: Primary | ICD-10-CM

## 2023-03-07 PROCEDURE — 97110 THERAPEUTIC EXERCISES: CPT

## 2023-03-07 PROCEDURE — 97112 NEUROMUSCULAR REEDUCATION: CPT

## 2023-03-07 PROCEDURE — 97140 MANUAL THERAPY 1/> REGIONS: CPT

## 2023-03-07 NOTE — PROGRESS NOTES
[facility]   Physical Therapy Treatment + POC Update    Name: Casandra Barragan  [facility] Number: [id number]    Therapy Diagnosis:   Encounter Diagnosis   Name Primary?    Decreased strength, endurance, and mobility Yes       Physician: Marcos Borja MD    Visit Date: 3/7/2023    Physician Orders: PT Eval and Treat  Medical Diagnosis from Referral: Chronic bilateral low back pain with bilateral sciatica [M54.42, M54.41, G89.29], Sacroiliac joint dysfunction of both sides [M53.3], Right cervical radiculopathy [M54.12]  Evaluation Date: 12/8/2022  Authorization Period Expiration: 11/28/2023  Plan of Care Expiration: 5/8/2023                  Progress Update: 3/8/2023     Visit # / Visits authorized: 8/20 (eval)          FOTO: Visit #1 1/13/2022 - Scored: 1 / 3      [facility]: [place]      PRECAUTIONS: Standard Precautions      MD Follow up: No scheduled follow-up    PTA Visit #: 0/5     Time In: 1000 (FOTO Next Visit)   Time Out:1103  Total Billable Time: 54 minutes    SUBJECTIVE     Pt reports:  she is feeling okay today with mild pain noted at the back and shoulder. States she still does not have full range of motion of her shoulder and gets increased pain with random activities. Low back pain comes and goes with no specific pattern    Compliance with Hep: Every Other Day  Response to previous treatment: no adverse reactions to treatment/updated HEP  Functional change: Better    Pain: 0/10   Worst: 5/10  Location: Neck and Back.      OBJECTIVE     Objective Measures updated at progress report unless specified otherwise.  LUMBAR-   Lumbar   Range of Motion Function   & Pain Goal   Lumbar Flexion (60º) [x]Functional  []Dysfunctional  []Painful  [x]Non-Painful 60º  Functional   Nonpainful   Lumbar Extension (30º) [x]Functional  []Dysfunctional  [x]Painful  []Non-Painful 25º  Functional   Nonpainful   Lumbar Side Bending (25º)  [x]Functional  []Dysfunctional  []Painful  [x]Non-Painful 25º  Functional   Nonpainful   Lumbar Rotation [x]Functional  []Dysfunctional  [x]Painful  []Non-Painful 45º  Functional   Nonpainful         Shoulder AROM/PROM Right Left Pain/Dysfunction with Movement Goal   Shoulder Flexion (180º) 130/170 180 Pain at R anterior shoulder     Shoulder Abduction (180º) 125/165 180 Pain at R anterior shoulder     Shoulder Extension (60º) 50/60 60 Pain at R anterior shoulder     Shoulder ER  at 90º (90º) 70/85 95 Pain at R anterior shoulder     Shoulder IR at 90º (70º) 60/70 70 Pain at R anterior shoulder     Functional ER T1 T6 Pain at R anterior shoulder     Functional IR L1 T8 Pain at R anterior shoulder            LE STRENGTH -   Lower Extremity  Strength RIGHT    Goal   Hip Flexion  []1  []2  []3  [x]4  []5                []+ []- 5/5 B    Hip Extension  []1  []2  []3  [x]4  []5                []+ []- 5/5 B   Hip Abduction  []1  []2  []3  [x]4  []5                []+ []- 5/5 B   Hip Internal rotaiton  []1  []2  []3  [x]4  []5                []+ []- 5/5 B   Hip External rotation  []1  []2  []3  [x]4  []5                []+ []- 5/5 B   Knee Flexion []1  []2  []3  [x]4  []5                [x]+ []- 5/5 B   Knee Extension []1  []2  []3  [x]4  []5                []+ []- 5/5 B   Ankle Dorsiflexion []1  []2  []3  []4  [x]5                []+ []- 5/5 B   Ankle Plantarflexion []1  []2  []3  []4  [x]5                []+ []- 5/5 B   Ankle Inversion []1  []2  []3  []4  [x]5                []+ []- 5/5 B   Ankle Eversion []1  []2  []3  []4  [x]5                []+ []- 5/5 B   Big Toe Extension []1  []2  []3  []4  [x]5                []+ []- 5/5 B      Lower Extremity  Strength LEFT    Goal   Hip Flexion  []1  []2  []3  [x]4  []5                []+ []- 5/5 B    Hip Extension  []1  []2  []3  [x]4  []5                []+ []- 5/5 B   Hip Abduction  []1  []2  []3  [x]4  []5                []+ []- 5/5 B   Hip Internal rotaiton  []1  []2  []3   [x]4  []5                []+ []- 5/5 B   Hip External rotation  []1  []2  []3  [x]4  []5                []+ []- 5/5 B   Knee Flexion []1  []2  []3  [x]4  []5                [x]+ []- 5/5 B   Knee Extension []1  []2  []3  [x]4  []5                []+ []- 5/5 B   Ankle Dorsiflexion []1  []2  []3  []4  [x]5                []+ []- 5/5 B   Ankle Plantarflexion []1  []2  []3  []4  [x]5                []+ []- 5/5 B   Ankle Inversion []1  []2  []3  []4  [x]5                []+ []- 5/5 B   Ankle Eversion []1  []2  []3  []4  [x]5                []+ []- 5/5 B   Big Toe Extension []1  []2  []3  []4  [x]5                []+ []- 5/5 B         U/E MMT Right Left Pain/Dysfunction with Movement Goal   Shoulder Flexion 3-/5 4/5  4+/5 B   Shoulder Extension 3+/5 4+/5  4+/5 B   Shoulder Abduction 3-/5 4/5  4+/5 B   Shoulder IR 3-/5 4/5  4+/5 B   Shoulder ER 3-/5 4/5  4+/5 B   Elbow Flexion  4/5 5/5  5/5 B   Elbow Extension 4+/5 5/5  5/5 B        Treatment     Gym/Equipment Access: [facility] gym, apartment  Time to Complete Exercises: as expected    Casandra received the treatments listed below:     MANUAL THERAPY TECHNIQUES: Joint mobilizations, Soft tissue Mobilization, and mobilization with movement were applied to the area listed below for (10) minutes, including: x = exercises performed     Manual Intervention 3/7/2023    Soft Tissue Mobilization x R pectoralis release   Joint Mobilizations x - Lumbar mobilization - high velocity low amplitude. Palpation and assessment performed to determine necessity.  - Sacroiliac Joint mobilization- Grade IV-V- Bro-Hold (Assist from [place] [facility]) as well as long axis distraction manip  -    Mobilization with movement     Functional Dry Needling  Location:   Technique: manual & electrical  Response: no adverse reaction    BOLD = new this visit  Plan for Next Visit:         THERAPEUTIC EXERCISES: to develop strength, endurance, range of motion, flexibility, posture and  core stabilization for (16)  minutes including: x = performed today    TherEx 3/7/2023    Mobility/Chondral: Elliptical  x Level 8 for 5 minutes    Range of motion work: Arm Bike  3 forward / 3 backward   Mobility: lumbar spine  Double Knees to Chest  Single Knee to Chest  Quadratus Lumborum Stretch  Double knee drops (lumbar rotation)   Mobility: Thoracic and Cervical  Series 6.  1 minute- each  ----------------------  Hugs on Foam Roll- alternating top arm  Arm Scissors  W- Raises Overhead  Serratus Push Up  Pec Stretch- W & T     Wall Slides x 15x each flexion and scaption    Pectoral corner stretch x 10s holds x10   Prayer Stretch  x 3 minutes with 10s holds    Table external rotation stretch  x 3 minutes with 10s holds    Internal rotation stretching- sleepers  45/90* - 4 min each with cueing for set up   Posterior Capsule Stretch  3-5s holds x4 min, Sidelying cross body   Internal Rotation Stretch- towel  15s holds x5    BOLD = new this visit  Plan for Next Visit: repeat above capsular stretching        NEUROMUSCULAR RE-EDUCATION activities to improve: Coordination, Kinesthetic, Sense, and Proprioception for (28) minutes.   The following activities were included: x = exercises performed     Neuro Re-Ed- Shoulder 3/7/2023    [place]- Prone  Scapular Retractions (SR) - 5s x20 each below  SR + Extension 2#  SR + 45* Extension 1#  SR + Horizontal Abduction 1#-0#   Sidelying external rotation   Endurance based, 2x 20, 1#   Shoulder internal rotation  x Green  band 3x10    Shoulder external rotation  x Red band 3x10    Quadruped clocks  x Yellow band 2x10 B      Neuro Re-Ed- Low back Program 3/7/2023    Table Top (TT) + Chin Tuck w/ Abdominal Brace  10s holds x10   Tabletop Arm Arc x 2x10- performed in hooklying due to pain   Tabletop Taps x 3x20 performed in hooklying due to pain   Tabletop Leg Extension x 3x20 performed in hooklying due to pain   Bird dogs  x 2x10 B    Bridge + Abdominal bracing  5s holds, 2x10  reps   TT + Pilates 100s  x100   Deadbug- opposite arm opposite leg  X20 with bolster on top of spine   Multifidi- Double Leg lift off table (whale tale)  2x20   MiniBand Series with Core Brace  2 min each- Green Band at knees  Hip Extension  Lateral Walks   Single leg stance paloff press            BOLD= new this visit  Plan for Next Visit:          ASSESSMENT     Casandra Barragan tolerated Physical Therapy  session well today. Progress to tabletop taps and added bird dogs to improve core stability. Added prayer and table external rotation stretch to improve shoulder ROM. Quadruped clocks incorporated to improve shoulder stability. An assessment was completed today with significant improvements noted in shoulder and lumbar ROM as well as gross upper and lower body strength compared to prior assessment; please see exam for details. Casandra continues to have difficulty with shoulder ROM due to continued pain and strength deficits. She also continues to have occasional flares in low back pain due to continued deficits in core strength and stability.  The above impairments and functional deficits will continue to be addressed over the course of this plan of care. Casandra was educated on continued progression of PT, expectations for the duration of care, updates on goals set at initial evaluation, and home exercise program.  Casandra will continue to benefit from physical therapy in order to further address goals, maximize function and quality of life.     Casandra is progressing well towards her goals.   Pt prognosis is Good.     Pt will continue to benefit from skilled outpatient physical therapy to address the deficits listed in the problem list box on initial evaluation, provide pt/family education and to maximize pt's level of independence in the home and community environment.     Pt's spiritual, cultural and educational needs considered and pt agreeable to plan of care and goals.    Anticipated barriers to physical  therapy: increased for cueing    SHORT TERM GOALS:  updated (2/8/23) Progress Date Met   Recent signs and systems trend is improving in order to progress towards Long term goals.  [x] Met  [] Not Met  [] Progressing 1/13/22   Patient will be independent with Home Exercise Program in order to further progress and return to maximal function. [x] Met  [] Not Met  [] Progressing 3/7/2023   Pain rating at Worst: 5 /10 in order to progress towards increased independence with activity. [x] Met  [] Not Met  [] Progressing 3/7/2023   Patient will be able to correct postural deviations in sitting and standing, to decrease pain and promote postural awareness for injury prevention.  [x] Met  [] Not Met  [] Progressing 1/13/22   Patient will improve functional outcome (FOTO) score: by 5% to increase self-worth & perceived functional ability towards long term goals [] Met  [] Not Met  [x] Progressing       Updated LONG TERM GOALS: (5/8/23) Progress Date Met   Patient will return to normal activites of daily living, recreational, and work related activities with less pain and limitation.  [] Met  [] Not Met  [x] Progressing    Patient will improve range of motion  to stated goals in order to return to maximal functional potential.  [] Met  [] Not Met  [x] Progressing    Patient will improve Strength to stated goals of appropriate musculature in order to improve functional independence.  [] Met  [] Not Met  [x] Progressing    Pain Rating at Best: 1/10 to improve Quality of Life.  [] Met  [] Not Met  [x] Progressing    Patient will meet predicted functional outcome (FOTO) score: 28% to increase self-worth & perceived functional ability. [] Met  [] Not Met  [x] Progressing    Patient will have met/partially met personal goal of: decrease pain to increase functional with daily and recreational activities.  [] Met  [] Not Met  [x] Progressing         PLAN     Updated Plan of care Certification: 3/7/2023 to 5/8/2023.    Outpatient  Physical Therapy 2 times weekly for 8 weeks to include any combination of the following interventions: virtual visits, dry needling, modalities, electrical stimulation (IFC, Pre-Mod, Attended with Functional Dry Needling), Cervical/Lumbar Traction, Gait Training, Manual Therapy, Neuromuscular Re-ed, Patient Education, Self Care, Therapeutic Exercise, and Therapeutic Activites     Asmita Miller, PT

## 2023-03-10 ENCOUNTER — CLINICAL SUPPORT (OUTPATIENT)
Dept: REHABILITATION | Facility: HOSPITAL | Age: 23
End: 2023-03-10
Payer: COMMERCIAL

## 2023-03-10 DIAGNOSIS — R53.1 DECREASED STRENGTH, ENDURANCE, AND MOBILITY: Primary | ICD-10-CM

## 2023-03-10 DIAGNOSIS — R68.89 DECREASED STRENGTH, ENDURANCE, AND MOBILITY: Primary | ICD-10-CM

## 2023-03-10 DIAGNOSIS — Z74.09 DECREASED STRENGTH, ENDURANCE, AND MOBILITY: Primary | ICD-10-CM

## 2023-03-10 PROCEDURE — 97112 NEUROMUSCULAR REEDUCATION: CPT

## 2023-03-10 PROCEDURE — 97140 MANUAL THERAPY 1/> REGIONS: CPT

## 2023-03-10 PROCEDURE — 97110 THERAPEUTIC EXERCISES: CPT

## 2023-03-14 ENCOUNTER — CLINICAL SUPPORT (OUTPATIENT)
Dept: REHABILITATION | Facility: HOSPITAL | Age: 23
End: 2023-03-14
Payer: COMMERCIAL

## 2023-03-14 DIAGNOSIS — R68.89 DECREASED STRENGTH, ENDURANCE, AND MOBILITY: Primary | ICD-10-CM

## 2023-03-14 DIAGNOSIS — Z74.09 DECREASED STRENGTH, ENDURANCE, AND MOBILITY: Primary | ICD-10-CM

## 2023-03-14 DIAGNOSIS — R53.1 DECREASED STRENGTH, ENDURANCE, AND MOBILITY: Primary | ICD-10-CM

## 2023-03-14 PROCEDURE — 97530 THERAPEUTIC ACTIVITIES: CPT

## 2023-03-14 PROCEDURE — 97110 THERAPEUTIC EXERCISES: CPT

## 2023-03-14 PROCEDURE — 97112 NEUROMUSCULAR REEDUCATION: CPT

## 2023-03-14 NOTE — PROGRESS NOTES
[facility] OUTPATIENT THERAPY AND WELLNESS   Physical Therapy Treatment     Name: Casandra Barragan  [facility] Number: [id number]    Therapy Diagnosis:   Encounter Diagnosis   Name Primary?    Decreased strength, endurance, and mobility Yes       Physician: Marcos Borja MD    Visit Date: 3/14/2023    Physician Orders: PT Eval and Treat  Medical Diagnosis from Referral: Chronic bilateral low back pain with bilateral sciatica [M54.42, M54.41, G89.29], Sacroiliac joint dysfunction of both sides [M53.3], Right cervical radiculopathy [M54.12]  Evaluation Date: 12/8/2022  Authorization Period Expiration: 11/28/2023  Plan of Care Expiration: 5/8/2023                  Progress Update: 3/8/2023     Visit # / Visits authorized: 8/20 (eval)          FOTO: Visit #1 1/13/2022 - Scored: 1 / 3      [facility]: [place]      PRECAUTIONS: Standard Precautions      MD Follow up: No scheduled follow-up    PTA Visit #: 0/5     Time In: 0900  Time Out:1012  Total Billable Time: 61 minutes    SUBJECTIVE     Pt reports:  she is feeling pretty good today with minimal pain. States she was having anterior shoulder pain yesterday and noted shooting down the anterior arm    Compliance with Hep: Every Other Day  Response to previous treatment: no adverse reactions to treatment/updated HEP  Functional change: Better    Pain: 1/10 shoulder,   2/10 back   Location: R shoulder and B low back.      OBJECTIVE     Objective Measures updated at progress report unless specified otherwise.    Treatment     Gym/Equipment Access: [facility] gym, apartment  Time to Complete Exercises: as expected    Casandra received the treatments listed below:     MANUAL THERAPY TECHNIQUES: Joint mobilizations, Soft tissue Mobilization, and mobilization with movement were applied to the area listed below for (3) minutes, including: x = exercises performed     Manual Intervention 3/14/2023    Soft Tissue Mobilization x R upper trapezius release    Joint  Mobilizations  - Lumbar mobilization - high velocity low amplitude. Palpation and assessment performed to determine necessity.  - Sacroiliac Joint mobilization- Grade IV-V- Bro-Hold (Assist from [place] [facility]rap[facility]) as well as long axis distraction manip  -    Mobilization with movement     Functional Dry Needling  Location:   Technique: manual & electrical  Response: no adverse reaction    BOLD = new this visit  Plan for Next Visit:         THERAPEUTIC EXERCISES: to develop strength, endurance, range of motion, flexibility, posture and core stabilization for (17)  minutes including: x = performed today    TherEx 3/14/2023    Mobility/Chondral: Upright bike   x Level 8 for 5 minutes    Range of motion work: Arm Bike  3 forward / 3 backward   Wall Slides  15x each flexion and scaption    Pectoral corner stretch X 10s holds x10   Biceps stretch x 10x10s holds    Prayer Stretch   3 minutes with 10s holds    Table external rotation stretch   3 minutes with 10s holds    Internal rotation stretching- sleepers  45/90* - 4 min each with cueing for set up   Posterior Capsule Stretch  3-5s holds x4 min, Sidelying cross body   Internal Rotation Stretch- towel  15s holds x5   Shoulder AAROM flexion  x 10x    Shoulder flexion with shrug  x 10x        NEUROMUSCULAR RE-EDUCATION activities to improve: Coordination, Kinesthetic, Sense, and Proprioception for (23) minutes.   The following activities were included: x = exercises performed       Neuro Re-Ed 3/14/2023    Table Top (TT) + Chin Tuck w/ Abdominal Brace  10s holds x10   Tabletop Arm Arc  2x10   Tabletop Taps  3x20    Tabletop Leg Extension  3x20   Bird dogs   2x10 B    TT + Pilates 100s  x100   Deadbug x 2x10 B    Multifidi- Double Leg lift off table (whale tale)  2x20   Single leg stance paloff press      Table plank x 30s    Table side plank  x 2x30s B    Plank shoulder taps  x 2x10 B         Shoulder internal rotation  x Green band 3x10    Shoulder external  rotation  x Red band 3x10                THERAPEUTIC ACTIVITIES to improve dynamic and functional  performance for (18) minutes including:    Intervention Performed Today    Bicep curls  x 5# 3x10    Table push ups  x 3x10    Standing arm circles (90* flexion)  x 3x10 each, clockwise and counterclockwise    Ball bridge  x 3x10 with no upper extremity support                              ASSESSMENT     Casandra Barragan demonstrates improved tolerance for session today. Progressed to table side planks, plank shoulder taps and table push ups to improve core strength and shoulder stability. Added shoulder flexion AAROM but pt states that she feels a lot of tension in the upper trapezius. Performed a release of the upper trapezius and then incorporated shoulder flexion with shrugs to practice volitionally dannie and relaxing the upper trapezius with the shoulder in flexion.     Casandra is progressing well towards her goals.   Pt prognosis is Good.     Pt will continue to benefit from skilled outpatient physical therapy to address the deficits listed in the problem list box on initial evaluation, provide pt/family education and to maximize pt's level of independence in the home and community environment.     Pt's spiritual, cultural and educational needs considered and pt agreeable to plan of care and goals.    Anticipated barriers to physical therapy: increased for cueing    SHORT TERM GOALS:  updated (2/8/23) Progress Date Met   Recent signs and systems trend is improving in order to progress towards Long term goals.  [x] Met  [] Not Met  [] Progressing 1/13/22   Patient will be independent with Home Exercise Program in order to further progress and return to maximal function. [x] Met  [] Not Met  [] Progressing 3/7/2023   Pain rating at Worst: 5 /10 in order to progress towards increased independence with activity. [x] Met  [] Not Met  [] Progressing 3/7/2023   Patient will be able to correct postural deviations in  sitting and standing, to decrease pain and promote postural awareness for injury prevention.  [x] Met  [] Not Met  [] Progressing 1/13/22   Patient will improve functional outcome (FOTO) score: by 5% to increase self-worth & perceived functional ability towards long term goals [] Met  [] Not Met  [x] Progressing       Updated LONG TERM GOALS: (5/8/23) Progress Date Met   Patient will return to normal activites of daily living, recreational, and work related activities with less pain and limitation.  [] Met  [] Not Met  [x] Progressing    Patient will improve range of motion  to stated goals in order to return to maximal functional potential.  [] Met  [] Not Met  [x] Progressing    Patient will improve Strength to stated goals of appropriate musculature in order to improve functional independence.  [] Met  [] Not Met  [x] Progressing    Pain Rating at Best: 1/10 to improve Quality of Life.  [] Met  [] Not Met  [x] Progressing    Patient will meet predicted functional outcome (FOTO) score: 28% to increase self-worth & perceived functional ability. [] Met  [] Not Met  [x] Progressing    Patient will have met/partially met personal goal of: decrease pain to increase functional with daily and recreational activities.  [] Met  [] Not Met  [x] Progressing         PLAN     Updated Plan of care Certification: 3/14/2023 to 5/8/2023.    Outpatient Physical Therapy 2 times weekly for 8 weeks to include any combination of the following interventions: virtual visits, dry needling, modalities, electrical stimulation (IFC, Pre-Mod, Attended with Functional Dry Needling), Cervical/Lumbar Traction, Gait Training, Manual Therapy, Neuromuscular Re-ed, Patient Education, Self Care, Therapeutic Exercise, and Therapeutic Activites     Asmita Miller, PT

## 2023-03-14 NOTE — PROGRESS NOTES
[facility]   Physical Therapy Treatment     Name: Casandra [facility] Number: [id number]    Therapy Diagnosis:   Encounter Diagnosis   Name Primary?    Decreased strength, endurance, and mobility Yes       Physician: Marcos Borja MD    Visit Date: 3/10/2023    Physician Orders: PT Eval and Treat  Medical Diagnosis from Referral: Chronic bilateral low back pain with bilateral sciatica [M54.42, M54.41, G89.29], Sacroiliac joint dysfunction of both sides [M53.3], Right cervical radiculopathy [M54.12]  Evaluation Date: 12/8/2022  Authorization Period Expiration: 11/28/2023  Plan of Care Expiration: 5/8/2023                  Progress Update: 3/8/2023     Visit # / Visits authorized: 8/20 (eval)          FOTO: Visit #1 1/13/2022 - Scored: 1 / 3      [facility]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     Compliance with Hep: Every Other Day  Response to previous treatment: no adverse reactions to treatment/updated HEP  Functional change: Better    Pain: 0/10   Worst: 5/10  Location: Neck and Back.      OBJECTIVE     Objective Measures updated at progress report unless specified otherwise.    Treatment     Gym/Equipment Access: [facility] gym, apartment  Time to Complete Exercises: as expected    Casandra received the treatments listed below:     MANUAL THERAPY TECHNIQUES: Joint mobilizations, Soft tissue Mobilization, and mobilization with movement were applied to the area listed below for (13) minutes, including: x = exercises performed     Manual Intervention 3/10/2023    Soft Tissue Mobilization x R pectoralis, bicep and anterior deltoid    Joint Mobilizations  - Lumbar mobilization - high velocity low amplitude. Palpation and  assessment performed to determine necessity.  - Sacroiliac Joint mobilization- Grade IV-V- Bro-Hold (Assist from [place] [facility]) as well as long axis distraction manip  -    Mobilization with movement     Functional Dry Needling  Location:   Technique: manual & electrical  Response: no adverse reaction    BOLD = new this visit  Plan for Next Visit:         THERAPEUTIC EXERCISES: to develop strength, endurance, range of motion, flexibility, posture and core stabilization for (18)  minutes including: x = performed today    TherEx 3/10/2023    Mobility/[place]: Upright bike   x Level 8 for 5 minutes    Range of motion work: Arm Bike  3 forward / 3 backward   Mobility: lumbar spine  Double Knees to Chest  Single Knee to Chest  Quadratus Lumborum Stretch  Double knee drops (lumbar rotation)   Mobility: Thoracic and Cervical  Series 6.  1 minute- each  ----------------------  Hugs on Foam Roll- alternating top arm  Arm Scissors  W- Raises Overhead  Serratus Push Up  Pec Stretch- W & T     Wall Slides x 15x each flexion and scaption    Pectoral corner stretch x 10s holds x10   Prayer Stretch  x 3 minutes with 10s holds    Table external rotation stretch  x 3 minutes with 10s holds    Internal rotation stretching- sleepers  45/90* - 4 min each with cueing for set up   Biceps stretch  x 5x10s holds    Posterior Capsule Stretch  3-5s holds x4 min, Sidelying cross body   Internal Rotation Stretch- towel  15s holds x5    BOLD = new this visit  Plan for Next Visit: repeat above capsular stretching        NEUROMUSCULAR RE-EDUCATION activities to improve: Coordination, Kinesthetic, Sense, and Proprioception for (25) minutes.   The following activities were included: x = exercises performed     Neuro Re-Ed- Shoulder 3/10/2023    Kirsten- Prone  Scapular Retractions (SR) - 5s x20 each below  SR + Extension 2#  SR + 45* Extension 1#  SR + Horizontal Abduction 1#-0#   Sidelying external rotation   Endurance based,  2x 20, 1#   Shoulder internal rotation  x Green  band 3x10    Shoulder external rotation  x Red band 3x10    Quadruped clocks  x Yellow band 2x10 B      Neuro Re-Ed- Low back Program 3/10/2023    Table Top (TT) + Chin Tuck w/ Abdominal Brace  10s holds x10   Tabletop Arm Arc x 2x10- performed in hooklying due to pain   Tabletop Taps x 3x20 performed in hooklying due to pain   Tabletop Leg Extension  3x20 performed in hooklying due to pain   Bird dogs  x 2x10 B    Bridge + Abdominal bracing  5s holds, 2x10 reps   TT + Pilates 100s  x100   Deadbug- opposite arm opposite leg  X20 with bolster on top of spine   Multifidi- Double Leg lift off table (whale tale)  2x20   MiniBand Series with Core Brace  2 min each- Green Band at knees  Hip Extension  Lateral Walks   Single leg stance paloff press            BOLD= new this visit  Plan for Next Visit:          ASSESSMENT     Casandra Barragan tolerated session well today. Incorporated soft tissue mobilization to the anterior shoulder due to significant discomfort with any shoulder movement into elevation. Added biceps stretch to improve mobility and decrease anterior shoulder and upper arm tension. Continued previously performed interventions due to significant fatigue and mild soreness reported following session     Casandra is progressing well towards her goals.   Pt prognosis is Good.     Pt will continue to benefit from skilled outpatient physical therapy to address the deficits listed in the problem list box on initial evaluation, provide pt/family education and to maximize pt's level of independence in the home and community environment.     Pt's spiritual, cultural and educational needs considered and pt agreeable to plan of care and goals.    Anticipated barriers to physical therapy: increased for cueing    SHORT TERM GOALS:  updated (2/8/23) Progress Date Met   Recent signs and systems trend is improving in order to progress towards Long term goals.  [x] Met  [] Not  Met  [] Progressing 1/13/22   Patient will be independent with Home Exercise Program in order to further progress and return to maximal function. [x] Met  [] Not Met  [] Progressing 3/7/2023   Pain rating at Worst: 5 /10 in order to progress towards increased independence with activity. [x] Met  [] Not Met  [] Progressing 3/7/2023   Patient will be able to correct postural deviations in sitting and standing, to decrease pain and promote postural awareness for injury prevention.  [x] Met  [] Not Met  [] Progressing 1/13/22   Patient will improve functional outcome (FOTO) score: by 5% to increase self-worth & perceived functional ability towards long term goals [] Met  [] Not Met  [x] Progressing       Updated LONG TERM GOALS: (5/8/23) Progress Date Met   Patient will return to normal activites of daily living, recreational, and work related activities with less pain and limitation.  [] Met  [] Not Met  [x] Progressing    Patient will improve range of motion  to stated goals in order to return to maximal functional potential.  [] Met  [] Not Met  [x] Progressing    Patient will improve Strength to stated goals of appropriate musculature in order to improve functional independence.  [] Met  [] Not Met  [x] Progressing    Pain Rating at Best: 1/10 to improve Quality of Life.  [] Met  [] Not Met  [x] Progressing    Patient will meet predicted functional outcome (FOTO) score: 28% to increase self-worth & perceived functional ability. [] Met  [] Not Met  [x] Progressing    Patient will have met/partially met personal goal of: decrease pain to increase functional with daily and recreational activities.  [] Met  [] Not Met  [x] Progressing         PLAN     Updated Plan of care Certification: 3/10/2023 to 5/8/2023.    Outpatient Physical Therapy 2 times weekly for 8 weeks to include any combination of the following interventions: virtual visits, dry needling, modalities, electrical stimulation (IFC, Pre-Mod, Attended with  Functional Dry Needling), Cervical/Lumbar Traction, Gait Training, Manual Therapy, Neuromuscular Re-ed, Patient Education, Self Care, Therapeutic Exercise, and Therapeutic Activites     Asmita Miller, PT

## 2023-03-16 ENCOUNTER — CLINICAL SUPPORT (OUTPATIENT)
Dept: REHABILITATION | Facility: HOSPITAL | Age: 23
End: 2023-03-16
Payer: COMMERCIAL

## 2023-03-16 DIAGNOSIS — R53.1 DECREASED STRENGTH, ENDURANCE, AND MOBILITY: Primary | ICD-10-CM

## 2023-03-16 DIAGNOSIS — Z74.09 DECREASED STRENGTH, ENDURANCE, AND MOBILITY: Primary | ICD-10-CM

## 2023-03-16 DIAGNOSIS — R68.89 DECREASED STRENGTH, ENDURANCE, AND MOBILITY: Primary | ICD-10-CM

## 2023-03-16 PROCEDURE — 97110 THERAPEUTIC EXERCISES: CPT

## 2023-03-16 PROCEDURE — 97530 THERAPEUTIC ACTIVITIES: CPT

## 2023-03-16 PROCEDURE — 97140 MANUAL THERAPY 1/> REGIONS: CPT

## 2023-03-16 PROCEDURE — 97112 NEUROMUSCULAR REEDUCATION: CPT

## 2023-03-21 ENCOUNTER — CLINICAL SUPPORT (OUTPATIENT)
Dept: REHABILITATION | Facility: HOSPITAL | Age: 23
End: 2023-03-21
Payer: COMMERCIAL

## 2023-03-21 DIAGNOSIS — R53.1 DECREASED STRENGTH, ENDURANCE, AND MOBILITY: Primary | ICD-10-CM

## 2023-03-21 DIAGNOSIS — R68.89 DECREASED STRENGTH, ENDURANCE, AND MOBILITY: Primary | ICD-10-CM

## 2023-03-21 DIAGNOSIS — Z74.09 DECREASED STRENGTH, ENDURANCE, AND MOBILITY: Primary | ICD-10-CM

## 2023-03-21 PROCEDURE — 97110 THERAPEUTIC EXERCISES: CPT

## 2023-03-21 PROCEDURE — 97112 NEUROMUSCULAR REEDUCATION: CPT

## 2023-03-21 PROCEDURE — 97140 MANUAL THERAPY 1/> REGIONS: CPT

## 2023-03-22 NOTE — PROGRESS NOTES
[facility]   Physical Therapy Treatment     Name: Casandra [facility] Number: [id number]    Therapy Diagnosis:   Encounter Diagnosis   Name Primary?    Decreased strength, endurance, and mobility Yes       Physician: Marcos Borja MD    Visit Date: 3/21/2023    Physician Orders: PT Eval and Treat  Medical Diagnosis from Referral: Chronic bilateral low back pain with bilateral sciatica [M54.42, M54.41, G89.29], Sacroiliac joint dysfunction of both sides [M53.3], Right cervical radiculopathy [M54.12]  Evaluation Date: 12/8/2022  Authorization Period Expiration: 11/28/2023  Plan of Care Expiration: 5/8/2023                  Progress Update: 4/7/2023     Visit # / Visits authorized: 11/20 (eval)          FOTO: Visit #1 1/13/2022 - Scored: 1 / 3      [facility]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 States that pain was so bad over the weekend that she was unable to perform any of her exercises     Compliance with Hep: Every Other Day  Response to previous treatment: no adverse reactions to treatment/updated HEP  Functional change: Better    Pain: 5/10 shoulder,   6/10 back   Location: R shoulder and B low back.      OBJECTIVE     Objective Measures updated at progress report unless specified otherwise.    Treatment     Gym/Equipment Access: [facility] gym, apartment  Time to Complete Exercises: as expected    Casandra received the treatments listed below:     MANUAL THERAPY TECHNIQUES: Joint mobilizations, Soft tissue Mobilization, and mobilization with movement were applied to the area listed below for (12) minutes, including: x = exercises performed     Manual Intervention 3/21/2023    Soft Tissue  "Mobilization x B glutes, piriformis   Joint Mobilizations x Grade V L4/L5 lumbar roll B         Functional Dry Needling          THERAPEUTIC EXERCISES: to develop strength, endurance, range of motion, flexibility, posture and core stabilization for (30)  minutes including: x = performed today    UBE: Level 1, 3" forward, 3" backward   Stretches   Pec corner stretch 10x10s holds   Biceps stretch 10x10s holds   Prayer stretch 3 minutes x 10s holds   Glute stretch 2x30s B   Piriformis stretch (figure 4) 2x30s B   Foam rolling glutes/piriformis: 2 minutes each side   Series 6 on half foam: 1 minute each movement     HELD Today   Shoulder AAROM flexion + shrug 10x with wand   Table external rotation stretch 3 minutes x 10s holds   Posterior capsule stretch 10x10s holds       NEUROMUSCULAR RE-EDUCATION activities to improve: Coordination, Kinesthetic, Sense, and Proprioception for (16) minutes.   The following activities were included: x = exercises performed     Posterior pelvic tilt 20x   Side lying shoulder external rotation 3x10   Shoulder internal rotation- yellow band 3x10   Shoulder punches- yellow back 3x10     HELD Today   Dead bugs with exercise ball 2x10 B   Plank shoulder taps 2x10 B   Side plank (modified with knees flexed) 2x30s B   Shoulder internal rotation- green band 3x10 on L   Shoulder external rotation- green band 3x10 on L   Tabletop arm arc/taps/extensions  Bird dogs   Single leg stance paloff press     THERAPEUTIC ACTIVITIES to improve dynamic and functional  performance for (0) minutes including:    HELD Today   Bicep curls 5# 3x10 B   Table push ups 3x10  Ball bridge (no upper extremity support) 3x10   Chest press 5# 3x10   Standing arm circles (90* flexion) 3x10 each (clockwise and counterclockwise)     ASSESSMENT     Casandra Barragan demonstrates limited tolerance for exercise today due to increased shoulder and low back pain with insidious onset. Incorporated soft tissue mobilization to the " posterior hip as well as stretches and foam rolling; pt reports significant decrease in pain following interventions. Regressed shoulder exercises with decreased band resistance due to pain. Pt states she feels better following session     Casandra is progressing well towards her goals.   Pt prognosis is Good.     Pt will continue to benefit from skilled outpatient physical therapy to address the deficits listed in the problem list box on initial evaluation, provide pt/family education and to maximize pt's level of independence in the home and community environment.     Pt's spiritual, cultural and educational needs considered and pt agreeable to plan of care and goals.    Anticipated barriers to physical therapy: increased for cueing    SHORT TERM GOALS:  updated (2/8/23) Progress Date Met   Recent signs and systems trend is improving in order to progress towards Long term goals.  [x] Met  [] Not Met  [] Progressing 1/13/22   Patient will be independent with Home Exercise Program in order to further progress and return to maximal function. [x] Met  [] Not Met  [] Progressing 3/7/2023   Pain rating at Worst: 5 /10 in order to progress towards increased independence with activity. [x] Met  [] Not Met  [] Progressing 3/7/2023   Patient will be able to correct postural deviations in sitting and standing, to decrease pain and promote postural awareness for injury prevention.  [x] Met  [] Not Met  [] Progressing 1/13/22   Patient will improve functional outcome (FOTO) score: by 5% to increase self-worth & perceived functional ability towards long term goals [] Met  [] Not Met  [x] Progressing       Updated LONG TERM GOALS: (5/8/23) Progress Date Met   Patient will return to normal activites of daily living, recreational, and work related activities with less pain and limitation.  [] Met  [] Not Met  [x] Progressing    Patient will improve range of motion  to stated goals in order to return to maximal functional potential.   [] Met  [] Not Met  [x] Progressing    Patient will improve Strength to stated goals of appropriate musculature in order to improve functional independence.  [] Met  [] Not Met  [x] Progressing    Pain Rating at Best: 1/10 to improve Quality of Life.  [] Met  [] Not Met  [x] Progressing    Patient will meet predicted functional outcome (FOTO) score: 28% to increase self-worth & perceived functional ability. [] Met  [] Not Met  [x] Progressing    Patient will have met/partially met personal goal of: decrease pain to increase functional with daily and recreational activities.  [] Met  [] Not Met  [x] Progressing         PLAN     Updated Plan of care Certification: 3/21/2023 to 5/8/2023.    Outpatient Physical Therapy 2 times weekly for 8 weeks to include any combination of the following interventions: virtual visits, dry needling, modalities, electrical stimulation (IFC, Pre-Mod, Attended with Functional Dry Needling), Cervical/Lumbar Traction, Gait Training, Manual Therapy, Neuromuscular Re-ed, Patient Education, Self Care, Therapeutic Exercise, and Therapeutic Activites     Asmita Miller, PT

## 2023-03-22 NOTE — PROGRESS NOTES
[facility]   Physical Therapy Treatment     Name: Casandra [facility] Number: [id number]    Therapy Diagnosis:   Encounter Diagnosis   Name Primary?    Decreased strength, endurance, and mobility Yes       Physician: Marcos Borja MD    Visit Date: 3/16/2023    Physician Orders: PT Eval and Treat  Medical Diagnosis from Referral: Chronic bilateral low back pain with bilateral sciatica [M54.42, M54.41, G89.29], Sacroiliac joint dysfunction of both sides [M53.3], Right cervical radiculopathy [M54.12]  Evaluation Date: 12/8/2022  Authorization Period Expiration: 11/28/2023  Plan of Care Expiration: 5/8/2023                  Progress Update: 3/8/2023     Visit # / Visits authorized: 8/20 (eval)          FOTO: Visit #1 1/13/2022 - Scored: 1 / 3      [facility]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     Compliance with Hep: Every Other Day  Response to previous treatment: no adverse reactions to treatment/updated HEP  Functional change: Better    Pain: 1/10 shoulder,   2/10 back   Location: R shoulder and B low back.      OBJECTIVE     Objective Measures updated at progress report unless specified otherwise.    Treatment     Gym/Equipment Access: [facility] gym, apartment  Time to Complete Exercises: as expected    Casandra received the treatments listed below:     MANUAL THERAPY TECHNIQUES: Joint mobilizations, Soft tissue Mobilization, and mobilization with movement were applied to the area listed below for (10) minutes, including: x = exercises performed     Manual Intervention 3/16/2023    Soft Tissue Mobilization x R pectoralis major, biceps and anterior deltoid    Joint Mobilizations  - Lumbar mobilization - high  "velocity low amplitude. Palpation and assessment performed to determine necessity.  - Sacroiliac Joint mobilization- Grade IV-V- Bro-Hold (Assist from [place] [facility]rap[facility]) as well as long axis distraction manip  -    Mobilization with movement x Grade V L4/L5 lumbar roll B    Functional Dry Needling  Location:   Technique: manual & electrical  Response: no adverse reaction        THERAPEUTIC EXERCISES: to develop strength, endurance, range of motion, flexibility, posture and core stabilization for (25)  minutes including: x = performed today    UBE: Level 1, 3" forward, 3" backward   Stretches   Pec corner stretch 10x10s holds   Biceps stretch 10x10s holds   Prayer stretch 3 minutes x 10s holds   Table external rotation stretch 3 minutes x 10s holds   Posterior capsule stretch 10x10s holds   Shoulder AAROM flexion 10x with wand   Shoulder AAROM flexion + shrug 10x with wand     HELD Today   Upright bike   Wall slides- flexion and scaption       NEUROMUSCULAR RE-EDUCATION activities to improve: Coordination, Kinesthetic, Sense, and Proprioception for (18) minutes.   The following activities were included: x = exercises performed     Dead bugs with exercise ball 2x10 B   Plank shoulder taps 2x10 B   Side plank (modified with knees flexed) 2x30s B   Shoulder internal rotation- green band 3x10 on L   Shoulder external rotation- green band 3x10 on L     HELD Today   Tabletop arm arc/taps/extensions  Bird dogs   Single leg stance paloff press     THERAPEUTIC ACTIVITIES to improve dynamic and functional  performance for (15) minutes including:    Bicep curls 5# 3x10 B   Table push ups 3x10  Ball bridge (no upper extremity support) 3x10   Chest press 5# 3x10     HELD Today   Standing arm circles (90* flexion) 3x10 each (clockwise and counterclockwise)     ASSESSMENT     Casandra Barragan tolerated session well today. Progressed to modified side plank to improve shoulder strength and stability. Added chest press to " improve functional strength with significant anterior shoulder fatigue. Improved form with shoulder elevation following AAROM flexion with shrug; decrease overall compensations with proper use of scapular stabilizers.    Casandra is progressing well towards her goals.   Pt prognosis is Good.     Pt will continue to benefit from skilled outpatient physical therapy to address the deficits listed in the problem list box on initial evaluation, provide pt/family education and to maximize pt's level of independence in the home and community environment.     Pt's spiritual, cultural and educational needs considered and pt agreeable to plan of care and goals.    Anticipated barriers to physical therapy: increased for cueing    SHORT TERM GOALS:  updated (2/8/23) Progress Date Met   Recent signs and systems trend is improving in order to progress towards Long term goals.  [x] Met  [] Not Met  [] Progressing 1/13/22   Patient will be independent with Home Exercise Program in order to further progress and return to maximal function. [x] Met  [] Not Met  [] Progressing 3/7/2023   Pain rating at Worst: 5 /10 in order to progress towards increased independence with activity. [x] Met  [] Not Met  [] Progressing 3/7/2023   Patient will be able to correct postural deviations in sitting and standing, to decrease pain and promote postural awareness for injury prevention.  [x] Met  [] Not Met  [] Progressing 1/13/22   Patient will improve functional outcome (FOTO) score: by 5% to increase self-worth & perceived functional ability towards long term goals [] Met  [] Not Met  [x] Progressing       Updated LONG TERM GOALS: (5/8/23) Progress Date Met   Patient will return to normal activites of daily living, recreational, and work related activities with less pain and limitation.  [] Met  [] Not Met  [x] Progressing    Patient will improve range of motion  to stated goals in order to return to maximal functional potential.  [] Met  [] Not  Met  [x] Progressing    Patient will improve Strength to stated goals of appropriate musculature in order to improve functional independence.  [] Met  [] Not Met  [x] Progressing    Pain Rating at Best: 1/10 to improve Quality of Life.  [] Met  [] Not Met  [x] Progressing    Patient will meet predicted functional outcome (FOTO) score: 28% to increase self-worth & perceived functional ability. [] Met  [] Not Met  [x] Progressing    Patient will have met/partially met personal goal of: decrease pain to increase functional with daily and recreational activities.  [] Met  [] Not Met  [x] Progressing         PLAN     Updated Plan of care Certification: 3/16/2023 to 5/8/2023.    Outpatient Physical Therapy 2 times weekly for 8 weeks to include any combination of the following interventions: virtual visits, dry needling, modalities, electrical stimulation (IFC, Pre-Mod, Attended with Functional Dry Needling), Cervical/Lumbar Traction, Gait Training, Manual Therapy, Neuromuscular Re-ed, Patient Education, Self Care, Therapeutic Exercise, and Therapeutic Activites     Asmita Miller, PT

## 2023-03-28 ENCOUNTER — CLINICAL SUPPORT (OUTPATIENT)
Dept: REHABILITATION | Facility: HOSPITAL | Age: 23
End: 2023-03-28
Payer: COMMERCIAL

## 2023-03-28 DIAGNOSIS — R68.89 DECREASED STRENGTH, ENDURANCE, AND MOBILITY: Primary | ICD-10-CM

## 2023-03-28 DIAGNOSIS — R53.1 DECREASED STRENGTH, ENDURANCE, AND MOBILITY: Primary | ICD-10-CM

## 2023-03-28 DIAGNOSIS — Z74.09 DECREASED STRENGTH, ENDURANCE, AND MOBILITY: Primary | ICD-10-CM

## 2023-03-28 PROCEDURE — 97112 NEUROMUSCULAR REEDUCATION: CPT

## 2023-03-28 PROCEDURE — 97530 THERAPEUTIC ACTIVITIES: CPT

## 2023-03-28 PROCEDURE — 97110 THERAPEUTIC EXERCISES: CPT

## 2023-03-28 NOTE — PROGRESS NOTES
[facility]   Physical Therapy Treatment     Name: Casandra [facility] Number: [id number]    Therapy Diagnosis:   Encounter Diagnosis   Name Primary?    Decreased strength, endurance, and mobility Yes       Physician: Marcos Borja MD    Visit Date: 3/28/2023    Physician Orders: PT Eval and Treat  Medical Diagnosis from Referral: Chronic bilateral low back pain with bilateral sciatica [M54.42, M54.41, G89.29], Sacroiliac joint dysfunction of both sides [M53.3], Right cervical radiculopathy [M54.12]  Evaluation Date: 12/8/2022  Authorization Period Expiration: 11/28/2023  Plan of Care Expiration: 5/8/2023                  Progress Update: 4/7/2023     Visit # / Visits authorized: 11/20 (eval)          FOTO: Visit #1 1/13/2022 - Scored: 1 / 3      [facility]: Cheer[employer]      PRECAUTIONS: Standard Precautions      MD Follow up: No scheduled follow-up    PTA Visit #: 0/5     Time In: 0915  Time Out: 1025  Total Billable Time: 66 minutes    SUBJECTIVE     Pt reports:  she is feeling okay today with low pain but notes she was having a lot of low back pain last night     Compliance with Hep: Every Other Day  Response to previous treatment: felt better with decreased low back pain   Functional change: Better    Pain: 5/10 shoulder,   6/10 back   Location: R shoulder and B low back.      OBJECTIVE     Objective Measures updated at progress report unless specified otherwise.    Treatment     Gym/Equipment Access: [facility] gym, apartment  Time to Complete Exercises: as expected    Casandra received the treatments listed below:     MANUAL THERAPY TECHNIQUES were applied for (0) minutes, including:    Soft tissue mobilization: N/A   Joint mobilizations: N/A   Functional dry needling: DEFERRED      THERAPEUTIC EXERCISES: to develop strength, endurance, range of motion, flexibility, posture and core stabilization for (28)  minutes including: x = performed today    Upright bike  level 8 for 5 minutes   Stretches   [place] pose 2x30s B   Foam rolling glutes/piriformis: 2 minutes each side   Supine shoulder AAROM flexion (hands clasped) 15x   Wall slides flexion 10x   Arm arc on wall (small range to tolerance) 10x       Interventions DEFERRED today   Series 6 on half foam: 1 minute each movement   stretches  Pec corner stretch 10x10s holds   Biceps stretch 10x10s holds   Prayer stretch 3 minutes x 10s holds   Glute stretch 2x30s B   Piriformis stretch (figure 4) 2x30s B   Table external rotation stretch 3 minutes x 10s holds   Posterior capsule stretch 10x10s hold        NEUROMUSCULAR RE-EDUCATION activities to improve: Coordination, Kinesthetic, Sense, and Proprioception for (28) minutes.   The following activities were included: x = exercises performed     Tabletop leg extensions GREEN band at feet 2x10 B   Side plank (modified with knees flexed) 2x20s B   Rows 7.5# 3x10   Shoulder extensions 7.5# 3x10   B shoulder external rotation YELLOW 3x10   Anterior carries 15# 3 laps     Interventions DEFERRED today   Dead bugs with exercise ball 2x10 B   Plank shoulder taps 2x10 B   Shoulder internal rotation- green band 3x10 on L   Tabletop arm arc/taps/extensions  Shoulder punches- yellow 3x10         THERAPEUTIC ACTIVITIES to improve dynamic and functional  performance for (10) minutes including:    Wall wash 2x30s clockwise and counterclockwise   Ball squats 3x10 with 10s holds on last repetition of each set     Interventions DEFERRED today   Bicep curls 5# 3x10 B   Table push ups 3x10  Ball bridge (no upper extremity support) 3x10   Chest press 5# 3x10       Next Session:   Standing clamshells        ASSESSMENT     Pt tolerates session well today and was able to make small progressions with reports of mild but tolerable symptom increase.   Response to Manual Therapy: N/A   Response to Therapeutic Exercise: significant limitation in shoulder flexion ROM initially with improved pain and ROM noted  following AAROM and wall slides   Response to Neuromuscular Re-education: decreased tolerance for shoulder external rotation today due to lateral shoulder pain; regressed to yellow band   Response to Therapeutic Activity: added ball squats to improve lower extremity functional strength     Casandra is progressing well towards her goals.   Pt prognosis is Good.     Pt will continue to benefit from skilled outpatient physical therapy to address the deficits listed in the problem list box on initial evaluation, provide pt/family education and to maximize pt's level of independence in the home and community environment.     Pt's spiritual, cultural and educational needs considered and pt agreeable to plan of care and goals.    Anticipated barriers to physical therapy: increased for cueing    SHORT TERM GOALS:  updated (2/8/23) Progress Date Met   Recent signs and systems trend is improving in order to progress towards Long term goals.  [x] Met  [] Not Met  [] Progressing 1/13/22   Patient will be independent with Home Exercise Program in order to further progress and return to maximal function. [x] Met  [] Not Met  [] Progressing 3/7/2023   Pain rating at Worst: 5 /10 in order to progress towards increased independence with activity. [x] Met  [] Not Met  [] Progressing 3/7/2023   Patient will be able to correct postural deviations in sitting and standing, to decrease pain and promote postural awareness for injury prevention.  [x] Met  [] Not Met  [] Progressing 1/13/22   Patient will improve functional outcome (FOTO) score: by 5% to increase self-worth & perceived functional ability towards long term goals [] Met  [] Not Met  [x] Progressing       Updated LONG TERM GOALS: (5/8/23) Progress Date Met   Patient will return to normal activites of daily living, recreational, and work related activities with less pain and limitation.  [] Met  [] Not Met  [x] Progressing    Patient will improve range of motion  to stated goals  in order to return to maximal functional potential.  [] Met  [] Not Met  [x] Progressing    Patient will improve Strength to stated goals of appropriate musculature in order to improve functional independence.  [] Met  [] Not Met  [x] Progressing    Pain Rating at Best: 1/10 to improve Quality of Life.  [] Met  [] Not Met  [x] Progressing    Patient will meet predicted functional outcome (FOTO) score: 28% to increase self-worth & perceived functional ability. [] Met  [] Not Met  [x] Progressing    Patient will have met/partially met personal goal of: decrease pain to increase functional with daily and recreational activities.  [] Met  [] Not Met  [x] Progressing         PLAN     Updated Plan of care Certification: 3/28/2023 to 5/8/2023.    Outpatient Physical Therapy 2 times weekly for 8 weeks to include any combination of the following interventions: virtual visits, dry needling, modalities, electrical stimulation (IFC, Pre-Mod, Attended with Functional Dry Needling), Cervical/Lumbar Traction, Gait Training, Manual Therapy, Neuromuscular Re-ed, Patient Education, Self Care, Therapeutic Exercise, and Therapeutic Activites     Asmita Miller, PT

## 2023-03-30 ENCOUNTER — CLINICAL SUPPORT (OUTPATIENT)
Dept: REHABILITATION | Facility: HOSPITAL | Age: 23
End: 2023-03-30
Payer: COMMERCIAL

## 2023-03-30 DIAGNOSIS — R53.1 DECREASED STRENGTH, ENDURANCE, AND MOBILITY: Primary | ICD-10-CM

## 2023-03-30 DIAGNOSIS — R68.89 DECREASED STRENGTH, ENDURANCE, AND MOBILITY: Primary | ICD-10-CM

## 2023-03-30 DIAGNOSIS — Z74.09 DECREASED STRENGTH, ENDURANCE, AND MOBILITY: Primary | ICD-10-CM

## 2023-03-30 PROCEDURE — 97112 NEUROMUSCULAR REEDUCATION: CPT

## 2023-03-30 PROCEDURE — 97530 THERAPEUTIC ACTIVITIES: CPT

## 2023-03-31 NOTE — PROGRESS NOTES
[facility]   Physical Therapy Treatment     Name: Casandra [facility] Number: [id number]    Therapy Diagnosis:   Encounter Diagnosis   Name Primary?    Decreased strength, endurance, and mobility Yes       Physician: Marcos Borja MD    Visit Date: 3/30/2023    Physician Orders: PT Eval and Treat  Medical Diagnosis from Referral: Chronic bilateral low back pain with bilateral sciatica [M54.42, M54.41, G89.29], Sacroiliac joint dysfunction of both sides [M53.3], Right cervical radiculopathy [M54.12]  Evaluation Date: 12/8/2022  Authorization Period Expiration: 11/28/2023  Plan of Care Expiration: 5/8/2023                  Progress Update: 4/7/2023     Visit # / Visits authorized: 13/20 (+1 for evaluation)   FOTO: Visit #1 1/13/2022 - Scored: 1 / 3      [facility]: Cheer[employer]      PRECAUTIONS: Standard Precautions      MD Follow up: No scheduled follow-up    PTA Visit #: 0/5     Time In: 1115 (late arrival)   Time Out: 1203  Total Billable Time: 40 minutes    SUBJECTIVE     Pt reports:  she is feeling okay today with low pain but notes she was having a lot of low back pain last night     Compliance with Hep: Every Other Day  Response to previous treatment: felt better with decreased low back pain   Functional change: Better    Pain: 5/10 shoulder,   6/10 back   Location: R shoulder and B low back.      OBJECTIVE     Objective Measures updated at progress report unless specified otherwise.    Treatment     Casandra received the treatments listed below:       MANUAL THERAPY TECHNIQUES were applied for (0) minutes, including:    Soft tissue mobilization: N/A   Joint mobilizations: N/A   Functional dry needling: DEFERRED        THERAPEUTIC EXERCISES: to develop strength, endurance, range of motion, flexibility, posture and core stabilization for (7)  minutes including: x = performed today    Performed Today:     Upright bike level 8 for 3 minutes   Foam rolling  glutes/piriformis: 2 minutes each side    Interventions DEFERRED today     Series 6 on half foam:   Stretches   [place] pose 2x30s B   Pec corner stretch 10x10s holds   Prayer stretch 3 minutes x 10s holds   Glute stretch 2x30s B   Wall slides   Arm arc on wall (small range to tolerance) 10x            NEUROMUSCULAR RE-EDUCATION activities to improve: Coordination, Kinesthetic, Sense, and Proprioception for (23) minutes.   The following activities were included: x = exercises performed     Performed Today:     Dead bug + bridge isometric 3x6 B   Wall angels 3x10   Plank cone taps (2 cones) 3x8 B   Table push ups 3x10    Interventions DEFERRED today     Tabletop leg extensions GREEN band at feet 2x10 B   Side plank (modified with knees flexed) 2x20s B   Rows 7.5# 3x10   Shoulder extensions 7.5# 3x10   B shoulder external rotation YELLOW 3x10   Shoulder internal rotation- green band 3x10 on L   Anterior carries 15# 3 laps            THERAPEUTIC ACTIVITIES to improve dynamic and functional  performance for (10) minutes including:    Performed Today:     RDL 20# 10x, 45# 10x, 75# 8x  TRX squat + row 3x10   Ball bridge (no upper extremity support) 3x10        Interventions DEFERRED today     Bicep curls 5# 3x10 B   Chest press 5# 3x10          Next Session:   Standing clamshells        ASSESSMENT     Pt tolerated session well today; significant progressions made with no adverse reactions reported by pt.    Response to Manual Therapy: N/A   Response to Therapeutic Exercise: incorporated foam rolling of posterior hip musculature at start of session to decrease muscle tension and reduce symptoms   Response to Neuromuscular Re-education: added dead bugs with bridge isometric hold to improve core strength and stability; good shoulder elevation ROM noted with no hesitancy or guarding noted   Response to Therapeutic Activity: added RDLs and TRX squat/row to improve functional upper and lower extremity strength     Casandra is  progressing well towards her goals.   Pt prognosis is Good.     Pt will continue to benefit from skilled outpatient physical therapy to address the deficits listed in the problem list box on initial evaluation, provide pt/family education and to maximize pt's level of independence in the home and community environment.     Pt's spiritual, cultural and educational needs considered and pt agreeable to plan of care and goals.    Anticipated barriers to physical therapy: increased for cueing    SHORT TERM GOALS:  updated (2/8/23) Progress Date Met   Recent signs and systems trend is improving in order to progress towards Long term goals.  [x] Met  [] Not Met  [] Progressing 1/13/22   Patient will be independent with Home Exercise Program in order to further progress and return to maximal function. [x] Met  [] Not Met  [] Progressing 3/7/2023   Pain rating at Worst: 5 /10 in order to progress towards increased independence with activity. [x] Met  [] Not Met  [] Progressing 3/7/2023   Patient will be able to correct postural deviations in sitting and standing, to decrease pain and promote postural awareness for injury prevention.  [x] Met  [] Not Met  [] Progressing 1/13/22   Patient will improve functional outcome (FOTO) score: by 5% to increase self-worth & perceived functional ability towards long term goals [] Met  [] Not Met  [x] Progressing       Updated LONG TERM GOALS: (5/8/23) Progress Date Met   Patient will return to normal activites of daily living, recreational, and work related activities with less pain and limitation.  [] Met  [] Not Met  [x] Progressing    Patient will improve range of motion  to stated goals in order to return to maximal functional potential.  [] Met  [] Not Met  [x] Progressing    Patient will improve Strength to stated goals of appropriate musculature in order to improve functional independence.  [] Met  [] Not Met  [x] Progressing    Pain Rating at Best: 1/10 to improve Quality of  Life.  [] Met  [] Not Met  [x] Progressing    Patient will meet predicted functional outcome (FOTO) score: 28% to increase self-worth & perceived functional ability. [] Met  [] Not Met  [x] Progressing    Patient will have met/partially met personal goal of: decrease pain to increase functional with daily and recreational activities.  [] Met  [] Not Met  [x] Progressing         PLAN     Updated Plan of care Certification: 3/30/2023 to 5/8/2023.    Outpatient Physical Therapy 2 times weekly for 8 weeks to include any combination of the following interventions: virtual visits, dry needling, modalities, electrical stimulation (IFC, Pre-Mod, Attended with Functional Dry Needling), Cervical/Lumbar Traction, Gait Training, Manual Therapy, Neuromuscular Re-ed, Patient Education, Self Care, Therapeutic Exercise, and Therapeutic Activites     Asmita Miller, PT

## 2023-04-06 ENCOUNTER — CLINICAL SUPPORT (OUTPATIENT)
Dept: REHABILITATION | Facility: HOSPITAL | Age: 23
End: 2023-04-06
Payer: COMMERCIAL

## 2023-04-06 DIAGNOSIS — R68.89 DECREASED STRENGTH, ENDURANCE, AND MOBILITY: Primary | ICD-10-CM

## 2023-04-06 DIAGNOSIS — R53.1 DECREASED STRENGTH, ENDURANCE, AND MOBILITY: Primary | ICD-10-CM

## 2023-04-06 DIAGNOSIS — Z74.09 DECREASED STRENGTH, ENDURANCE, AND MOBILITY: Primary | ICD-10-CM

## 2023-04-06 PROCEDURE — 97530 THERAPEUTIC ACTIVITIES: CPT

## 2023-04-06 PROCEDURE — 97112 NEUROMUSCULAR REEDUCATION: CPT

## 2023-04-06 PROCEDURE — 97110 THERAPEUTIC EXERCISES: CPT

## 2023-04-17 NOTE — PROGRESS NOTES
[facility]   Physical Therapy Treatment     Name: Casandra [facility] Number: [id number]    Therapy Diagnosis:   Encounter Diagnosis   Name Primary?    Decreased strength, endurance, and mobility Yes       Physician: Marcos Borja MD    Visit Date: 4/6/2023    Physician Orders: PT Eval and Treat  Medical Diagnosis from Referral: Chronic bilateral low back pain with bilateral sciatica [M54.42, M54.41, G89.29], Sacroiliac joint dysfunction of both sides [M53.3], Right cervical radiculopathy [M54.12]  Evaluation Date: 12/8/2022  Authorization Period Expiration: 11/28/2023  Plan of Care Expiration: 5/8/2023                  Progress Update: 4/7/2023     Visit # / Visits authorized: 14/20 (+1 for evaluation)   FOTO: Visit #1 1/13/2022 - Scored: 1 / 3      [facility]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      OBJECTIVE     Objective Measures updated at progress report unless specified otherwise.    Treatment     Casandra received the treatments listed below:       MANUAL THERAPY TECHNIQUES were applied for (0) minutes, including:    Soft tissue mobilization: N/A   Joint mobilizations: N/A   Functional dry needling: DEFERRED        THERAPEUTIC EXERCISES: to develop strength, endurance, range of motion, flexibility, posture and core stabilization for (10)  minutes including: x = performed today    Performed Today:     Upright bike level 8 for 3 minutes   Foam rolling glutes/piriformis: 2 minutes each  side   Stretches   [place] pose 2x30s B  Interventions DEFERRED today     Series 6 on half foam:   Stretches   Pec corner stretch 10x10s holds   Prayer stretch 3 minutes x 10s holds   Glute stretch 2x30s B   Wall slides   Arm arc on wall (small range to tolerance) 10x            NEUROMUSCULAR RE-EDUCATION activities to improve: Coordination, Kinesthetic, Sense, and Proprioception for (28) minutes.   The following activities were included: x = exercises performed     Performed Today:     Dead bug + bridge isometric 3x6 B   Wall angels 3x10   Plank cone taps (2 cones) 3x8 B   Table push ups 3x10   Anterior carries 15# 3 laps   Side plank (modified with knees flexed) 2x20s B   Rows 7.5# 3x10   Shoulder extensions 7.5# 3x10   B shoulder external rotation YELLOW 3x10   Shoulder internal rotation- green band 3x10 on L  Interventions DEFERRED today     Tabletop leg extensions GREEN band at feet 2x10 B              THERAPEUTIC ACTIVITIES to improve dynamic and functional  performance for (10) minutes including:    Performed Today:     RDL 20# 10x, 45# 10x, 75# 8x  TRX squat + row 3x10   Ball bridge (no upper extremity support) 3x10        Interventions DEFERRED today     Bicep curls 5# 3x10 B   Chest press 5# 3x10          Next Session:   Standing clamshells        ASSESSMENT     Pt tolerated session well today; significant progressions made with no adverse reactions reported by pt.    Response to Manual Therapy: N/A   Response to Therapeutic Exercise: again incorporated foam rolling of posterior hip musculature at start of session to decrease muscle tension and reduce symptoms   Response to Neuromuscular Re-education: incorporated anterior carries, side plank and plank cone taps to improve core strength with improved endurance and decreased breaks required to perform interventions   Response to Therapeutic Activity: added RDLs and TRX squat/row to improve functional upper and lower extremity strength     Casandra is  progressing well towards her goals.   Pt prognosis is Good.     Pt will continue to benefit from skilled outpatient physical therapy to address the deficits listed in the problem list box on initial evaluation, provide pt/family education and to maximize pt's level of independence in the home and community environment.     Pt's spiritual, cultural and educational needs considered and pt agreeable to plan of care and goals.    Anticipated barriers to physical therapy: increased for cueing    SHORT TERM GOALS:  updated (2/8/23) Progress Date Met   Recent signs and systems trend is improving in order to progress towards Long term goals.  [x] Met  [] Not Met  [] Progressing 1/13/22   Patient will be independent with Home Exercise Program in order to further progress and return to maximal function. [x] Met  [] Not Met  [] Progressing 3/7/2023   Pain rating at Worst: 5 /10 in order to progress towards increased independence with activity. [x] Met  [] Not Met  [] Progressing 3/7/2023   Patient will be able to correct postural deviations in sitting and standing, to decrease pain and promote postural awareness for injury prevention.  [x] Met  [] Not Met  [] Progressing 1/13/22   Patient will improve functional outcome (FOTO) score: by 5% to increase self-worth & perceived functional ability towards long term goals [] Met  [] Not Met  [x] Progressing       Updated LONG TERM GOALS: (5/8/23) Progress Date Met   Patient will return to normal activites of daily living, recreational, and work related activities with less pain and limitation.  [] Met  [] Not Met  [x] Progressing    Patient will improve range of motion  to stated goals in order to return to maximal functional potential.  [] Met  [] Not Met  [x] Progressing    Patient will improve Strength to stated goals of appropriate musculature in order to improve functional independence.  [] Met  [] Not Met  [x] Progressing    Pain Rating at Best: 1/10 to improve Quality of  Life.  [] Met  [] Not Met  [x] Progressing    Patient will meet predicted functional outcome (FOTO) score: 28% to increase self-worth & perceived functional ability. [] Met  [] Not Met  [x] Progressing    Patient will have met/partially met personal goal of: decrease pain to increase functional with daily and recreational activities.  [] Met  [] Not Met  [x] Progressing         PLAN     Updated Plan of care Certification: 4/6/2023 to 5/8/2023.    Outpatient Physical Therapy 2 times weekly for 8 weeks to include any combination of the following interventions: virtual visits, dry needling, modalities, electrical stimulation (IFC, Pre-Mod, Attended with Functional Dry Needling), Cervical/Lumbar Traction, Gait Training, Manual Therapy, Neuromuscular Re-ed, Patient Education, Self Care, Therapeutic Exercise, and Therapeutic Activites     Asmita Miller, PT

## 2023-04-18 ENCOUNTER — CLINICAL SUPPORT (OUTPATIENT)
Dept: REHABILITATION | Facility: HOSPITAL | Age: 23
End: 2023-04-18
Payer: COMMERCIAL

## 2023-04-18 DIAGNOSIS — R68.89 DECREASED STRENGTH, ENDURANCE, AND MOBILITY: Primary | ICD-10-CM

## 2023-04-18 DIAGNOSIS — Z74.09 DECREASED STRENGTH, ENDURANCE, AND MOBILITY: Primary | ICD-10-CM

## 2023-04-18 DIAGNOSIS — R53.1 DECREASED STRENGTH, ENDURANCE, AND MOBILITY: Primary | ICD-10-CM

## 2023-04-18 PROCEDURE — 97112 NEUROMUSCULAR REEDUCATION: CPT

## 2023-04-18 PROCEDURE — 97110 THERAPEUTIC EXERCISES: CPT

## 2023-04-18 PROCEDURE — 97140 MANUAL THERAPY 1/> REGIONS: CPT

## 2023-04-18 NOTE — Clinical Note
Hey I wanted to see about getting Casandra back in for a follow-up with y'all. I have been seeing her since Genia went out on maternity leave. We were making progress for a while but her symptoms are so up and down. I think the low back issue is actually a muscular issue in the hips so we are working on that but she is not doing well with the shoulder at all. She is getting a lot of sharp pain, popping and clicking in the shoulder. There are many days that she cannot actively lift past 90 degrees or do basic rotator cuff strengthening. Let me know what you think   Asmita  Patient Seen in: BATON ROUGE BEHAVIORAL 

## 2023-04-20 ENCOUNTER — CLINICAL SUPPORT (OUTPATIENT)
Dept: REHABILITATION | Facility: HOSPITAL | Age: 23
End: 2023-04-20
Payer: COMMERCIAL

## 2023-04-20 DIAGNOSIS — Z74.09 DECREASED STRENGTH, ENDURANCE, AND MOBILITY: Primary | ICD-10-CM

## 2023-04-20 DIAGNOSIS — R68.89 DECREASED STRENGTH, ENDURANCE, AND MOBILITY: Primary | ICD-10-CM

## 2023-04-20 DIAGNOSIS — R53.1 DECREASED STRENGTH, ENDURANCE, AND MOBILITY: Primary | ICD-10-CM

## 2023-04-20 PROCEDURE — 97110 THERAPEUTIC EXERCISES: CPT

## 2023-04-20 PROCEDURE — 97140 MANUAL THERAPY 1/> REGIONS: CPT

## 2023-04-20 PROCEDURE — 97530 THERAPEUTIC ACTIVITIES: CPT

## 2023-04-20 PROCEDURE — 97112 NEUROMUSCULAR REEDUCATION: CPT

## 2023-04-25 ENCOUNTER — CLINICAL SUPPORT (OUTPATIENT)
Dept: REHABILITATION | Facility: HOSPITAL | Age: 23
End: 2023-04-25
Payer: COMMERCIAL

## 2023-04-25 DIAGNOSIS — R53.1 DECREASED STRENGTH, ENDURANCE, AND MOBILITY: Primary | ICD-10-CM

## 2023-04-25 DIAGNOSIS — R68.89 DECREASED STRENGTH, ENDURANCE, AND MOBILITY: Primary | ICD-10-CM

## 2023-04-25 DIAGNOSIS — Z74.09 DECREASED STRENGTH, ENDURANCE, AND MOBILITY: Primary | ICD-10-CM

## 2023-04-25 PROCEDURE — 97110 THERAPEUTIC EXERCISES: CPT

## 2023-04-25 PROCEDURE — 97140 MANUAL THERAPY 1/> REGIONS: CPT

## 2023-04-25 PROCEDURE — 97112 NEUROMUSCULAR REEDUCATION: CPT

## 2023-04-25 PROCEDURE — 97530 THERAPEUTIC ACTIVITIES: CPT

## 2023-04-25 NOTE — PROGRESS NOTES
[facility]   Physical Therapy Treatment / Progress Note    Name: Casandra Barragan  [facility] Number: [id number]    Therapy Diagnosis:   Encounter Diagnosis   Name Primary?    Decreased strength, endurance, and mobility Yes       Physician: Marcos Borja MD    Visit Date: 4/18/2023    Physician Orders: PT Eval and Treat  Medical Diagnosis from Referral: Chronic bilateral low back pain with bilateral sciatica [M54.42, M54.41, G89.29], Sacroiliac joint dysfunction of both sides [M53.3], Right cervical radiculopathy [M54.12]  Evaluation Date: 12/8/2022  Authorization Period Expiration: 11/28/2023  Plan of Care Expiration: 5/8/2023                  Progress Update: 5/8/2023     Visit # / Visits authorized: 15/20 (+1 for evaluation)   FOTO: Visit #1 1/13/2022 - Scored: 1 / 3      [facility]: [place]      PRECAUTIONS: Standard Precautions      MD Follow up: No scheduled follow-up    PTA Visit #: 0/5     Time In: 1005  Time Out: 1108  Total Billable Time: 57 minutes    SUBJECTIVE     Pt reports:  she is feeling okay today but notes significant low back pain over the weekend. Feels that shoulder is not progressing and has sharp pains with ROM.     Compliance with Hep: Every Other Day  Response to previous treatment: felt better with decreased low back pain   Functional change: Better    Pain: 5/10 shoulder,   6/10 back   Location: R shoulder and B low back.      OBJECTIVE     Objective Measures updated at progress report unless specified otherwise.    LUMBAR-     Lumbar ROM Right  (spine) Left   Pain/Dysfunction with Movement Goal   Lumbar Flexion (60º) 60 --- Functional and pain free    Lumbar Extension (30º) 30 --- Functional and pain free    Lumbar Side Bending (25º) 25 25 Functional and pain free           Shoulder AROM/PROM Right Left Pain/Dysfunction with Movement Goal   Shoulder Flexion (180º) 90/165 180 Pain at R anterior shoulder      Shoulder Abduction (180º) 70/165 180 Pain  at R anterior shoulder      Shoulder Extension (60º) 50/60 60 Pain at R anterior shoulder      Shoulder ER  at 90º (90º) 70/85 95 Pain at R anterior shoulder      Shoulder IR at 90º (70º) 60/70 70 Pain at R anterior shoulder      Functional ER T1 T6 Pain at R anterior shoulder      Functional IR L1 T8 Pain at R anterior shoulder            LE STRENGTH -     L/E MMT Right  (spine) Left Pain/Dysfunction with Movement Goal   Modified (90/90) Abdominal Strength  fair ---     Hip Flexion  4/5 4/5  4+/5 B   Hip Extension  4/5 4/5  4+/5 B   Hip Abduction  4/5 4/5  4+/5 B   Knee Extension 4+/5 4+/5  5/5 B   Knee Flexion 4/5 4/5  5/5 B   Hip IR 4/5 4/5  4+/5 B   Hip ER 4+/5 4+/5  4+/5 B   Ankle DF 5/5 5/5  5/5 B   Ankle PF 5/5 5/5  5/5 B       U/E MMT Right Left Pain/Dysfunction with Movement Goal   Shoulder Flexion 3-/5 4/5   4+/5 B   Shoulder Extension 3+/5 4+/5   4+/5 B   Shoulder Abduction 3-/5 4/5   4+/5 B   Shoulder IR 3-/5 4/5   4+/5 B   Shoulder ER 3-/5 4/5   4+/5 B   Elbow Flexion  4/5 5/5   5/5 B   Elbow Extension 4+/5 5/5          Treatment     Casandra received the treatments listed below:       MANUAL THERAPY TECHNIQUES were applied for (25) minutes, including:    Soft tissue mobilization:   bilateral  glutes, piriformis  Joint mobilizations:   Lumbar roll grade V   Functional dry needling: DEFERRED  Palpation Assessment to determine the necessity for Functional Dry Needling of Bilateral  glutes, piriformis with electrical stimulation.   See EMR under MEDIA for written consent provided 4/18/2023.         THERAPEUTIC EXERCISES: to develop strength, endurance, range of motion, flexibility, posture and core stabilization for (12)  minutes including: x = performed today    Performed Today:     Upright bike level 8 for 5 minutes   Foam rolling glutes/piriformis: 2 minutes each side   Stretches   [place] pose 2x30s B    Interventions DEFERRED today     Series 6 on half foam:   Stretches   Pec corner stretch 10x10s holds    Prayer stretch 3 minutes x 10s holds   Glute stretch 2x30s B   Wall slides   Arm arc on wall (small range to tolerance) 10x            NEUROMUSCULAR RE-EDUCATION activities to improve: Coordination, Kinesthetic, Sense, and Proprioception for (20) minutes.   The following activities were included: x = exercises performed     Performed Today:     Clamshells 3x10 B   Bridge + posterior pelvic tilt 3x10   Rows 10# 3x10   Shoulder extensions 7.5# 3x10   B shoulder external rotation YELLOW 3x10   Shoulder internal rotation- YELLOW 3x10 Interventions DEFERRED today     Tabletop leg extensions GREEN band at feet 2x10 B   Dead bug + bridge isometric 3x6 B   Wall angels 3x10   Plank cone taps (2 cones) 3x8 B   Table push ups 3x10   Anterior carries 15# 3 laps   Side plank (modified with knees flexed) 2x20s B           THERAPEUTIC ACTIVITIES to improve dynamic and functional  performance for (0) minutes including:    Performed Today:            Interventions DEFERRED today     Bicep curls 5# 3x10 B   Chest press 5# 3x10   RDL 20# 10x, 45# 10x, 75# 8x  TRX squat + row 3x10   Ball bridge (no upper extremity support) 3x10        Next Session:         ASSESSMENT     Pt tolerated session well today.    Response to Manual Therapy: It was determined that this patient would benefit from the use of functional dry needling after being cleared for all contraindications. Verbal and written consent obtained. Patient demonstrated appropriate response to Functional Dry Needling with good  rhythmical contractions observed with estim to treated muscle groups.   Response to Therapeutic Exercise: again incorporated foam rolling of posterior hip musculature at start of session to decrease muscle tension and reduce symptoms   Response to Neuromuscular Re-education: added clamshells and bridges to re-educate muscles due to significant release of tension following manual interventions   Response to Therapeutic Activity: deferred today     Casandra  is progressing well towards her goals.   Pt prognosis is Good.     Pt will continue to benefit from skilled outpatient physical therapy to address the deficits listed in the problem list box on initial evaluation, provide pt/family education and to maximize pt's level of independence in the home and community environment.     Pt's spiritual, cultural and educational needs considered and pt agreeable to plan of care and goals.    Anticipated barriers to physical therapy: increased for cueing    SHORT TERM GOALS:  updated (2/8/23) Progress Date Met   Recent signs and systems trend is improving in order to progress towards Long term goals.  [x] Met  [] Not Met  [] Progressing 1/13/22   Patient will be independent with Home Exercise Program in order to further progress and return to maximal function. [x] Met  [] Not Met  [] Progressing 3/7/2023   Pain rating at Worst: 5 /10 in order to progress towards increased independence with activity. [x] Met  [] Not Met  [] Progressing 3/7/2023   Patient will be able to correct postural deviations in sitting and standing, to decrease pain and promote postural awareness for injury prevention.  [x] Met  [] Not Met  [] Progressing 1/13/22   Patient will improve functional outcome (FOTO) score: by 5% to increase self-worth & perceived functional ability towards long term goals [] Met  [] Not Met  [x] Progressing       Updated LONG TERM GOALS: (5/8/23) Progress Date Met   Patient will return to normal activites of daily living, recreational, and work related activities with less pain and limitation.  [] Met  [] Not Met  [x] Progressing    Patient will improve range of motion  to stated goals in order to return to maximal functional potential.  [] Met  [] Not Met  [x] Progressing    Patient will improve Strength to stated goals of appropriate musculature in order to improve functional independence.  [] Met  [] Not Met  [x] Progressing    Pain Rating at Best: 1/10 to improve Quality of  Life.  [] Met  [] Not Met  [x] Progressing    Patient will meet predicted functional outcome (FOTO) score: 28% to increase self-worth & perceived functional ability. [] Met  [] Not Met  [x] Progressing    Patient will have met/partially met personal goal of: decrease pain to increase functional with daily and recreational activities.  [] Met  [] Not Met  [x] Progressing         PLAN     Updated Plan of care Certification: 4/18/2023 to 5/8/2023.    Outpatient Physical Therapy 2 times weekly for 8 weeks to include any combination of the following interventions: virtual visits, dry needling, modalities, electrical stimulation (IFC, Pre-Mod, Attended with Functional Dry Needling), Cervical/Lumbar Traction, Gait Training, Manual Therapy, Neuromuscular Re-ed, Patient Education, Self Care, Therapeutic Exercise, and Therapeutic Activites     Asmita Miller, PT

## 2023-04-25 NOTE — PROGRESS NOTES
[facility] OUTPATIENT THERAPY AND WELLNESS   Physical Therapy Treatment / Progress Note    Name: Casandra [facility] Number: [id number]    Therapy Diagnosis:   Encounter Diagnosis   Name Primary?    Decreased strength, endurance, and mobility Yes       Physician: Marcos Borja MD    Visit Date: 4/20/2023    Physician Orders: PT Eval and Treat  Medical Diagnosis from Referral: Chronic bilateral low back pain with bilateral sciatica [M54.42, M54.41, G89.29], Sacroiliac joint dysfunction of both sides [M53.3], Right cervical radiculopathy [M54.12]  Evaluation Date: 12/8/2022  Authorization Period Expiration: 11/28/2023  Plan of Care Expiration: 5/8/2023                  Progress Update: 5/8/2023     Visit # / Visits authorized: 16/20 (+1 for evaluation)   FOTO: Visit #1 1/13/2022 - Scored: 1 / 3      [facility]: [place]      PRECAUTIONS: Standard Precautions      MD Follow up: No scheduled follow-up    PTA Visit #: 0/5     Time In: 1003  Time Out: 1105  Total Billable Time: 58 minutes    SUBJECTIVE     Pt reports:  she has been sore following functional dry needling but thinks it may have helped with muscle tension. 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      OBJECTIVE     Objective Measures updated at progress report unless specified otherwise.    Treatment     Casandra received the treatments listed below:       MANUAL THERAPY TECHNIQUES were applied for (15) minutes, including:    Soft tissue mobilization:   bilateral  glutes, piriformis and deep hip rotators   Joint mobilizations: DEFERRED  Lumbar roll grade V   Functional dry needling: DEFERRED  Palpation Assessment to determine the necessity for Functional Dry Needling of Bilateral  glutes,  piriformis with electrical stimulation.   See EMR under MEDIA for written consent provided 4/18/2023.         THERAPEUTIC EXERCISES: to develop strength, endurance, range of motion, flexibility, posture and core stabilization for (15)  minutes including: x = performed today    Performed Today:     Upright bike level 8 for 5 minutes   Foam rolling glutes/piriformis: 2 minutes each side   Stretches   [place] pose 2x30s B   Wall slides flexion with self assist 15x Interventions DEFERRED today     Series 6 on half foam:   Stretches   Pec corner stretch 10x10s holds   Prayer stretch 3 minutes x 10s holds   Glute stretch 2x30s B            NEUROMUSCULAR RE-EDUCATION activities to improve: Coordination, Kinesthetic, Sense, and Proprioception for (20) minutes.   The following activities were included: x = exercises performed     Performed Today:     Standing Clamshells 3x10 B   Plank cone taps (2 cones) 3x8 B   Shoulder external rotation to neutral YELLOW 3x10   Shoulder internal rotation walk outs- YELLOW 3x10  SL RDL 15# 3x10 on L   Staggered RDL 3x10 on R  Interventions DEFERRED today     Tabletop leg extensions GREEN band at feet 2x10 B   Dead bug + bridge isometric 3x6 B   Wall angels 3x10   Table push ups 3x10   Anterior carries 15# 3 laps   Side plank (modified with knees flexed) 2x20s B  Rows 10# 3x10   Shoulder extensions 7.5# 3x10          THERAPEUTIC ACTIVITIES to improve dynamic and functional  performance for (8) minutes including:    Performed Today:     Single leg hip thrusts 3x10 B   TRX squat + row 3x10      Interventions DEFERRED today            Next Session:         ASSESSMENT     Pt demonstrates limited tolerance for session today and therefore was unable to tolerate progressions.  Will reach out to physician regarding follow-up appointment due to continued pain and significant limitation in shoulder symptoms    Response to Manual Therapy: significant muscle tension and tenderness to palpation noted  initially with significant improvement noted following intervention.   Response to Therapeutic Exercise: added wall slides for shoulder ROM; however, pt required assist from contralateral upper extremity to maximize ROM.   Response to Neuromuscular Re-education: pt reports significant discomfort with shoulder IR and ER. Limited ER to neutral and regressed IR to walk outs with improved symptoms noted.   Response to Therapeutic Activity: added SL hip thrusts to improve posterior chain strength; breaks required throughout intervention due to significant muscle fatigue     Casandra is progressing well towards her goals.   Pt prognosis is Good.     Pt will continue to benefit from skilled outpatient physical therapy to address the deficits listed in the problem list box on initial evaluation, provide pt/family education and to maximize pt's level of independence in the home and community environment.     Pt's spiritual, cultural and educational needs considered and pt agreeable to plan of care and goals.    Anticipated barriers to physical therapy: increased for cueing    SHORT TERM GOALS:  updated (2/8/23) Progress Date Met   Recent signs and systems trend is improving in order to progress towards Long term goals.  [x] Met  [] Not Met  [] Progressing 1/13/22   Patient will be independent with Home Exercise Program in order to further progress and return to maximal function. [x] Met  [] Not Met  [] Progressing 3/7/2023   Pain rating at Worst: 5 /10 in order to progress towards increased independence with activity. [x] Met  [] Not Met  [] Progressing 3/7/2023   Patient will be able to correct postural deviations in sitting and standing, to decrease pain and promote postural awareness for injury prevention.  [x] Met  [] Not Met  [] Progressing 1/13/22   Patient will improve functional outcome (FOTO) score: by 5% to increase self-worth & perceived functional ability towards long term goals [] Met  [] Not Met  [x] Progressing        Updated LONG TERM GOALS: (5/8/23) Progress Date Met   Patient will return to normal activites of daily living, recreational, and work related activities with less pain and limitation.  [] Met  [] Not Met  [x] Progressing    Patient will improve range of motion  to stated goals in order to return to maximal functional potential.  [] Met  [] Not Met  [x] Progressing    Patient will improve Strength to stated goals of appropriate musculature in order to improve functional independence.  [] Met  [] Not Met  [x] Progressing    Pain Rating at Best: 1/10 to improve Quality of Life.  [] Met  [] Not Met  [x] Progressing    Patient will meet predicted functional outcome (FOTO) score: 28% to increase self-worth & perceived functional ability. [] Met  [] Not Met  [x] Progressing    Patient will have met/partially met personal goal of: decrease pain to increase functional with daily and recreational activities.  [] Met  [] Not Met  [x] Progressing         PLAN     Updated Plan of care Certification: 4/20/2023 to 5/8/2023.    Outpatient Physical Therapy 2 times weekly for 8 weeks to include any combination of the following interventions: virtual visits, dry needling, modalities, electrical stimulation (IFC, Pre-Mod, Attended with Functional Dry Needling), Cervical/Lumbar Traction, Gait Training, Manual Therapy, Neuromuscular Re-ed, Patient Education, Self Care, Therapeutic Exercise, and Therapeutic Activites     Asmita Miller PT

## 2023-04-27 ENCOUNTER — CLINICAL SUPPORT (OUTPATIENT)
Dept: REHABILITATION | Facility: HOSPITAL | Age: 23
End: 2023-04-27
Payer: COMMERCIAL

## 2023-04-27 DIAGNOSIS — R68.89 DECREASED STRENGTH, ENDURANCE, AND MOBILITY: Primary | ICD-10-CM

## 2023-04-27 DIAGNOSIS — R53.1 DECREASED STRENGTH, ENDURANCE, AND MOBILITY: Primary | ICD-10-CM

## 2023-04-27 DIAGNOSIS — Z74.09 DECREASED STRENGTH, ENDURANCE, AND MOBILITY: Primary | ICD-10-CM

## 2023-04-27 PROCEDURE — 97112 NEUROMUSCULAR REEDUCATION: CPT

## 2023-04-27 PROCEDURE — 97530 THERAPEUTIC ACTIVITIES: CPT

## 2023-04-27 PROCEDURE — 97110 THERAPEUTIC EXERCISES: CPT

## 2023-04-27 PROCEDURE — 97140 MANUAL THERAPY 1/> REGIONS: CPT

## 2023-04-28 NOTE — PROGRESS NOTES
[facility] OUTPAT[facility]ERAPY AND [facility]LL[facility]   Physical Therapy Treatment     Name: Casandra [facility] Number: [id number]    Therapy Diagnosis:   Encounter Diagnosis   Name Primary?    Decreased strength, endurance, and mobility Yes       Physician: Marcos Borja MD    Visit Date: 4/25/2023    Physician Orders: PT Eval and Treat  Medical Diagnosis from Referral: Chronic bilateral low back pain with bilateral sciatica [M54.42, M54.41, G89.29], Sacroiliac joint dysfunction of both sides [M53.3], Right cervical radiculopathy [M54.12]  Evaluation Date: 12/8/2022  Authorization Period Expiration: 11/28/2023  Plan of Care Expiration: 5/8/2023                  Progress Update: 5/8/2023     Visit # / Visits authorized: 17/20 (+1 for evaluation)   FOTO: Visit #1 1/13/2022 - Scored: 1 / 3      [facility]: [place]      PRECAUTIONS: Standard Precautions      MD Follow up: No scheduled follow-up    PTA Visit #: 0/5     Time In: 1003  Time Out: 1105  Total Billable Time: 58 minutes    SUBJECTIVE     Pt reports:  her back had been feeling pretty good until yesterday and states that pain was so bad that it was difficult for her to move     Compliance with Hep: Every Other Day  Response to previous treatment: felt better with decreased low back pain   Functional change: Better    Pain: 5/10 shoulder,   6/10 back   Location: R shoulder and B low back.      OBJECTIVE     Objective Measures updated at progress report unless specified otherwise.    Treatment     aCsandra received the treatments listed below:       MANUAL THERAPY TECHNIQUES were applied for (15) minutes, including:    Soft tissue mobilization:   bilateral  glutes, piriformis and deep hip rotators   Joint mobilizations: DEFERRED  Lumbar roll grade V   Functional dry needling:   Palpation Assessment to determine the necessity for Functional Dry Needling of Bilateral  glutes, piriformis with electrical stimulation.   See EMR under MEDIA for written consent  provided 4/18/2023.         THERAPEUTIC EXERCISES: to develop strength, endurance, range of motion, flexibility, posture and core stabilization for (15)  minutes including: x = performed today    Performed Today:     Upright bike level 8 for 5 minutes   Foam rolling glutes/piriformis: 2 minutes each side   Stretches   [place] pose 2x30s B   Wall slides flexion with self assist 15x Interventions DEFERRED today     Series 6 on half foam:   Stretches   Pec corner stretch 10x10s holds   Prayer stretch 3 minutes x 10s holds   Glute stretch 2x30s B            NEUROMUSCULAR RE-EDUCATION activities to improve: Coordination, Kinesthetic, Sense, and Proprioception for (20) minutes.   The following activities were included: x = exercises performed     Performed Today:     Standing Clamshells 3x10 B   Plank cone taps (2 cones) 3x8 B   Shoulder external rotation to neutral YELLOW 3x10   Shoulder internal rotation walk outs- YELLOW 3x10  SL RDL 15# 3x10 on L   Staggered RDL 3x10 on R  Interventions DEFERRED today     Tabletop leg extensions GREEN band at feet 2x10 B   Dead bug + bridge isometric 3x6 B   Wall angels 3x10   Table push ups 3x10   Anterior carries 15# 3 laps   Side plank (modified with knees flexed) 2x20s B  Rows 10# 3x10   Shoulder extensions 7.5# 3x10          THERAPEUTIC ACTIVITIES to improve dynamic and functional  performance for (8) minutes including:    Performed Today:     Single leg hip thrusts 3x10 B   TRX squat + row 3x10      Interventions DEFERRED today            Next Session:         ASSESSMENT     Pt demonstrates limited tolerance for session today and therefore was unable to tolerate progressions.    Response to Manual Therapy: incorporated functional dry needling and soft tissue mobilization with significant reduction in muscle tension and mild improvement in pain noted.   Response to Therapeutic Exercise: continue to incorporated foam rolling and pigeon pose stretch to improve soft tissue mobility  throughout the posterolateral hip   Response to Neuromuscular Re-education: no significant progressions incorporated due to symptoms. Casandra continues to have pain and difficulty with resisted shoulder rotation interventions   Response to Therapeutic Activity: no significant changes incorporated today due to symptoms; both interventions tolerated well     Casandra is progressing well towards her goals.   Pt prognosis is Good.     Pt will continue to benefit from skilled outpatient physical therapy to address the deficits listed in the problem list box on initial evaluation, provide pt/family education and to maximize pt's level of independence in the home and community environment.     Pt's spiritual, cultural and educational needs considered and pt agreeable to plan of care and goals.    Anticipated barriers to physical therapy: increased for cueing    SHORT TERM GOALS:  updated (2/8/23) Progress Date Met   Recent signs and systems trend is improving in order to progress towards Long term goals.  [x] Met  [] Not Met  [] Progressing 1/13/22   Patient will be independent with Home Exercise Program in order to further progress and return to maximal function. [x] Met  [] Not Met  [] Progressing 3/7/2023   Pain rating at Worst: 5 /10 in order to progress towards increased independence with activity. [x] Met  [] Not Met  [] Progressing 3/7/2023   Patient will be able to correct postural deviations in sitting and standing, to decrease pain and promote postural awareness for injury prevention.  [x] Met  [] Not Met  [] Progressing 1/13/22   Patient will improve functional outcome (FOTO) score: by 5% to increase self-worth & perceived functional ability towards long term goals [] Met  [] Not Met  [x] Progressing       Updated LONG TERM GOALS: (5/8/23) Progress Date Met   Patient will return to normal activites of daily living, recreational, and work related activities with less pain and limitation.  [] Met  [] Not Met  [x]  Progressing    Patient will improve range of motion  to stated goals in order to return to maximal functional potential.  [] Met  [] Not Met  [x] Progressing    Patient will improve Strength to stated goals of appropriate musculature in order to improve functional independence.  [] Met  [] Not Met  [x] Progressing    Pain Rating at Best: 1/10 to improve Quality of Life.  [] Met  [] Not Met  [x] Progressing    Patient will meet predicted functional outcome (FOTO) score: 28% to increase self-worth & perceived functional ability. [] Met  [] Not Met  [x] Progressing    Patient will have met/partially met personal goal of: decrease pain to increase functional with daily and recreational activities.  [] Met  [] Not Met  [x] Progressing         PLAN     Updated Plan of care Certification: 4/25/2023 to 5/8/2023.    Outpatient Physical Therapy 2 times weekly for 8 weeks to include any combination of the following interventions: virtual visits, dry needling, modalities, electrical stimulation (IFC, Pre-Mod, Attended with Functional Dry Needling), Cervical/Lumbar Traction, Gait Training, Manual Therapy, Neuromuscular Re-ed, Patient Education, Self Care, Therapeutic Exercise, and Therapeutic Activites     Asmita Miller, PT

## 2023-05-02 ENCOUNTER — OFFICE VISIT (OUTPATIENT)
Dept: SPORTS MEDICINE | Facility: CLINIC | Age: 23
End: 2023-05-02
Payer: COMMERCIAL

## 2023-05-02 VITALS — HEIGHT: 63 IN | WEIGHT: 120 LBS | BODY MASS INDEX: 21.26 KG/M2

## 2023-05-02 DIAGNOSIS — M75.41 INTERNAL IMPINGEMENT OF RIGHT SHOULDER: ICD-10-CM

## 2023-05-02 DIAGNOSIS — M54.41 CHRONIC BILATERAL LOW BACK PAIN WITH BILATERAL SCIATICA: ICD-10-CM

## 2023-05-02 DIAGNOSIS — G89.4 CHRONIC PAIN SYNDROME: Primary | ICD-10-CM

## 2023-05-02 DIAGNOSIS — M25.511 CHRONIC RIGHT SHOULDER PAIN: ICD-10-CM

## 2023-05-02 DIAGNOSIS — M54.42 CHRONIC BILATERAL LOW BACK PAIN WITH BILATERAL SCIATICA: ICD-10-CM

## 2023-05-02 DIAGNOSIS — G89.29 CHRONIC RIGHT SHOULDER PAIN: ICD-10-CM

## 2023-05-02 DIAGNOSIS — M54.9 DORSALGIA, UNSPECIFIED: ICD-10-CM

## 2023-05-02 DIAGNOSIS — G89.29 CHRONIC BILATERAL LOW BACK PAIN WITH BILATERAL SCIATICA: ICD-10-CM

## 2023-05-02 PROCEDURE — 99999 PR PBB SHADOW E&M-EST. PATIENT-LVL III: ICD-10-PCS | Mod: PBBFAC,,, | Performed by: STUDENT IN AN ORGANIZED HEALTH CARE EDUCATION/TRAINING PROGRAM

## 2023-05-02 PROCEDURE — 99999 PR PBB SHADOW E&M-EST. PATIENT-LVL III: CPT | Mod: PBBFAC,,, | Performed by: STUDENT IN AN ORGANIZED HEALTH CARE EDUCATION/TRAINING PROGRAM

## 2023-05-02 PROCEDURE — 99214 OFFICE O/P EST MOD 30 MIN: CPT | Mod: S$PBB,,, | Performed by: STUDENT IN AN ORGANIZED HEALTH CARE EDUCATION/TRAINING PROGRAM

## 2023-05-02 PROCEDURE — 99214 PR OFFICE/OUTPT VISIT, EST, LEVL IV, 30-39 MIN: ICD-10-PCS | Mod: S$PBB,,, | Performed by: STUDENT IN AN ORGANIZED HEALTH CARE EDUCATION/TRAINING PROGRAM

## 2023-05-02 RX ORDER — CYCLOBENZAPRINE HCL 5 MG
5 TABLET ORAL 3 TIMES DAILY PRN
Qty: 30 TABLET | Refills: 1 | Status: SHIPPED | OUTPATIENT
Start: 2023-05-02 | End: 2023-05-22

## 2023-05-02 RX ORDER — AMITRIPTYLINE HYDROCHLORIDE 10 MG/1
TABLET, FILM COATED ORAL
Qty: 90 TABLET | Refills: 1 | Status: SHIPPED | OUTPATIENT
Start: 2023-05-02 | End: 2023-06-05

## 2023-05-02 NOTE — PATIENT INSTRUCTIONS
Assessment:  Casandra Barragan is a 22 y.o. female with a chief complaint of Pain of the Spine and Pain of the Right Shoulder    Encounter Diagnoses   Name Primary?    Chronic pain syndrome Yes    Chronic bilateral low back pain with bilateral sciatica     Dorsalgia, unspecified     Chronic right shoulder pain     Internal impingement of right shoulder         Plan:  Chronic Pain syndrome  May be psychosomatic element of her pain, as she has had a lot of personal, external stressors in her life over the past year, and she continues with therapy at school  Flexeril 5 mg, as needed for back pain flares  Start amitriptyline (Elavil) for chronic pain  Start with 10 mg (1 tablet) nightly, for at least the next 2 weeks  Then, increase to 20 mg (2 tablets), nightly, for the next 2 weeks  Then, increase to 30 mg (3 tablets), nightly, for the next 2 weeks  May benefit from chronic pain counseling - will look into options  Chronic low back pain  MRI normal - no evidence of stress injury, or disc issue  Flexeril, as above  Amitriptyline, as above  Continue PT  Chronic right shoulder pain, internal impingement, history of dislocation  Started after a dislocation event over a year ago  Concern for internal impingement syndrome  Has undergone previous glenohumeral corticosteroid injection with approx 1 month of relief  Has been doing PT for nearly 15 months now, without significant improvement  Recommend to follow up with Dr Dozier regarding persistent shoulder pain, internal impingement, possible indication for arthroscopy  Continue physical therapy    Follow-up: 6-8 weeks or sooner if there are any problems between now and then.    Thank you for choosing [facility] and Dr. Marcos Borja for your orthopedic & sports medicine care. It is our goal to provide you with exceptional care that will help keep you healthy, active, and get you back in the game.    Please do not hesitate to reach out to us via email,  phone, or MyChart with any questions, concerns, or feedback.    If you are experiencing pain/discomfort ,or have questions after 5pm and would like to be connected to the [facility] on-call team, please call this number and specify which Sports Medicine provider is treating you: [phone]

## 2023-05-02 NOTE — PROGRESS NOTES
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 female who presents today with 5/10 pain for follow up of lumbar spine and 6/10 pain of Right shoulder.     The patient is active in cheerleading.    She is here today for follow up of low back pain and right shoulder pain. She reports that her low back pain is a intermittent aching, sharp pain that is aggravated by walking and sometimes when she is sneezing. She has been attending physical therapy at the [facility] with Asmita for her right shoulder and low back pain.     Her right shoulder is a constant aching pain. Her pain is aggravated by overhead movement, pushing/pulling, and reports numbness that radiates into her hand.     To review her history, Casandra has a history of chronic low back pain that began in 2019. She reports the pain as a sharp, aching pain in her low back. She also reports radiating symptoms. She has participated in physical therapy and chiropractic therapy with improvement.     She also has a history of right shoulder pain since October 2021. She has had previous MR arthrogram of Right shoulder that showed small Hill-Sachs lesion and humeral avulsion of the glenohumeral ligament. She has attended physical therapy at the [facility] and NSAIDs with minimal improvement in symptoms. She reports that her Right glenohumeral corticosteroid injection 5/19/2022 provided some relief of pain. She has also had EMG study performed in December 2022 that showed mild carpal tunnel syndrome.     Past Medical History:   History reviewed. No pertinent past medical history.  History reviewed. No pertinent surgical history.  Family History   Problem Relation Age of Onset    No Known Problems Mother     No Known Problems Father      Social  History     Socioeconomic History    Marital status: Single   Tobacco Use    Smoking status: Never     Passive exposure: Never    Smokeless tobacco: Never   Substance and Sexual Activity    Alcohol use: Never    Drug use: Never    Sexual activity: Not Currently     Partners: Male     Medication List with Changes/Refills   New Medications    AMITRIPTYLINE (ELAVIL) 10 MG TABLET    Take 1 tablet (10 mg total) by mouth every evening for 14 days, THEN 2 tablets (20 mg total) every evening for 14 days, THEN 3 tablets (30 mg total) every evening for 14 days.    CYCLOBENZAPRINE (FLEXERIL) 5 MG TABLET    Take 1 tablet (5 mg total) by mouth 3 (three) times daily as needed for Muscle spasms.   Current Medications    MEDROXYPROGESTERONE (DEPO-PROVERA) 150 MG/ML INJECTION    Inject 150 mg into the muscle.    NORETHINDRONE-ETHINYL ESTRADIOL (ORTHO-NOVUM 7/7/7, 28,) 0.5/0.75/1 MG- 35 MCG PER TABLET    Take 1 tablet by mouth once daily.    XULANE 150-35 MCG/24 HR    1 patch every 7 days.   Discontinued Medications    INDOMETHACIN (INDOCIN) 50 MG CAPSULE    Take 1 capsule (50 mg total) by mouth 2 (two) times daily with meals.     Review of patient's allergies indicates:   Allergen Reactions    Amoxicillin Rash    Clindamycin Other (See Comments)     Palm and foot skin peeling off  Palm and foot skin peeling off         Physical Exam:   Body mass index is 21.26 kg/m².    Physical Exam  Constitutional:       General: She is not in acute distress.     Appearance: Normal appearance.   HENT:      Head: Normocephalic and atraumatic.   Eyes:      Extraocular Movements: Extraocular movements intact.      Conjunctiva/sclera: Conjunctivae normal.   Pulmonary:      Effort: Pulmonary effort is normal. No respiratory distress.   Skin:     General: Skin is warm and dry.   Neurological:      General: No focal deficit present.      Mental Status: She is alert and oriented to person, place, and time.   Psychiatric:         Mood and Affect: Mood  normal.         Detailed MSK exam:   General    Constitutional: She is oriented to person, place, and time. No distress.   HENT:   Head: Normocephalic and atraumatic.   Eyes: Conjunctivae are normal.   Pulmonary/Chest: Effort normal. No respiratory distress.   Abdominal: Normal appearance.   Neurological: She is alert and oriented to person, place, and time.   Psychiatric: Mood normal.              Right Shoulder:    Inspection: no swelling, ecchymosis, erythema;    Palpation tenderness: Lateral Subacromial space , Anterior subacromial Space, and Bicipital Groove, upper trapezius    Range of motion: 120 deg Flexion, right shoulder hike          90 deg Abduction, right shoulder hike         50 deg External Rotation in Adduction         IR to back pocket    Strength:  4/5 Abduction    4/5 External Rotation in Adduction    4/5 Internal Rotation     N/V Rad Exam:  C4 Normal sensory (clavicle)   Normal motor (no scapular winging)     C5 Normal sensory (lateral upper arm)  Normal motor (shoulder abd)     C6 Normal sensory (radial hand)   Normal motor (wrist ext)  C7 Normal sensory (2, 3, 4 fingers)  Normal motor (wrist flex)  C8 Normal sensory (5th finger)   Normal motor (thumb ext)  T1 Normal sensory (medial elbow)  Normal motor (finger abd)    Biceps tendon reflex normal  Brachialis tendon reflex normal  Triceps tendon reflex normal  Rocha's reflex normal     Normal radial and ulnar pulses, warm and well perfused with capillary refill < 2 sec    Empty Can (+)  Obriens (+)    Lumbar spine  Palpation tenderness: bilateral Quadratus lumborum, midline lumbar spine, bilateral piriformis, bilateral SI joint  Range of motion: 90 degrees flexion, discomfort noted        30 deg extension  N/V Rad Exam:  L1 Normal sensory (iliac crest)     L2-3 Normal sensory (anterior, medial thigh)   Normal motor (hip add)     L4 Normal sensory (lateral thigh, anterior knee, medial leg) Normal motor (knee ext)  L5 Normal sensory (lateral leg,  dorsal foot)   Normal motor (toe DF, hip ext)  S1 Normal sensory (posterior leg)    Normal motor (PF, EV)  S2 Normal sensory (plantar foot)     Normal motor (toe PF)   Normal pedal pulses, warm and well perfused with capillary refill < 2 sec  Patella tendon reflex normal  Achilles tendon reflex normal    Straight leg raise (+), bilateral  VITO (+), bilateral  Stork (+), bilateral                      Imaging:  MRI Lumbar Spine Without Contrast  Narrative: EXAMINATION:  MRI LUMBAR SPINE WITHOUT CONTRAST    CLINICAL HISTORY:  Low back pain, symptoms persist with > 6wks conservative treatment;suspect lumbar stress fracture vs spondylolysis; Dorsalgia, unspecified    TECHNIQUE:  Multiplanar, multisequence MR images were acquired from the thoracolumbar junction to the sacrum without the administration of contrast.    COMPARISON:  None.    FINDINGS:  Alignment: Normal.    Vertebrae: Normal marrow signal. No fracture.  No pars defects.    Discs: Normal height and signal.    Cord: Normal.  Conus terminates at L1-2.    Degenerative findings:    T12-L1: No significant neural foraminal narrowing or spinal canal stenosis.    L1-L2: No significant neural foraminal narrowing or spinal canal stenosis.    L2-L3: No significant neural foraminal narrowing or spinal canal stenosis.    L3-L4: No significant neural foraminal narrowing or spinal canal stenosis.    L4-L5: No significant neural foraminal narrowing or spinal canal stenosis.    L5-S1: No significant neural foraminal narrowing or spinal canal stenosis.    Paraspinal muscles & soft tissues: Unremarkable.  Impression: Unremarkable MRI of the lumbar spine.    Electronically signed by: SPENCER Long MD  Date:    11/25/2022  Time:    09:24      Relevant imaging results were reviewed and interpreted by me and per my read:  Normal MRI of lumbar spine. No pars defect. No evidence of stress injury/rxn/fracture. No disc herniation.      This was discussed with the patient and / or  family today.       Patient Instructions   Assessment:  Casandra Barragan is a 22 y.o. female with a chief complaint of Pain of the Spine and Pain of the Right Shoulder    Encounter Diagnoses   Name Primary?    Chronic pain syndrome Yes    Chronic bilateral low back pain with bilateral sciatica     Dorsalgia, unspecified     Chronic right shoulder pain     Internal impingement of right shoulder         Plan:  Chronic Pain syndrome  May be psychosomatic element of her pain, as she has had a lot of personal, external stressors in her life over the past year, and she continues with therapy at school  Flexeril 5 mg, as needed for back pain flares  Start amitriptyline (Elavil) for chronic pain  Start with 10 mg (1 tablet) nightly, for at least the next 2 weeks  Then, increase to 20 mg (2 tablets), nightly, for the next 2 weeks  Then, increase to 30 mg (3 tablets), nightly, for the next 2 weeks  May benefit from chronic pain counseling - will look into options  Chronic low back pain  MRI normal - no evidence of stress injury, or disc issue  Flexeril, as above  Amitriptyline, as above  Continue PT  Chronic right shoulder pain, internal impingement, history of dislocation  Started after a dislocation event over a year ago  Concern for internal impingement syndrome  Has undergone previous glenohumeral corticosteroid injection with approx 1 month of relief  Has been doing PT for nearly 15 months now, without significant improvement  Recommend to follow up with Dr Dozier regarding persistent shoulder pain, internal impingement, possible indication for arthroscopy  Continue physical therapy    Follow-up: 6-8 weeks or sooner if there are any problems between now and then.    Thank you for choosing [facility] and Dr. Marcos Borja for your orthopedic & sports medicine care. It is our goal to provide you with exceptional care that will help keep you healthy, active, and get you back in the game.    Please do  not hesitate to reach out to us via email, phone, or MyChart with any questions, concerns, or feedback.    If you are experiencing pain/discomfort ,or have questions after 5pm and would like to be connected to the [facility] on-call team, please call this number and specify which Sports Medicine provider is treating you: [phone]           Marcos Borja MD  Primary Care Sports Medicine      Disclaimer: This note was prepared using a voice recognition system and is likely to have sound alike errors within the text.

## 2023-05-03 DIAGNOSIS — M25.511 CHRONIC RIGHT SHOULDER PAIN: Primary | ICD-10-CM

## 2023-05-03 DIAGNOSIS — G89.29 CHRONIC RIGHT SHOULDER PAIN: Primary | ICD-10-CM

## 2023-05-04 ENCOUNTER — OFFICE VISIT (OUTPATIENT)
Dept: SPORTS MEDICINE | Facility: CLINIC | Age: 23
End: 2023-05-04
Payer: COMMERCIAL

## 2023-05-04 ENCOUNTER — HOSPITAL ENCOUNTER (OUTPATIENT)
Dept: RADIOLOGY | Facility: HOSPITAL | Age: 23
Discharge: HOME | End: 2023-05-04
Attending: ORTHOPAEDIC SURGERY
Payer: COMMERCIAL

## 2023-05-04 DIAGNOSIS — G25.89 SCAPULAR DYSKINESIS: ICD-10-CM

## 2023-05-04 DIAGNOSIS — G89.29 CHRONIC RIGHT SHOULDER PAIN: ICD-10-CM

## 2023-05-04 DIAGNOSIS — M25.511 CHRONIC RIGHT SHOULDER PAIN: ICD-10-CM

## 2023-05-04 DIAGNOSIS — M75.01 ADHESIVE CAPSULITIS OF RIGHT SHOULDER: Primary | ICD-10-CM

## 2023-05-04 DIAGNOSIS — G89.4 CHRONIC PAIN SYNDROME: ICD-10-CM

## 2023-05-04 PROCEDURE — 73030 X-RAY EXAM OF SHOULDER: CPT | Mod: 26,RT,, | Performed by: RADIOLOGY

## 2023-05-04 PROCEDURE — 99214 OFFICE O/P EST MOD 30 MIN: CPT | Mod: S$PBB,,, | Performed by: ORTHOPAEDIC SURGERY

## 2023-05-04 PROCEDURE — 99999 PR PBB SHADOW E&M-EST. PATIENT-LVL III: CPT | Mod: PBBFAC,,, | Performed by: ORTHOPAEDIC SURGERY

## 2023-05-04 PROCEDURE — 73030 X-RAY EXAM OF SHOULDER: CPT | Mod: TC,RT

## 2023-05-04 PROCEDURE — 99214 PR OFFICE/OUTPT VISIT, EST, LEVL IV, 30-39 MIN: ICD-10-PCS | Mod: S$PBB,,, | Performed by: ORTHOPAEDIC SURGERY

## 2023-05-04 PROCEDURE — 73030 XR SHOULDER COMPLETE 2 OR MORE VIEWS RIGHT: ICD-10-PCS | Mod: 26,RT,, | Performed by: RADIOLOGY

## 2023-05-04 PROCEDURE — 99999 PR PBB SHADOW E&M-EST. PATIENT-LVL III: ICD-10-PCS | Mod: PBBFAC,,, | Performed by: ORTHOPAEDIC SURGERY
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
## 2023-05-04 NOTE — PATIENT INSTRUCTIONS
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    Adhesive capsulitis of right shoulder Yes    Scapular dyskinesis     Chronic right shoulder pain     Chronic pain syndrome       Plan:  Referral to Dr. Flanagan for 2nd opinion  Continue therapy with Asmita    Follow-up: With Masha or sooner if there are any problems between now and then.    Leave Review:   Google: Leave Google Review  Healthgrades: Leave Healthgrades Review    After Hours Number: [phone]
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
## 2023-05-09 ENCOUNTER — OFFICE VISIT (OUTPATIENT)
Dept: SPORTS MEDICINE | Facility: CLINIC | Age: 23
End: 2023-05-09
Payer: COMMERCIAL

## 2023-05-09 ENCOUNTER — CLINICAL SUPPORT (OUTPATIENT)
Dept: REHABILITATION | Facility: HOSPITAL | Age: 23
End: 2023-05-09
Payer: COMMERCIAL

## 2023-05-09 VITALS — WEIGHT: 120 LBS | HEIGHT: 63 IN | BODY MASS INDEX: 21.26 KG/M2

## 2023-05-09 DIAGNOSIS — G89.29 CHRONIC RIGHT SHOULDER PAIN: Primary | ICD-10-CM

## 2023-05-09 DIAGNOSIS — M25.511 CHRONIC RIGHT SHOULDER PAIN: Primary | ICD-10-CM

## 2023-05-09 DIAGNOSIS — Z74.09 DECREASED STRENGTH, ENDURANCE, AND MOBILITY: Primary | ICD-10-CM

## 2023-05-09 DIAGNOSIS — R68.89 DECREASED STRENGTH, ENDURANCE, AND MOBILITY: Primary | ICD-10-CM

## 2023-05-09 DIAGNOSIS — R53.1 DECREASED STRENGTH, ENDURANCE, AND MOBILITY: Primary | ICD-10-CM

## 2023-05-09 DIAGNOSIS — M21.821 HILL-SACHS LESION OF RIGHT SHOULDER: ICD-10-CM

## 2023-05-09 PROCEDURE — 99214 OFFICE O/P EST MOD 30 MIN: CPT | Mod: S$GLB,,, | Performed by: STUDENT IN AN ORGANIZED HEALTH CARE EDUCATION/TRAINING PROGRAM

## 2023-05-09 PROCEDURE — 97112 NEUROMUSCULAR REEDUCATION: CPT

## 2023-05-09 PROCEDURE — 97530 THERAPEUTIC ACTIVITIES: CPT

## 2023-05-09 PROCEDURE — 99999 PR PBB SHADOW E&M-EST. PATIENT-LVL III: CPT | Mod: PBBFAC,,, | Performed by: STUDENT IN AN ORGANIZED HEALTH CARE EDUCATION/TRAINING PROGRAM

## 2023-05-09 PROCEDURE — 99214 PR OFFICE/OUTPT VISIT, EST, LEVL IV, 30-39 MIN: ICD-10-PCS | Mod: S$GLB,,, | Performed by: STUDENT IN AN ORGANIZED HEALTH CARE EDUCATION/TRAINING PROGRAM

## 2023-05-09 PROCEDURE — 97110 THERAPEUTIC EXERCISES: CPT

## 2023-05-09 PROCEDURE — 99999 PR PBB SHADOW E&M-EST. PATIENT-LVL III: ICD-10-PCS | Mod: PBBFAC,,, | Performed by: STUDENT IN AN ORGANIZED HEALTH CARE EDUCATION/TRAINING PROGRAM

## 2023-05-09 NOTE — PROGRESS NOTES
[facility]   Physical Therapy Treatment     Name: Casandra [facility] Number: [id number]    Therapy Diagnosis:   Encounter Diagnosis   Name Primary?    Decreased strength, endurance, and mobility Yes         Physician: Marcos Borja MD    Visit Date: 5/9/2023    Physician Orders: PT Eval and Treat  Medical Diagnosis from Referral: Chronic bilateral low back pain with bilateral sciatica [M54.42, M54.41, G89.29], Sacroiliac joint dysfunction of both sides [M53.3], Right cervical radiculopathy [M54.12]  Evaluation Date: 12/8/2022  Authorization Period Expiration: 11/28/2023  Plan of Care Expiration: 5/8/2023                  Progress Update: 5/8/2023     Visit # / Visits authorized: 19/20 (+1 for evaluation)   FOTO: Visit #1 1/13/2022 - Scored: 1 / 3      [facility]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      OBJECTIVE     Objective Measures updated at progress report unless specified otherwise.    Treatment     Casandra received the treatments listed below:       MANUAL THERAPY TECHNIQUES were applied for (0) minutes, including:    Soft tissue mobilization:   bilateral  glutes, piriformis and deep hip rotators   Joint mobilizations: DEFERRED  Lumbar roll grade V   Functional dry needling:   Palpation Assessment to determine the necessity for Functional Dry Needling of Bilateral  glutes, piriformis with electrical stimulation.   See EMR under MEDIA for written consent provided 4/18/2023.          THERAPEUTIC EXERCISES: to develop strength, endurance, range of motion, flexibility, posture and core stabilization for (12)  minutes including: x = performed today    Performed Today:     Upright bike level 8 for 5 minutes   Foam rolling glutes/piriformis: 2 minutes each side   Stretches   [place] pose 2x30s B      Interventions DEFERRED today     Series 6 on half foam:   Stretches   Pec corner stretch 10x10s holds   Prayer stretch 3 minutes x 10s holds   Glute stretch 2x30s B            NEUROMUSCULAR RE-EDUCATION activities to improve: Coordination, Kinesthetic, Sense, and Proprioception for (20) minutes.   The following activities were included: x = exercises performed     Performed Today:     Standing Clamshells BLUE 3x10 B   SL RDL 15# 3x10 on L   Staggered RDL 3x10 on R (focus on neutral ankle)   Tabletop leg extensions GREEN band at feet 2x10 B   Anterior carries 15# 3 laps    Interventions DEFERRED today     Dead bug + bridge isometric 3x6 B   Wall angels 3x10   Table push ups 3x10              THERAPEUTIC ACTIVITIES to improve dynamic and functional  performance for (25) minutes including:    Performed Today:     Single leg hip thrusts 3x10 B   TRX squat + row 3x10   Knee extensions   DL 45# 3x10   Hamstring curls   DL: 45# 3x10      Interventions DEFERRED today            Next Session:         ASSESSMENT     Pt tolerated session well today.    Response to Manual Therapy: deferred today   Response to Therapeutic Exercise: no significant changes incorporated today  Response to Neuromuscular Re-education: improved ankle stability noted with single leg RDLs; continue to not incorporate weight with RDL on R in order to increase focus on ankle stability   Response to Therapeutic Activity: incorporated knee extensions and hamstring curls in order to focus on isolating functional quadricep and hamstring strength     Casandra is progressing well towards her goals.   Pt prognosis is Good.     Pt will continue  to benefit from skilled outpatient physical therapy to address the deficits listed in the problem list box on initial evaluation, provide pt/family education and to maximize pt's level of independence in the home and community environment.     Pt's spiritual, cultural and educational needs considered and pt agreeable to plan of care and goals.    Anticipated barriers to physical therapy: increased for cueing    SHORT TERM GOALS:  updated (2/8/23) Progress Date Met   Recent signs and systems trend is improving in order to progress towards Long term goals.  [x] Met  [] Not Met  [] Progressing 1/13/22   Patient will be independent with Home Exercise Program in order to further progress and return to maximal function. [x] Met  [] Not Met  [] Progressing 3/7/2023   Pain rating at Worst: 5 /10 in order to progress towards increased independence with activity. [x] Met  [] Not Met  [] Progressing 3/7/2023   Patient will be able to correct postural deviations in sitting and standing, to decrease pain and promote postural awareness for injury prevention.  [x] Met  [] Not Met  [] Progressing 1/13/22   Patient will improve functional outcome (FOTO) score: by 5% to increase self-worth & perceived functional ability towards long term goals [] Met  [] Not Met  [x] Progressing       Updated LONG TERM GOALS: (5/8/23) Progress Date Met   Patient will return to normal activites of daily living, recreational, and work related activities with less pain and limitation.  [] Met  [] Not Met  [x] Progressing    Patient will improve range of motion  to stated goals in order to return to maximal functional potential.  [] Met  [] Not Met  [x] Progressing    Patient will improve Strength to stated goals of appropriate musculature in order to improve functional independence.  [] Met  [] Not Met  [x] Progressing    Pain Rating at Best: 1/10 to improve Quality of Life.  [] Met  [] Not Met  [x] Progressing    Patient will meet predicted functional  outcome (FOTO) score: 28% to increase self-worth & perceived functional ability. [] Met  [] Not Met  [x] Progressing    Patient will have met/partially met personal goal of: decrease pain to increase functional with daily and recreational activities.  [] Met  [] Not Met  [x] Progressing         PLAN     Updated Plan of care Certification: 5/9/2023 to 5/8/2023.    Outpatient Physical Therapy 2 times weekly for 8 weeks to include any combination of the following interventions: virtual visits, dry needling, modalities, electrical stimulation (IFC, Pre-Mod, Attended with Functional Dry Needling), Cervical/Lumbar Traction, Gait Training, Manual Therapy, Neuromuscular Re-ed, Patient Education, Self Care, Therapeutic Exercise, and Therapeutic Activites     Asmita Miller, PT

## 2023-05-09 NOTE — PROGRESS NOTES
Orthopaedics Sports Medicine     Shoulder Initial Visit         5/9/2023    Referring MD: Bairon Dozier MD    Chief Complaint   Patient presents with    Right Shoulder - Pain         History of Present Illness:   Casandra Barragan is a 22 y.o. right-hand dominant female who presents with right shoulder pain and dysfunction.    Onset of the symptoms was October 2021     Inciting event: No specific injury, gradual onset and worsening of symptoms.       Current symptoms include right shoulder pain and limited function. Her pain is localized mainly anterior but occasionally posterior.      Pain is aggravated by use of arm, reaching across body, lifting objects.       Evaluation to date: X-Ray, MR Arthrogram.  MR arthrogram of Right shoulder that showed small Hill-Sachs lesion and humeral avulsion of the glenohumeral ligament.     Treatment to date: Rest, activity modification, oral anti-inflammatories, physical therapy, and corticosteroid injection (5/19/22). She reports that her right glenohumeral corticosteroid injection on 5/19/2022 provided relief for about 1.5 months. She has attended physical therapy at the [place] for several months and NSAIDs with minimal improvement in symptoms.    Past Medical History:   History reviewed. No pertinent past medical history.    Past Surgical History:   History reviewed. No pertinent surgical history.    Medications:  Patient's Medications   New Prescriptions    No medications on file   Previous Medications    AMITRIPTYLINE (ELAVIL) 10 MG TABLET    Take 1 tablet (10 mg total) by mouth every evening for 14 days, THEN 2 tablets (20 mg total) every evening for 14 days, THEN 3 tablets (30 mg total) every evening for 14 days.    CYCLOBENZAPRINE (FLEXERIL) 5 MG TABLET    Take 1 tablet (5 mg total) by mouth 3 (three) times daily as needed for Muscle spasms.    MEDROXYPROGESTERONE (DEPO-PROVERA) 150 MG/ML INJECTION    Inject 150 mg into the muscle.    NORETHINDRONE-ETHINYL ESTRADIOL  "(ORTHO-NOVUM 7/7/7, 28,) 0.5/0.75/1 MG- 35 MCG PER TABLET    Take 1 tablet by mouth once daily.    XULANE 150-35 MCG/24 HR    1 patch every 7 days.   Modified Medications    No medications on file   Discontinued Medications    No medications on file       Allergies:   Review of patient's allergies indicates:   Allergen Reactions    Amoxicillin Rash    Clindamycin Other (See Comments)     Palm and foot skin peeling off  Palm and foot skin peeling off         Social History:   Home town: [place], [place]  Occupation: Cheerleader at [employer]  Alcohol use: She reports no history of alcohol use.  Tobacco use: She reports that she has never smoked. She has never been exposed to tobacco smoke. She has never used smokeless tobacco.    Review of systems:  History of recent illness, fevers, shakes, or chills: no  History of cardiac problems or chest pain: no  History of pulmonary problems or asthma: no  History of diabetes: no  History of prior dvt or clotting problems: no  History of sleep apnea: no      Physical Examination:  Estimated body mass index is 21.26 kg/m² as calculated from the following:    Height as of this encounter: 5' 3" (1.6 m).    Weight as of this encounter: 54.4 kg (120 lb).    General  Healthy appearing female in no acute distress  Alert and oriented, normal mood, appropriate affect    Shoulder Examination:  Patient is alert and oriented, no distress. Skin is intact. Neuro is normal with no focal motor or sensory findings.    Cervical exam is unremarkable. Intact cervical ROM. Negative Spurling's test    Physical Exam:  RIGHT    LEFT    Scap Dyskinesis/Winging (-)    (-)    Tenderness:          Greater Tuberosity             (-)    (-)  Bicipital Groove  +    (-)  AC joint   (-)    (-)  Other:     ROM:  Forward Elevation 110    180  Abduction  90    120  ER (at side)  60    80  IR   T8    T8    Strength:   Supraspinatus  4/5    5/5  Infraspinatus  4/5    5/5  Subscap / IR  5/5    5/5 "     Special Tests:   Apprehension:   +    (-)   Nupur Relocation:  +    (-)   Jerk / Posterior Load:  (-)    (-)   Neer:    (-)    (-)   Brown:   (-)    (-)   SS Stress:   +    (-)   Bear Hug:   (-)    (-)   Gadsden's:   +    (-)   Resisted Thrower's:   +    (-)   Cross Arm Abduction:  +    (-)    Beighton score 0/9      Neurovascular examination  - Motor grossly intact bilaterally to shoulder abduction, elbow flexion and extension, wrist flexion and extension, and intrinsic hand musculature  - Sensation intact to light touch bilaterally in axillary, median, radial, and ulnar distributions  - Symmetrical radial pulses      Imaging:  XR Results:  Results for orders placed during the hospital encounter of 05/04/23    X-ray Shoulder 2 or More Views Right    Narrative  EXAM:    XR SHOULDER COMPLETE 2 OR MORE VIEWS RIGHT    CLINICAL HISTORY:  Pain in the right shoulder    COMPARISON: None    FINDINGS:  There is no fracture.  There is no dislocation.    Impression  Normal Study    Finalized on: 5/4/2023 2:15 PM By:  Jeanmarie Anderson MD  [facility]# [id number]      2023-05-04 14:17:[id number]    [facility]      MRI Results:  Results for orders placed during the hospital encounter of 01/26/22    MRI Arthrogram Shoulder With Contrast Right    Narrative  EXAMINATION:  MRI ARTHROGRAM SHOULDER WITH CONTRAST RIGHT    CLINICAL HISTORY:  right shoulder instability;  Other instability, right shoulder    TECHNIQUE:  Multiplanar, multisequence imaging of the right shoulder was performed after the administration of intra-articular gadolinium contrast.    COMPARISON:  No prior.    FINDINGS:  Rotator cuff: Supraspinatus, infraspinatus, teres minor, and subscapularis tendons are intact.  Muscle bulk is preserved.    Labrum: No labral tear.  Patulous appearance of the posterior aspect of the inferior glenohumeral ligament with some irregularity at its humeral attachment in this region.    Biceps: Long head biceps tendon is intact.    Bone: Moderate osseous  edema at the posterior/superior humeral head with a tiny impaction deformity concerning for a small Hill-Sachs lesion.  No suspicious osseous lesion.    Acromioclavicular joint: Satisfactory alignment.  No significant arthrosis.  Type 2 acromial arch with mild lateral downsloping.  No os acromiale.    Cartilage: Articular cartilage of the glenohumeral joint is preserved.    Miscellaneous: Interposition of the supraspinatus insertion between the posterosuperior glenoid labrum and the humeral head with mild irregularity the labrum in this region.  Findings can be seen in the clinical setting of internal impingement.    Impression  Small Hill-Sachs lesion suggestive of recent anterior shoulder dislocation.  No labral tear.  Findings concerning for partial avulsion of the humeral attachment of the inferior glenohumeral ligament, as above.    Findings which can be seen in the clinical setting of internal impingement, as above.      Electronically signed by: Jeanmarie Olguin  Date:    01/26/2022  Time:    15:36      CT Results:  No results found for this or any previous visit.      Physician Read: I agree with the above impression.      Impression:  22 y.o. female with chronic right shoulder pain, evidence of instability, suspected labral pathology      Plan:  Discussed diagnosis and treatment options with patient today. She has had persistent right shoulder pain for over 1.5 years. She has tried rest, activity modification, nsaids, CSI, and extensive PT without improvement in symptoms.  Her symptoms and imaging are most consistent with instability but she also has significant pain with ROM that is not fully explained on current imaging.  I recommend moving forward with MRI of her right shoulder to for intra-articular pathology, specifically labral tear and HAGL    Follow up with me after MRI.            Chris Flanagan MD    I, Mario Cortes, acted as a scribe for Chris Flanagan MD for the duration of  this office visit.

## 2023-05-11 ENCOUNTER — CLINICAL SUPPORT (OUTPATIENT)
Dept: REHABILITATION | Facility: HOSPITAL | Age: 23
End: 2023-05-11
Payer: COMMERCIAL

## 2023-05-11 DIAGNOSIS — R53.1 DECREASED STRENGTH, ENDURANCE, AND MOBILITY: Primary | ICD-10-CM

## 2023-05-11 DIAGNOSIS — R68.89 DECREASED STRENGTH, ENDURANCE, AND MOBILITY: Primary | ICD-10-CM

## 2023-05-11 DIAGNOSIS — Z74.09 DECREASED STRENGTH, ENDURANCE, AND MOBILITY: Primary | ICD-10-CM

## 2023-05-11 PROCEDURE — 97110 THERAPEUTIC EXERCISES: CPT

## 2023-05-11 PROCEDURE — 97530 THERAPEUTIC ACTIVITIES: CPT

## 2023-05-11 PROCEDURE — 97112 NEUROMUSCULAR REEDUCATION: CPT

## 2023-05-18 ENCOUNTER — CLINICAL SUPPORT (OUTPATIENT)
Dept: REHABILITATION | Facility: HOSPITAL | Age: 23
End: 2023-05-18
Payer: COMMERCIAL

## 2023-05-18 DIAGNOSIS — R53.1 DECREASED STRENGTH, ENDURANCE, AND MOBILITY: Primary | ICD-10-CM

## 2023-05-18 DIAGNOSIS — R68.89 DECREASED STRENGTH, ENDURANCE, AND MOBILITY: Primary | ICD-10-CM

## 2023-05-18 DIAGNOSIS — Z74.09 DECREASED STRENGTH, ENDURANCE, AND MOBILITY: Primary | ICD-10-CM

## 2023-05-18 PROCEDURE — 97112 NEUROMUSCULAR REEDUCATION: CPT

## 2023-05-18 PROCEDURE — 97530 THERAPEUTIC ACTIVITIES: CPT

## 2023-05-18 PROCEDURE — 97110 THERAPEUTIC EXERCISES: CPT

## 2023-05-18 NOTE — PROGRESS NOTES
[facility]   Physical Therapy Treatment     Name: Casandra [facility] Number: [id number]    Therapy Diagnosis:   Encounter Diagnosis   Name Primary?    Decreased strength, endurance, and mobility Yes         Physician: Marcos Borja MD    Visit Date: 5/18/2023    Physician Orders: PT Eval and Treat  Medical Diagnosis from Referral: Chronic bilateral low back pain with bilateral sciatica [M54.42, M54.41, G89.29], Sacroiliac joint dysfunction of both sides [M53.3], Right cervical radiculopathy [M54.12]  Evaluation Date: 12/8/2022  Authorization Period Expiration: 11/28/2023  Plan of Care Expiration: 5/8/2023                  Progress Update: 5/8/2023     Visit # / Visits authorized: 21/37 (+1 for evaluation)   FOTO: Visit #1 1/13/2022 - Scored: 1 / 3      [facility]: [facility]      PRECAUTIONS: Standard Precautions      MD Follow up: No scheduled follow-up    PTA Visit #: 0/5     Time In: 1005  Time Out: 1105  Total Billable Time: 53 minutes    SUBJECTIVE     Pt reports:  her low back has been feeling pretty good. States she drove home last weekend and noted significant discomfort during the drive but that she has otherwise been feeling pretty good     Compliance with Hep: Every Other Day  Response to previous treatment: felt better with decreased low back pain   Functional change: Better    Pain: 5/10 shoulder,   6/10 back   Location: R shoulder and B low back.      OBJECTIVE     Objective Measures updated at progress report unless specified otherwise.    Treatment     Casandra received the treatments listed below:       MANUAL THERAPY TECHNIQUES were applied for (0) minutes, including:    Soft tissue mobilization:   bilateral  glutes, piriformis and deep hip rotators   Joint mobilizations: DEFERRED  Lumbar roll grade V   Functional dry needling:   Palpation Assessment to determine the necessity for Functional Dry Needling of Bilateral  glutes, piriformis with electrical  stimulation.   See EMR under MEDIA for written consent provided 4/18/2023.         THERAPEUTIC EXERCISES: to develop strength, endurance, range of motion, flexibility, posture and core stabilization for (12)  minutes including: x = performed today    Performed Today:     Upright bike level 8 for 5 minutes   Foam rolling glutes/piriformis: 2 minutes each side   Stretches   [place] pose 2x30s B      Interventions DEFERRED today     Series 6 on half foam:   Stretches   Pec corner stretch 10x10s holds   Prayer stretch 3 minutes x 10s holds   Glute stretch 2x30s B            NEUROMUSCULAR RE-EDUCATION activities to improve: Coordination, Kinesthetic, Sense, and Proprioception for (20) minutes.   The following activities were included: x = exercises performed     Performed Today:     Clamshells RED 3x10 B, 10s hold last rep   SL RDL 15# 3x10 on L   Staggered RDL 3x10 on R (focus on neutral ankle)   Tabletop leg extensions GREEN band at feet 2x10 B    Interventions DEFERRED today     Dead bug + bridge isometric 3x6 B   Standing Clamshells BLUE 3x10 B   Anterior carries 15# 3 laps            THERAPEUTIC ACTIVITIES to improve dynamic and functional  performance for (25) minutes including:    Performed Today:     Single leg hip thrusts 3x10 B   TRX squat + row 3x10   Knee extensions   DL 45# 3x10   Hamstring curls   DL: 45# 3x10      Interventions DEFERRED today            Next Session:         ASSESSMENT     Pt tolerated session well today.    Response to Manual Therapy: deferred today   Response to Therapeutic Exercise: no significant changes incorporated today  Response to Neuromuscular Re-education: improved ankle stability noted with single leg RDLs; continue to not incorporate weight with RDL on R in order to increase focus on ankle stability   Response to Therapeutic Activity: incorporated knee extensions and hamstring curls in order to focus on isolating functional quadricep and hamstring strength     Casandra is  progressing well towards her goals.   Pt prognosis is Good.     Pt will continue to benefit from skilled outpatient physical therapy to address the deficits listed in the problem list box on initial evaluation, provide pt/family education and to maximize pt's level of independence in the home and community environment.     Pt's spiritual, cultural and educational needs considered and pt agreeable to plan of care and goals.    Anticipated barriers to physical therapy: increased for cueing    SHORT TERM GOALS:  updated (2/8/23) Progress Date Met   Recent signs and systems trend is improving in order to progress towards Long term goals.  [x] Met  [] Not Met  [] Progressing 1/13/22   Patient will be independent with Home Exercise Program in order to further progress and return to maximal function. [x] Met  [] Not Met  [] Progressing 3/7/2023   Pain rating at Worst: 5 /10 in order to progress towards increased independence with activity. [x] Met  [] Not Met  [] Progressing 3/7/2023   Patient will be able to correct postural deviations in sitting and standing, to decrease pain and promote postural awareness for injury prevention.  [x] Met  [] Not Met  [] Progressing 1/13/22   Patient will improve functional outcome (FOTO) score: by 5% to increase self-worth & perceived functional ability towards long term goals [] Met  [] Not Met  [x] Progressing       Updated LONG TERM GOALS: (5/8/23) Progress Date Met   Patient will return to normal activites of daily living, recreational, and work related activities with less pain and limitation.  [] Met  [] Not Met  [x] Progressing    Patient will improve range of motion  to stated goals in order to return to maximal functional potential.  [] Met  [] Not Met  [x] Progressing    Patient will improve Strength to stated goals of appropriate musculature in order to improve functional independence.  [] Met  [] Not Met  [x] Progressing    Pain Rating at Best: 1/10 to improve Quality of  Life.  [] Met  [] Not Met  [x] Progressing    Patient will meet predicted functional outcome (FOTO) score: 28% to increase self-worth & perceived functional ability. [] Met  [] Not Met  [x] Progressing    Patient will have met/partially met personal goal of: decrease pain to increase functional with daily and recreational activities.  [] Met  [] Not Met  [x] Progressing         PLAN     Updated Plan of care Certification: 5/18/2023 to 5/8/2023.    Outpatient Physical Therapy 2 times weekly for 8 weeks to include any combination of the following interventions: virtual visits, dry needling, modalities, electrical stimulation (IFC, Pre-Mod, Attended with Functional Dry Needling), Cervical/Lumbar Traction, Gait Training, Manual Therapy, Neuromuscular Re-ed, Patient Education, Self Care, Therapeutic Exercise, and Therapeutic Activites     Asmita Miller, PT

## 2023-05-22 NOTE — PROGRESS NOTES
[facility]   Physical Therapy Treatment     Name: Casandra [facility] Number: [id number]    Therapy Diagnosis:   Encounter Diagnosis   Name Primary?    Decreased strength, endurance, and mobility Yes         Physician: Marcos Borja MD    Visit Date: 5/11/2023    Physician Orders: PT Eval and Treat  Medical Diagnosis from Referral: Chronic bilateral low back pain with bilateral sciatica [M54.42, M54.41, G89.29], Sacroiliac joint dysfunction of both sides [M53.3], Right cervical radiculopathy [M54.12]  Evaluation Date: 12/8/2022  Authorization Period Expiration: 11/28/2023  Plan of Care Expiration: 5/8/2023                  Progress Update: 5/8/2023     Visit # / Visits authorized: 20/20 (+1 for evaluation)   FOTO: Visit #1 1/13/2022 - Scored: 1 / 3      [facility]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      OBJECTIVE     Objective Measures updated at progress report unless specified otherwise.    Treatment     Casandra received the treatments listed below:       MANUAL THERAPY TECHNIQUES were applied for (0) minutes, including:    Soft tissue mobilization:   bilateral  glutes, piriformis and deep hip rotators   Joint mobilizations: DEFERRED  Lumbar roll grade V   Functional dry needling:   Palpation Assessment to determine the necessity for Functional Dry Needling of Bilateral  glutes, piriformis with electrical stimulation.   See EMR under MEDIA for written consent provided 4/18/2023.          THERAPEUTIC EXERCISES: to develop strength, endurance, range of motion, flexibility, posture and core stabilization for (12)  minutes including: x = performed today    Performed Today:     Upright bike level 8 for 5 minutes   Foam rolling glutes/piriformis: 2 minutes each side   Stretches   [place] pose 2x30s B      Interventions DEFERRED today     Series 6 on half foam:   Stretches   Pec corner stretch 10x10s holds   Prayer stretch 3 minutes x 10s holds   Glute stretch 2x30s B            NEUROMUSCULAR RE-EDUCATION activities to improve: Coordination, Kinesthetic, Sense, and Proprioception for (20) minutes.   The following activities were included: x = exercises performed     Performed Today:     Standing Clamshells BLUE 3x10 B   SL RDL 15# 3x10 on L   Staggered RDL 3x10 on R (focus on neutral ankle)   Tabletop leg extensions GREEN band at feet 2x10 B   Anterior carries 15# 3 laps    Interventions DEFERRED today     Dead bug + bridge isometric 3x6 B   Wall angels 3x10   Table push ups 3x10              THERAPEUTIC ACTIVITIES to improve dynamic and functional  performance for (25) minutes including:    Performed Today:     Single leg hip thrusts 3x10 B   TRX squat + row 3x10   Knee extensions   DL 45# 3x10   Hamstring curls   DL: 45# 3x10      Interventions DEFERRED today            Next Session:         ASSESSMENT     Pt tolerated session well today.    Response to Manual Therapy: deferred today   Response to Therapeutic Exercise: no significant changes incorporated today  Response to Neuromuscular Re-education: improved ankle stability again noted with single leg RDLs. Pt reports mild shoulder pain following anterior carries   Response to Therapeutic Activity: improved endurance noted with knee extensions and hamstring curls with decreased breaks required to complete interventions     Casandra is progressing well towards her goals.   Pt prognosis is Good.     Pt will continue to benefit from skilled outpatient  physical therapy to address the deficits listed in the problem list box on initial evaluation, provide pt/family education and to maximize pt's level of independence in the home and community environment.     Pt's spiritual, cultural and educational needs considered and pt agreeable to plan of care and goals.    Anticipated barriers to physical therapy: increased for cueing    SHORT TERM GOALS:  updated (2/8/23) Progress Date Met   Recent signs and systems trend is improving in order to progress towards Long term goals.  [x] Met  [] Not Met  [] Progressing 1/13/22   Patient will be independent with Home Exercise Program in order to further progress and return to maximal function. [x] Met  [] Not Met  [] Progressing 3/7/2023   Pain rating at Worst: 5 /10 in order to progress towards increased independence with activity. [x] Met  [] Not Met  [] Progressing 3/7/2023   Patient will be able to correct postural deviations in sitting and standing, to decrease pain and promote postural awareness for injury prevention.  [x] Met  [] Not Met  [] Progressing 1/13/22   Patient will improve functional outcome (FOTO) score: by 5% to increase self-worth & perceived functional ability towards long term goals [] Met  [] Not Met  [x] Progressing       Updated LONG TERM GOALS: (5/8/23) Progress Date Met   Patient will return to normal activites of daily living, recreational, and work related activities with less pain and limitation.  [] Met  [] Not Met  [x] Progressing    Patient will improve range of motion  to stated goals in order to return to maximal functional potential.  [] Met  [] Not Met  [x] Progressing    Patient will improve Strength to stated goals of appropriate musculature in order to improve functional independence.  [] Met  [] Not Met  [x] Progressing    Pain Rating at Best: 1/10 to improve Quality of Life.  [] Met  [] Not Met  [x] Progressing    Patient will meet predicted functional outcome (FOTO) score: 28% to  increase self-worth & perceived functional ability. [] Met  [] Not Met  [x] Progressing    Patient will have met/partially met personal goal of: decrease pain to increase functional with daily and recreational activities.  [] Met  [] Not Met  [x] Progressing         PLAN     Updated Plan of care Certification: 5/11/2023 to 5/8/2023.    Outpatient Physical Therapy 2 times weekly for 8 weeks to include any combination of the following interventions: virtual visits, dry needling, modalities, electrical stimulation (IFC, Pre-Mod, Attended with Functional Dry Needling), Cervical/Lumbar Traction, Gait Training, Manual Therapy, Neuromuscular Re-ed, Patient Education, Self Care, Therapeutic Exercise, and Therapeutic Activites     Asmita Miller, PT

## 2023-05-25 ENCOUNTER — CLINICAL SUPPORT (OUTPATIENT)
Dept: REHABILITATION | Facility: HOSPITAL | Age: 23
End: 2023-05-25
Payer: COMMERCIAL

## 2023-05-25 DIAGNOSIS — R53.1 DECREASED STRENGTH, ENDURANCE, AND MOBILITY: Primary | ICD-10-CM

## 2023-05-25 DIAGNOSIS — Z74.09 DECREASED STRENGTH, ENDURANCE, AND MOBILITY: Primary | ICD-10-CM

## 2023-05-25 DIAGNOSIS — R68.89 DECREASED STRENGTH, ENDURANCE, AND MOBILITY: Primary | ICD-10-CM

## 2023-05-25 PROCEDURE — 97110 THERAPEUTIC EXERCISES: CPT

## 2023-05-25 PROCEDURE — 97530 THERAPEUTIC ACTIVITIES: CPT

## 2023-05-25 PROCEDURE — 97112 NEUROMUSCULAR REEDUCATION: CPT

## 2023-05-28 NOTE — H&P (VIEW-ONLY)
Orthopaedic Follow-Up Visit    Last Appointment: 5/9/23  Diagnosis: Chronic right shoulder pain, evidence of instability, suspected labral pathology  Prior Procedure: MR Arthrogram    Casandra Barragan is a 22 y.o. female who is here for f/u evaluation of her right shoulder. The patient was last seen here by me on 5/9/23 at which point we decided to send her for an MR Arthrogram of her right shoulder to further evaluate for suspected labral pathology prior to considering further treatment options. The patient returns today to review her MRI and discuss further treatment options.     To review her history, Casandra Barragan is a 22 y.o. right-hand dominant female who presented on 5/9/23 following referral from Dr. Dozier with right shoulder pain and dysfunction that initially began in October 2021 with no specific injury but gradual onset and worsening of symptoms. Her symptoms included right shoulder pain and limited function. Her pain was localized mainly anterior but occasionally posterior. Her pain was aggravated by use of arm, reaching across body, and lifting objects. She had a prior MR arthrogram of right shoulder that showed small Hill-Sachs lesion and humeral avulsion of the glenohumeral ligament. At the time of her visit with me she had tried rest, activity modification, oral anti-inflammatories, physical therapy, and corticosteroid injection (5/19/22). She reported that her right glenohumeral corticosteroid injection on 5/19/2022 provided relief for about 1.5 months. She had attended physical therapy at the [facility] for several months and NSAIDs with minimal improvement in symptoms. Her symptoms and imaging were most consistent with instability but she also had significant pain with ROM that was not fully explained on current imaging. We elected to proceed with MR Arthrogram of her right shoulder to for intra-articular pathology, specifically labral tear and HAGL.     Patient's medications, allergies, past  medical, surgical, social and family histories were reviewed and updated as appropriate.    Review of Systems   All systems reviewed were negative.  Specifically, the patient denies fever, chills, weight loss, chest pain, shortness of breath, or dyspnea on exertion.      History reviewed. No pertinent past medical history.    History reviewed. No pertinent surgical history.    Patient's Medications   New Prescriptions    No medications on file   Previous Medications    AMITRIPTYLINE (ELAVIL) 10 MG TABLET    Take 1 tablet (10 mg total) by mouth every evening for 14 days, THEN 2 tablets (20 mg total) every evening for 14 days, THEN 3 tablets (30 mg total) every evening for 14 days.    MEDROXYPROGESTERONE (DEPO-PROVERA) 150 MG/ML INJECTION    Inject 150 mg into the muscle.    MEDROXYPROGESTERONE (DEPO-PROVERA) 150 MG/ML INJECTION    Inject 150 mg into the muscle every 3 (three) months.    NORETHINDRONE-ETHINYL ESTRADIOL (ORTHO-NOVUM 7/7/7, 28,) 0.5/0.75/1 MG- 35 MCG PER TABLET    Take 1 tablet by mouth once daily.    XULANE 150-35 MCG/24 HR    1 patch every 7 days.   Modified Medications    No medications on file   Discontinued Medications    No medications on file       Family History   Problem Relation Age of Onset    No Known Problems Mother     No Known Problems Father        Review of patient's allergies indicates:   Allergen Reactions    Amoxicillin Rash    Clindamycin Other (See Comments)     Palm and foot skin peeling off  Palm and foot skin peeling off           Objective:      Physical Exam  Patient is alert and oriented, no distress. Skin is intact. Neuro is normal with no focal motor or sensory findings.    Cervical exam is unremarkable. Intact cervical ROM. Negative Spurling's test    Physical Exam:                       RIGHT                                     LEFT     Scap Dyskinesis/Winging       (-)                                             (-)     Tenderness:                                                                               Greater Tuberosity                  (-)                                            (-)  Bicipital Groove                       +                                              (-)  AC joint                                   (-)                                             (-)  Other:      ROM:  Forward Elevation       130/170                                          180  Abduction                    90                                            120  ER (at side)                 60                                            80  IR                                 T8                                            T8     Strength:   Supraspinatus             4/5                                           5/5  Infraspinatus               4/5                                           5/5  Subscap / IR               5/5                                           5/5      Special Tests:              Apprehension:                         +                                              (-)              Nupur Relocation:                     +                                              (-)              Jerk / Posterior Load:              (-)                                             (-)              Neer:                                       (-)                                             (-)              Brown:                                 (-)                                             (-)              SS Stress:                               +                                              (-)              Bear Hug:                                (-)                                             (-)              Dooly's:                                 +                                              (-)              Resisted Thrower's:                +                                              (-)              Cross Arm Abduction:             +                                               (-)     Beighton score 0/9    Neurovascular examination  - Motor grossly intact bilaterally to shoulder abduction, elbow flexion and extension, wrist flexion and extension, and intrinsic hand musculature  - Sensation intact to light touch bilaterally in axillary, median, radial, and ulnar distributions  - Symmetrical radial pulses    Imaging:    XR Results:  Results for orders placed during the hospital encounter of 05/04/23    X-ray Shoulder 2 or More Views Right    Narrative  EXAM:    XR SHOULDER COMPLETE 2 OR MORE VIEWS RIGHT    CLINICAL HISTORY:  Pain in the right shoulder    COMPARISON: None    FINDINGS:  There is no fracture.  There is no dislocation.    Impression  Normal Study    Finalized on: 5/4/2023 2:15 PM By:  Jeanmarie Anderson MD  [facility]# [id number]      2023-05-04 14:17:[id number]    [facility]      MRI Results:    MRI ARTHROGRAM SHOULDER WITH CONTRAST RIGHT     CLINICAL HISTORY:  Shoulder pain, labral tear suspected, xray done;  Pain in right shoulder     TECHNIQUE:  MRI of the shoulder was performed after administration of intra-articular contrast utilizing the following sequences: Axial, sagittal and coronal T1 FS; coronal and sagittal T2 FS.     COMPARISON:  MRI 01/26/2022     FINDINGS:  Rotator cuff: Supraspinatus, infraspinatus, teres minor, and subscapularis tendons are intact.  Muscle bulk is maintained.     Labrum: No discrete tear with circumferential generalized blunting/loss of the labrum, a specially at the superior labrum.  There is again noted patulous appearance of the posterior aspect of the end of the inferior glenohumeral ligament with at least partial tearing from the humeral aspect suspected.     Biceps: Long head biceps tendon is intact.     Bone/joint: New erosive changes within the inferior medial and anterior superior humeral head.  Progression in erosive changes involving the posterosuperior humeral head which was felt to represent possible prior Hill-Sachs deformity.     There is diffuse  prominent synovitis changes.     Acromioclavicular joint: The AC joint is unremarkable.     Cartilage: No glenohumeral cartilage appreciated.     Miscellaneous: Small reactive sized right neck base/supraclavicular lymph nodes.  Multiple small to pathologically enlarged axillary lymph nodes present as well which may be reactive.  Clinical correlation and CT chest/neck imaging recommended as clinically indicated.     Impression:     Interval development of diffuse synovitis changes as well as osseous erosive changes within the humeral head concerning for infectious or inflammatory etiology including inflammatory arthritis.     Similar patulous appearance of the posterior aspect inferior glenohumeral ligament with at least partial tear not excluded.     Miscellaneous findings as above.     This report was flagged in Epic as abnormal.        Electronically signed by: Travon Berkowitz MD  Date:                                            05/30/2023  Time:                                           16:14      CT Results:  No results found for this or any previous visit.      Physician read: I agree with the above impression.    Assessment/Plan:   Casandra Barragan is a 22 y.o. female with right shoulder extensive synovitis, concern for autoimmune disease, PVNS, or infection.     Plan:    Reviewed MRI with the patient today. Her MRI shows extensive synovitis in her right shoulder that has begun to erode into her bone and cartilage.   Will get labs today: ESR, CRP, CBC  Due to her young age and this condition starting to erode her bone and cartilage of her shoulder joint, I would like to move forward with operative treatment to remove the synovitis and collect tissue biopsy for further classification and diagnosis of her condition.   I recommend proceeding with right shoulder arthroscopy with extensive debridement.    We reviewed the proposed procedure in detail, which included discussion of risks and benefits, techniques, and  possible complications of the procedure. Risks include infection, bleeding, damage to artery and nerves, continual pain and possible stiffness, and blood clots. We reviewed the post-operative restrictions, recovery period, and rehabilitation.  All patient questions were answered. Despite the risks, she elected to proceed with surgery and the consent was freely signed.  At least 10 minutes were spent instructing the patient in home care following surgery including, but not limited to: sling use, sleeping, hygiene, post-operative exercises, preventing post-operative complications, etc.  All questions were answered.  This service was performed under the direction of Chris Flanagan MD.  CPT [id number].  Follow up with me 10-14 days after surgery          Chris Flanagan MD    I, Mario Cortes, acted as a scribe for Chris Flanagan MD for the duration of this office visit.

## 2023-05-30 ENCOUNTER — HOSPITAL ENCOUNTER (OUTPATIENT)
Dept: RADIOLOGY | Facility: HOSPITAL | Age: 23
Discharge: HOME | End: 2023-05-30
Attending: STUDENT IN AN ORGANIZED HEALTH CARE EDUCATION/TRAINING PROGRAM
Payer: COMMERCIAL

## 2023-05-30 DIAGNOSIS — G89.29 CHRONIC RIGHT SHOULDER PAIN: ICD-10-CM

## 2023-05-30 DIAGNOSIS — M25.511 CHRONIC RIGHT SHOULDER PAIN: ICD-10-CM

## 2023-05-30 PROCEDURE — A9585 GADOBUTROL INJECTION: HCPCS | Performed by: STUDENT IN AN ORGANIZED HEALTH CARE EDUCATION/TRAINING PROGRAM

## 2023-05-30 PROCEDURE — 73222 MRI JOINT UPR EXTREM W/DYE: CPT | Mod: TC,RT

## 2023-05-30 PROCEDURE — 23350 INJECTION FOR SHOULDER X-RAY: CPT | Mod: RT,,, | Performed by: RADIOLOGY

## 2023-05-30 PROCEDURE — 73040 XR ARTHROGRAM SHOULDER RIGHT, COMPLETE (XPD): ICD-10-PCS | Mod: 26,RT,, | Performed by: RADIOLOGY

## 2023-05-30 PROCEDURE — 23350 INJECTION FOR SHOULDER X-RAY: CPT | Mod: RT

## 2023-05-30 PROCEDURE — 73040 CONTRAST X-RAY OF SHOULDER: CPT | Mod: 26,RT,, | Performed by: RADIOLOGY

## 2023-05-30 PROCEDURE — 73222 MRI ARTHROGRAM SHOULDER WITH CONTRAST RIGHT: ICD-10-PCS | Mod: 26,RT,, | Performed by: RADIOLOGY

## 2023-05-30 PROCEDURE — 23350 XR ARTHROGRAM SHOULDER RIGHT, COMPLETE (XPD): ICD-10-PCS | Mod: RT,,, | Performed by: RADIOLOGY

## 2023-05-30 PROCEDURE — 25500020 PHARM REV CODE 255: Performed by: STUDENT IN AN ORGANIZED HEALTH CARE EDUCATION/TRAINING PROGRAM

## 2023-05-30 PROCEDURE — 73222 MRI JOINT UPR EXTREM W/DYE: CPT | Mod: 26,RT,, | Performed by: RADIOLOGY

## 2023-05-30 RX ORDER — GADOBUTROL 604.72 MG/ML
7.5 INJECTION INTRAVENOUS
Status: COMPLETED | OUTPATIENT
Start: 2023-05-30 | End: 2023-05-30

## 2023-05-30 RX ADMIN — GADOBUTROL 0.1 ML: 604.72 INJECTION INTRAVENOUS at 03:05

## 2023-05-30 RX ADMIN — IOHEXOL 20 ML: 350 INJECTION, SOLUTION INTRAVENOUS at 03:05

## 2023-05-31 ENCOUNTER — OFFICE VISIT (OUTPATIENT)
Dept: SPORTS MEDICINE | Facility: CLINIC | Age: 23
End: 2023-05-31
Payer: COMMERCIAL

## 2023-05-31 ENCOUNTER — LAB VISIT (OUTPATIENT)
Dept: LAB | Facility: HOSPITAL | Age: 23
End: 2023-05-31
Attending: STUDENT IN AN ORGANIZED HEALTH CARE EDUCATION/TRAINING PROGRAM
Payer: COMMERCIAL

## 2023-05-31 VITALS — HEIGHT: 63 IN | WEIGHT: 120 LBS | BODY MASS INDEX: 21.26 KG/M2

## 2023-05-31 DIAGNOSIS — M65.9 SYNOVITIS OF RIGHT SHOULDER: Primary | ICD-10-CM

## 2023-05-31 DIAGNOSIS — Z01.818 PREOPERATIVE TESTING: ICD-10-CM

## 2023-05-31 DIAGNOSIS — M65.9 SYNOVITIS OF RIGHT SHOULDER: ICD-10-CM

## 2023-05-31 LAB
ALBUMIN SERPL BCP-MCNC: 3.5 G/DL (ref 3.5–5.2)
ALP SERPL-CCNC: 71 U/L (ref 55–135)
ALT SERPL W/O P-5'-P-CCNC: 21 U/L (ref 10–44)
ANION GAP SERPL CALC-SCNC: 11 MMOL/L (ref 8–16)
AST SERPL-CCNC: 20 U/L (ref 10–40)
BASOPHILS # BLD AUTO: 0.04 K/UL (ref 0–0.2)
BASOPHILS NFR BLD: 0.6 % (ref 0–1.9)
BILIRUB SERPL-MCNC: 0.3 MG/DL (ref 0.1–1)
BUN SERPL-MCNC: 12 MG/DL (ref 6–20)
CALCIUM SERPL-MCNC: 9.6 MG/DL (ref 8.7–10.5)
CHLORIDE SERPL-SCNC: 108 MMOL/L (ref 95–110)
CO2 SERPL-SCNC: 21 MMOL/L (ref 23–29)
CREAT SERPL-MCNC: 0.9 MG/DL (ref 0.5–1.4)
CRP SERPL-MCNC: 11.7 MG/L (ref 0–8.2)
DIFFERENTIAL METHOD: ABNORMAL
EOSINOPHIL # BLD AUTO: 0.1 K/UL (ref 0–0.5)
EOSINOPHIL NFR BLD: 1 % (ref 0–8)
ERYTHROCYTE [DISTWIDTH] IN BLOOD BY AUTOMATED COUNT: 16.5 % (ref 11.5–14.5)
ERYTHROCYTE [SEDIMENTATION RATE] IN BLOOD BY PHOTOMETRIC METHOD: 60 MM/HR (ref 0–36)
EST. GFR  (NO RACE VARIABLE): >60 ML/MIN/1.73 M^2
GLUCOSE SERPL-MCNC: 62 MG/DL (ref 70–110)
HCT VFR BLD AUTO: 36.9 % (ref 37–48.5)
HGB BLD-MCNC: 11.2 G/DL (ref 12–16)
IMM GRANULOCYTES # BLD AUTO: 0.02 K/UL (ref 0–0.04)
IMM GRANULOCYTES NFR BLD AUTO: 0.3 % (ref 0–0.5)
LYMPHOCYTES # BLD AUTO: 2.5 K/UL (ref 1–4.8)
LYMPHOCYTES NFR BLD: 35.8 % (ref 18–48)
MCH RBC QN AUTO: 21.5 PG (ref 27–31)
MCHC RBC AUTO-ENTMCNC: 30.4 G/DL (ref 32–36)
MCV RBC AUTO: 71 FL (ref 82–98)
MONOCYTES # BLD AUTO: 0.4 K/UL (ref 0.3–1)
MONOCYTES NFR BLD: 5.9 % (ref 4–15)
NEUTROPHILS # BLD AUTO: 4 K/UL (ref 1.8–7.7)
NEUTROPHILS NFR BLD: 56.4 % (ref 38–73)
NRBC BLD-RTO: 0 /100 WBC
PLATELET # BLD AUTO: 459 K/UL (ref 150–450)
PMV BLD AUTO: 11.3 FL (ref 9.2–12.9)
POTASSIUM SERPL-SCNC: 4.1 MMOL/L (ref 3.5–5.1)
PROT SERPL-MCNC: 7.7 G/DL (ref 6–8.4)
RBC # BLD AUTO: 5.22 M/UL (ref 4–5.4)
SODIUM SERPL-SCNC: 140 MMOL/L (ref 136–145)
WBC # BLD AUTO: 6.99 K/UL (ref 3.9–12.7)

## 2023-05-31 PROCEDURE — 99999 PR PBB SHADOW E&M-EST. PATIENT-LVL III: ICD-10-PCS | Mod: PBBFAC,,, | Performed by: STUDENT IN AN ORGANIZED HEALTH CARE EDUCATION/TRAINING PROGRAM

## 2023-05-31 PROCEDURE — 99214 OFFICE O/P EST MOD 30 MIN: CPT | Mod: 25,S$GLB,, | Performed by: STUDENT IN AN ORGANIZED HEALTH CARE EDUCATION/TRAINING PROGRAM

## 2023-05-31 PROCEDURE — 36415 COLL VENOUS BLD VENIPUNCTURE: CPT | Performed by: STUDENT IN AN ORGANIZED HEALTH CARE EDUCATION/TRAINING PROGRAM

## 2023-05-31 PROCEDURE — 99214 PR OFFICE/OUTPT VISIT, EST, LEVL IV, 30-39 MIN: ICD-10-PCS | Mod: 25,S$GLB,, | Performed by: STUDENT IN AN ORGANIZED HEALTH CARE EDUCATION/TRAINING PROGRAM

## 2023-05-31 PROCEDURE — 86140 C-REACTIVE PROTEIN: CPT | Performed by: STUDENT IN AN ORGANIZED HEALTH CARE EDUCATION/TRAINING PROGRAM

## 2023-05-31 PROCEDURE — 97110 PR THERAPEUTIC EXERCISES: ICD-10-PCS | Mod: GP,S$GLB,, | Performed by: STUDENT IN AN ORGANIZED HEALTH CARE EDUCATION/TRAINING PROGRAM

## 2023-05-31 PROCEDURE — 85652 RBC SED RATE AUTOMATED: CPT | Performed by: STUDENT IN AN ORGANIZED HEALTH CARE EDUCATION/TRAINING PROGRAM

## 2023-05-31 PROCEDURE — 97110 THERAPEUTIC EXERCISES: CPT | Mod: GP,S$GLB,, | Performed by: STUDENT IN AN ORGANIZED HEALTH CARE EDUCATION/TRAINING PROGRAM

## 2023-05-31 PROCEDURE — 99999 PR PBB SHADOW E&M-EST. PATIENT-LVL III: CPT | Mod: PBBFAC,,, | Performed by: STUDENT IN AN ORGANIZED HEALTH CARE EDUCATION/TRAINING PROGRAM

## 2023-05-31 PROCEDURE — 85025 COMPLETE CBC W/AUTO DIFF WBC: CPT | Performed by: STUDENT IN AN ORGANIZED HEALTH CARE EDUCATION/TRAINING PROGRAM

## 2023-05-31 PROCEDURE — 80053 COMPREHEN METABOLIC PANEL: CPT | Performed by: STUDENT IN AN ORGANIZED HEALTH CARE EDUCATION/TRAINING PROGRAM

## 2023-05-31 RX ORDER — MEDROXYPROGESTERONE ACETATE 150 MG/ML
150 INJECTION, SUSPENSION INTRAMUSCULAR
COMMUNITY
End: 2023-08-22

## 2023-05-31 NOTE — PATIENT INSTRUCTIONS
In preparation for you upcoming surgery, here are some things to keep in mind leading up to and after your surgery:    PRE-ADMIT APPOINTMENT  We have a department that will review your chart for any health conditions or other issues to make sure that it is safe from an anesthesia standpoint to undergo surgery.   If they have any concerns they may schedule an appointment for you to be evaluated and have any further testing done. This may include but is not limited to bloodwork, EKG, chest X-ray, referral to cardiologist for additional testing/clearance, referral to pulmonologist for additional testing/clearance, or referral to any other needed specialties for additional testing/clearance.  If only basic testing is needed this appointment may be scheduled a few days before your actually surgery. Unless any new concerns or issues arise, this typically does not affect the date of your surgery.   If you are on any medications, at this appointment they will also review and discuss/provide instructions on when to stop or start taking these medications before and after surgery.   INSTRUCTIONS FOR SURGERY   The day before your surgery (usually between 1-3 PM), someone will call you to give you the scheduled time for you surgery, what time you need to arrive, what time to not eat/drink past, and any other final instructions  If your surgery is scheduled for Monday then they will call you on Friday afternoon.   If you do not receive this call please reach out to our office before the end of the day (4 PM) so that we may assist you.   HOME EXERCISES AFTER SURGERY        PHYSICAL THERAPY  A referral for physical therapy will be placed to the location we discussed today. If you would like to make changes to this, please give us a call or send us a [facility] message as soon as possible so we may coordinate these changes.   We will send that referral to the desired location and also provide them with the rehabilitation protocol that  will be followed to make sure you are progressed appropriately after surgery.   They will also be provided with the start date of your PT after surgery. You will likely start PT prior to you first post-op appointment. Depending on the procedure you have performed this may be as soon as 3 days after surgery or up to 2 weeks after surgery. Below are a few examples of some common time frames for certain procedures:  Rotator cuff surgery- 10-11 days after surgery  Shoulder labrum surgery- 3-5 days after surgery   Shoulder replacement- 3-5 days after surgery  ACL Reconstruction- 3-5 days after surgery  Hip scope- 3-5 days after surgery  Distal biceps tendon repair- once post-op splint is removed/per Dr. Flanagan recommendation  Fracture- once post-op splint is removed/per Dr. Flanagan recommendation   If you ever have any problems or issues with your physical therapy or wish to change locations at any point, please let us know and we are happy to assist with that change.   POST-OP APPOINTMENTS  Post-op appointments will be scheduled at 2 weeks, 6 weeks, and 3 months from the date of your surgery. We will schedule these appointments prior to your surgery and they will be able to be viewed in [employer].  2 week post-op appointment  This appointment will be with Lor Lott who is Dr. Flanagan's physician assistant (PA). At this appointment we will be removing your sutures, checking for any signs of infection or other concerning issues, and checking to make sure your range of motion is appropriate.   Dr. Flanagan will also be in clinic on the same day as this appointment and can step in to see you if there is anything of concern that needs to be addressed.   You may also have X-rays scheduled at this appointment, depending on the procedure you had performed (shoulder replacement, ACL surgery, surgery for a fracture, etc.)  6 week post-op appointment  This appointment will be with Dr. Flanagan. He will make sure  everything is progressing well and may also review your pictures from surgery if any were taken.   You may also have X-rays at this appointment, depending on the procedure you had performed (shoulder replacement, surgery for a fracture, etc.)  3 month post-op appointment  This appointment will be with Dr. Flanagan. He will make sure you continue to progress appropriately.  Any follow-ups after this visit will be at the discretion of Dr. Flanagan based upon your recovery/progress, procedure performed, etc.   FMLA OR SHORT TERM DISABILITY PAPERWORK  If you have any paperwork that needs to be filled out in regards to FMLA leave or short term disability leave, you may drop these forms off at the [facility] or attachment them to a patient message via [facility].   These forms will be filled out within a few days of your actual surgery being performed in case your surgery date is rescheduled or changed and the forms would need to potentially be filled out again.   Please try to provide these forms to our office in a timely manner, as we ask for 5-7 business days for them to be completed.

## 2023-06-01 ENCOUNTER — ANESTHESIA EVENT (OUTPATIENT)
Dept: SURGERY | Facility: HOSPITAL | Age: 23
End: 2023-06-01
Payer: COMMERCIAL

## 2023-06-01 RX ORDER — TRAMADOL HYDROCHLORIDE 50 MG/1
50 TABLET ORAL
COMMUNITY
End: 2023-06-16 | Stop reason: ALTCHOICE

## 2023-06-01 RX ORDER — SULFAMETHOXAZOLE AND TRIMETHOPRIM 800; 160 MG/1; MG/1
TABLET ORAL
COMMUNITY
End: 2023-08-22

## 2023-06-01 NOTE — PROGRESS NOTES
[facility]   Physical Therapy Treatment     Name: Casandra [facility] Number: [id number]    Therapy Diagnosis:   Encounter Diagnosis   Name Primary?    Decreased strength, endurance, and mobility Yes         Physician: Marcos Borja MD    Visit Date: 5/25/2023    Physician Orders: PT Eval and Treat  Medical Diagnosis from Referral: Chronic bilateral low back pain with bilateral sciatica [M54.42, M54.41, G89.29], Sacroiliac joint dysfunction of both sides [M53.3], Right cervical radiculopathy [M54.12]  Evaluation Date: 12/8/2022  Authorization Period Expiration: 11/28/2023  Plan of Care Expiration: 5/8/2023                  Progress Update: 5/8/2023     Visit # / Visits authorized: 21/37 (+1 for evaluation)   FOTO: Visit #1 1/13/2022 - Scored: 1 / 3      [facility]: [facility]      PRECAUTIONS: Standard Precautions      MD Follow up: No scheduled follow-up    PTA Visit #: 0/5     Time In: 1005  Time Out: 1105  Total Billable Time: 53 minutes    SUBJECTIVE     Pt reports:  her low back has been feeling pretty good. States she drove home last weekend and noted significant discomfort during the drive but that she has otherwise been feeling pretty good     Compliance with Hep: Every Other Day  Response to previous treatment: felt better with decreased low back pain   Functional change: Better    Pain: 5/10 shoulder,   6/10 back   Location: R shoulder and B low back.      OBJECTIVE     Objective Measures updated at progress report unless specified otherwise.    Treatment     Casandra received the treatments listed below:       MANUAL THERAPY TECHNIQUES were applied for (0) minutes, including:    Soft tissue mobilization:   bilateral  glutes, piriformis and deep hip rotators   Joint mobilizations: DEFERRED  Lumbar roll grade V   Functional dry needling:   Palpation Assessment to determine the necessity for Functional Dry Needling of Bilateral  glutes, piriformis with electrical  stimulation.   See EMR under MEDIA for written consent provided 4/18/2023.         THERAPEUTIC EXERCISES: to develop strength, endurance, range of motion, flexibility, posture and core stabilization for (12)  minutes including: x = performed today    Performed Today:     Upright bike level 8 for 5 minutes   Foam rolling glutes/piriformis: 2 minutes each side   Stretches   Fackler pose 2x30s B      Interventions DEFERRED today     Series 6 on half foam:   Stretches   Pec corner stretch 10x10s holds   Prayer stretch 3 minutes x 10s holds   Glute stretch 2x30s B            NEUROMUSCULAR RE-EDUCATION activities to improve: Coordination, Kinesthetic, Sense, and Proprioception for (20) minutes.   The following activities were included: x = exercises performed     Performed Today:     Clamshells RED 3x10 B, 10s hold last rep   SL RDL 15# 3x10 on L   Staggered RDL 3x10 on R (focus on neutral ankle)   Tabletop leg extensions GREEN band at feet 2x10 B    Interventions DEFERRED today     Dead bug + bridge isometric 3x6 B   Standing Clamshells BLUE 3x10 B   Anterior carries 15# 3 laps            THERAPEUTIC ACTIVITIES to improve dynamic and functional  performance for (25) minutes including:    Performed Today:     Single leg hip thrusts 3x10 B   TRX squat + row 3x10   Knee extensions   DL 45# 3x10   Hamstring curls   DL: 45# 3x10      Interventions DEFERRED today            Next Session:         ASSESSMENT     Pt tolerated session well today.    Response to Manual Therapy: deferred today   Response to Therapeutic Exercise: no significant changes incorporated today  Response to Neuromuscular Re-education: improved ankle stability noted with single leg RDLs; continue to not incorporate weight with RDL on R in order to increase focus on ankle stability   Response to Therapeutic Activity: incorporated knee extensions and hamstring curls in order to focus on isolating functional quadricep and hamstring strength     Casandra is  progressing well towards her goals.   Pt prognosis is Good.     Pt will continue to benefit from skilled outpatient physical therapy to address the deficits listed in the problem list box on initial evaluation, provide pt/family education and to maximize pt's level of independence in the home and community environment.     Pt's spiritual, cultural and educational needs considered and pt agreeable to plan of care and goals.    Anticipated barriers to physical therapy: increased for cueing    SHORT TERM GOALS:  updated (2/8/23) Progress Date Met   Recent signs and systems trend is improving in order to progress towards Long term goals.  [x] Met  [] Not Met  [] Progressing 1/13/22   Patient will be independent with Home Exercise Program in order to further progress and return to maximal function. [x] Met  [] Not Met  [] Progressing 3/7/2023   Pain rating at Worst: 5 /10 in order to progress towards increased independence with activity. [x] Met  [] Not Met  [] Progressing 3/7/2023   Patient will be able to correct postural deviations in sitting and standing, to decrease pain and promote postural awareness for injury prevention.  [x] Met  [] Not Met  [] Progressing 1/13/22   Patient will improve functional outcome (FOTO) score: by 5% to increase self-worth & perceived functional ability towards long term goals [] Met  [] Not Met  [x] Progressing       Updated LONG TERM GOALS: (5/8/23) Progress Date Met   Patient will return to normal activites of daily living, recreational, and work related activities with less pain and limitation.  [] Met  [] Not Met  [x] Progressing    Patient will improve range of motion  to stated goals in order to return to maximal functional potential.  [] Met  [] Not Met  [x] Progressing    Patient will improve Strength to stated goals of appropriate musculature in order to improve functional independence.  [] Met  [] Not Met  [x] Progressing    Pain Rating at Best: 1/10 to improve Quality of  Life.  [] Met  [] Not Met  [x] Progressing    Patient will meet predicted functional outcome (FOTO) score: 28% to increase self-worth & perceived functional ability. [] Met  [] Not Met  [x] Progressing    Patient will have met/partially met personal goal of: decrease pain to increase functional with daily and recreational activities.  [] Met  [] Not Met  [x] Progressing         PLAN     Updated Plan of care Certification: 5/25/2023 to 5/8/2023.    Outpatient Physical Therapy 2 times weekly for 8 weeks to include any combination of the following interventions: virtual visits, dry needling, modalities, electrical stimulation (IFC, Pre-Mod, Attended with Functional Dry Needling), Cervical/Lumbar Traction, Gait Training, Manual Therapy, Neuromuscular Re-ed, Patient Education, Self Care, Therapeutic Exercise, and Therapeutic Activites     Asmita Miller, PT

## 2023-06-01 NOTE — ANESTHESIA PREPROCEDURE EVALUATION
06/01/2023  Casandra Barragan is a 22 y.o., female.  No past medical history on file.  No past surgical history on file.      Pre-op Assessment    I have reviewed the Patient Summary Reports.     I have reviewed the Nursing Notes. I have reviewed the NPO Status.   I have reviewed the Medications.     Review of Systems  Anesthesia Hx:  No problems with previous Anesthesia  Neg history of prior surgery. Denies Family Hx of Anesthesia complications.   Denies Personal Hx of Anesthesia complications.   Social:  Non-Smoker    Hematology/Oncology:  Hematology Normal        Cardiovascular:  Cardiovascular Normal     Pulmonary:  Pulmonary Normal    Renal/:  Renal/ Normal     Hepatic/GI:  Hepatic/GI Normal    Musculoskeletal:   Extensive synovitis shoulder.   Neurological:  Neurology Normal    Psych:  Psychiatric Normal              Anesthesia Plan  Type of Anesthesia, risks & benefits discussed:    Anesthesia Type: Gen ETT  Intra-op Monitoring Plan: Standard ASA Monitors  Post Op Pain Control Plan: multimodal analgesia, IV/PO Opioids PRN and peripheral nerve block  Induction:  IV  Informed Consent: Informed consent signed with the Patient and all parties understand the risks and agree with anesthesia plan.  All questions answered.   ASA Score: 1  Day of Surgery Review of History & Physical: H&P Update referred to the surgeon/provider.    Ready For Surgery From Anesthesia Perspective.     .

## 2023-06-05 ENCOUNTER — TELEPHONE (OUTPATIENT)
Dept: PREADMISSION TESTING | Facility: HOSPITAL | Age: 23
End: 2023-06-05
Payer: COMMERCIAL

## 2023-06-05 ENCOUNTER — TELEPHONE (OUTPATIENT)
Dept: SPORTS MEDICINE | Facility: CLINIC | Age: 23
End: 2023-06-05
Payer: COMMERCIAL

## 2023-06-05 RX ORDER — AMITRIPTYLINE HYDROCHLORIDE 25 MG/1
25 TABLET, FILM COATED ORAL NIGHTLY
Qty: 30 TABLET | Refills: 2 | Status: SHIPPED | OUTPATIENT
Start: 2023-06-05 | End: 2023-07-20 | Stop reason: DRUGHIGH

## 2023-06-05 NOTE — TELEPHONE ENCOUNTER
Called and spoke to patient and infromed her 2 people are allowed to accompany her for surgery. Also discussed post-op appointment and recommendations on driving after surgery (at least not for the first 2 weeks). Patient voiced understanding and was grateful for the call. YOVANY    ----- Message from Janki Emery sent at 6/5/2023  1:25 PM CDT -----  Contact: Casandra  Casandra is calling to speak to the nurse regarding her scheduled surgery, she would like to know how many people are allow to come with her. Please give her a call back at [phone]    Thanks  LJ

## 2023-06-05 NOTE — TELEPHONE ENCOUNTER
Pre op instructions reviewed with patient per phone.      To confirm, your doctor has instructed you: Surgery is scheduled for 6/9/2023.     Pre admit office will call the afternoon prior to surgery between 1PM and 3PM with arrival time.    Surgery will be at [facility],  The address is [address]. [place] [address].      IMPORTANT INSTRUCTIONS!    Do not eat or drink after 12 midnight, including water. Do not smoke or use chewing tobacco after 12 midnight  OK to brush teeth, but no gum, candy, or mints!      *Take only these medicines with a small swallow of water-morning of surgery*     none       ____ Stop Aspirin, Ibuprofen, Motrin and Aleve at least 5-7 days before surgery, unless otherwise instructed by your doctor, or the nurse.   You MAY use Tylenol/acetaminophen until day of surgery.      ____  If you take diabetic medication, do NOT take morning of surgery unless instructed by Doctor. Metformin must be stopped 24 hrs prior to surgery time.       ____ Stop taking any Fish Oil supplements or Vitamins at least 5 days prior to surgery, unless instructed otherwise by your Doctor.       Please notify MD office if you have an active infection, currently taking antibiotics or received a vaccination within the past 7 days.    You may be required to provide a urine sample prior to procedure;   Please ask  for a specimen cup if you need to use the restroom prior to being called into pre-op.    Bathing Instructions: The night before surgery and the morning prior to coming to the hospital:    - Shower & rinse your body as usual with anti-bacterial Soap (Dial or Lever 2000)   -Hibiclens (if indicated) use AFTER anti-bacterial soap; 1 packet PM/1 packet in AM on surgical site only   -Do not use hibiclens on your head, face, or genitals.    -Do not wash with anti-bacterial soap after you use the hibiclens.    -Do not shave surgical site 5-7 days prior to surgery.    -Pubic hair 7 days  prior to surgery (gyn pt's).      Pediatric patients do not need to use anti-bacterial soap or Hibiclens.             After Bathing:   __ No powder, lotions, creams, or body spray to skin     __No deodorant for any breast procedure, PORT, or upper arm surgery     __ No makeup, mascara, nail polish or artificial nails       **SURGERY WILL BE CANCELLED IF ARTIFICIAL/NAIL POLISH IS PRESENT!!!**    __ Please remove all piercings and leave all jewelry at home.    **SURGERY WILL BE CANCELLED IF PIERCINGS ARE PRESENT!!!**      __ Dentures, Hearing Aids and Contact Lens need to be removed prior to the start of surgery.      __ Wear clean, loose-fitting clothing. Allow for dressings/bandages/surgical equipment     __ You must have transportation, and they MUST stay the entire time.         [facility] Visitor/Ride Policy:   Only 1 adult allowed (over the age of 18) to accompany you and MUST STAY through the entire length of admission.     -Must have a ride home from a responsible adult that you know and trust.    -Medical Transport, Uber or Lyft can only be used if patient has a responsible adult to accompany them during ride home.  Pediatric patients are encouraged to have 2 adults accompany them to the surgery center.     ~Your ride MUST STAY the entire time until you are discharged~        Post-Op Instructions: You will receive surgery post-op instructions by your Discharge Nurse prior to going home.     Surgical Site Infection:   Prevention of surgical site infections:   -Keep incisions clean and dry.   -Do not soak/submerge incisions in water until completely healed.   -Do not apply lotions, powders, creams, or deodorants to site.   -Always make sure hands are cleaned with antibacterial soap/ alcohol-based  prior to touching the surgical site.       Signs and symptoms:               -Redness and pain around the area where you had surgery               -Drainage of cloudy fluid from your surgical wound                -Fever over 100.4 or chills     >>>Call Surgeon office/on-call Surgeon if you experience any of these signs & symptoms post-surgery @ [phone].       *Please Call [facility] Pre-Admit Department for surgery instruction questions:  [phone]    *Payment questions:  [phone]    *Billing questions:  [phone]

## 2023-06-08 ENCOUNTER — TELEPHONE (OUTPATIENT)
Dept: PREADMISSION TESTING | Facility: HOSPITAL | Age: 23
End: 2023-06-08
Payer: COMMERCIAL

## 2023-06-08 NOTE — TELEPHONE ENCOUNTER
Called and spoke with the patient about the following:     Your Surgery arrival time is at 0930 on 6/9/2023 at [facility] location.   The address is [address]. [place] [address].      Only 1 adult allowed (over the age of 18) to accompany you and MUST STAY through the entire length of admission.     Must have a ride home from a responsible adult that you know and trust.    Medical Transport, Uber or Lyft can only be used if patient has a responsible adult to accompany them during ride home.  Pediatric patients are encouraged to have 2 adults accompany them to the surgery center.     ~Your ride MUST STAY the entire time until you are discharged~    You may be required to provide a urine sample prior to procedure;   Please ask  for a specimen cup if you need to use the restroom prior to being called into pre-op.    Please come to the main lobby and be prepared to show your photo ID and insurance card.      Nothing to eat or drink after midnight, unless you were instructed to take specific medications discussed with the Pre-admit Nurse.      Please call with any questions or concerns.     [phone]      Thanks.

## 2023-06-09 ENCOUNTER — ANESTHESIA (OUTPATIENT)
Dept: SURGERY | Facility: HOSPITAL | Age: 23
End: 2023-06-09
Payer: COMMERCIAL

## 2023-06-09 ENCOUNTER — TELEPHONE (OUTPATIENT)
Dept: SPORTS MEDICINE | Facility: CLINIC | Age: 23
End: 2023-06-09
Payer: COMMERCIAL

## 2023-06-09 ENCOUNTER — HOSPITAL ENCOUNTER (OUTPATIENT)
Facility: HOSPITAL | Age: 23
Discharge: HOME | End: 2023-06-09
Attending: STUDENT IN AN ORGANIZED HEALTH CARE EDUCATION/TRAINING PROGRAM | Admitting: STUDENT IN AN ORGANIZED HEALTH CARE EDUCATION/TRAINING PROGRAM
Payer: COMMERCIAL

## 2023-06-09 VITALS
BODY MASS INDEX: 23.63 KG/M2 | HEIGHT: 63 IN | SYSTOLIC BLOOD PRESSURE: 101 MMHG | HEART RATE: 99 BPM | RESPIRATION RATE: 20 BRPM | WEIGHT: 133.38 LBS | TEMPERATURE: 98 F | DIASTOLIC BLOOD PRESSURE: 67 MMHG | OXYGEN SATURATION: 97 %

## 2023-06-09 DIAGNOSIS — Z98.890 STATUS POST ARTHROSCOPY OF RIGHT SHOULDER: Primary | ICD-10-CM

## 2023-06-09 DIAGNOSIS — M65.9 SYNOVITIS OF RIGHT SHOULDER: ICD-10-CM

## 2023-06-09 DIAGNOSIS — M12.20 PVNS (PIGMENTED VILLONODULAR SYNOVITIS): Primary | ICD-10-CM

## 2023-06-09 PROCEDURE — 37000009 HC ANESTHESIA EA ADD 15 MINS: Performed by: STUDENT IN AN ORGANIZED HEALTH CARE EDUCATION/TRAINING PROGRAM

## 2023-06-09 PROCEDURE — 27201423 OPTIME MED/SURG SUP & DEVICES STERILE SUPPLY: Performed by: STUDENT IN AN ORGANIZED HEALTH CARE EDUCATION/TRAINING PROGRAM

## 2023-06-09 PROCEDURE — 63600175 PHARM REV CODE 636 W HCPCS: Performed by: NURSE ANESTHETIST, CERTIFIED REGISTERED

## 2023-06-09 PROCEDURE — 36000710: Performed by: STUDENT IN AN ORGANIZED HEALTH CARE EDUCATION/TRAINING PROGRAM

## 2023-06-09 PROCEDURE — 37000008 HC ANESTHESIA 1ST 15 MINUTES: Performed by: STUDENT IN AN ORGANIZED HEALTH CARE EDUCATION/TRAINING PROGRAM

## 2023-06-09 PROCEDURE — 63600175 PHARM REV CODE 636 W HCPCS: Performed by: STUDENT IN AN ORGANIZED HEALTH CARE EDUCATION/TRAINING PROGRAM

## 2023-06-09 PROCEDURE — 81025 URINE PREGNANCY TEST: CPT | Performed by: STUDENT IN AN ORGANIZED HEALTH CARE EDUCATION/TRAINING PROGRAM

## 2023-06-09 PROCEDURE — 29821 PR SHLDR ARTHROSCOP,FULL SYNOVECT: ICD-10-PCS | Mod: RT,,, | Performed by: STUDENT IN AN ORGANIZED HEALTH CARE EDUCATION/TRAINING PROGRAM

## 2023-06-09 PROCEDURE — D9220A PRA ANESTHESIA: ICD-10-PCS | Mod: ,,, | Performed by: NURSE ANESTHETIST, CERTIFIED REGISTERED

## 2023-06-09 PROCEDURE — 71000015 HC POSTOP RECOV 1ST HR: Performed by: STUDENT IN AN ORGANIZED HEALTH CARE EDUCATION/TRAINING PROGRAM

## 2023-06-09 PROCEDURE — 87075 CULTR BACTERIA EXCEPT BLOOD: CPT | Performed by: STUDENT IN AN ORGANIZED HEALTH CARE EDUCATION/TRAINING PROGRAM

## 2023-06-09 PROCEDURE — 63600175 PHARM REV CODE 636 W HCPCS: Performed by: ANESTHESIOLOGY

## 2023-06-09 PROCEDURE — 88305 TISSUE EXAM BY PATHOLOGIST: CPT | Mod: 26,,, | Performed by: PATHOLOGY

## 2023-06-09 PROCEDURE — 64415 NJX AA&/STRD BRCH PLXS IMG: CPT | Performed by: ANESTHESIOLOGY

## 2023-06-09 PROCEDURE — 87205 SMEAR GRAM STAIN: CPT | Performed by: STUDENT IN AN ORGANIZED HEALTH CARE EDUCATION/TRAINING PROGRAM

## 2023-06-09 PROCEDURE — 25000003 PHARM REV CODE 250: Performed by: NURSE ANESTHETIST, CERTIFIED REGISTERED

## 2023-06-09 PROCEDURE — 29821 SHO ARTHRS SRG COMPL SYNVCT: CPT | Mod: RT,,, | Performed by: STUDENT IN AN ORGANIZED HEALTH CARE EDUCATION/TRAINING PROGRAM

## 2023-06-09 PROCEDURE — 36000711: Performed by: STUDENT IN AN ORGANIZED HEALTH CARE EDUCATION/TRAINING PROGRAM

## 2023-06-09 PROCEDURE — 88305 TISSUE EXAM BY PATHOLOGIST: CPT | Mod: 59 | Performed by: PATHOLOGY

## 2023-06-09 PROCEDURE — 87102 FUNGUS ISOLATION CULTURE: CPT | Performed by: STUDENT IN AN ORGANIZED HEALTH CARE EDUCATION/TRAINING PROGRAM

## 2023-06-09 PROCEDURE — 88305 TISSUE EXAM BY PATHOLOGIST: ICD-10-PCS | Mod: 26,,, | Performed by: PATHOLOGY

## 2023-06-09 PROCEDURE — D9220A PRA ANESTHESIA: Mod: ,,, | Performed by: NURSE ANESTHETIST, CERTIFIED REGISTERED

## 2023-06-09 PROCEDURE — 87070 CULTURE OTHR SPECIMN AEROBIC: CPT | Performed by: STUDENT IN AN ORGANIZED HEALTH CARE EDUCATION/TRAINING PROGRAM

## 2023-06-09 PROCEDURE — 71000033 HC RECOVERY, INTIAL HOUR: Performed by: STUDENT IN AN ORGANIZED HEALTH CARE EDUCATION/TRAINING PROGRAM

## 2023-06-09 RX ORDER — FENTANYL CITRATE 50 UG/ML
25 INJECTION, SOLUTION INTRAMUSCULAR; INTRAVENOUS EVERY 5 MIN PRN
Status: DISCONTINUED | OUTPATIENT
Start: 2023-06-09 | End: 2023-06-09 | Stop reason: HOSPADM

## 2023-06-09 RX ORDER — SUCCINYLCHOLINE CHLORIDE 20 MG/ML
INJECTION INTRAMUSCULAR; INTRAVENOUS
Status: DISCONTINUED | OUTPATIENT
Start: 2023-06-09 | End: 2023-06-09

## 2023-06-09 RX ORDER — LIDOCAINE HYDROCHLORIDE 20 MG/ML
INJECTION, SOLUTION EPIDURAL; INFILTRATION; INTRACAUDAL; PERINEURAL
Status: DISCONTINUED | OUTPATIENT
Start: 2023-06-09 | End: 2023-06-09

## 2023-06-09 RX ORDER — FENTANYL CITRATE 50 UG/ML
INJECTION, SOLUTION INTRAMUSCULAR; INTRAVENOUS
Status: DISCONTINUED | OUTPATIENT
Start: 2023-06-09 | End: 2023-06-09

## 2023-06-09 RX ORDER — DIPHENHYDRAMINE HYDROCHLORIDE 50 MG/ML
INJECTION INTRAMUSCULAR; INTRAVENOUS
Status: DISCONTINUED | OUTPATIENT
Start: 2023-06-09 | End: 2023-06-09

## 2023-06-09 RX ORDER — PHENYLEPHRINE HYDROCHLORIDE 10 MG/ML
INJECTION INTRAVENOUS
Status: DISCONTINUED | OUTPATIENT
Start: 2023-06-09 | End: 2023-06-09

## 2023-06-09 RX ORDER — OXYCODONE HYDROCHLORIDE 5 MG/1
5 TABLET ORAL EVERY 4 HOURS PRN
Qty: 36 TABLET | Refills: 0 | Status: SHIPPED | OUTPATIENT
Start: 2023-06-09 | End: 2023-06-16 | Stop reason: ALTCHOICE

## 2023-06-09 RX ORDER — SODIUM CHLORIDE, SODIUM LACTATE, POTASSIUM CHLORIDE, CALCIUM CHLORIDE 600; 310; 30; 20 MG/100ML; MG/100ML; MG/100ML; MG/100ML
INJECTION, SOLUTION INTRAVENOUS CONTINUOUS
Status: ACTIVE | OUTPATIENT
Start: 2023-06-09

## 2023-06-09 RX ORDER — DIPHENHYDRAMINE HYDROCHLORIDE 50 MG/ML
25 INJECTION INTRAMUSCULAR; INTRAVENOUS EVERY 6 HOURS PRN
Status: DISCONTINUED | OUTPATIENT
Start: 2023-06-09 | End: 2023-06-09 | Stop reason: HOSPADM

## 2023-06-09 RX ORDER — ACETAMINOPHEN 10 MG/ML
INJECTION, SOLUTION INTRAVENOUS
Status: DISCONTINUED | OUTPATIENT
Start: 2023-06-09 | End: 2023-06-09

## 2023-06-09 RX ORDER — DEXAMETHASONE SODIUM PHOSPHATE 4 MG/ML
INJECTION, SOLUTION INTRA-ARTICULAR; INTRALESIONAL; INTRAMUSCULAR; INTRAVENOUS; SOFT TISSUE
Status: DISCONTINUED | OUTPATIENT
Start: 2023-06-09 | End: 2023-06-09

## 2023-06-09 RX ORDER — ROCURONIUM BROMIDE 10 MG/ML
INJECTION, SOLUTION INTRAVENOUS
Status: DISCONTINUED | OUTPATIENT
Start: 2023-06-09 | End: 2023-06-09

## 2023-06-09 RX ORDER — KETOROLAC TROMETHAMINE 30 MG/ML
INJECTION, SOLUTION INTRAMUSCULAR; INTRAVENOUS
Status: DISCONTINUED | OUTPATIENT
Start: 2023-06-09 | End: 2023-06-09

## 2023-06-09 RX ORDER — ONDANSETRON 2 MG/ML
INJECTION INTRAMUSCULAR; INTRAVENOUS
Status: DISCONTINUED | OUTPATIENT
Start: 2023-06-09 | End: 2023-06-09

## 2023-06-09 RX ORDER — ONDANSETRON 2 MG/ML
4 INJECTION INTRAMUSCULAR; INTRAVENOUS ONCE AS NEEDED
Status: DISCONTINUED | OUTPATIENT
Start: 2023-06-09 | End: 2023-06-09 | Stop reason: HOSPADM

## 2023-06-09 RX ORDER — EPINEPHRINE 1 MG/ML
INJECTION, SOLUTION, CONCENTRATE INTRAVENOUS
Status: DISCONTINUED
Start: 2023-06-09 | End: 2023-06-09 | Stop reason: HOSPADM

## 2023-06-09 RX ORDER — MEPERIDINE HYDROCHLORIDE 25 MG/ML
12.5 INJECTION INTRAMUSCULAR; INTRAVENOUS; SUBCUTANEOUS ONCE
Status: DISCONTINUED | OUTPATIENT
Start: 2023-06-09 | End: 2023-06-09 | Stop reason: HOSPADM

## 2023-06-09 RX ORDER — CHLORHEXIDINE GLUCONATE ORAL RINSE 1.2 MG/ML
10 SOLUTION DENTAL
Status: DISCONTINUED | OUTPATIENT
Start: 2023-06-09 | End: 2023-06-09 | Stop reason: HOSPADM

## 2023-06-09 RX ORDER — ASPIRIN 81 MG/1
81 TABLET ORAL 2 TIMES DAILY
Qty: 28 TABLET | Refills: 0 | Status: SHIPPED | OUTPATIENT
Start: 2023-06-09 | End: 2023-08-22

## 2023-06-09 RX ORDER — ROPIVACAINE HYDROCHLORIDE 5 MG/ML
INJECTION, SOLUTION EPIDURAL; INFILTRATION; PERINEURAL
Status: COMPLETED | OUTPATIENT
Start: 2023-06-09 | End: 2023-06-09

## 2023-06-09 RX ORDER — SODIUM CHLORIDE 9 MG/ML
INJECTION, SOLUTION INTRAVENOUS CONTINUOUS
Status: DISCONTINUED | OUTPATIENT
Start: 2023-06-09 | End: 2023-06-09 | Stop reason: HOSPADM

## 2023-06-09 RX ORDER — CEFAZOLIN SODIUM 2 G/50ML
2 SOLUTION INTRAVENOUS
Status: COMPLETED | OUTPATIENT
Start: 2023-06-09 | End: 2023-06-09

## 2023-06-09 RX ORDER — NAPROXEN 500 MG/1
500 TABLET ORAL 2 TIMES DAILY WITH MEALS
Qty: 60 TABLET | Refills: 0 | Status: SHIPPED | OUTPATIENT
Start: 2023-06-09 | End: 2023-07-09

## 2023-06-09 RX ORDER — HYDROCODONE BITARTRATE AND ACETAMINOPHEN 5; 325 MG/1; MG/1
1 TABLET ORAL
Status: DISCONTINUED | OUTPATIENT
Start: 2023-06-09 | End: 2023-06-09 | Stop reason: HOSPADM

## 2023-06-09 RX ORDER — ACETAMINOPHEN 500 MG
1000 TABLET ORAL EVERY 8 HOURS PRN
Qty: 60 TABLET | Refills: 0 | Status: SHIPPED | OUTPATIENT
Start: 2023-06-09 | End: 2023-08-22

## 2023-06-09 RX ORDER — PROPOFOL 10 MG/ML
VIAL (ML) INTRAVENOUS
Status: DISCONTINUED | OUTPATIENT
Start: 2023-06-09 | End: 2023-06-09

## 2023-06-09 RX ORDER — MIDAZOLAM HYDROCHLORIDE 1 MG/ML
INJECTION, SOLUTION INTRAMUSCULAR; INTRAVENOUS
Status: DISCONTINUED | OUTPATIENT
Start: 2023-06-09 | End: 2023-06-09

## 2023-06-09 RX ADMIN — SODIUM CHLORIDE, SODIUM LACTATE, POTASSIUM CHLORIDE, AND CALCIUM CHLORIDE: 600; 310; 30; 20 INJECTION, SOLUTION INTRAVENOUS at 10:06

## 2023-06-09 RX ADMIN — PROPOFOL 30 MG: 10 INJECTION, EMULSION INTRAVENOUS at 11:06

## 2023-06-09 RX ADMIN — ROPIVACAINE HYDROCHLORIDE 25 ML: 5 INJECTION, SOLUTION EPIDURAL; INFILTRATION; PERINEURAL at 10:06

## 2023-06-09 RX ADMIN — MIDAZOLAM 2 MG: 1 INJECTION INTRAMUSCULAR; INTRAVENOUS at 10:06

## 2023-06-09 RX ADMIN — FENTANYL CITRATE 50 MCG: 50 INJECTION, SOLUTION INTRAMUSCULAR; INTRAVENOUS at 10:06

## 2023-06-09 RX ADMIN — GLYCOPYRROLATE 0.2 MG: 0.2 INJECTION, SOLUTION INTRAMUSCULAR; INTRAVITREAL at 11:06

## 2023-06-09 RX ADMIN — CEFAZOLIN SODIUM 2 G: 2 SOLUTION INTRAVENOUS at 11:06

## 2023-06-09 RX ADMIN — PROPOFOL 120 MG: 10 INJECTION, EMULSION INTRAVENOUS at 11:06

## 2023-06-09 RX ADMIN — ROCURONIUM BROMIDE 6 MG: 10 SOLUTION INTRAVENOUS at 11:06

## 2023-06-09 RX ADMIN — ONDANSETRON 4 MG: 2 INJECTION INTRAMUSCULAR; INTRAVENOUS at 12:06

## 2023-06-09 RX ADMIN — PROPOFOL 50 MG: 10 INJECTION, EMULSION INTRAVENOUS at 11:06

## 2023-06-09 RX ADMIN — LIDOCAINE HYDROCHLORIDE 60 MG: 20 INJECTION, SOLUTION EPIDURAL; INFILTRATION; INTRACAUDAL; PERINEURAL at 11:06

## 2023-06-09 RX ADMIN — SUCCINYLCHOLINE CHLORIDE 120 MG: 20 INJECTION, SOLUTION INTRAMUSCULAR; INTRAVENOUS; PARENTERAL at 11:06

## 2023-06-09 RX ADMIN — SODIUM CHLORIDE, SODIUM LACTATE, POTASSIUM CHLORIDE, AND CALCIUM CHLORIDE: 600; 310; 30; 20 INJECTION, SOLUTION INTRAVENOUS at 12:06

## 2023-06-09 RX ADMIN — DEXAMETHASONE SODIUM PHOSPHATE 8 MG: 4 INJECTION, SOLUTION INTRA-ARTICULAR; INTRALESIONAL; INTRAMUSCULAR; INTRAVENOUS; SOFT TISSUE at 11:06

## 2023-06-09 RX ADMIN — ACETAMINOPHEN 750 MG: 10 INJECTION, SOLUTION INTRAVENOUS at 11:06

## 2023-06-09 RX ADMIN — PHENYLEPHRINE HYDROCHLORIDE 50 MCG: 10 INJECTION INTRAVENOUS at 12:06

## 2023-06-09 RX ADMIN — KETOROLAC TROMETHAMINE 15 MG: 30 INJECTION, SOLUTION INTRAMUSCULAR; INTRAVENOUS at 12:06

## 2023-06-09 RX ADMIN — DIPHENHYDRAMINE HYDROCHLORIDE 6.25 MG: 50 INJECTION INTRAMUSCULAR; INTRAVENOUS at 11:06

## 2023-06-09 NOTE — ANESTHESIA POSTPROCEDURE EVALUATION
Anesthesia Post Evaluation    Patient: Casandra Barragan    Procedure(s) Performed: Procedure(s) (LRB):  ARTHROSCOPY, SHOULDER; DIAGNOSTIC (Right)  SYNOVECTOMY, SHOULDER (Right)    Final Anesthesia Type: general      Patient location during evaluation: PACU  Patient participation: Yes- Able to Participate  Level of consciousness: awake and alert and oriented  Post-procedure vital signs: reviewed and stable  Pain management: adequate  Airway patency: patent    PONV status at discharge: No PONV  Anesthetic complications: no      Cardiovascular status: blood pressure returned to baseline, stable and hemodynamically stable  Respiratory status: unassisted  Hydration status: euvolemic  Follow-up not needed.          Vitals Value Taken Time   BP 95/52 06/09/23 1324   Temp 36.6 °C (97.8 °F) 06/09/23 1256   Pulse 92 06/09/23 1325   Resp 16 06/09/23 1325   SpO2 99 % 06/09/23 1325   Vitals shown include unvalidated device data.      No case tracking events are documented in the log.      Pain/Leanne Score: Leanne Score: 9 (6/9/2023 12:56 PM)

## 2023-06-09 NOTE — TRANSFER OF CARE
"Anesthesia Transfer of Care Note    Patient: Casandra Barragan    Procedure(s) Performed: Procedure(s) (LRB):  ARTHROSCOPY, SHOULDER; DIAGNOSTIC (Right)  SYNOVECTOMY, SHOULDER (Right)    Patient location: PACU    Anesthesia Type: general    Transport from OR: Transported from OR on room air with adequate spontaneous ventilation    Post pain: adequate analgesia    Post assessment: tolerated procedure well and no apparent anesthetic complications    Post vital signs: stable    Level of consciousness: awake and responds to stimulation    Nausea/Vomiting: no nausea/vomiting    Complications: none    Transfer of care protocol was followed      Last vitals:   Visit Vitals  /64 (BP Location: Left arm, Patient Position: Sitting)   Pulse 97   Temp 36.6 °C (97.8 °F) (Temporal)   Resp 15   Ht 5' 3" (1.6 m)   Wt 60.5 kg (133 lb 6.1 oz)   LMP 05/16/2023 (Exact Date)   SpO2 98%   Breastfeeding No   BMI 23.63 kg/m²     "

## 2023-06-09 NOTE — ANESTHESIA PROCEDURE NOTES
Intubation    Date/Time: 6/9/2023 11:07 AM  Performed by: Renetta Tobias CRNA  Authorized by: Hernandez Rios MD     Intubation:     Induction:  Intravenous    Intubated:  Postinduction    Mask Ventilation:  Easy mask    Attempts:  1    Attempted By:  CRNA    Method of Intubation:  Direct    Blade:  Tolliver 2    Laryngeal View Grade: Grade I - full view of cords      Difficult Airway Encountered?: No      Complications:  None    Airway Device:  Oral endotracheal tube    Airway Device Size:  7.0    Style/Cuff Inflation:  Cuffed    Inflation Amount (mL):  7    Tube secured:  21    Secured at:  The lips    Placement Verified By:  Capnometry    Complicating Factors:  None    Findings Post-Intubation:  BS equal bilateral and atraumatic/condition of teeth unchanged

## 2023-06-09 NOTE — OP NOTE
ORTHOPAEDIC SURGERY OPERATIVE REPORT    DATE OF SERVICE: 6/9/2023    PRIMARY SURGEON: Chris Flanagan MD    Assistant Surgeon: SMA Carlos Eduardo. Skilled assistance was medically necessary for this case to help with extremity positioning, soft tissue retractions and instrumentation.     DATE OF SURGERY: 6/9/2023    PATIENT'S NAME: Casandra Barragan    MEDICAL RECORD NUMBER: [id number]     PREOPERATIVE DIAGNOSES:   1. 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 Right shoulder inflammatory synovitis  2. Right shoulder cartilage loss    PROCEDURE PERFORMED:   1. Right shoulder diagnostic arthroscopy with complete synovectomy    ANESTHESIA: General plus regional.     IMPLANTS USED:   none    COMPLICATIONS: None.     POSITION: Beachchair.     BRIEF INDICATIONS: This is a 22 y.o. female who presents with a symptomatic RIGHT shoulder pain for over 1 year. She recently had an MRI that demonstrated extensive inflammatory synovitis, cartilage loss, and humeral head erosion. she has failed conservative treatment measures. We discussed surgical treatment options including risks and benefits. After a detailed explanation of the technical aspects of the procedure, the patient elected to proceed and signed consent.    OPERATIVE FINDINGS:   Biceps/Labrum: Inflamed tendon, proliferative synovitis  Glenohumeral joint: Glenoid and humeral head with grade 3-4 changes, posterior humeral head erosions  Rotator Cuff: intact  Subacromial space: n/a    DESCRIPTION OF PROCEDURE:   The patient was identified in the preoperative holding area. The operative upper extremity was marked.  Consent was verified. The patient was then taken for preoperative block. Following this, the patient was taken to the main operating room where she was laid supine on the operative table. General anesthetic was induced. Preoperative time-out was performed verifying the patient, procedure, and preoperative antibiotics.  The patient was then positioned in the beachchair position with all bony prominences well padded. The operative upper extremity was then prepped and draped in the usual sterile fashion.     A posterior portal was established with an #11-blade. The arthroscope was inserted into the glenohumeral joint atraumatically. We then made an anterior portal using an outside-in technique with an #18-gauge spinal needle into the rotator interval. We then used an #11-blade for stab incision and then followed this with a straight hemostat to open our anterior portal. We then inserted our arthroscopic probe to probe the joint. We were unable to visualize anything as there was extensive proliferative synovitis. A grasper was used to remove some of the inflamed synovium. Multiple samples of the synovium were taken and sent for both culture as well as permanent pathology.     A shaver was then used to debride the synovium from the anterior capsule including the subscapularis, biceps, anterior labrum, and anterior cuff. VAPR was used as necessary for hemostasis.     The camera was then placed in the anterior portal to visualize posterior. Again, extensive proliferative synovitis was visualized and a combination of shaver and VAPR was used to remove it. A 70 degree scope was used to better visualize posterior and inferior and care was taken to ensure that all pathologic tissue was removed while protecting the traversing axillary nerve.    Viewing portals were switched multiple times as the synovectomy was completed. We also visualized the humeral head which showed substantial erosions posterior and inferior.    Once we completed our synovectomy final pictures were taken.     The portal sites were closed with 3-0 nylon sutures.  A light sterile dressing and sling shoulder immobilizer were applied.  The patient was then woken from anesthesia and brought to PACU in stable condition.    Plan:  Shoulder arthroscopy protocol  YANICK marrufo for  comfort  Advance activity as tolerated  ASA 81mg BID x 2wks for DVT ppx  f/u 10-14 days for suture removal  Will follow up cultures and pathology      Chris Flanagan MD

## 2023-06-09 NOTE — ANESTHESIA PROCEDURE NOTES
Peripheral Block    Patient location during procedure: pre-op   Block not for primary anesthetic.  Reason for block: at surgeon's request and post-op pain management   Post-op Pain Location: Right Shoulder   Start time: 6/9/2023 10:44 AM  Timeout: 6/9/2023 10:40 AM   End time: 6/9/2023 10:53 AM    Staffing  Authorizing Provider: Hernandez Rios MD  Performing Provider: Hernandez Rios MD    Preanesthetic Checklist  Completed: patient identified, IV checked, site marked, risks and benefits discussed, surgical consent, monitors and equipment checked, pre-op evaluation and timeout performed  Peripheral Block  Patient position: sitting  Prep: ChloraPrep  Patient monitoring: heart rate, cardiac monitor, continuous pulse ox, continuous capnometry and frequent blood pressure checks  Block type: interscalene  Laterality: right  Injection technique: single shot  Needle  Needle type: Stimuplex   Needle gauge: 22 G  Needle length: 2 in  Needle localization: anatomical landmarks and ultrasound guidance   -ultrasound image captured on disc.  Assessment  Injection assessment: negative aspiration, negative parasthesia and local visualized surrounding nerve  Paresthesia pain: none  Heart rate change: no  Slow fractionated injection: yes    Medications:    Medications: ropivacaine (NAROPIN) injection 0.5% - Perineural   25 mL - 6/9/2023 10:50:00 AM    Additional Notes  VSS.  [facility] RN monitoring vitals throughout procedure.  Patient tolerated procedure well.

## 2023-06-09 NOTE — NURSING TRANSFER
Interscalene shoulder right  block completed per anesthesia at bedside. VSS. Continuous SPO2 & cardiac monitoring in place. Pt tolerated well.

## 2023-06-09 NOTE — PATIENT INSTRUCTIONS
DISCHARGE INSTRUCTIONS FOR SHOULDER ARTHROSCOPY     Contact the [facility] Medicine Clinic at [phone] if you have questions about your instructions or follow-up appointment.     DIET:   Start with clear liquids and light foods to minimize nausea. Once these are tolerated, advance to a regular diet.     DRESSING AND WOUND CARE:   Keep the dressing clean and dry. It is normal for there to be some drainage after surgery since the shoulder was irrigated with large amounts of fluid. Reinforce with additional gauze as necessary.   Remove the dressing the 2nd day after surgery and begin changing daily with clean gauze or Band-Aids®. Keep your incisions covered until you follow up in clinic.   If you have Steri-Strips in place of stitches, allow them to stay in place as long as possible. Steri-Strips are made of a fabric material that can get wet in the shower and pat dry with a towel. They usually fall off on their own within 7 to 10 days. You may trim the edges as they begin to curl.     BATHING:   You may bathe or shower on the 2nd day after surgery, but do not scrub or soak the incisions. Dry the area by gently blotting it with a gauze or towel. After it is completely dry, cover the wound with clean gauze or Band-Aids®. Do NOT submerge the incisions (bath/swim) until after the sutures are removed and the wound has completely healed.     ACTIVITY:    Ice should be applied to the shoulder for 20-30 minutes, 5-6 times a day, to help control pain and swelling. Apply additional times as needed, especially after exercise, for the first 3-4 weeks. Do not apply ice directly to the skin; use a thin barrier in between. Also, do not use heat.    Elevate the shoulder by sleeping as upright as possible using extra pillows or a recliner. Do this for the first few days to help decrease pain and swelling.    Wear the sling when up and about until you are comfortable without it. This usually takes about a week. You do not have to  wear the sling to sleep.    When your block wears off, start the following exercises:  Remove the sling for 5-10 minutes, 3 times a day, to do the following exercises:   Fully bend & straighten your fingers, your wrist, and your elbow several times.    the forearm of your surgical shoulder with your normal arm and gently rotate your shoulder in and out. You can also try to slowly bring the arm overhead. You may be more comfortable doing these lying on your back.   Lean forward, bracing yourself on a table/counter with your normal arm. Let your surgical arm relax and hang straight down. Shift your weight so that your arm moves side to side, front to back, and in gentle circles like a pendulum or elephant's trunk. Use your body to generate the movement for this, NOT your surgical shoulder's muscles. (see drawing below)        Advance your activity as tolerated. Use the arm in daily activities as comfort allows. No heavy lifting or strenuous activity until cleared by your surgeon.    Physical therapy should be started after surgery. Take your therapy prescription to the PT clinic of your choice. If you have not received a therapy prescription, please contact your surgeon's office to have a script sent to your physical therapist.     PAIN CONTROL:   It is important to stay ahead of pain as it becomes challenging to get under control if you fall behind. Ice and elevation can help and should be used as much as possible in the first few days.    Narcotic pain medications, such as hydrocodone and oxycodone, should be taken as prescribed. Wean off as soon as possible. Take these with food to decrease the chances of nausea and vomiting. Do not drink alcohol, drive a vehicle, or use heavy machinery while taking narcotic pain medications.    NSAID medications are used for pain control and to decrease inflammation. You may be prescribed an NSAID such as ketorolac (Toradol). Take as instructed. Other NSAID medications such as  ibuprofen, Motrin, Advil, naproxen, or Aleve can be used once you have finished the Toradol, or if a prescription for Toradol was not provided.    Acetaminophen (Tylenol) is an effective over-the-counter pain medication that can be used with NSAID medications and non-acetaminophen containing narcotics such as plain oxycodone.     ASPIRIN FOR PREVENTION OF BLOOD CLOTS:   You should take one 81 mg baby aspirin twice daily for two weeks starting the evening of the day you have surgery unless instructed otherwise or taking a different blood thinner such as enoxaparin or warfarin. If you are aware that you are at high risk for a blood clot, notify your physician as soon as possible.   Take aspirin at least 30 minutes before taking ibuprofen or Toradol.    CONSTIPATION PREVENTION:   Anesthesia and pain medications, changes in eating and drinking, and less activity can all lead to constipation after surgery. To prevent or reduce constipation, take an over-the-counter stool softener (brands include Colace and Miralax). Follow the directions on the bottle. Drink plenty of water and eat high fiber foods including whole grains, fresh fruits, vegetables, beans, prunes or prune juice.     PROBLEMS TO REPORT:   Persistent bloody drainage that soaks through reinforced dressings.   Fever greater than 101F or 38C.   Incision that is very red, swollen, draining pus, shows red streaks, or feels hot.   Inability to urinate within 8 hours of surgery (a rare effect of the anesthesia).   If you develop a rash, generalized itching or swelling from the medications, STOP the medication and call the clinic or the orthopedic surgery resident on call.   Daytime calls should be directed to the Sports Medicine Clinic at [phone].   Night-time and weekend calls should be directed to the after hours nurse, who can be reached at [phone].     FREQUENTLY ASKED QUESTIONS     WHAT DAILY ACTIVITIES CAN I DO?   After your surgery, daily  activities such as walking, getting dressed, and desk work should not cause damage to your shoulder. No heavy lifting with your surgical arm or putting yourself at risk of falling.     CAN I DRIVE OR RIDE BY CAR/ TRAIN/ PLANE?   You should not drive while using a sling. There are no forced restrictions regarding operating a motor vehicle, however you must always be the  of whether you are able to operate it safely. You should not drive while taking narcotic pain medications. You may ride in a car as needed after surgery. Discuss with your surgeon if you are planning a trip using train or plane for transportation.     WHAT ABOUT WORK?   You may return to an office-type job or to school whenever comfortable. For most patients this occurs 1-2 weeks after surgery. For more active jobs that require some lifting, you can wait until after your follow-up appointment. Any other unusual types of jobs should be discussed to determine a date for return to work.     WHAT ABOUT SWELLING?   Expect swelling as a normal process after surgery. Ice, elevation, and other treatments provided at physical therapy will allow this to improve in time. Some swelling may remain for up to 8 weeks, and this is normal.     WHAT IF IT REALLY HURTS TOO MUCH?   Surgery hurts and you cannot expect to be pain free, but our goal is for it to be tolerable. Try to use all available pain therapies such as narcotics, NSAIDS, and acetaminophen. Always try more ice and elevation. If the pain is not tolerable, call the clinic or the after hours nurse.

## 2023-06-09 NOTE — TELEPHONE ENCOUNTER
PT referral  and post-op rehab protocol successfully faxed to [facility] at [address], [address], [place] [address] to [phone] on 6/9/23. YOVANY

## 2023-06-10 ENCOUNTER — NURSE TRIAGE (OUTPATIENT)
Dept: ADMINISTRATIVE | Facility: CLINIC | Age: 23
End: 2023-06-10
Payer: COMMERCIAL

## 2023-06-10 DIAGNOSIS — L29.9 ITCHY SKIN: ICD-10-CM

## 2023-06-10 DIAGNOSIS — Z98.890 STATUS POST ARTHROSCOPY OF RIGHT SHOULDER: Primary | ICD-10-CM

## 2023-06-10 NOTE — TELEPHONE ENCOUNTER
Had procedure yesterday, had questions, was told to leave sling on, slept in recliner last night, elbow is hurting today. 7/10. Has not yet taken pain medication. Did not get her RX for tylenol. Advised to follow dosing on discharge instructions and to purchase OTC, reminded about stool softener for constipation. Triage done- dispo home care.Verb understanding. Instructed to call back for any further questions or concerns.   Reason for Disposition   Other post-op symptom or question    Additional Information   Negative: Sounds like a life-threatening emergency to the triager   Negative: [1] Widespread rash AND [2] bright red, sunburn-like   Negative: [1] SEVERE headache AND [2] after spinal (epidural) anesthesia   Negative: [1] Vomiting AND [2] persists > 4 hours   Negative: [1] Vomiting AND [2] abdomen looks much more swollen than usual   Negative: [1] Drinking very little AND [2] dehydration suspected (e.g., no urine > 12 hours, very dry mouth, very lightheaded)   Negative: Patient sounds very sick or weak to the triager   Negative: Sounds like a serious complication to the triager   Negative: Fever > 100.4 F (38.0 C)   Negative: [1] SEVERE post-op pain (e.g., excruciating, pain scale 8-10) AND [2] not controlled with pain medications   Negative: [1] Caller has URGENT question AND [2] triager unable to answer question   Negative: [1] Headache AND [2] after spinal (epidural) anesthesia AND [3] not severe   Negative: Fever present > 3 days (72 hours)   Negative: [1] MILD-MODERATE post-op pain (e.g., pain scale 1-7) AND [2] not controlled with pain medications   Negative: [1] Caller has NON-URGENT question AND [2] triager unable to answer question    Protocols used: Post-Op Symptoms and [id number]

## 2023-06-10 NOTE — TELEPHONE ENCOUNTER
Reason for Disposition   [1] SEVERE post-op pain (e.g., excruciating, pain scale 8-10) AND [2] not controlled with pain medications    Additional Information   Negative: Sounds like a life-threatening emergency to the triager   Negative: [1] Widespread rash AND [2] bright red, sunburn-like   Negative: [1] SEVERE headache AND [2] after spinal (epidural) anesthesia   Negative: [1] Vomiting AND [2] persists > 4 hours   Negative: [1] Vomiting AND [2] abdomen looks much more swollen than usual   Negative: [1] Drinking very little AND [2] dehydration suspected (e.g., no urine > 12 hours, very dry mouth, very lightheaded)   Negative: Patient sounds very sick or weak to the triager   Negative: Sounds like a serious complication to the triager   Negative: Fever > 100.4 F (38.0 C)    Protocols used: Post-Op Symptoms and [id number]-TE  Spoke with pt who reports she had surgery to right arm yesterday. States that she noted red rash to arm, after removing sling 20 minutes ago.Also states she is applying ice to extremity but does not feel it Advised will reach to on call provider.      Spoke with [facility] nurse that works with Dr. Avery who advised for pt to put arm back into sling, and to use benadryl. Advised to keep applying ice to extremity.    Pt informed, and verbalized understading

## 2023-06-12 ENCOUNTER — TELEPHONE (OUTPATIENT)
Dept: SPORTS MEDICINE | Facility: CLINIC | Age: 23
End: 2023-06-12
Payer: COMMERCIAL

## 2023-06-12 RX ORDER — HYDROXYZINE PAMOATE 25 MG/1
25 CAPSULE ORAL 3 TIMES DAILY
Qty: 30 CAPSULE | Refills: 0 | Status: SHIPPED | OUTPATIENT
Start: 2023-06-12 | End: 2023-08-22

## 2023-06-12 NOTE — TELEPHONE ENCOUNTER
Called and spoke to patient in regards to her concerns. Informed her that she may purchase Tylenol OTC at any local pharmacy and explained that I am not sure why she was not given it following her surgery at the [facility]. She also reports headache and dizziness last night. Explained that this could be due to pain medication and could try and wean off of it and utilize Tylenol and Naproxen instead for her pain.     Also discussed elbow pain and rash on arm. Advised that she may come out of sling as tolerated and work on elbow, hand and wrist ROM and pain in elbow likely due to sling. For her rash, she reported that her rash had been improving but it was itching a lot and was not getting relief with Benadryl. Message sent to Lor to discuss other possible treatment options.     Patient voiced understanding of all instructions given. YOVANY     ----- Message from Cierra Pinzon MA sent at 6/10/2023 10:49 AM CDT -----  Type:  Patient Returning Call    Who Called: pt  Does the patient know what this is regarding?: yes  Would the patient rather a call back or a response via [facility]? Call back  Best Call Back Number: [phone]  Additional Information: pt states she never received the acetaminophen (TYLENOL) 500 MG tablet, please contact pt to see where she would like medication sent to

## 2023-06-12 NOTE — TELEPHONE ENCOUNTER
Called and spoke with patient regarding post-operative itching. Rx for Vistaril provided to aid in itching.  I encouraged her to take this at night as it can cause drowsiness.  I also encouraged her to get it over-the-counter Tylenol to help with her pain relief that she has since discontinued oxycodone due to adverse effects.  Patient also reports headache. Warned patient of red flag symptoms such as persistent headache, nausea vomiting, blurry vision.  Encouraged her to report to emergency room or urgent care if any of these symptoms arise or persist, patient voiced understanding and was grateful for the call back

## 2023-06-13 ENCOUNTER — PATIENT MESSAGE (OUTPATIENT)
Dept: RESEARCH | Facility: HOSPITAL | Age: 23
End: 2023-06-13
Payer: COMMERCIAL

## 2023-06-14 LAB
BACTERIA TISS AEROBE CULT: NO GROWTH
FINAL PATHOLOGIC DIAGNOSIS: NORMAL
GRAM STN SPEC: NORMAL
GRAM STN SPEC: NORMAL
GROSS: NORMAL
Lab: NORMAL
MICROSCOPIC EXAM: NORMAL

## 2023-06-15 ENCOUNTER — PATIENT MESSAGE (OUTPATIENT)
Dept: SPORTS MEDICINE | Facility: CLINIC | Age: 23
End: 2023-06-15
Payer: COMMERCIAL

## 2023-06-15 DIAGNOSIS — Z98.890 STATUS POST ARTHROSCOPY OF RIGHT SHOULDER: Primary | ICD-10-CM

## 2023-06-16 ENCOUNTER — PATIENT MESSAGE (OUTPATIENT)
Dept: SPORTS MEDICINE | Facility: CLINIC | Age: 23
End: 2023-06-16
Payer: COMMERCIAL

## 2023-06-16 RX ORDER — HYDROCODONE BITARTRATE AND ACETAMINOPHEN 5; 325 MG/1; MG/1
1 TABLET ORAL EVERY 6 HOURS PRN
Qty: 21 TABLET | Refills: 0 | Status: SHIPPED | OUTPATIENT
Start: 2023-06-16 | End: 2023-08-22

## 2023-06-18 ENCOUNTER — PATIENT MESSAGE (OUTPATIENT)
Dept: SPORTS MEDICINE | Facility: CLINIC | Age: 23
End: 2023-06-18
Payer: COMMERCIAL

## 2023-06-19 LAB — BACTERIA SPEC ANAEROBE CULT: NORMAL

## 2023-06-22 ENCOUNTER — TELEPHONE (OUTPATIENT)
Dept: SPORTS MEDICINE | Facility: CLINIC | Age: 23
End: 2023-06-22
Payer: COMMERCIAL

## 2023-06-22 NOTE — TELEPHONE ENCOUNTER
Spoke with patient and will check with Dr. Flanagan about having PT take out her sutures. Will call her back tomorrow morning to confirm.    ----- Message from Tamara Rao sent at 6/22/2023  3:27 PM CDT -----  Contact: pt  Pt is calling in regard to her stitches from her surgery, she is currently in [place] w/family and needs and order or permission to authorization.  The place is [facility] in [place], [place].  Fax# [phone]  and email address [email or web address]  # [phone] thanks/mpd    Pt also, would like to get her results from her biopsy as per

## 2023-06-26 ENCOUNTER — TELEPHONE (OUTPATIENT)
Dept: SPORTS MEDICINE | Facility: CLINIC | Age: 23
End: 2023-06-26
Payer: COMMERCIAL

## 2023-06-26 ENCOUNTER — PATIENT MESSAGE (OUTPATIENT)
Dept: SPORTS MEDICINE | Facility: CLINIC | Age: 23
End: 2023-06-26
Payer: COMMERCIAL

## 2023-06-26 NOTE — TELEPHONE ENCOUNTER
Called and spoke to patient in regards to care of incisions. Reviewed pictures and stated they look good at this time. Advised no submerging underwater for another 7-10 days. Advised use of topical scar creams and sunscreen for long term health of scar. YOVANY

## 2023-06-27 ENCOUNTER — TELEPHONE (OUTPATIENT)
Dept: SPORTS MEDICINE | Facility: CLINIC | Age: 23
End: 2023-06-27
Payer: COMMERCIAL

## 2023-06-27 ENCOUNTER — PATIENT MESSAGE (OUTPATIENT)
Dept: RESEARCH | Facility: HOSPITAL | Age: 23
End: 2023-06-27
Payer: COMMERCIAL

## 2023-06-27 NOTE — TELEPHONE ENCOUNTER
Signed physical therapy plan of care returned via fax to [facility] at [phone] with successful fax confirmation email receipt.

## 2023-06-28 NOTE — DISCHARGE SUMMARY
[facility]  Discharge Note  Short Stay    Procedure(s) (LRB):  ARTHROSCOPY, SHOULDER (Right)  SYNOVECTOMY, SHOULDER (Right)      OUTCOME: Patient tolerated treatment/procedure well without complication and is now ready for discharge.    DISPOSITION: Home or Self Care    FINAL DIAGNOSIS:  <principal problem not specified>    FOLLOWUP: In clinic    DISCHARGE INSTRUCTIONS:  No discharge procedures on file.     TIME SPENT ON DISCHARGE: 10 minutes

## 2023-07-03 ENCOUNTER — TELEPHONE (OUTPATIENT)
Dept: SPORTS MEDICINE | Facility: CLINIC | Age: 23
End: 2023-07-03
Payer: COMMERCIAL

## 2023-07-03 NOTE — TELEPHONE ENCOUNTER
Casandra called regarding resuming her amitryptiline Rx, which was discontinued prior to her shoulder surgery with Dr. Flanagan.  She is now discontinuing the narcotics from her surgery and wants to know if she should continue the amitryptiline.      Discussed with Dr. Borja.  After discussion with the patient regarding her current symptoms, we recommend a trial period w/o Elavil and follow up in a couple weeks to discuss if she feels like she needs to resume.

## 2023-07-06 ENCOUNTER — TELEPHONE (OUTPATIENT)
Dept: SPORTS MEDICINE | Facility: CLINIC | Age: 23
End: 2023-07-06
Payer: COMMERCIAL

## 2023-07-06 NOTE — TELEPHONE ENCOUNTER
----- Message from Jose Cortez sent at 7/6/2023  2:33 PM CDT -----  Contact: [phone]  Pt is calling requesting test results states she was told someone was contact in 10 days ,and no one has reached out, pt states it has been a month . Please call back today at [phone] . Cindy

## 2023-07-06 NOTE — TELEPHONE ENCOUNTER
Returned call to patient regarding results of biopsy. Informed patient that Dr. Flanagan is currently out of office, but we will pass a message along to him to see how he would like to follow-up to review these results and let her know once we have an update. Patient verbalized understanding and was grateful for the call back.

## 2023-07-07 ENCOUNTER — TELEPHONE (OUTPATIENT)
Dept: SPORTS MEDICINE | Facility: CLINIC | Age: 23
End: 2023-07-07
Payer: COMMERCIAL

## 2023-07-07 DIAGNOSIS — M65.9 SYNOVITIS OF RIGHT SHOULDER: Primary | ICD-10-CM

## 2023-07-07 NOTE — TELEPHONE ENCOUNTER
Spoke to patient and advised that next step would be referral to to rheumatology for further workup and treatment. Informed that Dr. Flanagan is planning to call and discuss results further with her at some point as well. Confirmed follow-up appointment on 7/21. YOVANY

## 2023-07-10 LAB — FUNGUS SPEC CULT: NORMAL

## 2023-07-20 ENCOUNTER — OFFICE VISIT (OUTPATIENT)
Dept: SPORTS MEDICINE | Facility: CLINIC | Age: 23
End: 2023-07-20
Payer: COMMERCIAL

## 2023-07-20 VITALS — HEIGHT: 63 IN | WEIGHT: 133 LBS | BODY MASS INDEX: 23.57 KG/M2

## 2023-07-20 DIAGNOSIS — M54.41 CHRONIC BILATERAL LOW BACK PAIN WITH BILATERAL SCIATICA: ICD-10-CM

## 2023-07-20 DIAGNOSIS — M54.9 DORSALGIA, UNSPECIFIED: ICD-10-CM

## 2023-07-20 DIAGNOSIS — G89.29 CHRONIC BILATERAL LOW BACK PAIN WITH BILATERAL SCIATICA: ICD-10-CM

## 2023-07-20 DIAGNOSIS — G89.4 CHRONIC PAIN SYNDROME: Primary | ICD-10-CM

## 2023-07-20 DIAGNOSIS — M54.42 CHRONIC BILATERAL LOW BACK PAIN WITH BILATERAL SCIATICA: ICD-10-CM

## 2023-07-20 PROCEDURE — 99999 PR PBB SHADOW E&M-EST. PATIENT-LVL III: CPT | Mod: PBBFAC,,, | Performed by: STUDENT IN AN ORGANIZED HEALTH CARE EDUCATION/TRAINING PROGRAM

## 2023-07-20 PROCEDURE — 99213 OFFICE O/P EST LOW 20 MIN: CPT | Mod: 24,S$GLB,, | Performed by: STUDENT IN AN ORGANIZED HEALTH CARE EDUCATION/TRAINING PROGRAM

## 2023-07-20 PROCEDURE — 99999 PR PBB SHADOW E&M-EST. PATIENT-LVL III: ICD-10-PCS | Mod: PBBFAC,,, | Performed by: STUDENT IN AN ORGANIZED HEALTH CARE EDUCATION/TRAINING PROGRAM

## 2023-07-20 PROCEDURE — 99213 PR OFFICE/OUTPT VISIT, EST, LEVL III, 20-29 MIN: ICD-10-PCS | Mod: 24,S$GLB,, | Performed by: STUDENT IN AN ORGANIZED HEALTH CARE EDUCATION/TRAINING PROGRAM

## 2023-07-20 RX ORDER — AMITRIPTYLINE HYDROCHLORIDE 10 MG/1
TABLET, FILM COATED ORAL
Qty: 90 TABLET | Refills: 1 | Status: SHIPPED | OUTPATIENT
Start: 2023-07-20 | End: 2023-09-20

## 2023-07-20 NOTE — PROGRESS NOTES
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 female who presents today with   Chief Complaint   Patient presents with    Med Change Follow Up      She has previously been treated in our care for chronic low back and shoulder pain.  She was referred to Dr. Flanagan for her shoulder and recently underwent surgery on 6/9/23 which demonstrated inflammatory synovitis and grade 3 and 4 cartilage degenerative changes of the glenoid and humeral head.  She is recovering well.  We had previously placed her on amitryptiline prior to her surgery for treatment of depression/anxiety, which she discontinued prior to her surgery.  She is recovering from her shoulder surgery and returns to discuss whether she should resume amitryptiline.  She was doing very good with the amitriptyline prior to the surgery, and noticed a big difference.  She stopped the medication prior to surgery, and has noticed an increase in her back pain since then.  She is interested in restarting.    Past Medical History:   History reviewed. No pertinent past medical history.  Past Surgical History:   Procedure Laterality Date    SHOULDER ARTHROSCOPY Right 6/9/2023    Procedure: ARTHROSCOPY, SHOULDER;  Surgeon: Chris Flanagan MD;  Location: [facility] OR;  Service: Orthopedics;  Laterality: Right;    SYNOVECTOMY OF SHOULDER Right 6/9/2023    Procedure: SYNOVECTOMY, SHOULDER;  Surgeon: Chris Flanagan MD;  Location: [facility] OR;  Service: Orthopedics;  Laterality: Right;     Family History   Problem Relation Age of Onset    No Known Problems Mother     No Known Problems Father      Social History     Socioeconomic History    Marital status: Single   Tobacco Use    Smoking status: Never     Passive exposure: Never    Smokeless tobacco: Never    Substance and Sexual Activity    Alcohol use: Yes     Comment: occ    Drug use: Never    Sexual activity: Not Currently     Partners: Male     Medication List with Changes/Refills   Current Medications    ACETAMINOPHEN (TYLENOL) 500 MG TABLET    Take 2 tablets (1,000 mg total) by mouth every 8 (eight) hours as needed for Pain.    ASPIRIN (ECOTRIN) 81 MG EC TABLET    Take 1 tablet (81 mg total) by mouth 2 (two) times a day. for 14 days    HYDROCODONE-ACETAMINOPHEN (NORCO) 5-325 MG PER TABLET    Take 1 tablet by mouth every 6 (six) hours as needed for Pain.    HYDROXYZINE PAMOATE (VISTARIL) 25 MG CAP    Take 1 capsule (25 mg total) by mouth 3 (three) times daily.    MEDROXYPROGESTERONE (DEPO-PROVERA) 150 MG/ML INJECTION    Inject 150 mg into the muscle every 3 (three) months.    NORETHINDRONE-ETHINYL ESTRADIOL (ORTHO-NOVUM 7/7/7, 28,) 0.5/0.75/1 MG- 35 MCG PER TABLET    Take 1 tablet by mouth once daily.    SULFAMETHOXAZOLE-TRIMETHOPRIM 800-160MG (BACTRIM DS) 800-160 MG TAB    Take by mouth.    XULANE 150-35 MCG/24 HR    1 patch every 7 days.   Changed and/or Refilled Medications    Modified Medication Previous Medication    AMITRIPTYLINE (ELAVIL) 10 MG TABLET amitriptyline (ELAVIL) 10 MG tablet       Take 1 tablet (10 mg total) by mouth every evening for 7 days, THEN 2 tablets (20 mg total) every evening for 7 days, THEN 3 tablets (30 mg total) every evening.    Take 1 tablet (10 mg total) by mouth every evening for 14 days, THEN 2 tablets (20 mg total) every evening for 14 days, THEN 3 tablets (30 mg total) every evening for 14 days.   Discontinued Medications    AMITRIPTYLINE (ELAVIL) 25 MG TABLET    Take 1 tablet (25 mg total) by mouth every evening.     Review of patient's allergies indicates:   Allergen Reactions    Amoxicillin Rash    Clindamycin Other (See Comments)     Palm and foot skin peeling off  Palm and foot skin peeling off         Physical Exam:   Body mass index is 23.56 kg/m².    Physical  Exam  Constitutional:       General: She is not in acute distress.     Appearance: Normal appearance.   HENT:      Head: Normocephalic and atraumatic.   Eyes:      Extraocular Movements: Extraocular movements intact.      Conjunctiva/sclera: Conjunctivae normal.   Pulmonary:      Effort: Pulmonary effort is normal. No respiratory distress.   Skin:     General: Skin is warm and dry.   Neurological:      General: No focal deficit present.      Mental Status: She is alert and oriented to person, place, and time.   Psychiatric:         Mood and Affect: Mood normal.       Imaging:    No new imaging today    Patient Instructions   Assessment:  Casandra Barragan is a 22 y.o. female with a chief complaint of No chief complaint on file.    Encounter Diagnoses   Name Primary?    Chronic pain syndrome Yes    Chronic bilateral low back pain with bilateral sciatica     Dorsalgia, unspecified       Plan:  Restart Elavil, we will taper up since we have been off the medication for over 6 weeks  Take 10 mg (1 tablet), nightly, for the next 7 days, then increase to  20 mg (2 tablets), nightly, for the next 7 days, then increase to  30 mg (3 tablets), nightly, for at least the next 7 days thereafter, if needed can increase to  40 mg (4 tablets) nightly  If you get to 40 mg tablets and thank you need to go higher, please contact our clinic, we can change the dosage to a 50 mg tablet, so you are not needing to take 5 tablets at a time  Continue PT, rehab    Follow-up:  As needed or sooner if there are any problems between now and then.    Thank you for choosing [facility] and Dr. Marcos Borja for your orthopedic & sports medicine care. It is our goal to provide you with exceptional care that will help keep you healthy, active, and get you back in the game.    Please do not hesitate to reach out to us via email, phone, or MyChart with any questions, concerns, or feedback.    If you are experiencing pain/discomfort  ,or have questions after 5pm and would like to be connected to the [facility] on-call team, please call this number and specify which Sports Medicine provider is treating you: [phone]          Marcos Borja MD  Primary Care Sports Medicine      Disclaimer: This note was prepared using a voice recognition system and is likely to have sound alike errors within the text.

## 2023-07-20 NOTE — PATIENT INSTRUCTIONS
Assessment:  Casandra Barragan is a 22 y.o. female with a chief complaint of No chief complaint on file.    Encounter Diagnoses   Name Primary?    Chronic pain syndrome Yes    Chronic bilateral low back pain with bilateral sciatica     Dorsalgia, unspecified       Plan:  Restart Elavil, we will taper up since we have been off the medication for over 6 weeks  Take 10 mg (1 tablet), nightly, for the next 7 days, then increase to  20 mg (2 tablets), nightly, for the next 7 days, then increase to  30 mg (3 tablets), nightly, for at least the next 7 days thereafter, if needed can increase to  40 mg (4 tablets) nightly  If you get to 40 mg tablets and thank you need to go higher, please contact our clinic, we can change the dosage to a 50 mg tablet, so you are not needing to take 5 tablets at a time  Continue PT, rehab    Follow-up:  As needed or sooner if there are any problems between now and then.    Thank you for choosing [facility] and Dr. Marcos Borja for your orthopedic & sports medicine care. It is our goal to provide you with exceptional care that will help keep you healthy, active, and get you back in the game.    Please do not hesitate to reach out to us via email, phone, or MyChart with any questions, concerns, or feedback.    If you are experiencing pain/discomfort ,or have questions after 5pm and would like to be connected to the [facility] on-call team, please call this number and specify which Sports Medicine provider is treating you: [phone]

## 2023-07-21 ENCOUNTER — OFFICE VISIT (OUTPATIENT)
Dept: SPORTS MEDICINE | Facility: CLINIC | Age: 23
End: 2023-07-21
Payer: COMMERCIAL

## 2023-07-21 VITALS — WEIGHT: 133 LBS | HEIGHT: 63 IN | BODY MASS INDEX: 23.57 KG/M2

## 2023-07-21 DIAGNOSIS — M65.9 SYNOVITIS OF RIGHT SHOULDER: Primary | ICD-10-CM

## 2023-07-21 PROCEDURE — 99999 PR PBB SHADOW E&M-EST. PATIENT-LVL III: ICD-10-PCS | Mod: PBBFAC,,, | Performed by: STUDENT IN AN ORGANIZED HEALTH CARE EDUCATION/TRAINING PROGRAM

## 2023-07-21 PROCEDURE — 99024 POSTOP FOLLOW-UP VISIT: CPT | Mod: S$GLB,,, | Performed by: STUDENT IN AN ORGANIZED HEALTH CARE EDUCATION/TRAINING PROGRAM

## 2023-07-21 PROCEDURE — 99999 PR PBB SHADOW E&M-EST. PATIENT-LVL III: CPT | Mod: PBBFAC,,, | Performed by: STUDENT IN AN ORGANIZED HEALTH CARE EDUCATION/TRAINING PROGRAM

## 2023-07-21 PROCEDURE — 99024 PR POST-OP FOLLOW-UP VISIT: ICD-10-PCS | Mod: S$GLB,,, | Performed by: STUDENT IN AN ORGANIZED HEALTH CARE EDUCATION/TRAINING PROGRAM

## 2023-07-21 NOTE — PROGRESS NOTES
Orthopaedics  Post-operative follow-up    Procedure Performed:  1. Right shoulder diagnostic arthroscopy with complete synovectomy    Date of Surgery: 6/9/23    Subjective: Casandra Barragan is now approximately 6 weeks out from her shoulder surgery.  She has been compliant with post-operative restrictions and has been progressing with PT.  Her pain and function continue to improve. She currently has an appointment with rheumatology scheduled for September 7 with Dr. Castro.     Exam:  Incision sites healed, C/D/I  No swelling or bruising noted  Fluid ROM with no crepitus  Upright Active ROM: , , ER 50  Cuff strength intact on limited testing   Axillary nerve sensation and motor intact  Motor and sensory intact distally  Strong radial pulse, fingers warm and well perfused    Imaging:  No new imaging.    Pathology:  Microscopic Exam Plasma cells are a component of the chronic inflammatory infiltrate, as may occur in an immune synovitis such as that seen with rheumatoid arthritis, but the synovium does not have the fibrin on the surface as would be seen in atypical example of that   process.        Impression:  S/p right shoulder diagnostic arthroscopy with complete synovectomy, first post-operative visit - doing well    Plan:  Her range of motion is progressing well since her surgery. She will continue to work on this and has started working on some strengthening in therapy as well.   Will see rheumatology on 9/7 for further workup for autoimmune disorders.   Advance PT per protocol  Symptomatic treatment for pain / swelling  May advance activities as reviewed today:  Continue to progress strength and endurance of shoulder and periscapular musculature.   Instructed patient to call clinic if questions or concerns    Follow-up in 6 weeks          Chris Flanagan MD    I, Mario Cortes, acted as a scribe for Chris Flanagan MD for the duration of this office visit.

## 2023-07-26 ENCOUNTER — PATIENT MESSAGE (OUTPATIENT)
Dept: RHEUMATOLOGY | Facility: CLINIC | Age: 23
End: 2023-07-26
Payer: COMMERCIAL

## 2023-07-26 NOTE — TELEPHONE ENCOUNTER
----- Message from Celi Peraza sent at 7/26/2023  4:38 PM CDT -----  Contact: Casandra  .Type:  Patient Returning Call    Who Called:Casandra   Who Left Message for Patient:nurse   Does the patient know what this is regarding?:appt   Would the patient rather a call back or a response via [facility]? Call   Best Call Back Number:[phone]  Additional Information: patient calling back

## 2023-08-14 ENCOUNTER — PATIENT MESSAGE (OUTPATIENT)
Dept: SPORTS MEDICINE | Facility: CLINIC | Age: 23
End: 2023-08-14
Payer: COMMERCIAL

## 2023-08-21 ENCOUNTER — PATIENT MESSAGE (OUTPATIENT)
Dept: SPORTS MEDICINE | Facility: CLINIC | Age: 23
End: 2023-08-21
Payer: COMMERCIAL

## 2023-08-22 ENCOUNTER — OFFICE VISIT (OUTPATIENT)
Dept: RHEUMATOLOGY | Facility: CLINIC | Age: 23
End: 2023-08-22
Payer: COMMERCIAL

## 2023-08-22 ENCOUNTER — LAB VISIT (OUTPATIENT)
Dept: LAB | Facility: HOSPITAL | Age: 23
End: 2023-08-22
Attending: STUDENT IN AN ORGANIZED HEALTH CARE EDUCATION/TRAINING PROGRAM
Payer: COMMERCIAL

## 2023-08-22 VITALS
BODY MASS INDEX: 24.45 KG/M2 | DIASTOLIC BLOOD PRESSURE: 76 MMHG | HEIGHT: 63 IN | WEIGHT: 138 LBS | SYSTOLIC BLOOD PRESSURE: 112 MMHG | HEART RATE: 93 BPM

## 2023-08-22 DIAGNOSIS — M54.89 INFLAMMATORY BACK PAIN: ICD-10-CM

## 2023-08-22 DIAGNOSIS — M65.9 SYNOVITIS OF RIGHT SHOULDER: ICD-10-CM

## 2023-08-22 DIAGNOSIS — M65.9 SYNOVITIS OF RIGHT SHOULDER: Primary | ICD-10-CM

## 2023-08-22 LAB
CRP SERPL-MCNC: 0.5 MG/L (ref 0–8.2)
ERYTHROCYTE [SEDIMENTATION RATE] IN BLOOD BY PHOTOMETRIC METHOD: 11 MM/HR (ref 0–36)

## 2023-08-22 PROCEDURE — 86038 ANTINUCLEAR ANTIBODIES: CPT | Performed by: STUDENT IN AN ORGANIZED HEALTH CARE EDUCATION/TRAINING PROGRAM

## 2023-08-22 PROCEDURE — 81374 HLA I TYPING 1 ANTIGEN LR: CPT | Performed by: STUDENT IN AN ORGANIZED HEALTH CARE EDUCATION/TRAINING PROGRAM

## 2023-08-22 PROCEDURE — 86235 NUCLEAR ANTIGEN ANTIBODY: CPT | Mod: 59 | Performed by: STUDENT IN AN ORGANIZED HEALTH CARE EDUCATION/TRAINING PROGRAM

## 2023-08-22 PROCEDURE — 86431 RHEUMATOID FACTOR QUANT: CPT | Performed by: STUDENT IN AN ORGANIZED HEALTH CARE EDUCATION/TRAINING PROGRAM

## 2023-08-22 PROCEDURE — 99204 PR OFFICE/OUTPT VISIT, NEW, LEVL IV, 45-59 MIN: ICD-10-PCS | Mod: S$GLB,,, | Performed by: STUDENT IN AN ORGANIZED HEALTH CARE EDUCATION/TRAINING PROGRAM

## 2023-08-22 PROCEDURE — 85652 RBC SED RATE AUTOMATED: CPT | Performed by: STUDENT IN AN ORGANIZED HEALTH CARE EDUCATION/TRAINING PROGRAM

## 2023-08-22 PROCEDURE — 86039 ANTINUCLEAR ANTIBODIES (ANA): CPT | Performed by: STUDENT IN AN ORGANIZED HEALTH CARE EDUCATION/TRAINING PROGRAM

## 2023-08-22 PROCEDURE — 99204 OFFICE O/P NEW MOD 45 MIN: CPT | Mod: S$GLB,,, | Performed by: STUDENT IN AN ORGANIZED HEALTH CARE EDUCATION/TRAINING PROGRAM

## 2023-08-22 PROCEDURE — 36415 COLL VENOUS BLD VENIPUNCTURE: CPT | Performed by: STUDENT IN AN ORGANIZED HEALTH CARE EDUCATION/TRAINING PROGRAM

## 2023-08-22 PROCEDURE — 99999 PR PBB SHADOW E&M-EST. PATIENT-LVL IV: ICD-10-PCS | Mod: PBBFAC,,, | Performed by: STUDENT IN AN ORGANIZED HEALTH CARE EDUCATION/TRAINING PROGRAM

## 2023-08-22 PROCEDURE — 86200 CCP ANTIBODY: CPT | Performed by: STUDENT IN AN ORGANIZED HEALTH CARE EDUCATION/TRAINING PROGRAM

## 2023-08-22 PROCEDURE — 99999 PR PBB SHADOW E&M-EST. PATIENT-LVL IV: CPT | Mod: PBBFAC,,, | Performed by: STUDENT IN AN ORGANIZED HEALTH CARE EDUCATION/TRAINING PROGRAM

## 2023-08-22 PROCEDURE — 86140 C-REACTIVE PROTEIN: CPT | Performed by: STUDENT IN AN ORGANIZED HEALTH CARE EDUCATION/TRAINING PROGRAM

## 2023-08-22 RX ORDER — CELECOXIB 100 MG/1
100 CAPSULE ORAL 2 TIMES DAILY
Qty: 60 CAPSULE | Refills: 3 | Status: SHIPPED | OUTPATIENT
Start: 2023-08-22

## 2023-08-23 LAB
ANA PATTERN 1: NORMAL
ANA SER QL IF: POSITIVE
ANA TITR SER IF: NORMAL {TITER}
CCP AB SER IA-ACNC: 22.4 U/ML
RHEUMATOID FACT SERPL-ACNC: 126 IU/ML (ref 0–15)

## 2023-08-24 ENCOUNTER — TELEPHONE (OUTPATIENT)
Dept: RHEUMATOLOGY | Facility: CLINIC | Age: 23
End: 2023-08-24
Payer: COMMERCIAL

## 2023-08-24 NOTE — TELEPHONE ENCOUNTER
----- Message from Celi Peraza sent at 8/24/2023  3:06 PM CDT -----  Contact: Casandra  .Type:  Test Results    Who Called: Casandra  Name of Test (Lab/Mammo/Etc): lab  Date of Test: 08/22/2023  Ordering Provider: Dr. Castro  Where the test was performed: [facility]   Would the patient rather a call back or a response via [facility]? Call   Best Call Back Number: [phone]  Additional Information:  patient would like to go over lab results

## 2023-08-24 NOTE — TELEPHONE ENCOUNTER
Patient would like to go over the labs that has results and that is abnormal. Expressed to her that two more labs are still processing and can take 1-2 weeks to come in.

## 2023-08-25 ENCOUNTER — PATIENT MESSAGE (OUTPATIENT)
Dept: RHEUMATOLOGY | Facility: CLINIC | Age: 23
End: 2023-08-25
Payer: COMMERCIAL

## 2023-08-25 LAB
ANTI SM ANTIBODY: 0.11 RATIO (ref 0–0.99)
ANTI SM/RNP ANTIBODY: 0.1 RATIO (ref 0–0.99)
ANTI-SM INTERPRETATION: NEGATIVE
ANTI-SM/RNP INTERPRETATION: NEGATIVE
ANTI-SSA ANTIBODY: 0.09 RATIO (ref 0–0.99)
ANTI-SSA INTERPRETATION: NEGATIVE
ANTI-SSB ANTIBODY: 0.06 RATIO (ref 0–0.99)
ANTI-SSB INTERPRETATION: NEGATIVE
DSDNA AB SER-ACNC: NORMAL [IU]/ML

## 2023-08-28 ENCOUNTER — TELEPHONE (OUTPATIENT)
Dept: RHEUMATOLOGY | Facility: CLINIC | Age: 23
End: 2023-08-28
Payer: COMMERCIAL

## 2023-08-28 DIAGNOSIS — M05.9 SEROPOSITIVE RHEUMATOID ARTHRITIS: Primary | ICD-10-CM

## 2023-08-28 RX ORDER — FOLIC ACID 1 MG/1
1 TABLET ORAL DAILY
Qty: 90 TABLET | Refills: 3 | Status: SHIPPED | OUTPATIENT
Start: 2023-08-28

## 2023-08-28 RX ORDER — METHOTREXATE 2.5 MG/1
15 TABLET ORAL
Qty: 24 TABLET | Refills: 3 | Status: SHIPPED | OUTPATIENT
Start: 2023-08-28 | End: 2023-09-19

## 2023-08-28 NOTE — PROGRESS NOTES
Orthopaedics  Post-operative follow-up    Procedure Performed:  1. Right shoulder diagnostic arthroscopy with complete synovectomy    Date of Surgery: 6/9/23    Subjective: Casandra Barragan is now approximately 3 months out from her shoulder surgery.  She has been compliant with post-operative restrictions and has been progressing with PT.  Her pain and function continue to improve. She had an appointment with rheumatology on 8/22 with Dr. Castro. ***    Exam:  Incision sites healed, C/D/I  No swelling or bruising noted  Fluid ROM with no crepitus  Upright Active ROM: ***, ***, ER 50***  Cuff strength intact on limited testing   Axillary nerve sensation and motor intact  Motor and sensory intact distally  Strong radial pulse, fingers warm and well perfused    Imaging:  No new imaging.    Impression:  2.5 months s/p right shoulder diagnostic arthroscopy with complete synovectomy, - doing well ***    Plan:  ***  Advance PT per protocol  Symptomatic treatment for pain / swelling  May advance activities as reviewed today:  Continue to progress strength and endurance of shoulder and periscapular musculature.   Instructed patient to call clinic if questions or concerns    Follow-up in 6 weeks          Chris Flanagan MD    I, Mario Cortes, acted as a scribe for Chris Flanagan MD for the duration of this office visit.

## 2023-08-30 ENCOUNTER — LAB VISIT (OUTPATIENT)
Dept: LAB | Facility: HOSPITAL | Age: 23
End: 2023-08-30
Attending: STUDENT IN AN ORGANIZED HEALTH CARE EDUCATION/TRAINING PROGRAM
Payer: COMMERCIAL

## 2023-08-30 DIAGNOSIS — M05.9 SEROPOSITIVE RHEUMATOID ARTHRITIS: ICD-10-CM

## 2023-08-30 LAB
HBV CORE AB SERPL QL IA: NORMAL
HBV SURFACE AB SER-ACNC: <3 MIU/ML
HBV SURFACE AB SER-ACNC: NORMAL M[IU]/ML
HCV AB SERPL QL IA: NORMAL
HIV 1+2 AB+HIV1 P24 AG SERPL QL IA: NORMAL

## 2023-08-30 PROCEDURE — 87389 HIV-1 AG W/HIV-1&-2 AB AG IA: CPT | Performed by: STUDENT IN AN ORGANIZED HEALTH CARE EDUCATION/TRAINING PROGRAM

## 2023-08-30 PROCEDURE — 87340 HEPATITIS B SURFACE AG IA: CPT | Performed by: STUDENT IN AN ORGANIZED HEALTH CARE EDUCATION/TRAINING PROGRAM

## 2023-08-30 PROCEDURE — 86592 SYPHILIS TEST NON-TREP QUAL: CPT | Performed by: STUDENT IN AN ORGANIZED HEALTH CARE EDUCATION/TRAINING PROGRAM

## 2023-08-30 PROCEDURE — 86706 HEP B SURFACE ANTIBODY: CPT | Mod: 91 | Performed by: STUDENT IN AN ORGANIZED HEALTH CARE EDUCATION/TRAINING PROGRAM

## 2023-08-30 PROCEDURE — 86682 HELMINTH ANTIBODY: CPT | Performed by: STUDENT IN AN ORGANIZED HEALTH CARE EDUCATION/TRAINING PROGRAM

## 2023-08-30 PROCEDURE — 86480 TB TEST CELL IMMUN MEASURE: CPT | Performed by: STUDENT IN AN ORGANIZED HEALTH CARE EDUCATION/TRAINING PROGRAM

## 2023-08-30 PROCEDURE — 36415 COLL VENOUS BLD VENIPUNCTURE: CPT | Performed by: STUDENT IN AN ORGANIZED HEALTH CARE EDUCATION/TRAINING PROGRAM

## 2023-08-30 PROCEDURE — 86704 HEP B CORE ANTIBODY TOTAL: CPT | Performed by: STUDENT IN AN ORGANIZED HEALTH CARE EDUCATION/TRAINING PROGRAM

## 2023-08-30 PROCEDURE — 86803 HEPATITIS C AB TEST: CPT | Performed by: STUDENT IN AN ORGANIZED HEALTH CARE EDUCATION/TRAINING PROGRAM

## 2023-08-30 NOTE — PROGRESS NOTES
RHEUMATOLOGY CLINIC INITIAL VISIT    Reason for consult:- synovitis of right shoulder    Chief complaints, HPI, ROS, EXAM, Assessment & Plans:-    Casandra Barragan is a 22 y.o. pleasant female who presents to be evaluated for synovitis of right shoulder.  She is been followed by sports Medicine/orthopedics for right shoulder pain and dysfunction which initially began October of 2021.  No obvious injury.  She subsequently had MRI arthrogram of her shoulder which showed diffuse synovitis as well as osseous erosive changes.  Had arthroscopy with synovectomy and pathology was likely consistent with rheumatoid arthritis.  She has had improvement in her shoulder pain.  Does report having some back pain that does seem to radiate down her legs at times.  Seems worse with rest and better with activity.  Sometimes wakes her up at night.  Denies any family history of autoimmune condition she is aware of.  Has not noticed any swelling or tenderness of any other joints.  No rashes noted.  Rheumatologic review of systems otherwise negative.No evidence of synovitis, dactylitis or enthesitis.  Some pain with range of motion of right shoulder.    Reviewed all available old and outside pertinent medical records.    All lab results personally reviewed and interpreted by me.    1. Synovitis of right shoulder    2. Inflammatory back pain        Problem List Items Addressed This Visit    None  Visit Diagnoses       Synovitis of right shoulder    -  Primary    Relevant Medications    celecoxib (CELEBREX) 100 MG capsule    Other Relevant Orders    JOANNA Screen w/Reflex (Completed)    C-Reactive Protein (Completed)    Cyclic Citrullinated Peptide Antibody, IgG (Completed)    Rheumatoid Factor (Completed)    Sedimentation rate (Completed)    HLA B27 Antigen    Inflammatory back pain        Relevant Medications    celecoxib (CELEBREX) 100 MG capsule    Other Relevant Orders    JOANNA Screen w/Reflex (Completed)    C-Reactive Protein (Completed)     Cyclic Citrullinated Peptide Antibody, IgG (Completed)    Rheumatoid Factor (Completed)    Sedimentation rate (Completed)    HLA B27 Antigen    MRI Sacroiliac Joint W W/O Contrast            Patient presenting to be evaluated for synovitis of right shoulder   She had synovectomy with pathology consistent with likely rheumatoid arthritis  She does also have what sounds like inflammatory back pain  Will check JOANNA, RF/CCP, ESR/CRP, HLA B27  Obtain MRI of sacroiliac joints  Start on Celebrex    Addendum:  Labs came back showing positive RF and positive CCP antibodies  We will plan to treat with methotrexate 15 mg weekly and daily folic acid    # Follow up in about 3 months (around 11/22/2023).    Chronic comorbid conditions affecting medical decision making today:    Past Medical History:   Diagnosis Date    Allergy     Depression        Past Surgical History:   Procedure Laterality Date    SHOULDER ARTHROSCOPY Right 6/9/2023    Procedure: ARTHROSCOPY, SHOULDER;  Surgeon: Chris Flanagan MD;  Location: [facility];  Service: Orthopedics;  Laterality: Right;    SYNOVECTOMY OF SHOULDER Right 6/9/2023    Procedure: SYNOVECTOMY, SHOULDER;  Surgeon: Chris Flanagan MD;  Location: [facility];  Service: Orthopedics;  Laterality: Right;        Social History     Tobacco Use    Smoking status: Never     Passive exposure: Never    Smokeless tobacco: Never   Substance Use Topics    Alcohol use: Yes     Comment: occ    Drug use: Never       Family History   Problem Relation Age of Onset    No Known Problems Mother     No Known Problems Father     Arthritis Maternal Grandfather        Review of patient's allergies indicates:   Allergen Reactions    Amoxicillin Rash    Clindamycin Other (See Comments)     Palm and foot skin peeling off  Palm and foot skin peeling off         Medication List with Changes/Refills   New Medications    CELECOXIB (CELEBREX) 100 MG CAPSULE    Take 1 capsule (100 mg total) by mouth 2 (two) times  daily.    FOLIC ACID (FOLVITE) 1 MG TABLET    Take 1 tablet (1 mg total) by mouth once daily.    METHOTREXATE 2.5 MG TAB    Take 6 tablets (15 mg total) by mouth every 7 days.   Current Medications    AMITRIPTYLINE (ELAVIL) 10 MG TABLET    Take 1 tablet (10 mg total) by mouth every evening for 7 days, THEN 2 tablets (20 mg total) every evening for 7 days, THEN 3 tablets (30 mg total) every evening.   Discontinued Medications    ACETAMINOPHEN (TYLENOL) 500 MG TABLET    Take 2 tablets (1,000 mg total) by mouth every 8 (eight) hours as needed for Pain.    ASPIRIN (ECOTRIN) 81 MG EC TABLET    Take 1 tablet (81 mg total) by mouth 2 (two) times a day. for 14 days    HYDROCODONE-ACETAMINOPHEN (NORCO) 5-325 MG PER TABLET    Take 1 tablet by mouth every 6 (six) hours as needed for Pain.    HYDROXYZINE PAMOATE (VISTARIL) 25 MG CAP    Take 1 capsule (25 mg total) by mouth 3 (three) times daily.    MEDROXYPROGESTERONE (DEPO-PROVERA) 150 MG/ML INJECTION    Inject 150 mg into the muscle every 3 (three) months.    NORETHINDRONE-ETHINYL ESTRADIOL (ORTHO-NOVUM 7/7/7, 28,) 0.5/0.75/1 MG- 35 MCG PER TABLET    Take 1 tablet by mouth once daily.    SULFAMETHOXAZOLE-TRIMETHOPRIM 800-160MG (BACTRIM DS) 800-160 MG TAB    Take by mouth.    XULANE 150-35 MCG/24 HR    1 patch every 7 days.         Disclaimer: This note was prepared using voice recognition system and is likely to have sound alike errors and is not proofread.  Please message me with any questions.    45 minutes of total time spent on the encounter, which includes face to face time and non-face to face time preparing to see the patient (eg, review of tests), Obtaining and/or reviewing separately obtained history, Documenting clinical information in the electronic or other health record, Independently interpreting results (not separately reported) and communicating results to the patient/family/caregiver, or Care coordination (not separately reported).     Thank you for allowing  me to participate in the care of Casandra Barragan.    Roscoe Castro MD

## 2023-08-31 ENCOUNTER — CLINICAL SUPPORT (OUTPATIENT)
Dept: REHABILITATION | Facility: HOSPITAL | Age: 23
End: 2023-08-31
Attending: STUDENT IN AN ORGANIZED HEALTH CARE EDUCATION/TRAINING PROGRAM
Payer: COMMERCIAL

## 2023-08-31 DIAGNOSIS — Z74.09 DECREASED FUNCTIONAL MOBILITY AND ENDURANCE: ICD-10-CM

## 2023-08-31 DIAGNOSIS — M65.9 SYNOVITIS OF RIGHT SHOULDER: ICD-10-CM

## 2023-08-31 DIAGNOSIS — Z98.890 STATUS POST ARTHROSCOPY OF RIGHT SHOULDER: ICD-10-CM

## 2023-08-31 DIAGNOSIS — M25.611 DECREASED RANGE OF MOTION OF RIGHT SHOULDER: ICD-10-CM

## 2023-08-31 LAB
GAMMA INTERFERON BACKGROUND BLD IA-ACNC: 0.07 IU/ML
HBV SURFACE AG SERPL QL IA: NORMAL
M TB IFN-G CD4+ BCKGRND COR BLD-ACNC: 0.01 IU/ML
M TB IFN-G CD4+ BCKGRND COR BLD-ACNC: 0.02 IU/ML
MITOGEN IGNF BCKGRD COR BLD-ACNC: 9.93 IU/ML
RPR SER QL: NORMAL
TB GOLD PLUS: NEGATIVE

## 2023-08-31 PROCEDURE — 97162 PT EVAL MOD COMPLEX 30 MIN: CPT

## 2023-08-31 PROCEDURE — 97110 THERAPEUTIC EXERCISES: CPT

## 2023-08-31 NOTE — PLAN OF CARE
[facility]   Physical Therapy Initial Evaluation      Date: 8/31/2023   Name: Casandra Barragan  [facility] Number: [id number]    Therapy Diagnosis:    Encounter Diagnoses   Name Primary?    Synovitis of right shoulder     Status post arthroscopy of right shoulder     Decreased functional mobility and endurance     Decreased range of motion of right shoulder       Physician: Chris Flanagan     Physician Orders: PT Eval and Treat  Medical Diagnosis from Referral: Synovitis of right shoulder [M65.811], Status post arthroscopy of right shoulder [Z98.890]  Evaluation Date: 8/31/2023  Authorization Period Expiration: 6/8/2024  Plan of Care Expiration: 11/3/2023    Progress Note Due: 9/30/2023  Visit # / Visits authorized: 1/1   FOTO: 1/3 (last performed on 8/31/2023)    Precautions: Standard    Time In: 0803  Time Out: 0910  Total Billable Time (timed & untimed codes): 55 minutes    Subjective     Date of onset: 6/9/2023    History of current condition - Casandra reports she had shoulder surgery in June and has been home in [place] for the summer. States she was in PT but that her visits were very inconsistent due to poor appointment availability. States her mobility progressed well but strengthening was painful. States the PT focused on stretching her shoulder a lot each session. She has continued to have some pain in the shoulder but notes it has been better than it was prior to surgery. Has not been to PT since July but states she tried to keep up with some of it independently (Prone TYW, wall slides with lift off, wall wash). States she was not provided with any stretches as part of HEP. Has recently been having some pain in the left shoulder as well; insidious onset. States she was also recently diagnosed with rheumatoid arthritis and has an appointment scheduled with a rheumatologist     Imaging: [] Xray [x] MRI [] CT: Performed on: 5/30/2023    Pain:  Current 0/10, worst 8/10, best 0/10    Location: [x] Right   [] Left:  shoulder  Description: aching  and sharp and pulling  Aggravating Factors: random pain, sleeping (prefers to sleep on the R side but notes pain if on that side for too long),   Easing Factors: activity avoidance, rest    Prior Therapy:   [] N/A    [x] Yes:   Social History: Pt lives with a friend  Occupation: Pt is a student at [employer]   Prior Level of Function: Independent and pain free with all ADL, IADL, community mobility and functional activities.   Current Level of Function: Independent with all ADL, IADL, community mobility and functional activities with reports of increased pain and need for increased time and frequent breaks. Difficulty with sweeping and washing dishes for a prolonged period; repetitive activities bother her     Dominant Extremity:    [x] Right    [] Left    Pts goals: Pt reported goals are to decrease overall pain levels in order to return to prior functional level.     Medical History:   Past Medical History:   Diagnosis Date    Allergy     Depression        Surgical History:   Casandra Barragan  has a past surgical history that includes Shoulder arthroscopy (Right, 6/9/2023) and Synovectomy of shoulder (Right, 6/9/2023).    Medications:   Casandra has a current medication list which includes the following prescription(s): amitriptyline, celecoxib, folic acid, and methotrexate, and the following Facility-Administered Medications: lactated ringers.    Allergies:   Review of patient's allergies indicates:   Allergen Reactions    Amoxicillin Rash    Clindamycin Other (See Comments)     Palm and foot skin peeling off  Palm and foot skin peeling off          Objective        RANGE OF MOTION:     Shoulder AROM/PROM Right Left Pain/Dysfunction with Movement Goal   Shoulder Flexion (180º) 132/140 158 Pain B  170    Shoulder Abduction (180º) 112/123 180 Anterior shoulder pain on R  170   Shoulder Extension (60º) 64 75 Anterior shoulder pain on R 75    Shoulder ER  at 90º (90º) 83 90  90   Shoulder IR at 90º (70º) 41 73  70   Functional ER T2 T6 Anterior shoulder pain on R T6   Functional IR Inferior angle of scapula superior angle of scapula Anterior shoulder pain on R    *cervical ROM is pain free and within functional limits       STRENGTH:   U/E MMT Right Left Pain/Dysfunction with Movement Goal   Shoulder Flexion 3-/5 4/5 Anterior shoulder pain on R 4+/5 B   Shoulder Extension 4-/5 4+/5  4+/5 B   Shoulder Abduction 3-/5 4/5 Anterior shoulder pain on R 4+/5 B   Shoulder IR (at 0º abduction) 3-/5 4/5 Anterior shoulder pain on R 4+/5 B   Shoulder ER (at 0º abduction) 3-/5 4-/5 Anterior shoulder pain on R 4+/5 B   Serratus Anterior 4-/5 4+/5  4+/5 B   Middle Trapezius 3+/5 4/5  4+/5 B   Lower Trapezius 3-/5 4-/5  4+/5 B   Elbow Flexion  4+/5 5/5 Anterior shoulder pain on R 5/5 B   Elbow Extension 4+/5 5/5  5/5 B                JOINT MOBILITY:   Joint Motion  Right Mobility  (spine) Left Mobility Goal   Cervical Upglides  [] Hypo     [x] Normal     [] Hyper [] Hypo     [x] Normal     [] Hyper    Cervical Downglides  [] Hypo     [x] Normal     [] Hyper [] Hypo     [x] Normal     [] Hyper    1st Rib [] Hypo     [x] Normal     [] Hyper [] Hypo     [x] Normal     [] Hyper    Thoracic PA  [] Hypo     [x] Normal     [] Hyper ---    Costotransverse  [] Hypo     [x] Normal     [] Hyper [] Hypo     [x] Normal     [] Hyper    Glenohumeral distraction [x] Hypo     [] Normal     [] Hyper [] Hypo     [x] Normal     [] Hyper Normal    Glenohumeral inferior  [x] Hypo     [] Normal     [] Hyper [] Hypo     [x] Normal     [] Hyper Normal    Glenohumeral anterior [x] Hypo     [] Normal     [] Hyper [] Hypo     [x] Normal     [] Hyper Normal    Glenohumeral posterior [x] Hypo     [] Normal     [] Hyper [] Hypo     [x] Normal     [] Hyper Normal              INITIAL OBSERVATION:       SENSATION  [x] Intact to Light Touch   [] Impaired:      PALPATION: Muscles: Increased tone and  tenderness to palpation of: right upper trapezius, periscapular musculature, pectorals, rotator cuff muscles, . Structures: Increased tenderness to palpation of: right anterior shoulder scar with mild limitations noted in scar mobility; however, no tensioning noted at scar with shoulder ROM       POSTURE:  Pt presents with postural abnormalities which include:    [x] Forward Head   [] Increased Lumbar Lordosis   [x] Rounded Shoulder   [] Genu Recurvatum   [] Increased Thoracic Kyphosis [] Genu Valgus   [] Trunk Deviated    [] Pes Planus   [] Scapular Winging    [] Other:         FUNCTIONAL MOVEMENT PATTERNS  Movement  Analysis Notes    Repeated Shoulder Flexion []Functional  [x]Dysfunctional:  [x]Painful  []Non-Painful       Plank  []Functional  []Dysfunctional:  []Painful  []Non-Painful    NT   Push Up  []Functional  []Dysfunctional:  []Painful  []Non-Painful    NT    []Functional  []Dysfunctional:  []Painful  []Non-Painful             Function:     Intake Outcome Measure for FOTO Shoulder Survey    Therapist reviewed FOTO scores for Casandra on 8/31/2023.   FOTO report - see Media section or FOTO account for episode details    Intake Score: 55%         Treatment     Total Treatment time (time-based codes) separate from Evaluation: (23) minutes     Casandra received the treatments listed below:        THERAPEUTIC EXERCISES to develop strength, endurance, ROM, flexibility, posture, and core stabilization for (15) minutes including:    Performed Today:     Prayer stretch: 3 minutes x 10s holds   Table ER stretch: 3 minutes x 10s holds   Shoulder IR with towel: 3 minutes x 10s holds   Pec corner stretch (3 positions): 3x10s holds each      Interventions DEFERRED today                  NEUROMUSCULAR RE-EDUCATION ACTIVITIES to improve Balance, Coordination, Kinesthetic, Sense, Proprioception, and Posture for (8) minutes.  The following were included:    Performed Today:     Shoulder IR: red band 3x10   Shoulder ER: red band  3x10  Interventions DEFERRED today                Next Session:  Manual: prone glenohumeral inferior glides  Series 6   Open books   Rows   Shoulder extensions   Quadruped or plank serratus press   Prone TYW   Chest press   Bicep curls       Patient Education and Home Exercises     Education provided: (time included with treatment) minutes  PURPOSE: Patient educated on the impairments noted above and the effects of physical therapy intervention to improve overall condition and QOL.   EXERCISE: Patient was educated on all the above exercise prior/during/after for proper posture, positioning, and execution for safe performance with home exercise program.   STRENGTH: Patient educated on the importance of improved core and extremity strength in order to improve alignment of the spine and extremities with static positions and dynamic movement.   POSTURE: Patient educated on postural awareness to reduce stress and maintain optimal alignment of the spine with static positions and dynamic movement     Written Home Exercises Provided: yes.  Exercises were reviewed and Casandra was able to demonstrate them prior to the end of the session.  Casandra demonstrated good  understanding of the education provided. See EMR under Patient Instructions for exercises provided during therapy sessions.    Assessment     Casandra is a 22 y.o. female referred to outpatient Physical Therapy with a medical diagnosis of Synovitis  and Status post arthroscopy of right shoulder. Pt presents with impairments in the following categories: IMPAIRMENTS: ROM, strength, joint mobility, core strength and stability, functional movement patterns, and scar tissue    Pt prognosis is Fair  Pt will benefit from skilled outpatient Physical Therapy to address the deficits stated above and in the chart below, provide pt/family education, and to maximize pt's level of independence.     Plan of care discussed with patient: Yes  Pt's spiritual, cultural and educational  "needs considered and patient is agreeable to the plan of care and goals as stated below:     Anticipated Barriers for therapy: co-morbidities, chronicity of condition, motivation to improve condition, adherence to treatment plan, and coping style    Medical Necessity is demonstrated by the following  History  Co-morbidities and personal factors that may impact the plan of care [] LOW: no personal factors / co-morbidities  [] MODERATE: 1-2 personal factors / co-morbidities  [x] HIGH: 3+ personal factors / co-morbidities    Moderate / High Support Documentation: poor coping strategies, self reported diagnosis of rheumatoid arthritis,   Past Medical History:   Diagnosis Date    Allergy     Depression         Examination  Body Structures and Functions, activity limitations and participation restrictions that may impact the plan of care [] LOW: addressing 1-2 elements  [] MODERATE: 3+ elements  [x] HIGH: 4+ elements (please support below)    Moderate / High Support Documentation: See above in "Current Level of Function"      Clinical Presentation [] LOW: stable  [x] MODERATE: Evolving  [] HIGH: Unstable     Decision Making/ Complexity Score: moderate         Short Term Goals:  6 weeks Status  Date Met   PAIN: Pt will report worst pain of 5/10 in order to progress toward max functional ability and improve quality of life. [x] Progressing  [] Met  [] Not Met    FUNCTION: Patient will demonstrate improved function as indicated by a score of greater than or equal to 64 out of 100 on FOTO. [x] Progressing  [] Met  [] Not Met    MOBILITY: Patient will improve AROM to 50% of stated goals, listed in objective measures above, in order to progress towards independence with functional activities.  [x] Progressing  [] Met  [] Not Met    STRENGTH: Patient will improve strength to 50% of stated goals, listed in objective measures above, in order to progress towards independence with functional activities. [x] Progressing  [] Met  [] " Not Met    POSTURE: Patient will correct postural deviations in sitting and standing, to decrease pain and promote long term stability.  [x] Progressing  [] Met  [] Not Met    HEP: Patient will demonstrate independence with HEP in order to progress toward functional independence. [x] Progressing  [] Met  [] Not Met      Long Term Goals:  12 weeks Status Date Met   PAIN: Pt will report worst pain of 3/10 in order to progress toward max functional ability and improve quality of life [x] Progressing  [] Met  [] Not Met    FUNCTION: Patient will demonstrate improved function as indicated by a score of greater than or equal to 74 out of 100 on FOTO. [x] Progressing  [] Met  [] Not Met    MOBILITY: Patient will improve AROM to stated goals, listed in objective measures above, in order to return to maximal functional potential and improve quality of life.  [x] Progressing  [] Met  [] Not Met    STRENGTH: Patient will improve strength to stated goals, listed in objective measures above, in order to improve functional independence and quality of life.  [x] Progressing  [] Met  [] Not Met    Patient will return to normal ADL's, IADL's, community involvement, recreational activities, and work-related activities with less than or equal to 3/10 pain and maximal function.  [x] Progressing  [] Met  [] Not Met      Plan     Plan of care Certification: 8/31/2023 to 11/30/2023.    Outpatient Physical Therapy 2 times weekly for 12 weeks to include any combination of the following interventions: virtual visits, dry needling, modalities, electrical stimulation (IFC, Pre-Mod, Attended with Functional Dry Needling), Cervical/Lumbar Traction, Manual Therapy, Neuromuscular Re-ed, Patient Education, Self Care, Therapeutic Exercise, and Therapeutic Activites     Asmita Miller, PT, DPT

## 2023-08-31 NOTE — PROGRESS NOTES
[facility]   Physical Therapy Initial Evaluation      Date: 8/31/2023   Name: Casandra Barragan  [facility] Number: [id number]    Therapy Diagnosis:    Encounter Diagnoses   Name Primary?    Synovitis of right shoulder     Status post arthroscopy of right shoulder     Decreased functional mobility and endurance     Decreased range of motion of right shoulder       Physician: Chris Flanagan     Physician Orders: PT Eval and Treat  Medical Diagnosis from Referral: Synovitis of right shoulder [M65.811], Status post arthroscopy of right shoulder [Z98.890]  Evaluation Date: 8/31/2023  Authorization Period Expiration: 6/8/2024  Plan of Care Expiration: 11/3/2023    Progress Note Due: 9/30/2023  Visit # / Visits authorized: 1/1   FOTO: 1/3 (last performed on 8/31/2023)    Precautions: Standard    Time In: 0803  Time Out: 0910  Total Billable Time (timed & untimed codes): 55 minutes    Subjective     Date of onset: 6/9/2023    History of current condition - Casandra reports she had shoulder surgery in June and has been home in [place] for the summer. States she was in PT but that her visits were very inconsistent due to poor appointment availability. States her mobility progressed well but strengthening was painful. States the PT focused on stretching her shoulder a lot each session. She has continued to have some pain in the shoulder but notes it has been better than it was prior to surgery. Has not been to PT since July but states she tried to keep up with some of it independently (Prone TYW, wall slides with lift off, wall wash). States she was not provided with any stretches as part of HEP. Has recently been having some pain in the left shoulder as well; insidious onset. States she was also recently diagnosed with rheumatoid arthritis and has an appointment scheduled with a rheumatologist     Imaging: [] Xray [x] MRI [] CT: Performed on: 5/30/2023    Pain:  Current 0/10, worst 8/10, best 0/10    Location: [x] Right   [] Left:  shoulder  Description: aching  and sharp and pulling  Aggravating Factors: random pain, sleeping (prefers to sleep on the R side but notes pain if on that side for too long),   Easing Factors: activity avoidance, rest    Prior Therapy:   [] N/A    [x] Yes:   Social History: Pt lives with a friend  Occupation: Pt is a student at [employer]   Prior Level of Function: Independent and pain free with all ADL, IADL, community mobility and functional activities.   Current Level of Function: Independent with all ADL, IADL, community mobility and functional activities with reports of increased pain and need for increased time and frequent breaks. Difficulty with sweeping and washing dishes for a prolonged period; repetitive activities bother her     Dominant Extremity:    [x] Right    [] Left    Pts goals: Pt reported goals are to decrease overall pain levels in order to return to prior functional level.     Medical History:   Past Medical History:   Diagnosis Date    Allergy     Depression        Surgical History:   Casandra Barragan  has a past surgical history that includes Shoulder arthroscopy (Right, 6/9/2023) and Synovectomy of shoulder (Right, 6/9/2023).    Medications:   Casandra has a current medication list which includes the following prescription(s): amitriptyline, celecoxib, folic acid, and methotrexate, and the following Facility-Administered Medications: lactated ringers.    Allergies:   Review of patient's allergies indicates:   Allergen Reactions    Amoxicillin Rash    Clindamycin Other (See Comments)     Palm and foot skin peeling off  Palm and foot skin peeling off          Objective        RANGE OF MOTION:     Shoulder AROM/PROM Right Left Pain/Dysfunction with Movement Goal   Shoulder Flexion (180º) 132/140 158 Pain B  170    Shoulder Abduction (180º) 112/123 180 Anterior shoulder pain on R  170   Shoulder Extension (60º) 64 75 Anterior shoulder pain on R 75    Shoulder ER  at 90º (90º) 83 90  90   Shoulder IR at 90º (70º) 41 73  70   Functional ER T2 T6 Anterior shoulder pain on R T6   Functional IR Inferior angle of scapula superior angle of scapula Anterior shoulder pain on R    *cervical ROM is pain free and within functional limits       STRENGTH:   U/E MMT Right Left Pain/Dysfunction with Movement Goal   Shoulder Flexion 3-/5 4/5 Anterior shoulder pain on R 4+/5 B   Shoulder Extension 4-/5 4+/5  4+/5 B   Shoulder Abduction 3-/5 4/5 Anterior shoulder pain on R 4+/5 B   Shoulder IR (at 0º abduction) 3-/5 4/5 Anterior shoulder pain on R 4+/5 B   Shoulder ER (at 0º abduction) 3-/5 4-/5 Anterior shoulder pain on R 4+/5 B   Serratus Anterior 4-/5 4+/5  4+/5 B   Middle Trapezius 3+/5 4/5  4+/5 B   Lower Trapezius 3-/5 4-/5  4+/5 B   Elbow Flexion  4+/5 5/5 Anterior shoulder pain on R 5/5 B   Elbow Extension 4+/5 5/5  5/5 B          {MUSCLE LENGTH (Optional):36690}      JOINT MOBILITY:   Joint Motion  Right Mobility  (spine) Left Mobility Goal   Cervical Upglides  [] Hypo     [x] Normal     [] Hyper [] Hypo     [x] Normal     [] Hyper    Cervical Downglides  [] Hypo     [x] Normal     [] Hyper [] Hypo     [x] Normal     [] Hyper    1st Rib [] Hypo     [x] Normal     [] Hyper [] Hypo     [x] Normal     [] Hyper    Thoracic PA  [] Hypo     [x] Normal     [] Hyper ---    Costotransverse  [] Hypo     [x] Normal     [] Hyper [] Hypo     [x] Normal     [] Hyper    Glenohumeral distraction [x] Hypo     [] Normal     [] Hyper [] Hypo     [x] Normal     [] Hyper Normal    Glenohumeral inferior  [x] Hypo     [] Normal     [] Hyper [] Hypo     [x] Normal     [] Hyper Normal    Glenohumeral anterior [x] Hypo     [] Normal     [] Hyper [] Hypo     [x] Normal     [] Hyper Normal    Glenohumeral posterior [x] Hypo     [] Normal     [] Hyper [] Hypo     [x] Normal     [] Hyper Normal          {SPECIAL TESTS (Optional):81364}    INITIAL OBSERVATION:       SENSATION  [x] Intact to Light  Touch   [] Impaired:      PALPATION: Muscles: Increased tone and tenderness to palpation of: right upper trapezius, periscapular musculature, pectorals, rotator cuff muscles, {LOWER MUSCLES (Optional):73055}. Structures: Increased tenderness to palpation of: right anterior shoulder scar with mild limitations noted in scar mobility; however, no tensioning noted at scar with shoulder ROM       POSTURE:  Pt presents with postural abnormalities which include:    [x] Forward Head   [] Increased Lumbar Lordosis   [x] Rounded Shoulder   [] Genu Recurvatum   [] Increased Thoracic Kyphosis [] Genu Valgus   [] Trunk Deviated    [] Pes Planus   [] Scapular Winging    [] Other:         FUNCTIONAL MOVEMENT PATTERNS  Movement  Analysis Notes    Repeated Shoulder Flexion []Functional  [x]Dysfunctional:  [x]Painful  []Non-Painful       Plank  []Functional  []Dysfunctional:  []Painful  []Non-Painful    NT   Push Up  []Functional  []Dysfunctional:  []Painful  []Non-Painful    NT    []Functional  []Dysfunctional:  []Painful  []Non-Painful             Function:     Intake Outcome Measure for FOTO Shoulder Survey    Therapist reviewed FOTO scores for Casandra on 8/31/2023.   FOTO report - see Media section or FOTO account for episode details    Intake Score: 55%         Treatment     Total Treatment time (time-based codes) separate from Evaluation: (23) minutes     Casandra received the treatments listed below:  {submit eval and treatment charges}      THERAPEUTIC EXERCISES to develop strength, endurance, ROM, flexibility, posture, and core stabilization for (15) minutes including:    Performed Today:     Prayer stretch: 3 minutes x 10s holds   Table ER stretch: 3 minutes x 10s holds   Shoulder IR with towel: 3 minutes x 10s holds   Pec corner stretch (3 positions): 3x10s holds each      Interventions DEFERRED today                  NEUROMUSCULAR RE-EDUCATION ACTIVITIES to improve Balance, Coordination, Kinesthetic, Sense, Proprioception,  and Posture for (8) minutes.  The following were included:    Performed Today:     Shoulder IR: red band 3x10   Shoulder ER: red band 3x10  Interventions DEFERRED today                Next Session:  Manual: prone glenohumeral inferior glides  Series 6   Open books   Rows   Shoulder extensions   Quadruped or plank serratus press   Prone TYW   Chest press   Bicep curls       Patient Education and Home Exercises     Education provided: (time included with treatment) minutes  PURPOSE: Patient educated on the impairments noted above and the effects of physical therapy intervention to improve overall condition and QOL.   EXERCISE: Patient was educated on all the above exercise prior/during/after for proper posture, positioning, and execution for safe performance with home exercise program.   STRENGTH: Patient educated on the importance of improved core and extremity strength in order to improve alignment of the spine and extremities with static positions and dynamic movement.   POSTURE: Patient educated on postural awareness to reduce stress and maintain optimal alignment of the spine with static positions and dynamic movement     Written Home Exercises Provided: yes.  Exercises were reviewed and Casandra was able to demonstrate them prior to the end of the session.  Casandra demonstrated good  understanding of the education provided. See EMR under Patient Instructions for exercises provided during therapy sessions.    Assessment     Casandra is a 22 y.o. female referred to outpatient Physical Therapy with a medical diagnosis of Synovitis  and Status post arthroscopy of right shoulder. Pt presents with impairments in the following categories: IMPAIRMENTS: ROM, strength, joint mobility, core strength and stability, functional movement patterns, and scar tissue    Pt prognosis is Fair  Pt will benefit from skilled outpatient Physical Therapy to address the deficits stated above and in the chart below, provide pt/family education,  "and to maximize pt's level of independence.     Plan of care discussed with patient: Yes  Pt's spiritual, cultural and educational needs considered and patient is agreeable to the plan of care and goals as stated below:     Anticipated Barriers for therapy: co-morbidities, chronicity of condition, motivation to improve condition, adherence to treatment plan, and coping style    Medical Necessity is demonstrated by the following  History  Co-morbidities and personal factors that may impact the plan of care [] LOW: no personal factors / co-morbidities  [] MODERATE: 1-2 personal factors / co-morbidities  [x] HIGH: 3+ personal factors / co-morbidities    Moderate / High Support Documentation: poor coping strategies, self reported diagnosis of rheumatoid arthritis,   Past Medical History:   Diagnosis Date    Allergy     Depression         Examination  Body Structures and Functions, activity limitations and participation restrictions that may impact the plan of care [] LOW: addressing 1-2 elements  [] MODERATE: 3+ elements  [x] HIGH: 4+ elements (please support below)    Moderate / High Support Documentation: See above in "Current Level of Function"      Clinical Presentation [] LOW: stable  [x] MODERATE: Evolving  [] HIGH: Unstable     Decision Making/ Complexity Score: moderate         Short Term Goals:  6 weeks Status  Date Met   PAIN: Pt will report worst pain of 5/10 in order to progress toward max functional ability and improve quality of life. [x] Progressing  [] Met  [] Not Met    FUNCTION: Patient will demonstrate improved function as indicated by a score of greater than or equal to 64 out of 100 on FOTO. [x] Progressing  [] Met  [] Not Met    MOBILITY: Patient will improve AROM to 50% of stated goals, listed in objective measures above, in order to progress towards independence with functional activities.  [x] Progressing  [] Met  [] Not Met    STRENGTH: Patient will improve strength to 50% of stated goals, " listed in objective measures above, in order to progress towards independence with functional activities. [x] Progressing  [] Met  [] Not Met    POSTURE: Patient will correct postural deviations in sitting and standing, to decrease pain and promote long term stability.  [x] Progressing  [] Met  [] Not Met    HEP: Patient will demonstrate independence with HEP in order to progress toward functional independence. [x] Progressing  [] Met  [] Not Met      Long Term Goals:  12 weeks Status Date Met   PAIN: Pt will report worst pain of 3/10 in order to progress toward max functional ability and improve quality of life [x] Progressing  [] Met  [] Not Met    FUNCTION: Patient will demonstrate improved function as indicated by a score of greater than or equal to 74 out of 100 on FOTO. [x] Progressing  [] Met  [] Not Met    MOBILITY: Patient will improve AROM to stated goals, listed in objective measures above, in order to return to maximal functional potential and improve quality of life.  [x] Progressing  [] Met  [] Not Met    STRENGTH: Patient will improve strength to stated goals, listed in objective measures above, in order to improve functional independence and quality of life.  [x] Progressing  [] Met  [] Not Met    Patient will return to normal ADL's, IADL's, community involvement, recreational activities, and work-related activities with less than or equal to 3/10 pain and maximal function.  [x] Progressing  [] Met  [] Not Met      Plan     Plan of care Certification: 8/31/2023 to 11/30/2023.    Outpatient Physical Therapy 2 times weekly for 12 weeks to include any combination of the following interventions: virtual visits, dry needling, modalities, electrical stimulation (IFC, Pre-Mod, Attended with Functional Dry Needling), Cervical/Lumbar Traction, Manual Therapy, Neuromuscular Re-ed, Patient Education, Self Care, Therapeutic Exercise, and Therapeutic Activites     Asmita Miller, PT, DPT

## 2023-08-31 NOTE — PROGRESS NOTES
Inflammatory markers now normal.  JOANNA low titer positive with negative profile.    RF and CCP are positive.

## 2023-08-31 NOTE — Clinical Note
Jesus just evaluated Casandra. She is very limited in range of motion and I don't think has been stretching or compliant with HEP provided by her PT in [place]. I spent a lot of time stressing daily stretching to improve her ROM so if y'all can stress that as well then hopefully she will hear it better. According to her, her PT in [place] was super inconsistent so hoping to make more progress if we can get her here consistently but I am worried that she is still so stiff 3 months out. Do y'all expect her to regain full ROM or do you expect continued limitations based on how the joint looked during surgery? Just want to get a better idea of my goals for her.

## 2023-09-01 ENCOUNTER — HOSPITAL ENCOUNTER (OUTPATIENT)
Dept: RADIOLOGY | Facility: HOSPITAL | Age: 23
Discharge: HOME | End: 2023-09-01
Attending: STUDENT IN AN ORGANIZED HEALTH CARE EDUCATION/TRAINING PROGRAM
Payer: COMMERCIAL

## 2023-09-01 ENCOUNTER — OFFICE VISIT (OUTPATIENT)
Dept: SPORTS MEDICINE | Facility: CLINIC | Age: 23
End: 2023-09-01
Payer: COMMERCIAL

## 2023-09-01 ENCOUNTER — PATIENT MESSAGE (OUTPATIENT)
Dept: RHEUMATOLOGY | Facility: CLINIC | Age: 23
End: 2023-09-01
Payer: COMMERCIAL

## 2023-09-01 VITALS — WEIGHT: 138 LBS | BODY MASS INDEX: 24.45 KG/M2 | HEIGHT: 63 IN

## 2023-09-01 DIAGNOSIS — M25.512 ACUTE PAIN OF LEFT SHOULDER: ICD-10-CM

## 2023-09-01 DIAGNOSIS — M05.711 RHEUMATOID ARTHRITIS INVOLVING RIGHT SHOULDER WITH POSITIVE RHEUMATOID FACTOR: Primary | ICD-10-CM

## 2023-09-01 LAB — STRONGYLOIDES ANTIBODY IGG: NEGATIVE

## 2023-09-01 PROCEDURE — 99024 PR POST-OP FOLLOW-UP VISIT: ICD-10-PCS | Mod: S$GLB,,, | Performed by: STUDENT IN AN ORGANIZED HEALTH CARE EDUCATION/TRAINING PROGRAM

## 2023-09-01 PROCEDURE — 73030 X-RAY EXAM OF SHOULDER: CPT | Mod: 26,LT,, | Performed by: RADIOLOGY

## 2023-09-01 PROCEDURE — 73030 X-RAY EXAM OF SHOULDER: CPT | Mod: TC,LT

## 2023-09-01 PROCEDURE — 99999 PR PBB SHADOW E&M-EST. PATIENT-LVL III: ICD-10-PCS | Mod: PBBFAC,,, | Performed by: STUDENT IN AN ORGANIZED HEALTH CARE EDUCATION/TRAINING PROGRAM

## 2023-09-01 PROCEDURE — 99999 PR PBB SHADOW E&M-EST. PATIENT-LVL III: CPT | Mod: PBBFAC,,, | Performed by: STUDENT IN AN ORGANIZED HEALTH CARE EDUCATION/TRAINING PROGRAM

## 2023-09-01 PROCEDURE — 99024 POSTOP FOLLOW-UP VISIT: CPT | Mod: S$GLB,,, | Performed by: STUDENT IN AN ORGANIZED HEALTH CARE EDUCATION/TRAINING PROGRAM

## 2023-09-01 PROCEDURE — 73030 XR SHOULDER COMPLETE 2 OR MORE VIEWS LEFT: ICD-10-PCS | Mod: 26,LT,, | Performed by: RADIOLOGY

## 2023-09-01 NOTE — PROGRESS NOTES
Orthopaedics  Post-operative follow-up    Procedure Performed:  1. Right shoulder diagnostic arthroscopy with complete synovectomy    Date of Surgery: 6/9/23    Subjective: Casandra Barragan is now approximately 3 months out from her shoulder surgery.  She has been compliant with post-operative restrictions and has been progressing with PT.  Her pain and function continue to improve. She had an appointment with rheumatology on 8/22 with Dr. Castro and was diagnosed with seropositive rheumatoid arthritis. Prescriptions were written for her to begin treatment.     Exam:  Incision sites healed, C/D/I  No swelling or bruising noted  Fluid ROM with no crepitus  Upright Active ROM: , , ER 50  Cuff strength intact on limited testing   Axillary nerve sensation and motor intact  Motor and sensory intact distally  Strong radial pulse, fingers warm and well perfused    Imaging:  No new imaging.    Impression:  2.5 months s/p right shoulder diagnostic arthroscopy with complete synovectomy    Plan:  Overall she is making good progress after surgery.  She has restarted physical therapy and has also been diagnosed with seropositive rheumatoid arthritis.  She is planning to start pharmacologic treatment in the next few days once her lab work is finalized.  Advance PT per protocol  Symptomatic treatment for pain / swelling  May advance activities as reviewed today:  Continue to progress strength and endurance of shoulder and periscapular musculature.   Left shoulder xrays on the way out today  Instructed patient to call clinic if questions or concerns    Follow-up in 6 weeks          Chris Flanagan MD    I, Mario Cortes, acted as a scribe for Chris Flanagan MD for the duration of this office visit.

## 2023-09-05 ENCOUNTER — CLINICAL SUPPORT (OUTPATIENT)
Dept: REHABILITATION | Facility: HOSPITAL | Age: 23
End: 2023-09-05
Payer: COMMERCIAL

## 2023-09-05 ENCOUNTER — LAB VISIT (OUTPATIENT)
Dept: LAB | Facility: HOSPITAL | Age: 23
End: 2023-09-05
Attending: STUDENT IN AN ORGANIZED HEALTH CARE EDUCATION/TRAINING PROGRAM
Payer: COMMERCIAL

## 2023-09-05 DIAGNOSIS — Z74.09 DECREASED FUNCTIONAL MOBILITY AND ENDURANCE: Primary | ICD-10-CM

## 2023-09-05 DIAGNOSIS — M05.9 SEROPOSITIVE RHEUMATOID ARTHRITIS: ICD-10-CM

## 2023-09-05 DIAGNOSIS — M25.611 DECREASED RANGE OF MOTION OF RIGHT SHOULDER: ICD-10-CM

## 2023-09-05 DIAGNOSIS — M62.81 PROXIMAL MUSCLE WEAKNESS: ICD-10-CM

## 2023-09-05 PROBLEM — R68.89 DECREASED STRENGTH, ENDURANCE, AND MOBILITY: Status: RESOLVED | Noted: 2022-12-19 | Resolved: 2023-09-05

## 2023-09-05 PROBLEM — R53.1 DECREASED STRENGTH, ENDURANCE, AND MOBILITY: Status: RESOLVED | Noted: 2022-12-19 | Resolved: 2023-09-05

## 2023-09-05 LAB
ALBUMIN SERPL BCP-MCNC: 4.2 G/DL (ref 3.5–5.2)
ALP SERPL-CCNC: 60 U/L (ref 55–135)
ALT SERPL W/O P-5'-P-CCNC: 26 U/L (ref 10–44)
ANION GAP SERPL CALC-SCNC: 7 MMOL/L (ref 8–16)
AST SERPL-CCNC: 25 U/L (ref 10–40)
BASOPHILS # BLD AUTO: 0.05 K/UL (ref 0–0.2)
BASOPHILS NFR BLD: 1 % (ref 0–1.9)
BILIRUB SERPL-MCNC: 0.2 MG/DL (ref 0.1–1)
BUN SERPL-MCNC: 11 MG/DL (ref 6–20)
CALCIUM SERPL-MCNC: 10.1 MG/DL (ref 8.7–10.5)
CHLORIDE SERPL-SCNC: 108 MMOL/L (ref 95–110)
CO2 SERPL-SCNC: 25 MMOL/L (ref 23–29)
CREAT SERPL-MCNC: 1 MG/DL (ref 0.5–1.4)
CRP SERPL-MCNC: 0.6 MG/L (ref 0–8.2)
DIFFERENTIAL METHOD: ABNORMAL
EOSINOPHIL # BLD AUTO: 0.1 K/UL (ref 0–0.5)
EOSINOPHIL NFR BLD: 1.8 % (ref 0–8)
ERYTHROCYTE [DISTWIDTH] IN BLOOD BY AUTOMATED COUNT: 16.7 % (ref 11.5–14.5)
ERYTHROCYTE [SEDIMENTATION RATE] IN BLOOD BY PHOTOMETRIC METHOD: 12 MM/HR (ref 0–36)
EST. GFR  (NO RACE VARIABLE): >60 ML/MIN/1.73 M^2
GLUCOSE SERPL-MCNC: 70 MG/DL (ref 70–110)
HCT VFR BLD AUTO: 42 % (ref 37–48.5)
HGB BLD-MCNC: 13.2 G/DL (ref 12–16)
IMM GRANULOCYTES # BLD AUTO: 0.01 K/UL (ref 0–0.04)
IMM GRANULOCYTES NFR BLD AUTO: 0.2 % (ref 0–0.5)
LYMPHOCYTES # BLD AUTO: 3 K/UL (ref 1–4.8)
LYMPHOCYTES NFR BLD: 60 % (ref 18–48)
MCH RBC QN AUTO: 24.3 PG (ref 27–31)
MCHC RBC AUTO-ENTMCNC: 31.4 G/DL (ref 32–36)
MCV RBC AUTO: 77 FL (ref 82–98)
MONOCYTES # BLD AUTO: 0.4 K/UL (ref 0.3–1)
MONOCYTES NFR BLD: 7.8 % (ref 4–15)
NEUTROPHILS # BLD AUTO: 1.5 K/UL (ref 1.8–7.7)
NEUTROPHILS NFR BLD: 29.2 % (ref 38–73)
NRBC BLD-RTO: 0 /100 WBC
PLATELET # BLD AUTO: 273 K/UL (ref 150–450)
PMV BLD AUTO: 11.2 FL (ref 9.2–12.9)
POTASSIUM SERPL-SCNC: 4.7 MMOL/L (ref 3.5–5.1)
PROT SERPL-MCNC: 7.7 G/DL (ref 6–8.4)
RBC # BLD AUTO: 5.43 M/UL (ref 4–5.4)
SODIUM SERPL-SCNC: 140 MMOL/L (ref 136–145)
WBC # BLD AUTO: 5.03 K/UL (ref 3.9–12.7)

## 2023-09-05 PROCEDURE — 97110 THERAPEUTIC EXERCISES: CPT

## 2023-09-05 PROCEDURE — 80053 COMPREHEN METABOLIC PANEL: CPT | Performed by: STUDENT IN AN ORGANIZED HEALTH CARE EDUCATION/TRAINING PROGRAM

## 2023-09-05 PROCEDURE — 97140 MANUAL THERAPY 1/> REGIONS: CPT

## 2023-09-05 PROCEDURE — 85652 RBC SED RATE AUTOMATED: CPT | Performed by: STUDENT IN AN ORGANIZED HEALTH CARE EDUCATION/TRAINING PROGRAM

## 2023-09-05 PROCEDURE — 85025 COMPLETE CBC W/AUTO DIFF WBC: CPT | Performed by: STUDENT IN AN ORGANIZED HEALTH CARE EDUCATION/TRAINING PROGRAM

## 2023-09-05 PROCEDURE — 36415 COLL VENOUS BLD VENIPUNCTURE: CPT | Performed by: STUDENT IN AN ORGANIZED HEALTH CARE EDUCATION/TRAINING PROGRAM

## 2023-09-05 PROCEDURE — 86140 C-REACTIVE PROTEIN: CPT | Performed by: STUDENT IN AN ORGANIZED HEALTH CARE EDUCATION/TRAINING PROGRAM

## 2023-09-06 ENCOUNTER — PATIENT MESSAGE (OUTPATIENT)
Dept: RHEUMATOLOGY | Facility: CLINIC | Age: 23
End: 2023-09-06
Payer: COMMERCIAL

## 2023-09-06 ENCOUNTER — CLINICAL SUPPORT (OUTPATIENT)
Dept: REHABILITATION | Facility: HOSPITAL | Age: 23
End: 2023-09-06
Payer: COMMERCIAL

## 2023-09-06 DIAGNOSIS — Z74.09 DECREASED FUNCTIONAL MOBILITY AND ENDURANCE: Primary | ICD-10-CM

## 2023-09-06 DIAGNOSIS — M62.81 PROXIMAL MUSCLE WEAKNESS: ICD-10-CM

## 2023-09-06 DIAGNOSIS — M25.611 DECREASED RANGE OF MOTION OF RIGHT SHOULDER: ICD-10-CM

## 2023-09-06 PROCEDURE — 97140 MANUAL THERAPY 1/> REGIONS: CPT

## 2023-09-06 PROCEDURE — 97530 THERAPEUTIC ACTIVITIES: CPT

## 2023-09-06 PROCEDURE — 97112 NEUROMUSCULAR REEDUCATION: CPT

## 2023-09-06 PROCEDURE — 97110 THERAPEUTIC EXERCISES: CPT

## 2023-09-06 NOTE — PROGRESS NOTES
[facility] OUTPATIENT THERAPY AND WELLNESS   Physical Therapy Treatment Note      Name: Casandra [facility] Number: [id number]    Therapy Diagnosis:   Encounter Diagnoses   Name Primary?    Decreased functional mobility and endurance Yes    Decreased range of motion of right shoulder     Proximal muscle weakness      Physician: Chris Flanagan    Visit Date: 9/6/2023    Physician Orders: PT Eval and Treat  Medical Diagnosis from Referral: Synovitis of right shoulder [M65.811], Status post arthroscopy of right shoulder [Z98.890]  Evaluation Date: 8/31/2023  Authorization Period Expiration: 8/30/2024  Plan of Care Expiration: 11/3/2023    Progress Note Due: 9/30/2023  Visit # / Visits authorized: 1/ 20 (+eval)  FOTO: 1/3 (last performed on 8/31/2023)     Precautions: Standard  PTA Visit #: 0/5     Time In: 1500  Time Out: 1610  Total Billable Time: 64 minutes    SUBJECTIVE     Pt reports:  She is feeling good with minimal soreness following previous visit     Compliance with Hep: Every Other Day  Response to previous treatment: no adverse reactions to treatment/updated HEP  Functional change: Better    Pain: 0/10   Worst: 5/10  Location: top of right shoulder      OBJECTIVE     Objective Measures updated at progress report unless specified otherwise.      Shoulder AROM/PROM Right  9/6/2023   Forward Flexion (180) 140   Abduction (180) 145   ER at 90 degrees (90) 105   ER at 0 degrees (45)  -   Functional ER (C7) -   Functional IR (T10) T10       Treatment     Gym/Equipment Access: full  Time to Complete Exercises: increased for cueing.    Casandra received the treatments listed below:        MANUAL THERAPY TECHNIQUES were applied for (8) minutes, including:    Soft tissue mobilization:     Joint mobilizations:   Glenohumeral distraction, inferior, anterior and posterior glides   Glenohumeral inferior glide in prone   Glenohumeral inferior glide with flexion, scaption and abduction   Functional dry needling:  DEFERRED        THERAPEUTIC EXERCISES to develop strength, endurance, ROM, flexibility, posture, and core stabilization for (40) minutes including:     Performed Today:     UBE - level 1, 3'/3'  Shoulder PROM   Pulleys Forward / Abduction 4' each  Prayer stretch:   Shoulder in IR: 2 minutes x 10s holds   Shoulder in ER: 2 minutes x 10s holds   Stretches   Shoulder IR with strap 4x30s   Sleepers Stretch 45/90 3' each  Posterior capsule stretch: 3x30s   Inferior capsule 3x30s holds   Series 6- 1 minute each     Interventions DEFERRED today                   NEUROMUSCULAR RE-EDUCATION ACTIVITIES to improve Balance, Coordination, Kinesthetic, Sense, Proprioception, and Posture for (8) minutes.  The following were included:     Performed Today:     Foam roll strength   B shoulder ER: yellow band 30x   Pull aparts: red band 30x   Diagonal pull aparts: red band 15x B   Flexion: yellow band 30x    Interventions DEFERRED today     Shoulder IR: red band 3x10   Shoulder ER: red band 3x10                    THERAPEUTIC ACTIVITIES to improve dynamic and functional  performance for (8) minutes including:    Performed Today:     Rows 7.5# 3x10   Extensions 5# 3x10  Interventions DEFERRED today                  Next Session:  Quadruped or plank serratus press   Prone TYW   Chest press   Bicep curls         Patient Education and Home Exercises      Education provided: (time included with treatment) minutes  PURPOSE: Patient educated on the impairments noted above and the effects of physical therapy intervention to improve overall condition and QOL.   EXERCISE: Patient was educated on all the above exercise prior/during/after for proper posture, positioning, and execution for safe performance with home exercise program.   STRENGTH: Patient educated on the importance of improved core and extremity strength in order to improve alignment of the spine and extremities with static positions and dynamic movement.   POSTURE: Patient educated  on postural awareness to reduce stress and maintain optimal alignment of the spine with static positions and dynamic movement      Written Home Exercises Provided: yes.  Exercises were reviewed and Casandra was able to demonstrate them prior to the end of the session.  Casandra demonstrated good  understanding of the education provided. See EMR under Patient Instructions for exercises provided during therapy sessions.    ASSESSMENT     Casandra Barragan tolerated Physical Therapy  session well today. Significant time spent on PROM, joint mobilizations and stretches to improve joint mobility and ROM with significant improvements noted at end of session. Incorporated rows and shoulder extensions to improve functional strength with good form noted throughout intervention      Casandra Is progressing well towards her goals.   Pt prognosis is Good.     Pt will continue to benefit from skilled outpatient physical therapy to address the deficits listed in the problem list box on initial evaluation, provide pt/family education and to maximize pt's level of independence in the home and community environment.     Pt's spiritual, cultural and educational needs considered and pt agreeable to plan of care and goals.    Anticipated Barriers for therapy: co-morbidities, chronicity of condition, motivation to improve condition, adherence to treatment plan, and coping style       Short Term Goals:  6 weeks Status  Date Met   PAIN: Pt will report worst pain of 5/10 in order to progress toward max functional ability and improve quality of life. [x] Progressing  [] Met  [] Not Met     FUNCTION: Patient will demonstrate improved function as indicated by a score of greater than or equal to 64 out of 100 on FOTO. [x] Progressing  [] Met  [] Not Met     MOBILITY: Patient will improve AROM to 50% of stated goals, listed in objective measures above, in order to progress towards independence with functional activities.  [x] Progressing  [] Met  [] Not  Met     STRENGTH: Patient will improve strength to 50% of stated goals, listed in objective measures above, in order to progress towards independence with functional activities. [x] Progressing  [] Met  [] Not Met     POSTURE: Patient will correct postural deviations in sitting and standing, to decrease pain and promote long term stability.  [x] Progressing  [] Met  [] Not Met     HEP: Patient will demonstrate independence with HEP in order to progress toward functional independence. [x] Progressing  [] Met  [] Not Met        Long Term Goals:  12 weeks Status Date Met   PAIN: Pt will report worst pain of 3/10 in order to progress toward max functional ability and improve quality of life [x] Progressing  [] Met  [] Not Met     FUNCTION: Patient will demonstrate improved function as indicated by a score of greater than or equal to 74 out of 100 on FOTO. [x] Progressing  [] Met  [] Not Met     MOBILITY: Patient will improve AROM to stated goals, listed in objective measures above, in order to return to maximal functional potential and improve quality of life.  [x] Progressing  [] Met  [] Not Met     STRENGTH: Patient will improve strength to stated goals, listed in objective measures above, in order to improve functional independence and quality of life.  [x] Progressing  [] Met  [] Not Met     Patient will return to normal ADL's, IADL's, community involvement, recreational activities, and work-related activities with less than or equal to 3/10 pain and maximal function.  [x] Progressing  [] Met  [] Not Met          PLAN     Continue Plan of Care (POC) and progress per patient tolerance. See Treatment section for exercise progression.    Asmita Miller, PT

## 2023-09-11 ENCOUNTER — CLINICAL SUPPORT (OUTPATIENT)
Dept: REHABILITATION | Facility: HOSPITAL | Age: 23
End: 2023-09-11
Payer: COMMERCIAL

## 2023-09-11 DIAGNOSIS — M62.81 PROXIMAL MUSCLE WEAKNESS: ICD-10-CM

## 2023-09-11 DIAGNOSIS — M25.611 DECREASED RANGE OF MOTION OF RIGHT SHOULDER: ICD-10-CM

## 2023-09-11 DIAGNOSIS — Z74.09 DECREASED FUNCTIONAL MOBILITY AND ENDURANCE: Primary | ICD-10-CM

## 2023-09-11 PROCEDURE — 97112 NEUROMUSCULAR REEDUCATION: CPT

## 2023-09-11 PROCEDURE — 97110 THERAPEUTIC EXERCISES: CPT

## 2023-09-11 PROCEDURE — 97140 MANUAL THERAPY 1/> REGIONS: CPT

## 2023-09-11 NOTE — PROGRESS NOTES
[facility]   Physical Therapy Treatment Note      Name: Casandra [facility] Number: [id number]    Therapy Diagnosis:   No diagnosis found.    Physician: Chris Flanagan*    Visit Date: 9/11/2023    Physician Orders: PT Eval and Treat  Medical Diagnosis from Referral: Synovitis of right shoulder [M65.811], Status post arthroscopy of right shoulder [Z98.890]  Evaluation Date: 8/31/2023  Authorization Period Expiration: 8/30/2024  Plan of Care Expiration: 11/3/2023    Progress Note Due: 9/30/2023  Visit # / Visits authorized: 3/20 (+eval)  FOTO: 1/3 (last performed on 8/31/2023)     Precautions: Standard  PTA Visit #: 0/5     Time In: 1500  Time Out: 1612  Total Billable Time: 56 minutes    SUBJECTIVE     Pt reports:  She is feeling good and notes that shoulder pain and range of motion have improved significantly     Compliance with Hep: Every Other Day  Response to previous treatment: no adverse reactions to treatment/updated HEP  Functional change: Better    Pain: 0/10   Worst: 5/10  Location: top of right shoulder      OBJECTIVE     Objective Measures updated at progress report unless specified otherwise.      Treatment     Gym/Equipment Access: full  Time to Complete Exercises: increased for cueing.    Casandra received the treatments listed below:        MANUAL THERAPY TECHNIQUES were applied for (8) minutes, including:    Soft tissue mobilization:     Joint mobilizations:   Glenohumeral distraction, inferior, anterior and posterior glides   Glenohumeral inferior glide in prone   Glenohumeral inferior glide with flexion, scaption and abduction   Functional dry needling: DEFERRED        THERAPEUTIC EXERCISES to develop strength, endurance, ROM, flexibility, posture, and core stabilization for (40) minutes including:     Performed Today:     UBE - level 1, 3'/3'  Shoulder PROM   Prayer stretch:   Shoulder in IR: 2 minutes x 10s holds   Shoulder in ER: 2 minutes x 10s holds    Stretches   Inferior capsule 3x30s holds   Series 6- 1 minute each   Low load long duration stretches   90/90 ER: 3# for 3 minutes   Flexion: 5# for 3 minutes    Interventions DEFERRED today                   NEUROMUSCULAR RE-EDUCATION ACTIVITIES to improve Balance, Coordination, Kinesthetic, Sense, Proprioception, and Posture for (8) minutes.  The following were included:     Performed Today:     Foam roll strength   B shoulder ER: yellow band 30x   Pull aparts: red band 30x   Diagonal pull aparts: red band 15x B   Flexion: yellow band 30x    Interventions DEFERRED today     Shoulder IR: red band 3x10   Shoulder ER: red band 3x10                    THERAPEUTIC ACTIVITIES to improve dynamic and functional  performance for (0) minutes including:    Performed Today:      Interventions DEFERRED today     Rows 7.5# 3x10   Extensions 5# 3x10              Next Session:  Quadruped or plank serratus press   Prone TYW   Chest press   Bicep curls         Patient Education and Home Exercises      Education provided: (time included with treatment) minutes  PURPOSE: Patient educated on the impairments noted above and the effects of physical therapy intervention to improve overall condition and QOL.   EXERCISE: Patient was educated on all the above exercise prior/during/after for proper posture, positioning, and execution for safe performance with home exercise program.   STRENGTH: Patient educated on the importance of improved core and extremity strength in order to improve alignment of the spine and extremities with static positions and dynamic movement.   POSTURE: Patient educated on postural awareness to reduce stress and maintain optimal alignment of the spine with static positions and dynamic movement      Written Home Exercises Provided: yes.  Exercises were reviewed and Casandra was able to demonstrate them prior to the end of the session.  Casandra demonstrated good  understanding of the education provided. See EMR under  Patient Instructions for exercises provided during therapy sessions.    ASSESSMENT     Casandra Barragan tolerated Physical Therapy  session well today. She continues to demonstrate improved shoulder ROM each visit. Significant time again spent on joint mobilizations, PROM and stretches. Added low load long duration stretches to further improve shoulder ER and flexion ROM with significant discomfort reported during intervention. Pt states her arm is very tired but that she feels good following session       Casandra Is progressing well towards her goals.   Pt prognosis is Good.     Pt will continue to benefit from skilled outpatient physical therapy to address the deficits listed in the problem list box on initial evaluation, provide pt/family education and to maximize pt's level of independence in the home and community environment.     Pt's spiritual, cultural and educational needs considered and pt agreeable to plan of care and goals.    Anticipated Barriers for therapy: co-morbidities, chronicity of condition, motivation to improve condition, adherence to treatment plan, and coping style       Short Term Goals:  6 weeks Status  Date Met   PAIN: Pt will report worst pain of 5/10 in order to progress toward max functional ability and improve quality of life. [x] Progressing  [] Met  [] Not Met     FUNCTION: Patient will demonstrate improved function as indicated by a score of greater than or equal to 64 out of 100 on FOTO. [x] Progressing  [] Met  [] Not Met     MOBILITY: Patient will improve AROM to 50% of stated goals, listed in objective measures above, in order to progress towards independence with functional activities.  [x] Progressing  [] Met  [] Not Met     STRENGTH: Patient will improve strength to 50% of stated goals, listed in objective measures above, in order to progress towards independence with functional activities. [x] Progressing  [] Met  [] Not Met     POSTURE: Patient will correct postural  deviations in sitting and standing, to decrease pain and promote long term stability.  [x] Progressing  [] Met  [] Not Met     HEP: Patient will demonstrate independence with HEP in order to progress toward functional independence. [x] Progressing  [] Met  [] Not Met        Long Term Goals:  12 weeks Status Date Met   PAIN: Pt will report worst pain of 3/10 in order to progress toward max functional ability and improve quality of life [x] Progressing  [] Met  [] Not Met     FUNCTION: Patient will demonstrate improved function as indicated by a score of greater than or equal to 74 out of 100 on FOTO. [x] Progressing  [] Met  [] Not Met     MOBILITY: Patient will improve AROM to stated goals, listed in objective measures above, in order to return to maximal functional potential and improve quality of life.  [x] Progressing  [] Met  [] Not Met     STRENGTH: Patient will improve strength to stated goals, listed in objective measures above, in order to improve functional independence and quality of life.  [x] Progressing  [] Met  [] Not Met     Patient will return to normal ADL's, IADL's, community involvement, recreational activities, and work-related activities with less than or equal to 3/10 pain and maximal function.  [x] Progressing  [] Met  [] Not Met          PLAN     Continue Plan of Care (POC) and progress per patient tolerance. See Treatment section for exercise progression.    Asmita Miller, PT

## 2023-09-13 ENCOUNTER — OFFICE VISIT (OUTPATIENT)
Dept: RHEUMATOLOGY | Facility: CLINIC | Age: 23
End: 2023-09-13
Payer: COMMERCIAL

## 2023-09-13 DIAGNOSIS — M05.9 SEROPOSITIVE RHEUMATOID ARTHRITIS: Primary | ICD-10-CM

## 2023-09-13 DIAGNOSIS — M65.9 SYNOVITIS OF RIGHT SHOULDER: ICD-10-CM

## 2023-09-13 PROCEDURE — 99214 PR OFFICE/OUTPT VISIT, EST, LEVL IV, 30-39 MIN: ICD-10-PCS | Mod: 95,,, | Performed by: STUDENT IN AN ORGANIZED HEALTH CARE EDUCATION/TRAINING PROGRAM

## 2023-09-13 PROCEDURE — 99214 OFFICE O/P EST MOD 30 MIN: CPT | Mod: 95,,, | Performed by: STUDENT IN AN ORGANIZED HEALTH CARE EDUCATION/TRAINING PROGRAM

## 2023-09-13 NOTE — PROGRESS NOTES
RHEUMATOLOGY CLINIC ESTABLISHED PATIENT TELEMED VISIT    The patient location is: Home  The chief complaint leading to consultation is:  Rheumatoid arthritis    Visit type: Audiovisual    Face to Face time with patient: 21  32 minutes of total time spent on the encounter, which includes face to face time and non-face to face time preparing to see the patient (eg, review of tests), Obtaining and/or reviewing separately obtained history, Documenting clinical information in the electronic or other health record, Independently interpreting results (not separately reported) and communicating results to the patient/family/caregiver, or Care coordination (not separately reported).     Each patient to whom he or she provides medical services by telemedicine is:  (1) informed of the relationship between the physician and patient and the respective role of any other health care provider with respect to management of the patient; and (2) notified that he or she may decline to receive medical services by telemedicine and may withdraw from such care at any time.    Reason for consult:- rheumatoid arthritis    Chief complaints, HPI, ROS, EXAM, Assessment & Plans:-    Casandra Barragan is a 22 y.o. pleasant female who presents to follow-up for recent diagnosis of rheumatoid arthritis.  She has some questions along with her mother about the disease and its treatment.  She does state that she is been doing well continuing physical therapy and having increased range of motion of her shoulder.  She is not started the medication we prescribed as of yet.  No new symptoms.  Rheumatologic review of systems otherwise negative.  Appears well on video visit.      Reviewed all available old and outside pertinent medical records.    All lab results personally reviewed and interpreted by me.    1. Seropositive rheumatoid arthritis    2. Synovitis of right shoulder        Problem List Items Addressed This Visit    None  Visit Diagnoses        Seropositive rheumatoid arthritis    -  Primary    Synovitis of right shoulder                Patient presenting to follow up today for seropositive rheumatoid arthritis  She has had biopsy of shoulder joint which was consistent with rheumatoid arthritis as well as positive rheumatoid factor and CCP antibodies  Planning to start on methotrexate 15 mg weekly and daily folic acid  Answered all questions today to patient's full satisfaction  She will begin therapy and let us know if she has any issues in the interim before her next follow-up appointment    # No follow-ups on file.    Chronic comorbid conditions affecting medical decision making today:    Past Medical History:   Diagnosis Date    Allergy     Depression        Past Surgical History:   Procedure Laterality Date    SHOULDER ARTHROSCOPY Right 6/9/2023    Procedure: ARTHROSCOPY, SHOULDER;  Surgeon: Chris Flanagan MD;  Location: [facility] OR;  Service: Orthopedics;  Laterality: Right;    SYNOVECTOMY OF SHOULDER Right 6/9/2023    Procedure: SYNOVECTOMY, SHOULDER;  Surgeon: Chris Flanagan MD;  Location: [facility];  Service: Orthopedics;  Laterality: Right;        Social History     Tobacco Use    Smoking status: Never     Passive exposure: Never    Smokeless tobacco: Never   Substance Use Topics    Alcohol use: Yes     Comment: occ    Drug use: Never       Family History   Problem Relation Age of Onset    No Known Problems Mother     No Known Problems Father     Arthritis Maternal Grandfather        Review of patient's allergies indicates:   Allergen Reactions    Amoxicillin Rash    Clindamycin Other (See Comments)     Palm and foot skin peeling off  Palm and foot skin peeling off         Medication List with Changes/Refills   Current Medications    AMITRIPTYLINE (ELAVIL) 10 MG TABLET    Take 1 tablet (10 mg total) by mouth every evening for 7 days, THEN 2 tablets (20 mg total) every evening for 7 days, THEN 3 tablets (30 mg total) every evening.     CELECOXIB (CELEBREX) 100 MG CAPSULE    Take 1 capsule (100 mg total) by mouth 2 (two) times daily.    FOLIC ACID (FOLVITE) 1 MG TABLET    Take 1 tablet (1 mg total) by mouth once daily.    METHOTREXATE 2.5 MG TAB    Take 6 tablets (15 mg total) by mouth every 7 days.         Disclaimer: This note was prepared using voice recognition system and is likely to have sound alike errors and is not proofread.  Please message me with any questions.    32 minutes of total time spent on the encounter, which includes face to face time and non-face to face time preparing to see the patient (eg, review of tests), Obtaining and/or reviewing separately obtained history, Documenting clinical information in the electronic or other health record, Independently interpreting results (not separately reported) and communicating results to the patient/family/caregiver, or Care coordination (not separately reported).     Thank you for allowing me to participate in the care of Casandra Barragan.    Roscoe Castro MD

## 2023-09-14 ENCOUNTER — CLINICAL SUPPORT (OUTPATIENT)
Dept: REHABILITATION | Facility: HOSPITAL | Age: 23
End: 2023-09-14
Payer: COMMERCIAL

## 2023-09-14 DIAGNOSIS — M62.81 PROXIMAL MUSCLE WEAKNESS: ICD-10-CM

## 2023-09-14 DIAGNOSIS — Z74.09 DECREASED FUNCTIONAL MOBILITY AND ENDURANCE: Primary | ICD-10-CM

## 2023-09-14 DIAGNOSIS — M25.611 DECREASED RANGE OF MOTION OF RIGHT SHOULDER: ICD-10-CM

## 2023-09-14 LAB
HLA B27 INTERPRETATION: NORMAL
HLA-B27 RELATED AG QL: NEGATIVE
HLA-B27 RELATED AG QL: NORMAL

## 2023-09-14 PROCEDURE — 97530 THERAPEUTIC ACTIVITIES: CPT

## 2023-09-14 PROCEDURE — 97140 MANUAL THERAPY 1/> REGIONS: CPT

## 2023-09-14 PROCEDURE — 97110 THERAPEUTIC EXERCISES: CPT

## 2023-09-18 ENCOUNTER — PATIENT MESSAGE (OUTPATIENT)
Dept: RADIOLOGY | Facility: HOSPITAL | Age: 23
End: 2023-09-18
Payer: COMMERCIAL

## 2023-09-19 DIAGNOSIS — M05.9 SEROPOSITIVE RHEUMATOID ARTHRITIS: ICD-10-CM

## 2023-09-19 RX ORDER — METHOTREXATE 2.5 MG/1
TABLET ORAL
Qty: 72 TABLET | Refills: 2 | Status: SHIPPED | OUTPATIENT
Start: 2023-09-19

## 2023-09-20 RX ORDER — AMITRIPTYLINE HYDROCHLORIDE 10 MG/1
TABLET, FILM COATED ORAL
Qty: 270 TABLET | Refills: 1 | Status: SHIPPED | OUTPATIENT
Start: 2023-09-20 | End: 2023-09-26

## 2023-09-21 ENCOUNTER — CLINICAL SUPPORT (OUTPATIENT)
Dept: REHABILITATION | Facility: HOSPITAL | Age: 23
End: 2023-09-21
Payer: COMMERCIAL

## 2023-09-21 DIAGNOSIS — Z74.09 DECREASED FUNCTIONAL MOBILITY AND ENDURANCE: Primary | ICD-10-CM

## 2023-09-21 DIAGNOSIS — M25.611 DECREASED RANGE OF MOTION OF RIGHT SHOULDER: ICD-10-CM

## 2023-09-21 DIAGNOSIS — M62.81 PROXIMAL MUSCLE WEAKNESS: ICD-10-CM

## 2023-09-21 PROCEDURE — 97140 MANUAL THERAPY 1/> REGIONS: CPT

## 2023-09-21 PROCEDURE — 97110 THERAPEUTIC EXERCISES: CPT

## 2023-09-21 PROCEDURE — 97530 THERAPEUTIC ACTIVITIES: CPT

## 2023-09-21 NOTE — PROGRESS NOTES
[facility]   Physical Therapy Treatment Note      Name: Casandra Barragan  [facility] Number: [id number]    Therapy Diagnosis:   Encounter Diagnoses   Name Primary?    Decreased functional mobility and endurance Yes    Decreased range of motion of right shoulder     Proximal muscle weakness        Physician: Chris Flanagan    Visit Date: 9/14/2023    Physician Orders: PT Eval and Treat  Medical Diagnosis from Referral: Synovitis of right shoulder [M65.811], Status post arthroscopy of right shoulder [Z98.890]  Evaluation Date: 8/31/2023  Authorization Period Expiration: 8/30/2024  Plan of Care Expiration: 11/3/2023    Progress Note Due: 9/30/2023  Visit # / Visits authorized: 5/20 (+eval)  FOTO: 1/3 (last performed on 8/31/2023)     Precautions: Standard  PTA Visit #: 0/5     Time In: 1400  Time Out: 1505  Total Billable Time: 62 minutes    SUBJECTIVE     Pt reports:  She is feeling good and notes that shoulder pain and range of motion have improved significantly     Compliance with Hep: Every Other Day  Response to previous treatment: no adverse reactions to treatment/updated HEP  Functional change: Better    Pain: 0/10   Worst: 5/10  Location: top of right shoulder      OBJECTIVE     Objective Measures updated at progress report unless specified otherwise.    RANGE OF MOTION:      Shoulder AROM/PROM Right  (Eval) Right  9/14/2023 Left Pain/Dysfunction with Movement Goal   Shoulder Flexion (180º) 132/140 168 180 Pain B  170    Shoulder Abduction (180º) 112/123 153 180 Anterior shoulder pain on R  170   Shoulder Extension (60º) 64 70 75 Anterior shoulder pain on R 75   Shoulder ER  at 90º (90º) 83 90 90   90   Shoulder IR at 90º (70º) 41 65 73   70   Functional ER T2  T6 Anterior shoulder pain on R T6   Functional IR Inferior angle of scapula  superior angle of scapula Anterior shoulder pain on R         Treatment     Gym/Equipment Access: full  Time to Complete Exercises: increased  for cueingTobias Guajardo received the treatments listed below:        MANUAL THERAPY TECHNIQUES were applied for (8) minutes, including:    Soft tissue mobilization:     Joint mobilizations:   Glenohumeral distraction, inferior, anterior and posterior glides   Glenohumeral inferior glide in prone   Glenohumeral inferior glide with flexion, scaption and abduction   Functional dry needling: DEFERRED        THERAPEUTIC EXERCISES to develop strength, endurance, ROM, flexibility, posture, and core stabilization for (40) minutes including:     Performed Today:     UBE - level 1, 3'/3'  Shoulder PROM   Prayer stretch:   Shoulder in IR: 2 minutes x 10s holds   Shoulder in ER: 2 minutes x 10s holds   Stretches   Inferior capsule: 10x10s holds   Pec corner stretch: 10x10s holds   Series 6- 1 minute each   Low load long duration stretches   90/90 ER: 4# for 3 minutes   Flexion: 5# for 3 minutes    Interventions DEFERRED today                   NEUROMUSCULAR RE-EDUCATION ACTIVITIES to improve Balance, Coordination, Kinesthetic, Sense, Proprioception, and Posture for (0) minutes.  The following were included:     Performed Today:          Interventions DEFERRED today     Shoulder IR: red band 3x10   Shoulder ER: red band 3x10                    THERAPEUTIC ACTIVITIES to improve dynamic and functional  performance for (14) minutes including:    Performed Today:     Rows 7.5# 3x10   Extensions 5# 3x10   Foam roll strength   B shoulder ER: yellow band 30x   Pull aparts: red band 30x   Diagonal pull aparts: red band 15x B   Flexion: yellow band 30x    Interventions DEFERRED today                  Next Session:  Quadruped or plank serratus press   Prone TYW   Chest press   Bicep curls         Patient Education and Home Exercises      Education provided: (time included with treatment) minutes  PURPOSE: Patient educated on the impairments noted above and the effects of physical therapy intervention to improve overall condition and QOL.    EXERCISE: Patient was educated on all the above exercise prior/during/after for proper posture, positioning, and execution for safe performance with home exercise program.   STRENGTH: Patient educated on the importance of improved core and extremity strength in order to improve alignment of the spine and extremities with static positions and dynamic movement.   POSTURE: Patient educated on postural awareness to reduce stress and maintain optimal alignment of the spine with static positions and dynamic movement      Written Home Exercises Provided: yes.  Exercises were reviewed and Casandra was able to demonstrate them prior to the end of the session.  Casandra demonstrated good  understanding of the education provided. See EMR under Patient Instructions for exercises provided during therapy sessions.    ASSESSMENT     Casandra Barragan tolerated Physical Therapy session well today. She demonstrates significant improvement in A/PROM at start of session with further improvement in all directions following session and is able to accomplish full external rotation PROM. Improved mobility noted with B shoulder ER today; pt reports decreased pain with this intervention as her ROM has improved.     Casandra Is progressing well towards her goals.   Pt prognosis is Good.     Pt will continue to benefit from skilled outpatient physical therapy to address the deficits listed in the problem list box on initial evaluation, provide pt/family education and to maximize pt's level of independence in the home and community environment.     Pt's spiritual, cultural and educational needs considered and pt agreeable to plan of care and goals.    Anticipated Barriers for therapy: co-morbidities, chronicity of condition, motivation to improve condition, adherence to treatment plan, and coping style       Short Term Goals:  6 weeks Status  Date Met   PAIN: Pt will report worst pain of 5/10 in order to progress toward max functional ability and  improve quality of life. [x] Progressing  [] Met  [] Not Met     FUNCTION: Patient will demonstrate improved function as indicated by a score of greater than or equal to 64 out of 100 on FOTO. [x] Progressing  [] Met  [] Not Met     MOBILITY: Patient will improve AROM to 50% of stated goals, listed in objective measures above, in order to progress towards independence with functional activities.  [x] Progressing  [] Met  [] Not Met     STRENGTH: Patient will improve strength to 50% of stated goals, listed in objective measures above, in order to progress towards independence with functional activities. [x] Progressing  [] Met  [] Not Met     POSTURE: Patient will correct postural deviations in sitting and standing, to decrease pain and promote long term stability.  [x] Progressing  [] Met  [] Not Met     HEP: Patient will demonstrate independence with HEP in order to progress toward functional independence. [x] Progressing  [] Met  [] Not Met        Long Term Goals:  12 weeks Status Date Met   PAIN: Pt will report worst pain of 3/10 in order to progress toward max functional ability and improve quality of life [x] Progressing  [] Met  [] Not Met     FUNCTION: Patient will demonstrate improved function as indicated by a score of greater than or equal to 74 out of 100 on FOTO. [x] Progressing  [] Met  [] Not Met     MOBILITY: Patient will improve AROM to stated goals, listed in objective measures above, in order to return to maximal functional potential and improve quality of life.  [x] Progressing  [] Met  [] Not Met     STRENGTH: Patient will improve strength to stated goals, listed in objective measures above, in order to improve functional independence and quality of life.  [x] Progressing  [] Met  [] Not Met     Patient will return to normal ADL's, IADL's, community involvement, recreational activities, and work-related activities with less than or equal to 3/10 pain and maximal function.  [x] Progressing  []  Met  [] Not Met          PLAN     Continue Plan of Care (POC) and progress per patient tolerance. See Treatment section for exercise progression.    Asmita Miller, PT

## 2023-09-26 ENCOUNTER — CLINICAL SUPPORT (OUTPATIENT)
Dept: REHABILITATION | Facility: HOSPITAL | Age: 23
End: 2023-09-26
Payer: COMMERCIAL

## 2023-09-26 DIAGNOSIS — M62.81 PROXIMAL MUSCLE WEAKNESS: ICD-10-CM

## 2023-09-26 DIAGNOSIS — Z74.09 DECREASED FUNCTIONAL MOBILITY AND ENDURANCE: Primary | ICD-10-CM

## 2023-09-26 DIAGNOSIS — M25.611 DECREASED RANGE OF MOTION OF RIGHT SHOULDER: ICD-10-CM

## 2023-09-26 PROCEDURE — 97110 THERAPEUTIC EXERCISES: CPT

## 2023-09-26 PROCEDURE — 97530 THERAPEUTIC ACTIVITIES: CPT

## 2023-09-26 RX ORDER — AMITRIPTYLINE HYDROCHLORIDE 10 MG/1
TABLET, FILM COATED ORAL
Qty: 90 TABLET | Refills: 1 | Status: SHIPPED | OUTPATIENT
Start: 2023-09-26 | End: 2023-10-10 | Stop reason: DRUGHIGH

## 2023-09-27 NOTE — PROGRESS NOTES
[facility]   Physical Therapy Treatment Note      Name: Casandra Barragan  [facility] Number: [id number]    Therapy Diagnosis:   Encounter Diagnoses   Name Primary?    Decreased functional mobility and endurance Yes    Decreased range of motion of right shoulder     Proximal muscle weakness        Physician: Chris Flanagan    Visit Date: 9/21/2023    Physician Orders: PT Eval and Treat  Medical Diagnosis from Referral: Synovitis of right shoulder [M65.811], Status post arthroscopy of right shoulder [Z98.890]  Evaluation Date: 8/31/2023  Authorization Period Expiration: 8/30/2024  Plan of Care Expiration: 11/3/2023    Progress Note Due: 9/30/2023  Visit # / Visits authorized: 6/20 (+eval)  FOTO: 1/3 (last performed on 8/31/2023)     Precautions: Standard  PTA Visit #: 0/5     Time In: 1300  Time Out: 1412  Total Billable Time: 68 minutes    SUBJECTIVE     Pt reports:  She is feeling good with continued improvement in shoulder mobility and pain     Compliance with Hep: Every Other Day  Response to previous treatment: no adverse reactions to treatment/updated HEP  Functional change: Better    Pain: 0/10   Worst: 5/10  Location: top of right shoulder      OBJECTIVE     Objective Measures updated at progress report unless specified otherwise.    RANGE OF MOTION:      Shoulder AROM/PROM Right  (Eval) Right  9/14/2023 Left Pain/Dysfunction with Movement Goal   Shoulder Flexion (180º) 132/140 168 180 Pain B  170    Shoulder Abduction (180º) 112/123 153 180 Anterior shoulder pain on R  170   Shoulder Extension (60º) 64 70 75 Anterior shoulder pain on R 75   Shoulder ER  at 90º (90º) 83 90 90   90   Shoulder IR at 90º (70º) 41 65 73   70   Functional ER T2  T6 Anterior shoulder pain on R T6   Functional IR Inferior angle of scapula  superior angle of scapula Anterior shoulder pain on R         Treatment     Gym/Equipment Access: full  Time to Complete Exercises: increased for cueing.    Casandra  received the treatments listed below:        MANUAL THERAPY TECHNIQUES were applied for (8) minutes, including:    Soft tissue mobilization:     Joint mobilizations:   Glenohumeral distraction, inferior, anterior and posterior glides   Glenohumeral inferior glide in prone   Glenohumeral inferior glide with flexion, scaption and abduction   Functional dry needling: DEFERRED        THERAPEUTIC EXERCISES to develop strength, endurance, ROM, flexibility, posture, and core stabilization for (40) minutes including:     Performed Today:     UBE - level 1, 3'/3'  Shoulder PROM   Prayer stretch:   Shoulder in IR: 2 minutes x 10s holds   Shoulder in ER: 2 minutes x 10s holds   Stretches   Inferior capsule: 10x10s holds   Pec corner stretch: 10x10s holds   Series 6- 1 minute each   Low load long duration stretches   90/90 ER: 4# for 3 minutes   Flexion: 5# for 3 minutes    Interventions DEFERRED today                   NEUROMUSCULAR RE-EDUCATION ACTIVITIES to improve Balance, Coordination, Kinesthetic, Sense, Proprioception, and Posture for (0) minutes.  The following were included:     Performed Today:          Interventions DEFERRED today     Shoulder IR: red band 3x10   Shoulder ER: red band 3x10                    THERAPEUTIC ACTIVITIES to improve dynamic and functional  performance for (20) minutes including:    Performed Today:     Rows 7.5# 3x10   Extensions 5# 3x10   Prone TYW 2x10 each on R   Foam roll strength   B shoulder ER: yellow band 30x   Pull aparts: red band 30x   Diagonal pull aparts: red band 15x B   Flexion: yellow band 30x    Interventions DEFERRED today                  Next Session:  Quadruped or plank serratus press   Prone TYW   Chest press   Bicep curls         Patient Education and Home Exercises      Education provided: (time included with treatment) minutes  PURPOSE: Patient educated on the impairments noted above and the effects of physical therapy intervention to improve overall condition and  QOL.   EXERCISE: Patient was educated on all the above exercise prior/during/after for proper posture, positioning, and execution for safe performance with home exercise program.   STRENGTH: Patient educated on the importance of improved core and extremity strength in order to improve alignment of the spine and extremities with static positions and dynamic movement.   POSTURE: Patient educated on postural awareness to reduce stress and maintain optimal alignment of the spine with static positions and dynamic movement      Written Home Exercises Provided: yes.  Exercises were reviewed and Casandra was able to demonstrate them prior to the end of the session.  Casandra demonstrated good  understanding of the education provided. See EMR under Patient Instructions for exercises provided during therapy sessions.    ASSESSMENT     Casandra Barragan tolerated Physical Therapy session well today. Continue to spend majority of session on PROM and stretches to improve shoulder mobility with significant improvements noted each session. Added prone TYW to improve functional strength of the shoulder complex; cueing to reduce compensations and maximize recruitment of scapular stabilizers.     Casandra Is progressing well towards her goals.   Pt prognosis is Good.     Pt will continue to benefit from skilled outpatient physical therapy to address the deficits listed in the problem list box on initial evaluation, provide pt/family education and to maximize pt's level of independence in the home and community environment.     Pt's spiritual, cultural and educational needs considered and pt agreeable to plan of care and goals.    Anticipated Barriers for therapy: co-morbidities, chronicity of condition, motivation to improve condition, adherence to treatment plan, and coping style       Short Term Goals:  6 weeks Status  Date Met   PAIN: Pt will report worst pain of 5/10 in order to progress toward max functional ability and improve quality  of life. [x] Progressing  [] Met  [] Not Met     FUNCTION: Patient will demonstrate improved function as indicated by a score of greater than or equal to 64 out of 100 on FOTO. [x] Progressing  [] Met  [] Not Met     MOBILITY: Patient will improve AROM to 50% of stated goals, listed in objective measures above, in order to progress towards independence with functional activities.  [x] Progressing  [] Met  [] Not Met     STRENGTH: Patient will improve strength to 50% of stated goals, listed in objective measures above, in order to progress towards independence with functional activities. [x] Progressing  [] Met  [] Not Met     POSTURE: Patient will correct postural deviations in sitting and standing, to decrease pain and promote long term stability.  [x] Progressing  [] Met  [] Not Met     HEP: Patient will demonstrate independence with HEP in order to progress toward functional independence. [x] Progressing  [] Met  [] Not Met        Long Term Goals:  12 weeks Status Date Met   PAIN: Pt will report worst pain of 3/10 in order to progress toward max functional ability and improve quality of life [x] Progressing  [] Met  [] Not Met     FUNCTION: Patient will demonstrate improved function as indicated by a score of greater than or equal to 74 out of 100 on FOTO. [x] Progressing  [] Met  [] Not Met     MOBILITY: Patient will improve AROM to stated goals, listed in objective measures above, in order to return to maximal functional potential and improve quality of life.  [x] Progressing  [] Met  [] Not Met     STRENGTH: Patient will improve strength to stated goals, listed in objective measures above, in order to improve functional independence and quality of life.  [x] Progressing  [] Met  [] Not Met     Patient will return to normal ADL's, IADL's, community involvement, recreational activities, and work-related activities with less than or equal to 3/10 pain and maximal function.  [x] Progressing  [] Met  [] Not Met           PLAN     Continue Plan of Care (POC) and progress per patient tolerance. See Treatment section for exercise progression.    Asmita Miller, PT

## 2023-09-28 ENCOUNTER — CLINICAL SUPPORT (OUTPATIENT)
Dept: REHABILITATION | Facility: HOSPITAL | Age: 23
End: 2023-09-28
Payer: COMMERCIAL

## 2023-09-28 DIAGNOSIS — M25.611 DECREASED RANGE OF MOTION OF RIGHT SHOULDER: ICD-10-CM

## 2023-09-28 DIAGNOSIS — M62.81 PROXIMAL MUSCLE WEAKNESS: ICD-10-CM

## 2023-09-28 DIAGNOSIS — Z74.09 DECREASED FUNCTIONAL MOBILITY AND ENDURANCE: Primary | ICD-10-CM

## 2023-09-28 PROCEDURE — 97110 THERAPEUTIC EXERCISES: CPT

## 2023-09-28 PROCEDURE — 97530 THERAPEUTIC ACTIVITIES: CPT

## 2023-09-29 NOTE — PROGRESS NOTES
[facility] OUTPATIENT THERAPY AND WELLNESS   Physical Therapy Treatment Note      Name: Casandra [facility] Number: [id number]    Therapy Diagnosis:   Encounter Diagnoses   Name Primary?    Decreased functional mobility and endurance Yes    Decreased range of motion of right shoulder     Proximal muscle weakness        Physician: Chris Flanagan    Visit Date: 9/26/2023    Physician Orders: PT Eval and Treat  Medical Diagnosis from Referral: Synovitis of right shoulder [M65.811], Status post arthroscopy of right shoulder [Z98.890]  Evaluation Date: 8/31/2023  Authorization Period Expiration: 8/30/2024  Plan of Care Expiration: 11/3/2023    Progress Note Due: 9/30/2023  Visit # / Visits authorized: 7/20 (+eval)  FOTO: 1/3 (last performed on 8/31/2023)     Precautions: Standard  PTA Visit #: 0/5     Time In: 1100  Time Out: 1203  Total Billable Time: 56 minutes    SUBJECTIVE     Pt reports:  she is feeling okay currently but has noticed a few instances of sharp pain in the shoulder since previous session. This tends to occur when she is sitting around and not moving and takes some time to subside     Compliance with Hep: Every Other Day  Response to previous treatment: no adverse reactions to treatment/updated HEP  Functional change: Better    Pain: 0/10   Worst: 5/10  Location: top of right shoulder      OBJECTIVE     Objective Measures updated at progress report unless specified otherwise.    RANGE OF MOTION:      Shoulder AROM/PROM Right  (Eval) Right  9/14/2023 Left Pain/Dysfunction with Movement Goal   Shoulder Flexion (180º) 132/140 168 180 Pain B  170    Shoulder Abduction (180º) 112/123 153 180 Anterior shoulder pain on R  170   Shoulder Extension (60º) 64 70 75 Anterior shoulder pain on R 75   Shoulder ER  at 90º (90º) 83 90 90   90   Shoulder IR at 90º (70º) 41 65 73   70   Functional ER T2  T6 Anterior shoulder pain on R T6   Functional IR Inferior angle of scapula  superior angle of scapula  Anterior shoulder pain on R         Treatment     Gym/Equipment Access: full  Time to Complete Exercises: increased for esmer Guajardo received the treatments listed below:        MANUAL THERAPY TECHNIQUES were applied for (8) minutes, including:    Soft tissue mobilization:     Joint mobilizations:   Glenohumeral distraction, inferior, anterior and posterior glides   Glenohumeral inferior glide in prone   Glenohumeral inferior glide with flexion, scaption and abduction   Functional dry needling: DEFERRED        THERAPEUTIC EXERCISES to develop strength, endurance, ROM, flexibility, posture, and core stabilization for (40) minutes including:     Performed Today:     UBE - level 1, 3'/3'  Shoulder PROM   Prayer stretch:   Shoulder in IR: 2 minutes x 10s holds   Shoulder in ER: 2 minutes x 10s holds   Stretches   Inferior capsule: 10x10s holds   Pec corner stretch: 10x10s holds   Series 6- 1 minute each   Low load long duration stretches   90/90 ER: 4# for 3 minutes   Flexion: 5# for 3 minutes    Interventions DEFERRED today                   NEUROMUSCULAR RE-EDUCATION ACTIVITIES to improve Balance, Coordination, Kinesthetic, Sense, Proprioception, and Posture for (0) minutes.  The following were included:     Performed Today:          Interventions DEFERRED today     Shoulder IR: red band 3x10   Shoulder ER: red band 3x10                    THERAPEUTIC ACTIVITIES to improve dynamic and functional  performance for (20) minutes including:    Performed Today:     Rows 7.5# 3x10   Extensions 5# 3x10   Prone TYW 2x10 each on R   Foam roll strength   B shoulder ER: yellow band 30x   Pull aparts: red band 30x   Diagonal pull aparts: red band 15x B   Flexion: yellow band 30x    Interventions DEFERRED today                  Next Session:  Quadruped or plank serratus press   Prone TYW   Chest press   Bicep curls         Patient Education and Home Exercises      Education provided: (time included with treatment)  minutes  PURPOSE: Patient educated on the impairments noted above and the effects of physical therapy intervention to improve overall condition and QOL.   EXERCISE: Patient was educated on all the above exercise prior/during/after for proper posture, positioning, and execution for safe performance with home exercise program.   STRENGTH: Patient educated on the importance of improved core and extremity strength in order to improve alignment of the spine and extremities with static positions and dynamic movement.   POSTURE: Patient educated on postural awareness to reduce stress and maintain optimal alignment of the spine with static positions and dynamic movement      Written Home Exercises Provided: yes.  Exercises were reviewed and Casandra was able to demonstrate them prior to the end of the session.  Casandra demonstrated good  understanding of the education provided. See EMR under Patient Instructions for exercises provided during therapy sessions.    ASSESSMENT     Casandra Barragan tolerated Physical Therapy session well today. Continue to spend majority of session on PROM and stretches to improve shoulder mobility with significant improvements noted each session. Added prone TYW to improve functional strength of the shoulder complex; cueing to reduce compensations and maximize recruitment of scapular stabilizers.     Casandra Is progressing well towards her goals.   Pt prognosis is Good.     Pt will continue to benefit from skilled outpatient physical therapy to address the deficits listed in the problem list box on initial evaluation, provide pt/family education and to maximize pt's level of independence in the home and community environment.     Pt's spiritual, cultural and educational needs considered and pt agreeable to plan of care and goals.    Anticipated Barriers for therapy: co-morbidities, chronicity of condition, motivation to improve condition, adherence to treatment plan, and coping style       Short Term  Goals:  6 weeks Status  Date Met   PAIN: Pt will report worst pain of 5/10 in order to progress toward max functional ability and improve quality of life. [x] Progressing  [] Met  [] Not Met     FUNCTION: Patient will demonstrate improved function as indicated by a score of greater than or equal to 64 out of 100 on FOTO. [x] Progressing  [] Met  [] Not Met     MOBILITY: Patient will improve AROM to 50% of stated goals, listed in objective measures above, in order to progress towards independence with functional activities.  [x] Progressing  [] Met  [] Not Met     STRENGTH: Patient will improve strength to 50% of stated goals, listed in objective measures above, in order to progress towards independence with functional activities. [x] Progressing  [] Met  [] Not Met     POSTURE: Patient will correct postural deviations in sitting and standing, to decrease pain and promote long term stability.  [x] Progressing  [] Met  [] Not Met     HEP: Patient will demonstrate independence with HEP in order to progress toward functional independence. [x] Progressing  [] Met  [] Not Met        Long Term Goals:  12 weeks Status Date Met   PAIN: Pt will report worst pain of 3/10 in order to progress toward max functional ability and improve quality of life [x] Progressing  [] Met  [] Not Met     FUNCTION: Patient will demonstrate improved function as indicated by a score of greater than or equal to 74 out of 100 on FOTO. [x] Progressing  [] Met  [] Not Met     MOBILITY: Patient will improve AROM to stated goals, listed in objective measures above, in order to return to maximal functional potential and improve quality of life.  [x] Progressing  [] Met  [] Not Met     STRENGTH: Patient will improve strength to stated goals, listed in objective measures above, in order to improve functional independence and quality of life.  [x] Progressing  [] Met  [] Not Met     Patient will return to normal ADL's, IADL's, community involvement,  recreational activities, and work-related activities with less than or equal to 3/10 pain and maximal function.  [x] Progressing  [] Met  [] Not Met          PLAN     Continue Plan of Care (POC) and progress per patient tolerance. See Treatment section for exercise progression.    Asmita Miller, PT

## 2023-09-29 NOTE — PROGRESS NOTES
[facility]   Physical Therapy Treatment Note      Name: [facility] Number: [id number]    Therapy Diagnosis:   Encounter Diagnoses   Name Primary?    Decreased functional mobility and endurance Yes    Decreased range of motion of right shoulder     Proximal muscle weakness        Physician: Chris Flanagan    Visit Date: 9/28/2023    Physician Orders: PT Eval and Treat  Medical Diagnosis from Referral: Synovitis of right shoulder [M65.811], Status post arthroscopy of right shoulder [Z98.890]  Evaluation Date: 8/31/2023  Authorization Period Expiration: 8/30/2024  Plan of Care Expiration: 11/3/2023    Progress Note Due: 9/30/2023  Visit # / Visits authorized: 7/20 (+eval)  FOTO: 1/3 (last performed on 8/31/2023)     Precautions: Standard  PTA Visit #: 0/5     Time In: 1100  Time Out: 1203  Total Billable Time: 56 minutes    SUBJECTIVE     Pt reports:  she is feeling okay currently but has noticed a few instances of sharp pain in the shoulder since previous session. This tends to occur when she is sitting around and not moving and takes some time to subside     Compliance with Hep: Every Other Day  Response to previous treatment: no adverse reactions to treatment/updated HEP  Functional change: Better    Pain: 0/10   Worst: 5/10  Location: top of right shoulder      OBJECTIVE     Objective Measures updated at progress report unless specified otherwise.    RANGE OF MOTION:      Shoulder AROM/PROM Right  (Eval) Right  9/14/2023 Left Pain/Dysfunction with Movement Goal   Shoulder Flexion (180º) 132/140 168 180 Pain B  170    Shoulder Abduction (180º) 112/123 153 180 Anterior shoulder pain on R  170   Shoulder Extension (60º) 64 70 75 Anterior shoulder pain on R 75   Shoulder ER  at 90º (90º) 83 90 90   90   Shoulder IR at 90º (70º) 41 65 73   70   Functional ER T2  T6 Anterior shoulder pain on R T6   Functional IR Inferior angle of scapula  superior angle of scapula  Anterior shoulder pain on R         Treatment     Gym/Equipment Access: full  Time to Complete Exercises: increased for esmer Guajardo received the treatments listed below:        MANUAL THERAPY TECHNIQUES were applied for (8) minutes, including:    Soft tissue mobilization:     Joint mobilizations:   Glenohumeral distraction, inferior, anterior and posterior glides   Glenohumeral inferior glide in prone   Glenohumeral inferior glide with flexion, scaption and abduction   Functional dry needling: DEFERRED        THERAPEUTIC EXERCISES to develop strength, endurance, ROM, flexibility, posture, and core stabilization for (40) minutes including:     Performed Today:     UBE - level 1, 3'/3'  Shoulder PROM   Prayer stretch:   Shoulder in IR: 2 minutes x 10s holds   Shoulder in ER: 2 minutes x 10s holds   Stretches   Inferior capsule: 10x10s holds   Pec corner stretch: 10x10s holds   Series 6- 1 minute each   Low load long duration stretches   90/90 ER: 4# for 3 minutes   Flexion: 5# for 3 minutes    Interventions DEFERRED today                   NEUROMUSCULAR RE-EDUCATION ACTIVITIES to improve Balance, Coordination, Kinesthetic, Sense, Proprioception, and Posture for (0) minutes.  The following were included:     Performed Today:          Interventions DEFERRED today     Shoulder IR: red band 3x10   Shoulder ER: red band 3x10                    THERAPEUTIC ACTIVITIES to improve dynamic and functional  performance for (20) minutes including:    Performed Today:     Rows 7.5# 3x10   Extensions 5# 3x10   Prone TYW 2x10 each on R   Foam roll strength   B shoulder ER: yellow band 30x   Pull aparts: red band 30x   Diagonal pull aparts: red band 15x B   Flexion: yellow band 30x    Interventions DEFERRED today                  Next Session:  Quadruped or plank serratus press   Prone TYW   Chest press   Bicep curls         Patient Education and Home Exercises      Education provided: (time included with treatment)  minutes  PURPOSE: Patient educated on the impairments noted above and the effects of physical therapy intervention to improve overall condition and QOL.   EXERCISE: Patient was educated on all the above exercise prior/during/after for proper posture, positioning, and execution for safe performance with home exercise program.   STRENGTH: Patient educated on the importance of improved core and extremity strength in order to improve alignment of the spine and extremities with static positions and dynamic movement.   POSTURE: Patient educated on postural awareness to reduce stress and maintain optimal alignment of the spine with static positions and dynamic movement      Written Home Exercises Provided: yes.  Exercises were reviewed and Casandra was able to demonstrate them prior to the end of the session.  Casandra demonstrated good  understanding of the education provided. See EMR under Patient Instructions for exercises provided during therapy sessions.    ASSESSMENT     Casandra Barragan tolerated Physical Therapy session well today. Continue to spend majority of session on PROM and stretches to improve shoulder mobility with significant improvements noted each session. Added prone TYW to improve functional strength of the shoulder complex; cueing to reduce compensations and maximize recruitment of scapular stabilizers.     Casandra Is progressing well towards her goals.   Pt prognosis is Good.     Pt will continue to benefit from skilled outpatient physical therapy to address the deficits listed in the problem list box on initial evaluation, provide pt/family education and to maximize pt's level of independence in the home and community environment.     Pt's spiritual, cultural and educational needs considered and pt agreeable to plan of care and goals.    Anticipated Barriers for therapy: co-morbidities, chronicity of condition, motivation to improve condition, adherence to treatment plan, and coping style       Short Term  Goals:  6 weeks Status  Date Met   PAIN: Pt will report worst pain of 5/10 in order to progress toward max functional ability and improve quality of life. [x] Progressing  [] Met  [] Not Met     FUNCTION: Patient will demonstrate improved function as indicated by a score of greater than or equal to 64 out of 100 on FOTO. [x] Progressing  [] Met  [] Not Met     MOBILITY: Patient will improve AROM to 50% of stated goals, listed in objective measures above, in order to progress towards independence with functional activities.  [x] Progressing  [] Met  [] Not Met     STRENGTH: Patient will improve strength to 50% of stated goals, listed in objective measures above, in order to progress towards independence with functional activities. [x] Progressing  [] Met  [] Not Met     POSTURE: Patient will correct postural deviations in sitting and standing, to decrease pain and promote long term stability.  [x] Progressing  [] Met  [] Not Met     HEP: Patient will demonstrate independence with HEP in order to progress toward functional independence. [x] Progressing  [] Met  [] Not Met        Long Term Goals:  12 weeks Status Date Met   PAIN: Pt will report worst pain of 3/10 in order to progress toward max functional ability and improve quality of life [x] Progressing  [] Met  [] Not Met     FUNCTION: Patient will demonstrate improved function as indicated by a score of greater than or equal to 74 out of 100 on FOTO. [x] Progressing  [] Met  [] Not Met     MOBILITY: Patient will improve AROM to stated goals, listed in objective measures above, in order to return to maximal functional potential and improve quality of life.  [x] Progressing  [] Met  [] Not Met     STRENGTH: Patient will improve strength to stated goals, listed in objective measures above, in order to improve functional independence and quality of life.  [x] Progressing  [] Met  [] Not Met     Patient will return to normal ADL's, IADL's, community involvement,  recreational activities, and work-related activities with less than or equal to 3/10 pain and maximal function.  [x] Progressing  [] Met  [] Not Met          PLAN     Continue Plan of Care (POC) and progress per patient tolerance. See Treatment section for exercise progression.    Asmita Miller, PT

## 2023-10-03 ENCOUNTER — CLINICAL SUPPORT (OUTPATIENT)
Dept: REHABILITATION | Facility: HOSPITAL | Age: 23
End: 2023-10-03
Payer: COMMERCIAL

## 2023-10-03 DIAGNOSIS — M62.81 PROXIMAL MUSCLE WEAKNESS: ICD-10-CM

## 2023-10-03 DIAGNOSIS — M25.611 DECREASED RANGE OF MOTION OF RIGHT SHOULDER: ICD-10-CM

## 2023-10-03 DIAGNOSIS — Z74.09 DECREASED FUNCTIONAL MOBILITY AND ENDURANCE: Primary | ICD-10-CM

## 2023-10-03 PROCEDURE — 97110 THERAPEUTIC EXERCISES: CPT

## 2023-10-03 PROCEDURE — 97112 NEUROMUSCULAR REEDUCATION: CPT

## 2023-10-03 PROCEDURE — 97530 THERAPEUTIC ACTIVITIES: CPT

## 2023-10-03 NOTE — PROGRESS NOTES
[facility]   Physical Therapy Treatment Note      Name: Casandra Barragan  [facility] Number: [id number]    Therapy Diagnosis:   Encounter Diagnoses   Name Primary?    Decreased functional mobility and endurance Yes    Decreased range of motion of right shoulder     Proximal muscle weakness        Physician: Chris Flanagan    Visit Date: 10/3/2023    Physician Orders: PT Eval and Treat  Medical Diagnosis from Referral: Synovitis of right shoulder [M65.811], Status post arthroscopy of right shoulder [Z98.890]  Evaluation Date: 8/31/2023  Authorization Period Expiration: 8/30/2024  Plan of Care Expiration: 11/3/2023    Progress Note Due: 9/30/2023  Visit # / Visits authorized: 7/20 (+eval)  FOTO: 1/3 (last performed on 8/31/2023)     Precautions: Standard  PTA Visit #: 0/5     Time In: 1330  Time Out: 1437  Total Billable Time: 54 minutes    SUBJECTIVE     Pt reports:  she is feeling good with no significant complaints since previous session     Compliance with Hep: Every Other Day  Response to previous treatment: no adverse reactions to treatment/updated HEP  Functional change: Better    Pain: 0/10   Worst: 5/10  Location: top of right shoulder      OBJECTIVE     Objective Measures updated at progress report unless specified otherwise.    RANGE OF MOTION:      Shoulder AROM/PROM Right  (Eval) Right  9/14/2023 Left Pain/Dysfunction with Movement Goal   Shoulder Flexion (180º) 132/140 168 180 Pain B  170    Shoulder Abduction (180º) 112/123 153 180 Anterior shoulder pain on R  170   Shoulder Extension (60º) 64 70 75 Anterior shoulder pain on R 75   Shoulder ER  at 90º (90º) 83 90 90   90   Shoulder IR at 90º (70º) 41 65 73   70   Functional ER T2  T6 Anterior shoulder pain on R T6   Functional IR Inferior angle of scapula  superior angle of scapula Anterior shoulder pain on R         Treatment     Gym/Equipment Access: full  Time to Complete Exercises: increased for cueing.    Casandra  received the treatments listed below:        MANUAL THERAPY TECHNIQUES were applied for (0) minutes, including:    Soft tissue mobilization:     Joint mobilizations:   Glenohumeral distraction, inferior, anterior and posterior glides   Glenohumeral inferior glide in prone   Glenohumeral inferior glide with flexion, scaption and abduction   Functional dry needling: DEFERRED        THERAPEUTIC EXERCISES to develop strength, endurance, ROM, flexibility, posture, and core stabilization for (23) minutes including:     Performed Today:     UBE - level 1, 3'/3'  Shoulder PROM     Stretches   Inferior capsule: 10x10s holds   Pec corner stretch: 10x10s holds   Kneeling thoracic rotation: 15x B   Series 6- 1 minute each        Interventions DEFERRED today     Prayer stretch:   Shoulder in IR: 2 minutes x 10s holds   Shoulder in ER: 2 minutes x 10s holds   Low load long duration stretches   90/90 ER: 4# for 3 minutes   Flexion: 5# for 3 minutes           NEUROMUSCULAR RE-EDUCATION ACTIVITIES to improve Balance, Coordination, Kinesthetic, Sense, Proprioception, and Posture for (8) minutes.  The following were included:     Performed Today:     B shoulder ER: red band 2x10   Breuggars: red band 2x10      Interventions DEFERRED today     Shoulder IR: red band 3x10   Shoulder ER: red band 3x10                    THERAPEUTIC ACTIVITIES to improve dynamic and functional  performance for (23) minutes including:    Performed Today:     Rows 7.5# 3x10   Extensions 5# 3x10   Prone Series 2x10 each   Row: 7.5#  ITYW 0#  Quadruped serratus push up 20x   Plank serratus push up: 20 inch box 20x   Plank shoulder taps 3x10    Interventions DEFERRED today     Foam roll strength   B shoulder ER: yellow band 30x   Pull aparts: red band 30x   Diagonal pull aparts: red band 15x B   Flexion: yellow band 30x              Next Session:  Quadruped or plank serratus press   Prone TYW   Chest press   Bicep curls         Patient Education and Home  Exercises      Education provided: (time included with treatment) minutes  PURPOSE: Patient educated on the impairments noted above and the effects of physical therapy intervention to improve overall condition and QOL.   EXERCISE: Patient was educated on all the above exercise prior/during/after for proper posture, positioning, and execution for safe performance with home exercise program.   STRENGTH: Patient educated on the importance of improved core and extremity strength in order to improve alignment of the spine and extremities with static positions and dynamic movement.   POSTURE: Patient educated on postural awareness to reduce stress and maintain optimal alignment of the spine with static positions and dynamic movement      Written Home Exercises Provided: yes.  Exercises were reviewed and Casandra was able to demonstrate them prior to the end of the session.  Casandra demonstrated good  understanding of the education provided. See EMR under Patient Instructions for exercises provided during therapy sessions.    ASSESSMENT     Patient tolerated session well today. Progressed to plank serratus push ups and plank shoulder taps to further improve shoulder stability; cueing required to maintain shoulder protraction with taps. Added kneeling thoracic rotations to improve posture as well as shoulder and thoracic mobility. Pt notes they feel good following session with reports of increased fatigue but no adverse symptoms.    Casandra Is progressing well towards her goals.   Pt prognosis is Good.     Pt will continue to benefit from skilled outpatient physical therapy to address the deficits listed in the problem list box on initial evaluation, provide pt/family education and to maximize pt's level of independence in the home and community environment.     Pt's spiritual, cultural and educational needs considered and pt agreeable to plan of care and goals.    Anticipated Barriers for therapy: co-morbidities, chronicity of  condition, motivation to improve condition, adherence to treatment plan, and coping style       Short Term Goals:  6 weeks Status  Date Met   PAIN: Pt will report worst pain of 5/10 in order to progress toward max functional ability and improve quality of life. [x] Progressing  [] Met  [] Not Met     FUNCTION: Patient will demonstrate improved function as indicated by a score of greater than or equal to 64 out of 100 on FOTO. [x] Progressing  [] Met  [] Not Met     MOBILITY: Patient will improve AROM to 50% of stated goals, listed in objective measures above, in order to progress towards independence with functional activities.  [x] Progressing  [] Met  [] Not Met     STRENGTH: Patient will improve strength to 50% of stated goals, listed in objective measures above, in order to progress towards independence with functional activities. [x] Progressing  [] Met  [] Not Met     POSTURE: Patient will correct postural deviations in sitting and standing, to decrease pain and promote long term stability.  [x] Progressing  [] Met  [] Not Met     HEP: Patient will demonstrate independence with HEP in order to progress toward functional independence. [x] Progressing  [] Met  [] Not Met        Long Term Goals:  12 weeks Status Date Met   PAIN: Pt will report worst pain of 3/10 in order to progress toward max functional ability and improve quality of life [x] Progressing  [] Met  [] Not Met     FUNCTION: Patient will demonstrate improved function as indicated by a score of greater than or equal to 74 out of 100 on FOTO. [x] Progressing  [] Met  [] Not Met     MOBILITY: Patient will improve AROM to stated goals, listed in objective measures above, in order to return to maximal functional potential and improve quality of life.  [x] Progressing  [] Met  [] Not Met     STRENGTH: Patient will improve strength to stated goals, listed in objective measures above, in order to improve functional independence and quality of life.  [x]  Progressing  [] Met  [] Not Met     Patient will return to normal ADL's, IADL's, community involvement, recreational activities, and work-related activities with less than or equal to 3/10 pain and maximal function.  [x] Progressing  [] Met  [] Not Met          PLAN     Continue Plan of Care (POC) and progress per patient tolerance. See Treatment section for exercise progression.    Asmita Miller, PT

## 2023-10-05 ENCOUNTER — CLINICAL SUPPORT (OUTPATIENT)
Dept: REHABILITATION | Facility: HOSPITAL | Age: 23
End: 2023-10-05
Payer: COMMERCIAL

## 2023-10-05 DIAGNOSIS — M62.81 PROXIMAL MUSCLE WEAKNESS: ICD-10-CM

## 2023-10-05 DIAGNOSIS — Z74.09 DECREASED FUNCTIONAL MOBILITY AND ENDURANCE: Primary | ICD-10-CM

## 2023-10-05 DIAGNOSIS — M25.611 DECREASED RANGE OF MOTION OF RIGHT SHOULDER: ICD-10-CM

## 2023-10-05 PROCEDURE — 97112 NEUROMUSCULAR REEDUCATION: CPT

## 2023-10-05 PROCEDURE — 97110 THERAPEUTIC EXERCISES: CPT

## 2023-10-05 PROCEDURE — 97530 THERAPEUTIC ACTIVITIES: CPT

## 2023-10-05 PROCEDURE — 97140 MANUAL THERAPY 1/> REGIONS: CPT

## 2023-10-05 NOTE — PROGRESS NOTES
[facility]   Physical Therapy Treatment Note      Name: Casandra [facility] Number: [id number]    Therapy Diagnosis:   Encounter Diagnoses   Name Primary?    Decreased functional mobility and endurance Yes    Decreased range of motion of right shoulder     Proximal muscle weakness          Physician: Chris Flanagan    Visit Date: 10/5/2023    Physician Orders: PT Eval and Treat  Medical Diagnosis from Referral: Synovitis of right shoulder [M65.811], Status post arthroscopy of right shoulder [Z98.890]  Evaluation Date: 8/31/2023  Authorization Period Expiration: 8/30/2024  Plan of Care Expiration: 11/3/2023    Progress Note Due: 9/30/2023  Visit # / Visits authorized: 7/20 (+eval)  FOTO: 1/3 (last performed on 8/31/2023)     Precautions: Standard  PTA Visit #: 0/5     Time In: 1330  Time Out: 1437  Total Billable Time: 54 minutes    SUBJECTIVE     Pt reports:  she is feeling good with no significant complaints since previous session     Compliance with Hep: Every Other Day  Response to previous treatment: no adverse reactions to treatment/updated HEP  Functional change: Better    Pain: 0/10   Worst: 5/10  Location: top of right shoulder      OBJECTIVE     Objective Measures updated at progress report unless specified otherwise.    RANGE OF MOTION:      Treatment     Gym/Equipment Access: full  Time to Complete Exercises: increased for cueing.    Casandra received the treatments listed below:        MANUAL THERAPY TECHNIQUES were applied for (8) minutes, including:    Soft tissue mobilization:     Joint mobilizations:   Glenohumeral distraction, inferior, anterior and posterior glides   Glenohumeral inferior glide in prone   Glenohumeral inferior glide with flexion, scaption and abduction   Functional dry needling: DEFERRED        THERAPEUTIC EXERCISES to develop strength, endurance, ROM, flexibility, posture, and core stabilization for (23) minutes including:     Performed  Today:     UBE - level 3, 3'/3'  Shoulder PROM     Stretches   Inferior capsule: 10x10s holds   Pec corner stretch: 10x10s holds   Supine shoulder flexion: 10x10s holds   Kneeling thoracic rotation: 15x B          Interventions DEFERRED today     Series 6- 1 minute each   Prayer stretch:   Shoulder in IR: 2 minutes x 10s holds   Shoulder in ER: 2 minutes x 10s holds   Low load long duration stretches   90/90 ER: 4# for 3 minutes   Flexion: 5# for 3 minutes           NEUROMUSCULAR RE-EDUCATION ACTIVITIES to improve Balance, Coordination, Kinesthetic, Sense, Proprioception, and Posture for (10) minutes.  The following were included:     Performed Today:     B shoulder ER: red band 3x10   Supine shoulder ER: yellow band 2x10     Supine ER isometric at end range: yellow band 2x10    Interventions DEFERRED today     Shoulder IR: red band 3x10   Shoulder ER: red band 3x10                    THERAPEUTIC ACTIVITIES to improve dynamic and functional  performance for (15) minutes including:    Performed Today:       Prone Series 2x10 each   Row: 10#  ITYW 0#  Chest press: 8# 3x10        Interventions DEFERRED today     Foam roll strength   B shoulder ER: yellow band 30x   Pull aparts: red band 30x   Diagonal pull aparts: red band 15x B   Flexion: yellow band 30x   Quadruped serratus push up 20x   Plank serratus push up: 20 inch box 20x   Plank shoulder taps 3x10   Rows 7.5# 3x10   Extensions 5# 3x10            Next Session:  Quadruped or plank serratus press   Prone TYW   Chest press   Bicep curls         Patient Education and Home Exercises      Education provided: (time included with treatment) minutes  PURPOSE: Patient educated on the impairments noted above and the effects of physical therapy intervention to improve overall condition and QOL.   EXERCISE: Patient was educated on all the above exercise prior/during/after for proper posture, positioning, and execution for safe performance with home exercise program.    STRENGTH: Patient educated on the importance of improved core and extremity strength in order to improve alignment of the spine and extremities with static positions and dynamic movement.   POSTURE: Patient educated on postural awareness to reduce stress and maintain optimal alignment of the spine with static positions and dynamic movement      Written Home Exercises Provided: yes.  Exercises were reviewed and Casandra was able to demonstrate them prior to the end of the session.  Casandra demonstrated good  understanding of the education provided. See EMR under Patient Instructions for exercises provided during therapy sessions.    ASSESSMENT     Patient tolerated session well today. Progressed weight with rows and added chest press to further improve  gross upper extremity strength and functional movement patterns. Incorporated supine shoulder ER to improve  active ER AROM.    Casandra Is progressing well towards her goals.   Pt prognosis is Good.     Pt will continue to benefit from skilled outpatient physical therapy to address the deficits listed in the problem list box on initial evaluation, provide pt/family education and to maximize pt's level of independence in the home and community environment.     Pt's spiritual, cultural and educational needs considered and pt agreeable to plan of care and goals.    Anticipated Barriers for therapy: co-morbidities, chronicity of condition, motivation to improve condition, adherence to treatment plan, and coping style       Short Term Goals:  6 weeks Status  Date Met   PAIN: Pt will report worst pain of 5/10 in order to progress toward max functional ability and improve quality of life. [x] Progressing  [] Met  [] Not Met     FUNCTION: Patient will demonstrate improved function as indicated by a score of greater than or equal to 64 out of 100 on FOTO. [x] Progressing  [] Met  [] Not Met     MOBILITY: Patient will improve AROM to 50% of stated goals, listed in objective  measures above, in order to progress towards independence with functional activities.  [x] Progressing  [] Met  [] Not Met     STRENGTH: Patient will improve strength to 50% of stated goals, listed in objective measures above, in order to progress towards independence with functional activities. [x] Progressing  [] Met  [] Not Met     POSTURE: Patient will correct postural deviations in sitting and standing, to decrease pain and promote long term stability.  [x] Progressing  [] Met  [] Not Met     HEP: Patient will demonstrate independence with HEP in order to progress toward functional independence. [x] Progressing  [] Met  [] Not Met        Long Term Goals:  12 weeks Status Date Met   PAIN: Pt will report worst pain of 3/10 in order to progress toward max functional ability and improve quality of life [x] Progressing  [] Met  [] Not Met     FUNCTION: Patient will demonstrate improved function as indicated by a score of greater than or equal to 74 out of 100 on FOTO. [x] Progressing  [] Met  [] Not Met     MOBILITY: Patient will improve AROM to stated goals, listed in objective measures above, in order to return to maximal functional potential and improve quality of life.  [x] Progressing  [] Met  [] Not Met     STRENGTH: Patient will improve strength to stated goals, listed in objective measures above, in order to improve functional independence and quality of life.  [x] Progressing  [] Met  [] Not Met     Patient will return to normal ADL's, IADL's, community involvement, recreational activities, and work-related activities with less than or equal to 3/10 pain and maximal function.  [x] Progressing  [] Met  [] Not Met          PLAN     Continue Plan of Care (POC) and progress per patient tolerance. See Treatment section for exercise progression.    Asmita Miller, PT

## 2023-10-09 ENCOUNTER — HOSPITAL ENCOUNTER (OUTPATIENT)
Dept: RADIOLOGY | Facility: HOSPITAL | Age: 23
Discharge: HOME | End: 2023-10-09
Attending: STUDENT IN AN ORGANIZED HEALTH CARE EDUCATION/TRAINING PROGRAM
Payer: COMMERCIAL

## 2023-10-09 DIAGNOSIS — M54.89 INFLAMMATORY BACK PAIN: ICD-10-CM

## 2023-10-09 PROCEDURE — 72197 MRI PELVIS W/O & W/DYE: CPT | Mod: 26,,, | Performed by: STUDENT IN AN ORGANIZED HEALTH CARE EDUCATION/TRAINING PROGRAM

## 2023-10-09 PROCEDURE — 25500020 PHARM REV CODE 255: Performed by: STUDENT IN AN ORGANIZED HEALTH CARE EDUCATION/TRAINING PROGRAM

## 2023-10-09 PROCEDURE — A9585 GADOBUTROL INJECTION: HCPCS | Performed by: STUDENT IN AN ORGANIZED HEALTH CARE EDUCATION/TRAINING PROGRAM

## 2023-10-09 PROCEDURE — 72197 MRI PELVIS W/O & W/DYE: CPT | Mod: TC

## 2023-10-09 PROCEDURE — 72197 MRI SACROILIAC JOINTS W W/O CONTRAST: ICD-10-PCS | Mod: 26,,, | Performed by: STUDENT IN AN ORGANIZED HEALTH CARE EDUCATION/TRAINING PROGRAM

## 2023-10-09 RX ORDER — GADOBUTROL 604.72 MG/ML
6 INJECTION INTRAVENOUS
Status: COMPLETED | OUTPATIENT
Start: 2023-10-09 | End: 2023-10-09

## 2023-10-09 RX ADMIN — GADOBUTROL 6 ML: 604.72 INJECTION INTRAVENOUS at 09:10

## 2023-10-10 ENCOUNTER — CLINICAL SUPPORT (OUTPATIENT)
Dept: REHABILITATION | Facility: HOSPITAL | Age: 23
End: 2023-10-10
Payer: COMMERCIAL

## 2023-10-10 DIAGNOSIS — M25.611 DECREASED RANGE OF MOTION OF RIGHT SHOULDER: ICD-10-CM

## 2023-10-10 DIAGNOSIS — M62.81 PROXIMAL MUSCLE WEAKNESS: ICD-10-CM

## 2023-10-10 DIAGNOSIS — Z74.09 DECREASED FUNCTIONAL MOBILITY AND ENDURANCE: Primary | ICD-10-CM

## 2023-10-10 PROCEDURE — 97530 THERAPEUTIC ACTIVITIES: CPT

## 2023-10-10 PROCEDURE — 97140 MANUAL THERAPY 1/> REGIONS: CPT

## 2023-10-10 PROCEDURE — 97110 THERAPEUTIC EXERCISES: CPT

## 2023-10-10 PROCEDURE — 97112 NEUROMUSCULAR REEDUCATION: CPT

## 2023-10-10 RX ORDER — AMITRIPTYLINE HYDROCHLORIDE 25 MG/1
25 TABLET, FILM COATED ORAL NIGHTLY PRN
Qty: 90 TABLET | Refills: 1 | Status: SHIPPED | OUTPATIENT
Start: 2023-10-10 | End: 2024-10-09

## 2023-10-10 NOTE — PROGRESS NOTES
[facility]   Physical Therapy Treatment Note      Name: Casandra [facility] Number: [id number]    Therapy Diagnosis:   Encounter Diagnoses   Name Primary?    Decreased functional mobility and endurance Yes    Decreased range of motion of right shoulder     Proximal muscle weakness          Physician: Chris Flanagan    Visit Date: 10/10/2023    Physician Orders: PT Eval and Treat  Medical Diagnosis from Referral: Synovitis of right shoulder [M65.811], Status post arthroscopy of right shoulder [Z98.890]  Evaluation Date: 8/31/2023  Authorization Period Expiration: 8/30/2024  Plan of Care Expiration: 11/3/2023    Progress Note Due: 9/30/2023  Visit # / Visits authorized: 10/20 (+eval)  FOTO: 1/3 (last performed on 8/31/2023)     Precautions: Standard  PTA Visit #: 0/5     Time In: 1330  Time Out: 1432  Total Billable Time: 60 minutes    SUBJECTIVE     Pt reports:  she is feeling good with no significant complaints today    Compliance with Hep: Every Other Day  Response to previous treatment: no adverse reactions to treatment/updated HEP  Functional change: Better    Pain: 0/10   Worst: 5/10  Location: top of right shoulder      OBJECTIVE     Objective Measures updated at progress report unless specified otherwise.    RANGE OF MOTION:      Treatment     Gym/Equipment Access: full  Time to Complete Exercises: increased for cueing.    Casandra received the treatments listed below:        MANUAL THERAPY TECHNIQUES were applied for (15) minutes, including:    Soft tissue mobilization:   Soft tissue mobilization R biceps, coracobrachialis and deltoid   Joint mobilizations:   Glenohumeral distraction, inferior, anterior and posterior glides   Glenohumeral inferior glide in prone   Glenohumeral inferior glide with flexion, scaption and abduction   Functional dry needling: DEFERRED        THERAPEUTIC EXERCISES to develop strength, endurance, ROM, flexibility, posture, and core  stabilization for (20) minutes including:     Performed Today:     UBE - level 3, 3'/3'  Shoulder PROM     Stretches   Inferior capsule: 10x10s holds   Pec corner stretch: 10x10s holds   Supine shoulder flexion: 10x10s holds   Low load long duration flexion in supine: 3x1'       Interventions DEFERRED today     Prayer stretch:   Shoulder in IR: 2 minutes x 10s holds   Shoulder in ER: 2 minutes x 10s holds   Low load long duration stretches   90/90 ER: 4# for 3 minutes   Flexion: 5# for 3 minutes           NEUROMUSCULAR RE-EDUCATION ACTIVITIES to improve Balance, Coordination, Kinesthetic, Sense, Proprioception, and Posture for (10) minutes.  The following were included:     Performed Today:     Series 6- 1 minute each   B shoulder ER: red band 30x   Pull aparts: red band 30x        Interventions DEFERRED today     Shoulder IR: red band 3x10   Shoulder ER: red band 3x10   B shoulder ER: red band 3x10   Supine shoulder ER: yellow band 2x10   Supine ER isometric: yellow band 2x10                 THERAPEUTIC ACTIVITIES to improve dynamic and functional  performance for (15) minutes including:    Performed Today:     Rows 7.5# 3x10   D1 Extensions 7.5# 2x10, 5# 1x10  Punches 7.5# 3x10    Interventions DEFERRED today     Quadruped serratus push up 20x   Plank serratus push up: 20 inch box 20x   Plank shoulder taps 3x10   Prone Series 2x10 each   Row: 10#  ITYW 0#  Chest press: 8# 3x10            Next Session:  Quadruped or plank serratus press   Prone TYW   Chest press   Bicep curls         Patient Education and Home Exercises      Education provided: (time included with treatment) minutes  PURPOSE: Patient educated on the impairments noted above and the effects of physical therapy intervention to improve overall condition and QOL.   EXERCISE: Patient was educated on all the above exercise prior/during/after for proper posture, positioning, and execution for safe performance with home exercise program.   STRENGTH: Patient  educated on the importance of improved core and extremity strength in order to improve alignment of the spine and extremities with static positions and dynamic movement.   POSTURE: Patient educated on postural awareness to reduce stress and maintain optimal alignment of the spine with static positions and dynamic movement      Written Home Exercises Provided: yes.  Exercises were reviewed and Casandra was able to demonstrate them prior to the end of the session.  Casandra demonstrated good  understanding of the education provided. See EMR under Patient Instructions for exercises provided during therapy sessions.    ASSESSMENT     Patient tolerated session well today. Increased time spent on joint mobilizations, soft tissue mobilization and PROM today due to increased anterior shoulder discomfort reported with pectoralis corner stretch today. Added D1 extensions to improve functional shoulder strength; good form noted with no significant cueing required from PT.     Casandra Is progressing well towards her goals.   Pt prognosis is Good.     Pt will continue to benefit from skilled outpatient physical therapy to address the deficits listed in the problem list box on initial evaluation, provide pt/family education and to maximize pt's level of independence in the home and community environment.     Pt's spiritual, cultural and educational needs considered and pt agreeable to plan of care and goals.    Anticipated Barriers for therapy: co-morbidities, chronicity of condition, motivation to improve condition, adherence to treatment plan, and coping style       Short Term Goals:  6 weeks Status  Date Met   PAIN: Pt will report worst pain of 5/10 in order to progress toward max functional ability and improve quality of life. [x] Progressing  [] Met  [] Not Met     FUNCTION: Patient will demonstrate improved function as indicated by a score of greater than or equal to 64 out of 100 on FOTO. [x] Progressing  [] Met  [] Not Met      MOBILITY: Patient will improve AROM to 50% of stated goals, listed in objective measures above, in order to progress towards independence with functional activities.  [x] Progressing  [] Met  [] Not Met     STRENGTH: Patient will improve strength to 50% of stated goals, listed in objective measures above, in order to progress towards independence with functional activities. [x] Progressing  [] Met  [] Not Met     POSTURE: Patient will correct postural deviations in sitting and standing, to decrease pain and promote long term stability.  [x] Progressing  [] Met  [] Not Met     HEP: Patient will demonstrate independence with HEP in order to progress toward functional independence. [x] Progressing  [] Met  [] Not Met        Long Term Goals:  12 weeks Status Date Met   PAIN: Pt will report worst pain of 3/10 in order to progress toward max functional ability and improve quality of life [x] Progressing  [] Met  [] Not Met     FUNCTION: Patient will demonstrate improved function as indicated by a score of greater than or equal to 74 out of 100 on FOTO. [x] Progressing  [] Met  [] Not Met     MOBILITY: Patient will improve AROM to stated goals, listed in objective measures above, in order to return to maximal functional potential and improve quality of life.  [x] Progressing  [] Met  [] Not Met     STRENGTH: Patient will improve strength to stated goals, listed in objective measures above, in order to improve functional independence and quality of life.  [x] Progressing  [] Met  [] Not Met     Patient will return to normal ADL's, IADL's, community involvement, recreational activities, and work-related activities with less than or equal to 3/10 pain and maximal function.  [x] Progressing  [] Met  [] Not Met          PLAN     Continue Plan of Care (POC) and progress per patient tolerance. See Treatment section for exercise progression.    Asmita Miller, PT

## 2023-10-12 ENCOUNTER — CLINICAL SUPPORT (OUTPATIENT)
Dept: REHABILITATION | Facility: HOSPITAL | Age: 23
End: 2023-10-12
Payer: COMMERCIAL

## 2023-10-12 DIAGNOSIS — M25.611 DECREASED RANGE OF MOTION OF RIGHT SHOULDER: ICD-10-CM

## 2023-10-12 DIAGNOSIS — Z74.09 DECREASED FUNCTIONAL MOBILITY AND ENDURANCE: Primary | ICD-10-CM

## 2023-10-12 DIAGNOSIS — M62.81 PROXIMAL MUSCLE WEAKNESS: ICD-10-CM

## 2023-10-12 PROCEDURE — 97110 THERAPEUTIC EXERCISES: CPT

## 2023-10-12 PROCEDURE — 97530 THERAPEUTIC ACTIVITIES: CPT

## 2023-10-12 PROCEDURE — 97112 NEUROMUSCULAR REEDUCATION: CPT

## 2023-10-12 NOTE — PROGRESS NOTES
[facility]   Physical Therapy Treatment Note      Name: Casandra [facility] Number: [id number]    Therapy Diagnosis:   Encounter Diagnoses   Name Primary?    Decreased functional mobility and endurance Yes    Decreased range of motion of right shoulder     Proximal muscle weakness          Physician: Chris Flanagan    Visit Date: 10/12/2023    Physician Orders: PT Eval and Treat  Medical Diagnosis from Referral: Synovitis of right shoulder [M65.811], Status post arthroscopy of right shoulder [Z98.890]  Evaluation Date: 8/31/2023  Authorization Period Expiration: 8/30/2024  Plan of Care Expiration: 11/3/2023    Progress Note Due: 9/30/2023  Visit # / Visits authorized: 11/20 (+eval)  FOTO: 1/3 (last performed on 8/31/2023)     Precautions: Standard  PTA Visit #: 0/5     Time In: 1408  Time Out: 1505  Total Billable Time: 54 minutes    SUBJECTIVE     Pt reports:  she is feeling good with no significant complaints today    Compliance with Hep: Every Other Day  Response to previous treatment: no adverse reactions to treatment/updated HEP  Functional change: Better    Pain: 0/10   Worst: 5/10  Location: top of right shoulder      OBJECTIVE     Objective Measures updated at progress report unless specified otherwise.    RANGE OF MOTION:      Treatment     Gym/Equipment Access: full  Time to Complete Exercises: increased for cueing.    Casandra received the treatments listed below:        MANUAL THERAPY TECHNIQUES were applied for (0) minutes, including:    Soft tissue mobilization:   Soft tissue mobilization R biceps, coracobrachialis and deltoid   Joint mobilizations:   Glenohumeral distraction, inferior, anterior and posterior glides   Glenohumeral inferior glide in prone   Glenohumeral inferior glide with flexion, scaption and abduction   Functional dry needling: DEFERRED        THERAPEUTIC EXERCISES to develop strength, endurance, ROM, flexibility, posture, and core  stabilization for (18) minutes including:     Performed Today:     UBE - level 3, 3'/3'  Shoulder PROM     Stretches   Pec stretch: 10x10s holds   Supine shoulder flexion: 10x10s holds   Low load long duration stretches  flexion in supine: 2x2'  90/90 ER: 3# for 3 minutes      Interventions DEFERRED today               NEUROMUSCULAR RE-EDUCATION ACTIVITIES to improve Balance, Coordination, Kinesthetic, Sense, Proprioception, and Posture for (12) minutes.  The following were included:     Performed Today:     Shoulder IR: 5# 3x10   Shoulder ER: green band 3x10   Quadruped clocks: yellow band 2x10 B      Interventions DEFERRED today     B shoulder ER: red band 3x10   Supine shoulder ER: yellow band 2x10   Supine ER isometric: yellow band 2x10   Series 6- 1 minute each   B shoulder ER: red band 30x   Pull aparts: red band 30x               THERAPEUTIC ACTIVITIES to improve dynamic and functional  performance for (24) minutes including:    Performed Today:     Standing TYW: yellow band 2x10 each   Wall angels: 3x10   Box push ups: 3x6   Rows 7.5# 3x10   D1 Extensions 7.5# 2x10, 5# 1x10       Interventions DEFERRED today     Quadruped serratus push up 20x   Plank serratus push up: 20 inch box 20x   Plank shoulder taps 3x10   Prone Series 2x10 each   Row: 10#  ITYW 0#  Chest press: 8# 3x10            Next Session:  Quadruped or plank serratus press   Prone TYW   Chest press   Bicep curls         Patient Education and Home Exercises      Education provided: (time included with treatment) minutes  PURPOSE: Patient educated on the impairments noted above and the effects of physical therapy intervention to improve overall condition and QOL.   EXERCISE: Patient was educated on all the above exercise prior/during/after for proper posture, positioning, and execution for safe performance with home exercise program.   STRENGTH: Patient educated on the importance of improved core and extremity strength in order to improve alignment  of the spine and extremities with static positions and dynamic movement.   POSTURE: Patient educated on postural awareness to reduce stress and maintain optimal alignment of the spine with static positions and dynamic movement      Written Home Exercises Provided: yes.  Exercises were reviewed and Casandra was able to demonstrate them prior to the end of the session.  Casandra demonstrated good  understanding of the education provided. See EMR under Patient Instructions for exercises provided during therapy sessions.    ASSESSMENT     Patient tolerated session well today. Continue to incorporated low load long duration stretch with significant improvement noted in shoulder flexion A/PROM following intervention. Added standing TYW to improve functional shoulder strength at or above shoulder level; pt tolerated intervention well but has to limit motion with T and Y due to slight discomfort     Casandra Is progressing well towards her goals.   Pt prognosis is Good.     Pt will continue to benefit from skilled outpatient physical therapy to address the deficits listed in the problem list box on initial evaluation, provide pt/family education and to maximize pt's level of independence in the home and community environment.     Pt's spiritual, cultural and educational needs considered and pt agreeable to plan of care and goals.    Anticipated Barriers for therapy: co-morbidities, chronicity of condition, motivation to improve condition, adherence to treatment plan, and coping style       Short Term Goals:  6 weeks Status  Date Met   PAIN: Pt will report worst pain of 5/10 in order to progress toward max functional ability and improve quality of life. [x] Progressing  [] Met  [] Not Met     FUNCTION: Patient will demonstrate improved function as indicated by a score of greater than or equal to 64 out of 100 on FOTO. [x] Progressing  [] Met  [] Not Met     MOBILITY: Patient will improve AROM to 50% of stated goals, listed in  objective measures above, in order to progress towards independence with functional activities.  [x] Progressing  [] Met  [] Not Met     STRENGTH: Patient will improve strength to 50% of stated goals, listed in objective measures above, in order to progress towards independence with functional activities. [x] Progressing  [] Met  [] Not Met     POSTURE: Patient will correct postural deviations in sitting and standing, to decrease pain and promote long term stability.  [x] Progressing  [] Met  [] Not Met     HEP: Patient will demonstrate independence with HEP in order to progress toward functional independence. [x] Progressing  [] Met  [] Not Met        Long Term Goals:  12 weeks Status Date Met   PAIN: Pt will report worst pain of 3/10 in order to progress toward max functional ability and improve quality of life [x] Progressing  [] Met  [] Not Met     FUNCTION: Patient will demonstrate improved function as indicated by a score of greater than or equal to 74 out of 100 on FOTO. [x] Progressing  [] Met  [] Not Met     MOBILITY: Patient will improve AROM to stated goals, listed in objective measures above, in order to return to maximal functional potential and improve quality of life.  [x] Progressing  [] Met  [] Not Met     STRENGTH: Patient will improve strength to stated goals, listed in objective measures above, in order to improve functional independence and quality of life.  [x] Progressing  [] Met  [] Not Met     Patient will return to normal ADL's, IADL's, community involvement, recreational activities, and work-related activities with less than or equal to 3/10 pain and maximal function.  [x] Progressing  [] Met  [] Not Met          PLAN     Continue Plan of Care (POC) and progress per patient tolerance. See Treatment section for exercise progression.    Asmita Miller, PT

## 2023-10-17 ENCOUNTER — CLINICAL SUPPORT (OUTPATIENT)
Dept: REHABILITATION | Facility: HOSPITAL | Age: 23
End: 2023-10-17
Payer: COMMERCIAL

## 2023-10-17 DIAGNOSIS — Z74.09 DECREASED FUNCTIONAL MOBILITY AND ENDURANCE: Primary | ICD-10-CM

## 2023-10-17 DIAGNOSIS — M25.611 DECREASED RANGE OF MOTION OF RIGHT SHOULDER: ICD-10-CM

## 2023-10-17 DIAGNOSIS — M62.81 PROXIMAL MUSCLE WEAKNESS: ICD-10-CM

## 2023-10-17 PROCEDURE — 97530 THERAPEUTIC ACTIVITIES: CPT

## 2023-10-17 PROCEDURE — 97110 THERAPEUTIC EXERCISES: CPT

## 2023-10-17 PROCEDURE — 97112 NEUROMUSCULAR REEDUCATION: CPT

## 2023-10-17 NOTE — PROGRESS NOTES
[facility]   Physical Therapy Treatment Note      Name: Casandra [facility] Number: [id number]    Therapy Diagnosis:   Encounter Diagnoses   Name Primary?    Decreased functional mobility and endurance Yes    Decreased range of motion of right shoulder     Proximal muscle weakness          Physician: Chris Flanagan    Visit Date: 10/17/2023    Physician Orders: PT Eval and Treat  Medical Diagnosis from Referral: Synovitis of right shoulder [M65.811], Status post arthroscopy of right shoulder [Z98.890]  Evaluation Date: 8/31/2023  Authorization Period Expiration: 8/30/2024  Plan of Care Expiration: 11/3/2023    Progress Note Due: 9/30/2023  Visit # / Visits authorized: 11/20 (+eval)  FOTO: 1/3 (last performed on 8/31/2023)     Precautions: Standard  PTA Visit #: 0/5     Time In: 1344  Time Out: 1445  Total Billable Time: 54 minutes    SUBJECTIVE     Pt reports:  she had an accident over the weekend and cut her leg, causing her to need stitches. Decreased focus on shoulder HEP due to this incident. States she has had no significant issues with the shoulder    Compliance with Hep: Every Other Day  Response to previous treatment: no adverse reactions to treatment/updated HEP  Functional change: Better    Pain: 0/10   Worst: 5/10  Location: top of right shoulder      OBJECTIVE     Objective Measures updated at progress report unless specified otherwise.    RANGE OF MOTION:      Treatment     Gym/Equipment Access: full  Time to Complete Exercises: increased for cueing.    Casandra received the treatments listed below:        MANUAL THERAPY TECHNIQUES were applied for (0) minutes, including:    Soft tissue mobilization:   Soft tissue mobilization R biceps, coracobrachialis and deltoid   Joint mobilizations:   Glenohumeral distraction, inferior, anterior and posterior glides   Glenohumeral inferior glide in prone   Glenohumeral inferior glide with flexion, scaption and abduction    Functional dry needling: DEFERRED        THERAPEUTIC EXERCISES to develop strength, endurance, ROM, flexibility, posture, and core stabilization for (18) minutes including:     Performed Today:     UBE - level 3, 3'/3'  Shoulder PROM     Stretches   Pec stretch: 10x10s holds   Supine shoulder flexion: 10x10s holds   Low load long duration stretches  flexion in supine: 2x2'  90/90 ER: 3# for 3 minutes      Interventions DEFERRED today               NEUROMUSCULAR RE-EDUCATION ACTIVITIES to improve Balance, Coordination, Kinesthetic, Sense, Proprioception, and Posture for (12) minutes.  The following were included:     Performed Today:     Shoulder IR: 5# 3x10   Shoulder ER: green band 3x10   Quadruped clocks: yellow band 2x10 B      Interventions DEFERRED today     B shoulder ER: red band 3x10   Supine shoulder ER: yellow band 2x10   Supine ER isometric: yellow band 2x10   Series 6- 1 minute each   B shoulder ER: red band 30x   Pull aparts: red band 30x               THERAPEUTIC ACTIVITIES to improve dynamic and functional  performance for (24) minutes including:    Performed Today:     Standing TYW: yellow band 2x10 each   Wall angels: 3x10   Box push ups: 3x6   Rows 7.5# 3x10   D1 Extensions 7.5# 2x10, 5# 1x10       Interventions DEFERRED today     Quadruped serratus push up 20x   Plank serratus push up: 20 inch box 20x   Plank shoulder taps 3x10   Prone Series 2x10 each   Row: 10#  ITYW 0#  Chest press: 8# 3x10            Next Session:  Quadruped or plank serratus press   Prone TYW   Chest press   Bicep curls         Patient Education and Home Exercises      Education provided: (time included with treatment) minutes  PURPOSE: Patient educated on the impairments noted above and the effects of physical therapy intervention to improve overall condition and QOL.   EXERCISE: Patient was educated on all the above exercise prior/during/after for proper posture, positioning, and execution for safe performance with home  exercise program.   STRENGTH: Patient educated on the importance of improved core and extremity strength in order to improve alignment of the spine and extremities with static positions and dynamic movement.   POSTURE: Patient educated on postural awareness to reduce stress and maintain optimal alignment of the spine with static positions and dynamic movement      Written Home Exercises Provided: yes.  Exercises were reviewed and Casandra was able to demonstrate them prior to the end of the session.  Casandra demonstrated good  understanding of the education provided. See EMR under Patient Instructions for exercises provided during therapy sessions.    ASSESSMENT     Patient tolerated session well today. Continue to incorporated low load long duration stretch with significant improvement noted in shoulder flexion A/PROM following intervention. Added standing TYW to improve functional shoulder strength at or above shoulder level; pt tolerated intervention well but has to limit motion with T and Y due to slight discomfort     Casandra Is progressing well towards her goals.   Pt prognosis is Good.     Pt will continue to benefit from skilled outpatient physical therapy to address the deficits listed in the problem list box on initial evaluation, provide pt/family education and to maximize pt's level of independence in the home and community environment.     Pt's spiritual, cultural and educational needs considered and pt agreeable to plan of care and goals.    Anticipated Barriers for therapy: co-morbidities, chronicity of condition, motivation to improve condition, adherence to treatment plan, and coping style       Short Term Goals:  6 weeks Status  Date Met   PAIN: Pt will report worst pain of 5/10 in order to progress toward max functional ability and improve quality of life. [x] Progressing  [] Met  [] Not Met     FUNCTION: Patient will demonstrate improved function as indicated by a score of greater than or equal to 64  out of 100 on FOTO. [x] Progressing  [] Met  [] Not Met     MOBILITY: Patient will improve AROM to 50% of stated goals, listed in objective measures above, in order to progress towards independence with functional activities.  [x] Progressing  [] Met  [] Not Met     STRENGTH: Patient will improve strength to 50% of stated goals, listed in objective measures above, in order to progress towards independence with functional activities. [x] Progressing  [] Met  [] Not Met     POSTURE: Patient will correct postural deviations in sitting and standing, to decrease pain and promote long term stability.  [x] Progressing  [] Met  [] Not Met     HEP: Patient will demonstrate independence with HEP in order to progress toward functional independence. [x] Progressing  [] Met  [] Not Met        Long Term Goals:  12 weeks Status Date Met   PAIN: Pt will report worst pain of 3/10 in order to progress toward max functional ability and improve quality of life [x] Progressing  [] Met  [] Not Met     FUNCTION: Patient will demonstrate improved function as indicated by a score of greater than or equal to 74 out of 100 on FOTO. [x] Progressing  [] Met  [] Not Met     MOBILITY: Patient will improve AROM to stated goals, listed in objective measures above, in order to return to maximal functional potential and improve quality of life.  [x] Progressing  [] Met  [] Not Met     STRENGTH: Patient will improve strength to stated goals, listed in objective measures above, in order to improve functional independence and quality of life.  [x] Progressing  [] Met  [] Not Met     Patient will return to normal ADL's, IADL's, community involvement, recreational activities, and work-related activities with less than or equal to 3/10 pain and maximal function.  [x] Progressing  [] Met  [] Not Met          PLAN     Continue Plan of Care (POC) and progress per patient tolerance. See Treatment section for exercise progression.    Asmita Miller, PT

## 2023-10-19 ENCOUNTER — CLINICAL SUPPORT (OUTPATIENT)
Dept: REHABILITATION | Facility: HOSPITAL | Age: 23
End: 2023-10-19
Payer: COMMERCIAL

## 2023-10-19 DIAGNOSIS — Z74.09 DECREASED FUNCTIONAL MOBILITY AND ENDURANCE: Primary | ICD-10-CM

## 2023-10-19 DIAGNOSIS — M62.81 PROXIMAL MUSCLE WEAKNESS: ICD-10-CM

## 2023-10-19 DIAGNOSIS — M25.611 DECREASED RANGE OF MOTION OF RIGHT SHOULDER: ICD-10-CM

## 2023-10-19 PROCEDURE — 97110 THERAPEUTIC EXERCISES: CPT

## 2023-10-19 PROCEDURE — 97112 NEUROMUSCULAR REEDUCATION: CPT

## 2023-10-19 PROCEDURE — 97530 THERAPEUTIC ACTIVITIES: CPT

## 2023-10-24 ENCOUNTER — CLINICAL SUPPORT (OUTPATIENT)
Dept: REHABILITATION | Facility: HOSPITAL | Age: 23
End: 2023-10-24
Payer: COMMERCIAL

## 2023-10-24 DIAGNOSIS — M62.81 PROXIMAL MUSCLE WEAKNESS: ICD-10-CM

## 2023-10-24 DIAGNOSIS — M25.611 DECREASED RANGE OF MOTION OF RIGHT SHOULDER: ICD-10-CM

## 2023-10-24 DIAGNOSIS — Z74.09 DECREASED FUNCTIONAL MOBILITY AND ENDURANCE: Primary | ICD-10-CM

## 2023-10-24 PROCEDURE — 97530 THERAPEUTIC ACTIVITIES: CPT

## 2023-10-24 PROCEDURE — 97110 THERAPEUTIC EXERCISES: CPT

## 2023-10-24 PROCEDURE — 97140 MANUAL THERAPY 1/> REGIONS: CPT

## 2023-10-24 PROCEDURE — 97112 NEUROMUSCULAR REEDUCATION: CPT

## 2023-10-24 NOTE — PROGRESS NOTES
Orthopaedics  Post-operative follow-up    Procedure Performed:  1. Right shoulder diagnostic arthroscopy with complete synovectomy    Date of Surgery: 6/9/23    Subjective: Casandra Barragan is now approximately 4.5 months out from her shoulder surgery.  She has been compliant with post-operative restrictions and has been progressing with PT.  Her pain and function continue to improve.***    Exam:  Incision sites healed, C/D/I  No swelling or bruising noted  Fluid ROM with no crepitus  Upright Active ROM: ***, ***, ER 50***  Cuff strength intact  ***  Axillary nerve sensation and motor intact  Motor and sensory intact distally  Strong radial pulse, fingers warm and well perfused    Imaging:  No new imaging.    Impression:  2.5 months s/p right shoulder diagnostic arthroscopy with complete synovectomy ***    Plan:  ***  Advance PT per protocol  Symptomatic treatment for pain / swelling  May advance activities as reviewed today:  Continue to progress strength and endurance of shoulder and periscapular musculature.   Left shoulder xrays on the way out today  Instructed patient to call clinic if questions or concerns    Follow-up in ***          Chris Flanagan MD    I, Mario Cortes, acted as a scribe for Chris Flanagan MD for the duration of this office visit.

## 2023-10-24 NOTE — PROGRESS NOTES
[facility]   Physical Therapy Treatment Note / Progress Note      Name: Casandra Barragan  [facility] Number: [id number]    Therapy Diagnosis:   Encounter Diagnoses   Name Primary?    Decreased functional mobility and endurance Yes    Decreased range of motion of right shoulder     Proximal muscle weakness          Physician: Chris Flanagan    Visit Date: 10/24/2023    Physician Orders: PT Eval and Treat  Medical Diagnosis from Referral: Synovitis of right shoulder [M65.811], Status post arthroscopy of right shoulder [Z98.890]  Evaluation Date: 8/31/2023  Authorization Period Expiration: 8/30/2024  Plan of Care Expiration: 11/3/2023    Progress Note Due: 11/3/2023  Visit # / Visits authorized: 14/20 (+eval)  FOTO: 1/3 (last performed on 8/31/2023)     Precautions: Standard  PTA Visit #: 0/5     Time In: 1335  Time Out: 1436  Total Billable Time: 56 minutes    SUBJECTIVE     Pt reports:  her shoulder is feeling much better overall but notes that she continues to have stiffness at end range flexion, abduction and ER.     Compliance with Hep: Every Other Day  Response to previous treatment: no adverse reactions to treatment/updated HEP  Functional change: Better    Pain: 0/10   Worst: 5/10  Location: top of right shoulder      OBJECTIVE     Objective Measures updated at progress report unless specified otherwise.  RANGE OF MOTION:      Shoulder AROM/PROM Right  (Evaluation)  Right   10/24/2023 Left Pain/Dysfunction with Movement Goal   Shoulder Flexion (180º) 132/140 160/170 158 Pain B  170    Shoulder Abduction (180º) 112/123 150/167 180 Anterior shoulder pain on R  170   Shoulder Extension (60º) 64 74 75 Anterior shoulder pain on R 75   Shoulder ER  at 90º (90º) 83 85/96 90   90   Shoulder IR at 90º (70º) 41 54/68 73   70   Functional ER T2 T6 T6 Anterior shoulder pain on R T6   Functional IR Inferior angle of scapula T8 superior angle of scapula Anterior shoulder pain on R          STRENGTH:   U/E MMT Right Left Pain/Dysfunction with Movement Goal   Shoulder Flexion 4/5 4/5  4+/5 B   Shoulder Extension 4+/5 4+/5  4+/5 B   Shoulder Abduction 4-/5 4/5  4+/5 B   Shoulder IR (at 0º abduction) 4/5 4/5  4+/5 B   Shoulder ER (at 0º abduction) 4-/5 4-/5  4+/5 B   Serratus Anterior 4+/5 4+/5  4+/5 B   Middle Trapezius 4-/5 4/5  4+/5 B   Lower Trapezius 4-/5 4-/5  4+/5 B   Elbow Flexion  5/5 5/5  5/5 B   Elbow Extension 5/5 5/5   5/5 B         JOINT MOBILITY:   Joint Motion  Right Mobility  (spine) Left Mobility Goal   Cervical Upglides  [] Hypo     [x] Normal     [] Hyper [] Hypo     [x] Normal     [] Hyper     Cervical Downglides  [] Hypo     [x] Normal     [] Hyper [] Hypo     [x] Normal     [] Hyper     1st Rib [] Hypo     [x] Normal     [] Hyper [] Hypo     [x] Normal     [] Hyper     Thoracic PA  [] Hypo     [x] Normal     [] Hyper ---     Costotransverse  [] Hypo     [x] Normal     [] Hyper [] Hypo     [x] Normal     [] Hyper     Glenohumeral distraction [x] Hypo     [] Normal     [] Hyper [] Hypo     [x] Normal     [] Hyper Normal    Glenohumeral inferior  [x] Hypo     [] Normal     [] Hyper [] Hypo     [x] Normal     [] Hyper Normal    Glenohumeral anterior [x] Hypo     [] Normal     [] Hyper [] Hypo     [x] Normal     [] Hyper Normal    Glenohumeral posterior [x] Hypo     [] Normal     [] Hyper [] Hypo     [x] Normal     [] Hyper Normal               FUNCTIONAL MOVEMENT PATTERNS  Movement  Analysis Notes    Repeated Shoulder Flexion [x]Functional  []Dysfunctional:  []Painful  [x]Non-Painful        Plank  [x]Functional  []Dysfunctional:  []Painful  [x]Non-Painful       Push Up  []Functional  [x]Dysfunctional:  []Painful  [x]Non-Painful    Performed with upper extremities on an elevated surface     []Functional  []Dysfunctional:  []Painful  []Non-Painful             Treatment     Gym/Equipment Access: full  Time to Complete Exercises: increased for cueing.    Casandra received the treatments  listed below:        MANUAL THERAPY TECHNIQUES were applied for (10) minutes, including:    Soft tissue mobilization:   Soft tissue mobilization R biceps, coracobrachialis and deltoid   Joint mobilizations:   Glenohumeral distraction, inferior, anterior and posterior glides   Glenohumeral inferior glide in prone   Glenohumeral inferior glide with flexion, scaption and abduction   Functional dry needling: DEFERRED        THERAPEUTIC EXERCISES to develop strength, endurance, ROM, flexibility, posture, and core stabilization for (18) minutes including:     Performed Today:     UBE - level 3, 3'/3'  Shoulder PROM     Low load long duration stretches  flexion in supine: 2x2 minutes   90/90 ER: 3# for 3 minutes      Interventions DEFERRED today     Stretches   Pec stretch: 10x10s holds   Supine shoulder flexion: 10x10s holds           NEUROMUSCULAR RE-EDUCATION ACTIVITIES to improve Balance, Coordination, Kinesthetic, Sense, Proprioception, and Posture for (18) minutes.  The following were included:     Performed Today:     Prone end range lifts   Flexion 2x10  90/90 ER 2x10  Shoulder IR: 5# 3x10   Shoulder ER: green band 3x10          Interventions DEFERRED today     B shoulder ER: red band 3x10   Supine shoulder ER: yellow band 2x10   Supine ER isometric: yellow band 2x10   Series 6- 1 minute each   B shoulder ER: red band 30x   Pull aparts: red band 30x    Quadruped clocks: yellow band 2x10 B           THERAPEUTIC ACTIVITIES to improve dynamic and functional  performance for (10) minutes including:    Performed Today:     Wall slides with end range lift off : 8x each   Flexion   Scaption   Abduction   Windshield wiper 15x           Interventions DEFERRED today     Quadruped serratus push up 20x   Plank serratus push up: 20 inch box 20x   Plank shoulder taps 3x10   Prone Series 2x10 each   Row: 10#  ITYW 0#  Chest press: 8# 3x10   Standing TYW: yellow band 2x10 each   Wall angels: 3x10   Box push ups: 3x6   Rows 7.5#  3x10   D1 Extensions 7.5# 2x10, 5# 1x10         Next Session:  Quadruped or plank serratus press   Prone TYW   Chest press   Bicep curls         Patient Education and Home Exercises      Education provided: (time included with treatment) minutes  PURPOSE: Patient educated on the impairments noted above and the effects of physical therapy intervention to improve overall condition and QOL.   EXERCISE: Patient was educated on all the above exercise prior/during/after for proper posture, positioning, and execution for safe performance with home exercise program.   STRENGTH: Patient educated on the importance of improved core and extremity strength in order to improve alignment of the spine and extremities with static positions and dynamic movement.   POSTURE: Patient educated on postural awareness to reduce stress and maintain optimal alignment of the spine with static positions and dynamic movement      Written Home Exercises Provided: yes.  Exercises were reviewed and Casandra was able to demonstrate them prior to the end of the session.  Casandra demonstrated good  understanding of the education provided. See EMR under Patient Instructions for exercises provided during therapy sessions.    ASSESSMENT     Patient tolerated session well today. Spent significant time with joint mobilizations and stretches to improve shoulder ROM today. Added end range lifts in prone position to further reinforce end range mobility and strength. An assessment was completed today with improvements noted in shoulder range of motion, gross upper extremity strength, and functional movement patterns compared to prior assessment; please see exam for details. Casandra continues to have difficulty with end range shoulder flexion and abduction ROM. The above impairments and functional deficits will continue to be addressed over the course of this plan of care. Casandra was educated on continued progression of PT, expectations for the duration of care,  updates on goals set at initial evaluation, and home exercise program.  Casandra will continue to benefit from physical therapy in order to further address goals, maximize function and quality of life.     Casandra Is progressing well towards her goals.   Pt prognosis is Good.     Pt will continue to benefit from skilled outpatient physical therapy to address the deficits listed in the problem list box on initial evaluation, provide pt/family education and to maximize pt's level of independence in the home and community environment.     Pt's spiritual, cultural and educational needs considered and pt agreeable to plan of care and goals.    Anticipated Barriers for therapy: co-morbidities, chronicity of condition, motivation to improve condition, adherence to treatment plan, and coping style       Short Term Goals:  6 weeks Status  Date Met   PAIN: Pt will report worst pain of 5/10 in order to progress toward max functional ability and improve quality of life. [x] Progressing  [] Met  [] Not Met     FUNCTION: Patient will demonstrate improved function as indicated by a score of greater than or equal to 64 out of 100 on FOTO. [x] Progressing  [] Met  [] Not Met     MOBILITY: Patient will improve AROM to 50% of stated goals, listed in objective measures above, in order to progress towards independence with functional activities.  [x] Progressing  [] Met  [] Not Met     STRENGTH: Patient will improve strength to 50% of stated goals, listed in objective measures above, in order to progress towards independence with functional activities. [x] Progressing  [] Met  [] Not Met     POSTURE: Patient will correct postural deviations in sitting and standing, to decrease pain and promote long term stability.  [x] Progressing  [] Met  [] Not Met     HEP: Patient will demonstrate independence with HEP in order to progress toward functional independence. [x] Progressing  [] Met  [] Not Met        Long Term Goals:  12 weeks Status Date  Met   PAIN: Pt will report worst pain of 3/10 in order to progress toward max functional ability and improve quality of life [x] Progressing  [] Met  [] Not Met     FUNCTION: Patient will demonstrate improved function as indicated by a score of greater than or equal to 74 out of 100 on FOTO. [x] Progressing  [] Met  [] Not Met     MOBILITY: Patient will improve AROM to stated goals, listed in objective measures above, in order to return to maximal functional potential and improve quality of life.  [x] Progressing  [] Met  [] Not Met     STRENGTH: Patient will improve strength to stated goals, listed in objective measures above, in order to improve functional independence and quality of life.  [x] Progressing  [] Met  [] Not Met     Patient will return to normal ADL's, IADL's, community involvement, recreational activities, and work-related activities with less than or equal to 3/10 pain and maximal function.  [x] Progressing  [] Met  [] Not Met          PLAN     Continue Plan of Care (POC) and progress per patient tolerance. See Treatment section for exercise progression.    Asmita Miller, PT

## 2023-10-25 ENCOUNTER — OFFICE VISIT (OUTPATIENT)
Dept: SPORTS MEDICINE | Facility: CLINIC | Age: 23
End: 2023-10-25
Payer: COMMERCIAL

## 2023-10-25 VITALS — WEIGHT: 138 LBS | BODY MASS INDEX: 24.45 KG/M2 | HEIGHT: 63 IN

## 2023-10-25 DIAGNOSIS — M65.9 SYNOVITIS OF RIGHT SHOULDER: ICD-10-CM

## 2023-10-25 DIAGNOSIS — M05.711 RHEUMATOID ARTHRITIS INVOLVING RIGHT SHOULDER WITH POSITIVE RHEUMATOID FACTOR: Primary | ICD-10-CM

## 2023-10-25 PROCEDURE — 99999 PR PBB SHADOW E&M-EST. PATIENT-LVL III: ICD-10-PCS | Mod: PBBFAC,,, | Performed by: STUDENT IN AN ORGANIZED HEALTH CARE EDUCATION/TRAINING PROGRAM

## 2023-10-25 PROCEDURE — 99999 PR PBB SHADOW E&M-EST. PATIENT-LVL III: CPT | Mod: PBBFAC,,, | Performed by: STUDENT IN AN ORGANIZED HEALTH CARE EDUCATION/TRAINING PROGRAM

## 2023-10-25 PROCEDURE — 99213 PR OFFICE/OUTPT VISIT, EST, LEVL III, 20-29 MIN: ICD-10-PCS | Mod: S$GLB,,, | Performed by: STUDENT IN AN ORGANIZED HEALTH CARE EDUCATION/TRAINING PROGRAM

## 2023-10-25 PROCEDURE — 99213 OFFICE O/P EST LOW 20 MIN: CPT | Mod: S$GLB,,, | Performed by: STUDENT IN AN ORGANIZED HEALTH CARE EDUCATION/TRAINING PROGRAM

## 2023-10-25 NOTE — PROGRESS NOTES
[facility]   Physical Therapy Treatment Note      Name: Casandra [facility] Number: [id number]    Therapy Diagnosis:   Encounter Diagnoses   Name Primary?    Decreased functional mobility and endurance Yes    Decreased range of motion of right shoulder     Proximal muscle weakness          Physician: Chris Flanagan    Visit Date: 10/19/2023    Physician Orders: PT Eval and Treat  Medical Diagnosis from Referral: Synovitis of right shoulder [M65.811], Status post arthroscopy of right shoulder [Z98.890]  Evaluation Date: 8/31/2023  Authorization Period Expiration: 8/30/2024  Plan of Care Expiration: 11/3/2023    Progress Note Due: 9/30/2023  Visit # / Visits authorized: 11/20 (+eval)  FOTO: 1/3 (last performed on 8/31/2023)     Precautions: Standard  PTA Visit #: 0/5     Time In: 1400  Time Out: 1502  Total Billable Time: 57 minutes    SUBJECTIVE     Pt reports:  she is feeling pretty good but continues to note stiffness in the shoulder     Compliance with Hep: Every Other Day  Response to previous treatment: no adverse reactions to treatment/updated HEP  Functional change: Better    Pain: 0/10   Worst: 5/10  Location: top of right shoulder      OBJECTIVE     Objective Measures updated at progress report unless specified otherwise.    RANGE OF MOTION:      Treatment     Gym/Equipment Access: full  Time to Complete Exercises: increased for cueing.    Casandra received the treatments listed below:        MANUAL THERAPY TECHNIQUES were applied for (5) minutes, including:    Soft tissue mobilization:   Soft tissue mobilization R biceps, coracobrachialis and deltoid   Joint mobilizations:   Glenohumeral distraction, inferior, anterior and posterior glides   Glenohumeral inferior glide in prone   Glenohumeral inferior glide with flexion, scaption and abduction   Functional dry needling: DEFERRED        THERAPEUTIC EXERCISES to develop strength, endurance, ROM, flexibility, posture,  and core stabilization for (18) minutes including:     Performed Today:     UBE - level 3, 3'/3'  Shoulder PROM     Stretches   Pec stretch: 10x10s holds   Supine shoulder flexion: 10x10s holds   Low load long duration stretches  flexion in supine: 2x2'  90/90 ER: 3# for 3 minutes      Interventions DEFERRED today               NEUROMUSCULAR RE-EDUCATION ACTIVITIES to improve Balance, Coordination, Kinesthetic, Sense, Proprioception, and Posture for (10) minutes.  The following were included:     Performed Today:     Shoulder IR: 5# 3x10   Shoulder ER: green band 3x10      Interventions DEFERRED today     B shoulder ER: red band 3x10   Supine shoulder ER: yellow band 2x10   Supine ER isometric: yellow band 2x10   Series 6- 1 minute each   B shoulder ER: red band 30x   Pull aparts: red band 30x               THERAPEUTIC ACTIVITIES to improve dynamic and functional  performance for (24) minutes including:    Performed Today:     Standing TYW: yellow band 2x10 each   Wall angels: 3x10   Box push ups: 3x6   Rows 7.5# 3x10   D1 Extensions 7.5# 2x10, 5# 1x10       Interventions DEFERRED today     Quadruped serratus push up 20x   Plank serratus push up: 20 inch box 20x   Plank shoulder taps 3x10   Prone Series 2x10 each   Row: 10#  ITYW 0#  Chest press: 8# 3x10            Next Session:  Quadruped or plank serratus press   Prone TYW   Chest press   Bicep curls         Patient Education and Home Exercises      Education provided: (time included with treatment) minutes  PURPOSE: Patient educated on the impairments noted above and the effects of physical therapy intervention to improve overall condition and QOL.   EXERCISE: Patient was educated on all the above exercise prior/during/after for proper posture, positioning, and execution for safe performance with home exercise program.   STRENGTH: Patient educated on the importance of improved core and extremity strength in order to improve alignment of the spine and extremities  with static positions and dynamic movement.   POSTURE: Patient educated on postural awareness to reduce stress and maintain optimal alignment of the spine with static positions and dynamic movement      Written Home Exercises Provided: yes.  Exercises were reviewed and Casandra was able to demonstrate them prior to the end of the session.  Casandra demonstrated good  understanding of the education provided. See EMR under Patient Instructions for exercises provided during therapy sessions.    ASSESSMENT     Patient tolerated session well today. Continues to present to clinic with significant stiffness in the glenohumeral joint. Significant improvement noted following joint mobilizations and stretches.     Casandra Is progressing well towards her goals.   Pt prognosis is Good.     Pt will continue to benefit from skilled outpatient physical therapy to address the deficits listed in the problem list box on initial evaluation, provide pt/family education and to maximize pt's level of independence in the home and community environment.     Pt's spiritual, cultural and educational needs considered and pt agreeable to plan of care and goals.    Anticipated Barriers for therapy: co-morbidities, chronicity of condition, motivation to improve condition, adherence to treatment plan, and coping style       Short Term Goals:  6 weeks Status  Date Met   PAIN: Pt will report worst pain of 5/10 in order to progress toward max functional ability and improve quality of life. [x] Progressing  [] Met  [] Not Met     FUNCTION: Patient will demonstrate improved function as indicated by a score of greater than or equal to 64 out of 100 on FOTO. [x] Progressing  [] Met  [] Not Met     MOBILITY: Patient will improve AROM to 50% of stated goals, listed in objective measures above, in order to progress towards independence with functional activities.  [x] Progressing  [] Met  [] Not Met     STRENGTH: Patient will improve strength to 50% of stated  goals, listed in objective measures above, in order to progress towards independence with functional activities. [x] Progressing  [] Met  [] Not Met     POSTURE: Patient will correct postural deviations in sitting and standing, to decrease pain and promote long term stability.  [x] Progressing  [] Met  [] Not Met     HEP: Patient will demonstrate independence with HEP in order to progress toward functional independence. [x] Progressing  [] Met  [] Not Met        Long Term Goals:  12 weeks Status Date Met   PAIN: Pt will report worst pain of 3/10 in order to progress toward max functional ability and improve quality of life [x] Progressing  [] Met  [] Not Met     FUNCTION: Patient will demonstrate improved function as indicated by a score of greater than or equal to 74 out of 100 on FOTO. [x] Progressing  [] Met  [] Not Met     MOBILITY: Patient will improve AROM to stated goals, listed in objective measures above, in order to return to maximal functional potential and improve quality of life.  [x] Progressing  [] Met  [] Not Met     STRENGTH: Patient will improve strength to stated goals, listed in objective measures above, in order to improve functional independence and quality of life.  [x] Progressing  [] Met  [] Not Met     Patient will return to normal ADL's, IADL's, community involvement, recreational activities, and work-related activities with less than or equal to 3/10 pain and maximal function.  [x] Progressing  [] Met  [] Not Met          PLAN     Continue Plan of Care (POC) and progress per patient tolerance. See Treatment section for exercise progression.    Asmita Miller, PT

## 2023-10-25 NOTE — PROGRESS NOTES
Orthopaedics  Post-operative follow-up    Procedure Performed:  1. Right shoulder diagnostic arthroscopy with complete synovectomy    Date of Surgery: 6/9/23    Subjective: Casandra Barragan is now approximately 4.5 months out from her shoulder surgery.  She has been compliant with post-operative restrictions and has been progressing with PT.  Her pain and function continue to improve.  She reports near full range of motion in the shoulder and limited discomfort, only at the extremes of range of motion.  She is not yet started her oral medications for rheumatoid arthritis.  She did have a fall through a glass floor on 10/14/23 at homecoming.  She has a laceration on her left knee that has stitches.     Exam:  Incision sites healed, C/D/I  No swelling or bruising noted  Fluid ROM with no crepitus  Upright Active ROM: , , ER 50  Cuff strength intact    Axillary nerve sensation and motor intact  Motor and sensory intact distally  Strong radial pulse, fingers warm and well perfused    Left leg laceration is clean, sutures present, no erythema or induration    Imaging:  No new imaging.    Impression:  4.5 months s/p right shoulder diagnostic arthroscopy with complete synovectomy, left leg laceration    Plan:  She is making good progress with range of motion.  She is not yet started oral medications, and I talked with her that long term her best outcome is going to be from treatment of her underlying condition, which is rheumatoid arthritis.  Advance PT per protocol  Symptomatic treatment for pain / swelling  May advance activities as reviewed today:  Continue to progress strength and endurance of shoulder and periscapular musculature.   For the left knee, she can have the stiches removed on Tuesday of next week at PT.  Recommend cleaning the area with Hibiclens   Instructed patient to call clinic if questions or concerns    Follow-up in as needed           Chris Flanagan MD    I, Ariela Issa,  acted as a scribe for Chris Flanagan MD for the duration of this office visit.

## 2023-10-26 ENCOUNTER — CLINICAL SUPPORT (OUTPATIENT)
Dept: REHABILITATION | Facility: HOSPITAL | Age: 23
End: 2023-10-26
Payer: COMMERCIAL

## 2023-10-26 DIAGNOSIS — M25.611 DECREASED RANGE OF MOTION OF RIGHT SHOULDER: ICD-10-CM

## 2023-10-26 DIAGNOSIS — M62.81 PROXIMAL MUSCLE WEAKNESS: ICD-10-CM

## 2023-10-26 DIAGNOSIS — Z74.09 DECREASED FUNCTIONAL MOBILITY AND ENDURANCE: Primary | ICD-10-CM

## 2023-10-26 PROCEDURE — 97110 THERAPEUTIC EXERCISES: CPT

## 2023-10-26 PROCEDURE — 97140 MANUAL THERAPY 1/> REGIONS: CPT

## 2023-10-26 PROCEDURE — 97112 NEUROMUSCULAR REEDUCATION: CPT

## 2023-10-26 PROCEDURE — 97530 THERAPEUTIC ACTIVITIES: CPT

## 2023-10-26 NOTE — PROGRESS NOTES
[facility]   Physical Therapy Treatment Note     Name: Casandra [facility] Number: [id number]    Therapy Diagnosis:   Encounter Diagnoses   Name Primary?    Decreased functional mobility and endurance Yes    Decreased range of motion of right shoulder     Proximal muscle weakness          Physician: Chris Flanagan    Visit Date: 10/26/2023    Physician Orders: PT Eval and Treat  Medical Diagnosis from Referral: Synovitis of right shoulder [M65.811], Status post arthroscopy of right shoulder [Z98.890]  Evaluation Date: 8/31/2023  Authorization Period Expiration: 8/30/2024  Plan of Care Expiration: 11/3/2023    Progress Note Due: 11/3/2023  Visit # / Visits authorized: 14/20 (+eval)  FOTO: 1/3 (last performed on 8/31/2023)     Precautions: Standard  PTA Visit #: 0/5     Time In: 1420  Time Out: 1525  Total Billable Time: 58 minutes    SUBJECTIVE     Pt reports:  her shoulder is feeling much better overall but notes that she continues to have stiffness at end range flexion, abduction and ER.     Compliance with Hep: Every Other Day  Response to previous treatment: no adverse reactions to treatment/updated HEP  Functional change: Better    Pain: 0/10   Worst: 5/10  Location: top of right shoulder      OBJECTIVE     Objective Measures updated at progress report unless specified otherwise.     Treatment     Gym/Equipment Access: full  Time to Complete Exercises: increased for cueing.    Casandra received the treatments listed below:        MANUAL THERAPY TECHNIQUES were applied for (10) minutes, including:    Soft tissue mobilization:   Soft tissue mobilization R biceps, coracobrachialis and deltoid   Joint mobilizations:   Glenohumeral distraction, inferior, anterior and posterior glides   Glenohumeral inferior glide in prone   Glenohumeral inferior glide with flexion, scaption and abduction   Functional dry needling: DEFERRED        THERAPEUTIC EXERCISES to develop strength,  endurance, ROM, flexibility, posture, and core stabilization for (18) minutes including:     Performed Today:     UBE - level 3, 3'/3'  Stretches   Pec stretch: 10x10s holds   Prayer stretch (2 way): 10x10s holds each  Low load long duration stretches  flexion in supine: 2x2 minutes   90/90 ER: 3# for 3 minutes      Interventions DEFERRED today               NEUROMUSCULAR RE-EDUCATION ACTIVITIES to improve Balance, Coordination, Kinesthetic, Sense, Proprioception, and Posture for (10) minutes.  The following were included:     Performed Today:     B shoulder ER: red band 3x10   Shoulder ER AROM at 90º on wall: 2x10          Interventions DEFERRED today     Supine shoulder ER: yellow band 2x10   Supine ER isometric: yellow band 2x10     Prone end range lifts   Flexion 2x10  90/90 ER 2x10  Shoulder IR: 5# 3x10             THERAPEUTIC ACTIVITIES to improve dynamic and functional  performance for (20) minutes including:    Performed Today:     Wall slides with end range lift off : 10x each   Flexion   Scaption   Abduction   Windshield wiper 2x10   Pull aparts: red band 2x10   Diagonal pull aparts: yellow band 2x6 B            Interventions DEFERRED today     Quadruped serratus push up 20x   Plank serratus push up: 20 inch box 20x   Plank shoulder taps 3x10   Prone Series 2x10 each   Row: 10#  ITYW 0#  Chest press: 8# 3x10   Standing TYW: yellow band 2x10 each   Wall angels: 3x10   Box push ups: 3x6   Rows 7.5# 3x10   D1 Extensions 7.5# 2x10, 5# 1x10         Next Session:  Quadruped or plank serratus press   Prone TYW   Chest press   Bicep curls         Patient Education and Home Exercises      Education provided: (time included with treatment) minutes  PURPOSE: Patient educated on the impairments noted above and the effects of physical therapy intervention to improve overall condition and QOL.   EXERCISE: Patient was educated on all the above exercise prior/during/after for proper posture, positioning, and execution for  safe performance with home exercise program.   STRENGTH: Patient educated on the importance of improved core and extremity strength in order to improve alignment of the spine and extremities with static positions and dynamic movement.   POSTURE: Patient educated on postural awareness to reduce stress and maintain optimal alignment of the spine with static positions and dynamic movement      Written Home Exercises Provided: yes.  Exercises were reviewed and Casandra was able to demonstrate them prior to the end of the session.  Casandra demonstrated good  understanding of the education provided. See EMR under Patient Instructions for exercises provided during therapy sessions.    ASSESSMENT     Patient tolerated session well today. Continue to spend significant time focusing on end range flexion and ER ROM and strength. Incorporated shoulder AROM at 90/90 ER as well as wall slides with lift off and pull aparts to improve end range strength. Pt is very fatigued following session but feels good overall    Casandra Is progressing well towards her goals.   Pt prognosis is Good.     Pt will continue to benefit from skilled outpatient physical therapy to address the deficits listed in the problem list box on initial evaluation, provide pt/family education and to maximize pt's level of independence in the home and community environment.     Pt's spiritual, cultural and educational needs considered and pt agreeable to plan of care and goals.    Anticipated Barriers for therapy: co-morbidities, chronicity of condition, motivation to improve condition, adherence to treatment plan, and coping style       Short Term Goals:  6 weeks Status  Date Met   PAIN: Pt will report worst pain of 5/10 in order to progress toward max functional ability and improve quality of life. [x] Progressing  [] Met  [] Not Met     FUNCTION: Patient will demonstrate improved function as indicated by a score of greater than or equal to 64 out of 100 on FOTO.  [x] Progressing  [] Met  [] Not Met     MOBILITY: Patient will improve AROM to 50% of stated goals, listed in objective measures above, in order to progress towards independence with functional activities.  [x] Progressing  [] Met  [] Not Met     STRENGTH: Patient will improve strength to 50% of stated goals, listed in objective measures above, in order to progress towards independence with functional activities. [x] Progressing  [] Met  [] Not Met     POSTURE: Patient will correct postural deviations in sitting and standing, to decrease pain and promote long term stability.  [x] Progressing  [] Met  [] Not Met     HEP: Patient will demonstrate independence with HEP in order to progress toward functional independence. [x] Progressing  [] Met  [] Not Met        Long Term Goals:  12 weeks Status Date Met   PAIN: Pt will report worst pain of 3/10 in order to progress toward max functional ability and improve quality of life [x] Progressing  [] Met  [] Not Met     FUNCTION: Patient will demonstrate improved function as indicated by a score of greater than or equal to 74 out of 100 on FOTO. [x] Progressing  [] Met  [] Not Met     MOBILITY: Patient will improve AROM to stated goals, listed in objective measures above, in order to return to maximal functional potential and improve quality of life.  [x] Progressing  [] Met  [] Not Met     STRENGTH: Patient will improve strength to stated goals, listed in objective measures above, in order to improve functional independence and quality of life.  [x] Progressing  [] Met  [] Not Met     Patient will return to normal ADL's, IADL's, community involvement, recreational activities, and work-related activities with less than or equal to 3/10 pain and maximal function.  [x] Progressing  [] Met  [] Not Met          PLAN     Continue Plan of Care (POC) and progress per patient tolerance. See Treatment section for exercise progression.    Asmita Miller, PT

## 2023-10-31 ENCOUNTER — CLINICAL SUPPORT (OUTPATIENT)
Dept: REHABILITATION | Facility: HOSPITAL | Age: 23
End: 2023-10-31
Payer: COMMERCIAL

## 2023-10-31 DIAGNOSIS — Z74.09 DECREASED FUNCTIONAL MOBILITY AND ENDURANCE: Primary | ICD-10-CM

## 2023-10-31 DIAGNOSIS — M62.81 PROXIMAL MUSCLE WEAKNESS: ICD-10-CM

## 2023-10-31 DIAGNOSIS — M25.611 DECREASED RANGE OF MOTION OF RIGHT SHOULDER: ICD-10-CM

## 2023-10-31 PROCEDURE — 97140 MANUAL THERAPY 1/> REGIONS: CPT

## 2023-10-31 PROCEDURE — 97112 NEUROMUSCULAR REEDUCATION: CPT

## 2023-10-31 PROCEDURE — 97110 THERAPEUTIC EXERCISES: CPT

## 2023-10-31 PROCEDURE — 97530 THERAPEUTIC ACTIVITIES: CPT

## 2023-10-31 NOTE — PROGRESS NOTES
[facility]   Physical Therapy Treatment Note     Name: Casandra [facility] Number: [id number]    Therapy Diagnosis:   Encounter Diagnoses   Name Primary?    Decreased functional mobility and endurance Yes    Decreased range of motion of right shoulder     Proximal muscle weakness          Physician: Chris Flanagan    Visit Date: 10/31/2023    Physician Orders: PT Eval and Treat  Medical Diagnosis from Referral: Synovitis of right shoulder [M65.811], Status post arthroscopy of right shoulder [Z98.890]  Evaluation Date: 8/31/2023  Authorization Period Expiration: 8/30/2024  Plan of Care Expiration: 11/3/2023    Progress Note Due: 11/3/2023  Visit # / Visits authorized: 16/20 (+eval)  FOTO: 1/3 (last performed on 8/31/2023)     Precautions: Standard  PTA Visit #: 0/5     Time In: 1330  Time Out: 1432  Total Billable Time: 58 minutes    SUBJECTIVE     Pt reports:  her shoulder is feeling much better overall but notes that she continues to have stiffness at end range flexion, abduction and ER.     Compliance with Hep: Every Other Day  Response to previous treatment: no adverse reactions to treatment/updated HEP  Functional change: Better    Pain: 0/10   Worst: 5/10  Location: top of right shoulder      OBJECTIVE     Objective Measures updated at progress report unless specified otherwise.     Treatment     Gym/Equipment Access: full  Time to Complete Exercises: increased for cueing.    Casandra received the treatments listed below:        MANUAL THERAPY TECHNIQUES were applied for (10) minutes, including:    Soft tissue mobilization:   Soft tissue mobilization R biceps, coracobrachialis and deltoid   Joint mobilizations:   Glenohumeral distraction, inferior, anterior and posterior glides   Glenohumeral inferior glide in prone   Glenohumeral inferior glide with flexion, scaption and abduction   Functional dry needling: DEFERRED        THERAPEUTIC EXERCISES to develop strength,  endurance, ROM, flexibility, posture, and core stabilization for (18) minutes including:     Performed Today:     UBE - level 3, 3'/3'  Stretches   Pec stretch: 10x10s holds   Prayer stretch (2 way): 10x10s holds each  Low load long duration stretches  flexion in supine: 2x2 minutes   90/90 ER: 3# for 3 minutes      Interventions DEFERRED today               NEUROMUSCULAR RE-EDUCATION ACTIVITIES to improve Balance, Coordination, Kinesthetic, Sense, Proprioception, and Posture for (10) minutes.  The following were included:     Performed Today:     B shoulder ER: red band 3x10   Shoulder ER AROM at 90º on wall: 2x10          Interventions DEFERRED today     Supine shoulder ER: yellow band 2x10   Supine ER isometric: yellow band 2x10     Prone end range lifts   Flexion 2x10  90/90 ER 2x10  Shoulder IR: 5# 3x10             THERAPEUTIC ACTIVITIES to improve dynamic and functional  performance for (20) minutes including:    Performed Today:     Wall slides with end range lift off : 10x each   Flexion   Scaption   Abduction   Windshield wiper 2x10   Pull aparts: red band 2x10   Diagonal pull aparts: yellow band 2x6 B            Interventions DEFERRED today     Quadruped serratus push up 20x   Plank serratus push up: 20 inch box 20x   Plank shoulder taps 3x10   Prone Series 2x10 each   Row: 10#  ITYW 0#  Chest press: 8# 3x10   Standing TYW: yellow band 2x10 each   Wall angels: 3x10   Box push ups: 3x6   Rows 7.5# 3x10   D1 Extensions 7.5# 2x10, 5# 1x10         Next Session:  Quadruped or plank serratus press   Prone TYW   Chest press   Bicep curls         Patient Education and Home Exercises      Education provided: (time included with treatment) minutes  PURPOSE: Patient educated on the impairments noted above and the effects of physical therapy intervention to improve overall condition and QOL.   EXERCISE: Patient was educated on all the above exercise prior/during/after for proper posture, positioning, and execution for  safe performance with home exercise program.   STRENGTH: Patient educated on the importance of improved core and extremity strength in order to improve alignment of the spine and extremities with static positions and dynamic movement.   POSTURE: Patient educated on postural awareness to reduce stress and maintain optimal alignment of the spine with static positions and dynamic movement      Written Home Exercises Provided: yes.  Exercises were reviewed and Casandra was able to demonstrate them prior to the end of the session.  Casandra demonstrated good  understanding of the education provided. See EMR under Patient Instructions for exercises provided during therapy sessions.    ASSESSMENT     Patient tolerated session well today. Continue to spend significant time focusing on end range flexion and ER ROM and strength. Incorporated shoulder AROM at 90/90 ER as well as wall slides with lift off and pull aparts to improve end range strength. Pt is very fatigued following session but feels good overall    Casandra Is progressing well towards her goals.   Pt prognosis is Good.     Pt will continue to benefit from skilled outpatient physical therapy to address the deficits listed in the problem list box on initial evaluation, provide pt/family education and to maximize pt's level of independence in the home and community environment.     Pt's spiritual, cultural and educational needs considered and pt agreeable to plan of care and goals.    Anticipated Barriers for therapy: co-morbidities, chronicity of condition, motivation to improve condition, adherence to treatment plan, and coping style       Short Term Goals:  6 weeks Status  Date Met   PAIN: Pt will report worst pain of 5/10 in order to progress toward max functional ability and improve quality of life. [x] Progressing  [] Met  [] Not Met     FUNCTION: Patient will demonstrate improved function as indicated by a score of greater than or equal to 64 out of 100 on FOTO.  [x] Progressing  [] Met  [] Not Met     MOBILITY: Patient will improve AROM to 50% of stated goals, listed in objective measures above, in order to progress towards independence with functional activities.  [x] Progressing  [] Met  [] Not Met     STRENGTH: Patient will improve strength to 50% of stated goals, listed in objective measures above, in order to progress towards independence with functional activities. [x] Progressing  [] Met  [] Not Met     POSTURE: Patient will correct postural deviations in sitting and standing, to decrease pain and promote long term stability.  [x] Progressing  [] Met  [] Not Met     HEP: Patient will demonstrate independence with HEP in order to progress toward functional independence. [x] Progressing  [] Met  [] Not Met        Long Term Goals:  12 weeks Status Date Met   PAIN: Pt will report worst pain of 3/10 in order to progress toward max functional ability and improve quality of life [x] Progressing  [] Met  [] Not Met     FUNCTION: Patient will demonstrate improved function as indicated by a score of greater than or equal to 74 out of 100 on FOTO. [x] Progressing  [] Met  [] Not Met     MOBILITY: Patient will improve AROM to stated goals, listed in objective measures above, in order to return to maximal functional potential and improve quality of life.  [x] Progressing  [] Met  [] Not Met     STRENGTH: Patient will improve strength to stated goals, listed in objective measures above, in order to improve functional independence and quality of life.  [x] Progressing  [] Met  [] Not Met     Patient will return to normal ADL's, IADL's, community involvement, recreational activities, and work-related activities with less than or equal to 3/10 pain and maximal function.  [x] Progressing  [] Met  [] Not Met          PLAN     Continue Plan of Care (POC) and progress per patient tolerance. See Treatment section for exercise progression.    Asmita Miller, PT

## 2023-11-08 ENCOUNTER — CLINICAL SUPPORT (OUTPATIENT)
Dept: REHABILITATION | Facility: HOSPITAL | Age: 23
End: 2023-11-08
Payer: COMMERCIAL

## 2023-11-08 DIAGNOSIS — M62.81 PROXIMAL MUSCLE WEAKNESS: ICD-10-CM

## 2023-11-08 DIAGNOSIS — Z74.09 DECREASED FUNCTIONAL MOBILITY AND ENDURANCE: Primary | ICD-10-CM

## 2023-11-08 DIAGNOSIS — M25.611 DECREASED RANGE OF MOTION OF RIGHT SHOULDER: ICD-10-CM

## 2023-11-08 PROCEDURE — 97110 THERAPEUTIC EXERCISES: CPT

## 2023-11-08 PROCEDURE — 97112 NEUROMUSCULAR REEDUCATION: CPT

## 2023-11-08 PROCEDURE — 97530 THERAPEUTIC ACTIVITIES: CPT

## 2023-11-10 ENCOUNTER — CLINICAL SUPPORT (OUTPATIENT)
Dept: REHABILITATION | Facility: HOSPITAL | Age: 23
End: 2023-11-10
Payer: COMMERCIAL

## 2023-11-10 DIAGNOSIS — M62.81 PROXIMAL MUSCLE WEAKNESS: ICD-10-CM

## 2023-11-10 DIAGNOSIS — M25.611 DECREASED RANGE OF MOTION OF RIGHT SHOULDER: ICD-10-CM

## 2023-11-10 DIAGNOSIS — Z74.09 DECREASED FUNCTIONAL MOBILITY AND ENDURANCE: Primary | ICD-10-CM

## 2023-11-10 PROCEDURE — 97530 THERAPEUTIC ACTIVITIES: CPT

## 2023-11-10 PROCEDURE — 97110 THERAPEUTIC EXERCISES: CPT

## 2023-11-10 PROCEDURE — 97112 NEUROMUSCULAR REEDUCATION: CPT

## 2023-11-10 PROCEDURE — 97140 MANUAL THERAPY 1/> REGIONS: CPT

## 2023-11-13 ENCOUNTER — CLINICAL SUPPORT (OUTPATIENT)
Dept: REHABILITATION | Facility: HOSPITAL | Age: 23
End: 2023-11-13
Payer: COMMERCIAL

## 2023-11-13 DIAGNOSIS — M62.81 PROXIMAL MUSCLE WEAKNESS: ICD-10-CM

## 2023-11-13 DIAGNOSIS — Z74.09 DECREASED FUNCTIONAL MOBILITY AND ENDURANCE: Primary | ICD-10-CM

## 2023-11-13 DIAGNOSIS — M25.611 DECREASED RANGE OF MOTION OF RIGHT SHOULDER: ICD-10-CM

## 2023-11-13 PROCEDURE — 97110 THERAPEUTIC EXERCISES: CPT

## 2023-11-13 PROCEDURE — 97530 THERAPEUTIC ACTIVITIES: CPT

## 2023-11-13 PROCEDURE — 97112 NEUROMUSCULAR REEDUCATION: CPT

## 2023-11-13 NOTE — PROGRESS NOTES
[facility]   Physical Therapy Treatment Note / POC Update     Name: Casandar Barragan  [facility] Number: [id number]    Therapy Diagnosis:   Encounter Diagnoses   Name Primary?    Decreased functional mobility and endurance Yes    Decreased range of motion of right shoulder     Proximal muscle weakness          Physician: Chris Flanagan    Visit Date: 11/8/2023    Physician Orders: PT Eval and Treat  Medical Diagnosis from Referral: Synovitis of right shoulder [M65.811], Status post arthroscopy of right shoulder [Z98.890]  Evaluation Date: 8/31/2023  Authorization Period Expiration: 8/30/2024  Plan of Care Expiration: 2/8/2024  Progress Note Due: 12/8/2023  Visit # / Visits authorized: 17/20 (+eval)  FOTO: 1/3 (last performed on 8/31/2023)     Precautions: Standard  PTA Visit #: 0/5     Time In: 1502  Time Out: 1608  Total Billable Time: 63 minutes    SUBJECTIVE     Pt reports: her shoulder is feeling good overall. Feels that stiffness and strength continue to improve    Compliance with Hep: Every Other Day  Response to previous treatment: no adverse reactions to treatment/updated HEP  Functional change: Better    Pain: 0/10   Worst: 5/10  Location: top of right shoulder      OBJECTIVE     Objective Measures updated at progress report unless specified otherwise.      RANGE OF MOTION:      Shoulder AROM/PROM Right  (Eval) Right  11/8/2023 Left Pain/Dysfunction with Movement Goal   Shoulder Flexion (180º) 132/140 172 180 Pain B  170    Shoulder Abduction (180º) 112/123 165 180 Anterior shoulder pain on R  170   Shoulder Extension (60º) 64 75 75 Anterior shoulder pain on R 75   Shoulder ER  at 90º (90º) 83 90 90   90   Shoulder IR at 90º (70º) 41 70 73   70   Functional ER T2  T3 T6 Anterior shoulder pain on R T6   Functional IR Inferior angle of scapula T6  superior angle of scapula Anterior shoulder pain on R          U/E MMT Right Left Pain/Dysfunction with Movement Goal   Shoulder  Flexion 4/5 4+/5  4+/5 B   Shoulder Extension 4/5 5/5  4+/5 B   Shoulder Abduction 4-/5 4+/5  4+/5 B   Shoulder IR 4/5 5/5  4+/5 B   Shoulder ER 4-/5 4+/5  4+/5 B   Serratus Anterior 4+/5 5/5  4+/5 B   Middle Trapezius 3+/5 4/5  4+/5 B   Lower Trapezius 3+/5 4/5  4+/5 B   Elbow Flexion  4+/5 5/5  5/5 B   Elbow Extension 4+/5 5/5  5/5 B            Treatment     Gym/Equipment Access: full  Time to Complete Exercises: increased for cueing.    Casandra received the treatments listed below:        MANUAL THERAPY TECHNIQUES were applied for (5) minutes, including:    Soft tissue mobilization: DEFERRED  Soft tissue mobilization R biceps, coracobrachialis and deltoid   Joint mobilizations:   Glenohumeral distraction, inferior, anterior and posterior glides   Glenohumeral inferior glide in prone   Glenohumeral inferior glide with flexion, scaption and abduction   Functional dry needling: DEFERRED        THERAPEUTIC EXERCISES to develop strength, endurance, ROM, flexibility, posture, and core stabilization for (15) minutes including:     Performed Today:     UBE - level 3, 3'/3'  Stretches   Pec stretch: 10x10s holds   Prayer stretch (2 way): 10x10s holds each  Low load long duration stretches  flexion in supine: 2x2 minutes      Interventions DEFERRED today               NEUROMUSCULAR RE-EDUCATION ACTIVITIES to improve Balance, Coordination, Kinesthetic, Sense, Proprioception, and Posture for (20) minutes.  The following were included:     Performed Today:     B shoulder ER: red band 3x10   Shoulder ER AROM at 90º on wall: 2x10   Shoulder IR: 5# 3x10   Prone end range lifts   T 2x10  Y 2x5  90/90 ER 2x10     Interventions DEFERRED today     Supine shoulder ER: yellow band 2x10   Supine ER isometric: yellow band 2x10                 THERAPEUTIC ACTIVITIES to improve dynamic and functional  performance for (23) minutes including:    Performed Today:     Wall slides with end range lift off : 10x each   Flexion   Scaption    Abduction   Windshield wiper 2x10   Pull aparts: red band 2x10   Diagonal pull aparts: yellow band 2x6 B   Plank serratus push up: 20 inch box 20x   D1 Extensions 7.5# 2x10, 5# 1x10       Interventions DEFERRED today     Quadruped serratus push up 20x   Plank shoulder taps 3x10   Prone Series 2x10 each   Row: 10#  ITYW 0#  Chest press: 8# 3x10   Standing TYW: yellow band 2x10 each   Wall angels: 3x10   Box push ups: 3x6   Rows 7.5# 3x10            Next Session:  Quadruped or plank serratus press   Prone TYW   Chest press   Bicep curls         Patient Education and Home Exercises      Education provided: (time included with treatment) minutes  PURPOSE: Patient educated on the impairments noted above and the effects of physical therapy intervention to improve overall condition and QOL.   EXERCISE: Patient was educated on all the above exercise prior/during/after for proper posture, positioning, and execution for safe performance with home exercise program.   STRENGTH: Patient educated on the importance of improved core and extremity strength in order to improve alignment of the spine and extremities with static positions and dynamic movement.   POSTURE: Patient educated on postural awareness to reduce stress and maintain optimal alignment of the spine with static positions and dynamic movement      Written Home Exercises Provided: yes.  Exercises were reviewed and Casandra was able to demonstrate them prior to the end of the session.  Casandra demonstrated good  understanding of the education provided. See EMR under Patient Instructions for exercises provided during therapy sessions.    ASSESSMENT     Patient tolerated session well today. Incorporated prone T, Y and 90/90 lifts to improve end range strength; PT assist required for all motions. Pt continues to demonstrate improved ROM following stretches and joint mobilizations. An assessment was completed today with improvements noted in shoulder range of motion and  gross upper extremity strength compared to prior assessment; please see exam for details. Casandra continues to have difficulty with end range shoulder ROM due to poor A/PROM noted upon transfer to [facility] at 2.5 months post-op.   The above impairments and functional deficits will continue to be addressed over the course of this plan of care. Casandra was educated on continued progression of PT, expectations for the duration of care, updates on goals set at initial evaluation, and home exercise program.  Casandra will continue to benefit from physical therapy in order to further address goals, maximize function and quality of life.     Casandra Is progressing well towards her goals.   Pt prognosis is Good.     Pt will continue to benefit from skilled outpatient physical therapy to address the deficits listed in the problem list box on initial evaluation, provide pt/family education and to maximize pt's level of independence in the home and community environment.     Pt's spiritual, cultural and educational needs considered and pt agreeable to plan of care and goals.    Anticipated Barriers for therapy: co-morbidities, chronicity of condition, motivation to improve condition, adherence to treatment plan, and coping style       Short Term Goals:  6 weeks Status  Date Met   PAIN: Pt will report worst pain of 5/10 in order to progress toward max functional ability and improve quality of life. [] Progressing  [x] Met  [] Not Met  11/8/2023   FUNCTION: Patient will demonstrate improved function as indicated by a score of greater than or equal to 64 out of 100 on FOTO. [x] Progressing  [] Met  [] Not Met     MOBILITY: Patient will improve AROM to 50% of stated goals, listed in objective measures above, in order to progress towards independence with functional activities.  [] Progressing  [x] Met  [] Not Met  11/8/2023   STRENGTH: Patient will improve strength to 50% of stated goals, listed in objective measures above, in order  to progress towards independence with functional activities. [x] Progressing  [] Met  [] Not Met 11/8/2023    POSTURE: Patient will correct postural deviations in sitting and standing, to decrease pain and promote long term stability.  [x] Progressing  [] Met  [] Not Met 11/8/2023    HEP: Patient will demonstrate independence with HEP in order to progress toward functional independence. [x] Progressing  [] Met  [] Not Met 11/8/2023       Long Term Goals:  12 weeks Status Date Met   PAIN: Pt will report worst pain of 3/10 in order to progress toward max functional ability and improve quality of life [] Progressing  [x] Met  [] Not Met  11/8/2023   FUNCTION: Patient will demonstrate improved function as indicated by a score of greater than or equal to 74 out of 100 on FOTO. [x] Progressing  [] Met  [] Not Met     MOBILITY: Patient will improve AROM to stated goals, listed in objective measures above, in order to return to maximal functional potential and improve quality of life.  [x] Progressing  [] Met  [] Not Met     STRENGTH: Patient will improve strength to stated goals, listed in objective measures above, in order to improve functional independence and quality of life.  [x] Progressing  [] Met  [] Not Met     Patient will return to normal ADL's, IADL's, community involvement, recreational activities, and work-related activities with less than or equal to 3/10 pain and maximal function.  [x] Progressing  [] Met  [] Not Met          PLAN     Updated Plan of care Certification: 11/8/2023 to 2/8/2024.    Outpatient Physical Therapy 2 times weekly for 12 weeks to include any combination of the following interventions: virtual visits, dry needling, modalities, electrical stimulation (IFC, Pre-Mod, Attended with Functional Dry Needling), Cervical/Lumbar Traction, Gait Training, Manual Therapy, Neuromuscular Re-ed, Patient Education, Self Care, Therapeutic Exercise, and Therapeutic Activites     Asmita Miller, PT

## 2023-11-14 NOTE — PLAN OF CARE
[facility]   Physical Therapy Treatment Note / POC Update     Name: Casandra Barragan  [facility] Number: [id number]    Therapy Diagnosis:   Encounter Diagnoses   Name Primary?    Decreased functional mobility and endurance Yes    Decreased range of motion of right shoulder     Proximal muscle weakness          Physician: Chris Flanagan    Visit Date: 11/8/2023    Physician Orders: PT Eval and Treat  Medical Diagnosis from Referral: Synovitis of right shoulder [M65.811], Status post arthroscopy of right shoulder [Z98.890]  Evaluation Date: 8/31/2023  Authorization Period Expiration: 8/30/2024  Plan of Care Expiration: 2/8/2024  Progress Note Due: 12/8/2023  Visit # / Visits authorized: 17/20 (+eval)  FOTO: 1/3 (last performed on 8/31/2023)     Precautions: Standard  PTA Visit #: 0/5     Time In: 1502  Time Out: 1608  Total Billable Time: 63 minutes    SUBJECTIVE     Pt reports: her shoulder is feeling good overall and she is having no symptoms currently. Feels that stiffness and strength continued to improve    Compliance with Hep: Every Other Day  Response to previous treatment: no adverse reactions to treatment/updated HEP  Functional change: Better    Pain: 0/10   Worst: 5/10  Location: top of right shoulder      OBJECTIVE     Objective Measures updated at progress report unless specified otherwise.      RANGE OF MOTION:      Shoulder AROM/PROM Right  (Eval) Right  11/8/2023 Left Pain/Dysfunction with Movement Goal   Shoulder Flexion (180º) 132/140 172 180 Pain B  170    Shoulder Abduction (180º) 112/123 165 180 Anterior shoulder pain on R  170   Shoulder Extension (60º) 64 75 75 Anterior shoulder pain on R 75   Shoulder ER  at 90º (90º) 83 90 90   90   Shoulder IR at 90º (70º) 41 70 73   70   Functional ER T2  T3 T6 Anterior shoulder pain on R T6   Functional IR Inferior angle of scapula T6  superior angle of scapula Anterior shoulder pain on R          U/E MMT Right Left  Pain/Dysfunction with Movement Goal   Shoulder Flexion 4/5 4+/5  4+/5 B   Shoulder Extension 4/5 5/5  4+/5 B   Shoulder Abduction 4-/5 4+/5  4+/5 B   Shoulder IR 4/5 5/5  4+/5 B   Shoulder ER 4-/5 4+/5  4+/5 B   Serratus Anterior 4+/5 5/5  4+/5 B   Middle Trapezius 3+/5 4/5  4+/5 B   Lower Trapezius 3+/5 4/5  4+/5 B   Elbow Flexion  4+/5 5/5  5/5 B   Elbow Extension 4+/5 5/5  5/5 B            Treatment     Gym/Equipment Access: full  Time to Complete Exercises: increased for cueingTobias Guajardo received the treatments listed below:        MANUAL THERAPY TECHNIQUES were applied for (5) minutes, including:    Soft tissue mobilization: DEFERRED  Soft tissue mobilization R biceps, coracobrachialis and deltoid   Joint mobilizations:   Glenohumeral distraction, inferior, anterior and posterior glides   Glenohumeral inferior glide in prone   Glenohumeral inferior glide with flexion, scaption and abduction   Functional dry needling: DEFERRED        THERAPEUTIC EXERCISES to develop strength, endurance, ROM, flexibility, posture, and core stabilization for (15) minutes including:     Performed Today:     UBE - level 3, 3'/3'  Stretches   Pec stretch: 10x10s holds   Prayer stretch (2 way): 10x10s holds each  Low load long duration stretches  flexion in supine: 2x2 minutes      Interventions DEFERRED today               NEUROMUSCULAR RE-EDUCATION ACTIVITIES to improve Balance, Coordination, Kinesthetic, Sense, Proprioception, and Posture for (20) minutes.  The following were included:     Performed Today:     B shoulder ER: red band 3x10   Shoulder ER AROM at 90º on wall: 2x10   Shoulder IR: 5# 3x10   Prone end range lifts   T 2x10  Y 2x5  90/90 ER 2x10     Interventions DEFERRED today     Supine shoulder ER: yellow band 2x10   Supine ER isometric: yellow band 2x10                 THERAPEUTIC ACTIVITIES to improve dynamic and functional  performance for (23) minutes including:    Performed Today:     Wall slides with end range  lift off : 10x each   Flexion   Scaption   Abduction   Windshield wiper 2x10   Pull aparts: red band 2x10   Diagonal pull aparts: yellow band 2x6 B   Plank serratus push up: 20 inch box 20x   D1 Extensions 7.5# 2x10, 5# 1x10       Interventions DEFERRED today     Quadruped serratus push up 20x   Plank shoulder taps 3x10   Prone Series 2x10 each   Row: 10#  ITYW 0#  Chest press: 8# 3x10   Standing TYW: yellow band 2x10 each   Wall angels: 3x10   Box push ups: 3x6   Rows 7.5# 3x10            Next Session:  Quadruped or plank serratus press   Prone TYW   Chest press   Bicep curls         Patient Education and Home Exercises      Education provided: (time included with treatment) minutes  PURPOSE: Patient educated on the impairments noted above and the effects of physical therapy intervention to improve overall condition and QOL.   EXERCISE: Patient was educated on all the above exercise prior/during/after for proper posture, positioning, and execution for safe performance with home exercise program.   STRENGTH: Patient educated on the importance of improved core and extremity strength in order to improve alignment of the spine and extremities with static positions and dynamic movement.   POSTURE: Patient educated on postural awareness to reduce stress and maintain optimal alignment of the spine with static positions and dynamic movement      Written Home Exercises Provided: yes.  Exercises were reviewed and Casandra was able to demonstrate them prior to the end of the session.  Casandra demonstrated good  understanding of the education provided. See EMR under Patient Instructions for exercises provided during therapy sessions.    ASSESSMENT     Patient tolerated session well today. Incorporated prone T, Y and 90/90 lifts to improve end range strength; PT assist required for all motions. Pt continues to demonstrate improved ROM following stretches and joint mobilizations. An assessment was completed today with improvements  noted in shoulder range of motion and gross upper extremity strength compared to prior assessment; please see exam for details. Casandra continues to have difficulty with end range shoulder ROM due to poor A/PROM noted upon transfer to [facility] at 2.5 months post-op.   The above impairments and functional deficits will continue to be addressed over the course of this plan of care. Casandra was educated on continued progression of PT, expectations for the duration of care, updates on goals set at initial evaluation, and home exercise program.  Casandra will continue to benefit from physical therapy in order to further address goals, maximize function and quality of life.     Casandra Is progressing well towards her goals.   Pt prognosis is Good.     Pt will continue to benefit from skilled outpatient physical therapy to address the deficits listed in the problem list box on initial evaluation, provide pt/family education and to maximize pt's level of independence in the home and community environment.     Pt's spiritual, cultural and educational needs considered and pt agreeable to plan of care and goals.    Anticipated Barriers for therapy: co-morbidities, chronicity of condition, motivation to improve condition, adherence to treatment plan, and coping style       Short Term Goals:  6 weeks Status  Date Met   PAIN: Pt will report worst pain of 5/10 in order to progress toward max functional ability and improve quality of life. [] Progressing  [x] Met  [] Not Met  11/8/2023   FUNCTION: Patient will demonstrate improved function as indicated by a score of greater than or equal to 64 out of 100 on FOTO. [x] Progressing  [] Met  [] Not Met     MOBILITY: Patient will improve AROM to 50% of stated goals, listed in objective measures above, in order to progress towards independence with functional activities.  [] Progressing  [x] Met  [] Not Met  11/8/2023   STRENGTH: Patient will improve strength to 50% of stated goals, listed  in objective measures above, in order to progress towards independence with functional activities. [x] Progressing  [] Met  [] Not Met 11/8/2023    POSTURE: Patient will correct postural deviations in sitting and standing, to decrease pain and promote long term stability.  [x] Progressing  [] Met  [] Not Met 11/8/2023    HEP: Patient will demonstrate independence with HEP in order to progress toward functional independence. [x] Progressing  [] Met  [] Not Met 11/8/2023       Long Term Goals:  12 weeks Status Date Met   PAIN: Pt will report worst pain of 3/10 in order to progress toward max functional ability and improve quality of life [] Progressing  [x] Met  [] Not Met  11/8/2023   FUNCTION: Patient will demonstrate improved function as indicated by a score of greater than or equal to 74 out of 100 on FOTO. [x] Progressing  [] Met  [] Not Met     MOBILITY: Patient will improve AROM to stated goals, listed in objective measures above, in order to return to maximal functional potential and improve quality of life.  [x] Progressing  [] Met  [] Not Met     STRENGTH: Patient will improve strength to stated goals, listed in objective measures above, in order to improve functional independence and quality of life.  [x] Progressing  [] Met  [] Not Met     Patient will return to normal ADL's, IADL's, community involvement, recreational activities, and work-related activities with less than or equal to 3/10 pain and maximal function.  [x] Progressing  [] Met  [] Not Met          PLAN     Updated Plan of care Certification: 11/8/2023 to 2/8/2024.    Outpatient Physical Therapy 2 times weekly for 12 weeks to include any combination of the following interventions: virtual visits, dry needling, modalities, electrical stimulation (IFC, Pre-Mod, Attended with Functional Dry Needling), Cervical/Lumbar Traction, Gait Training, Manual Therapy, Neuromuscular Re-ed, Patient Education, Self Care, Therapeutic Exercise, and Therapeutic  Activites     Asmita Miller, PT

## 2023-11-15 ENCOUNTER — CLINICAL SUPPORT (OUTPATIENT)
Dept: REHABILITATION | Facility: HOSPITAL | Age: 23
End: 2023-11-15
Payer: COMMERCIAL

## 2023-11-15 DIAGNOSIS — M62.81 PROXIMAL MUSCLE WEAKNESS: ICD-10-CM

## 2023-11-15 DIAGNOSIS — Z74.09 DECREASED FUNCTIONAL MOBILITY AND ENDURANCE: Primary | ICD-10-CM

## 2023-11-15 DIAGNOSIS — M25.611 DECREASED RANGE OF MOTION OF RIGHT SHOULDER: ICD-10-CM

## 2023-11-15 PROCEDURE — 97530 THERAPEUTIC ACTIVITIES: CPT

## 2023-11-15 PROCEDURE — 97112 NEUROMUSCULAR REEDUCATION: CPT

## 2023-11-15 PROCEDURE — 97110 THERAPEUTIC EXERCISES: CPT

## 2023-11-20 NOTE — PROGRESS NOTES
[facility]   Physical Therapy Treatment Note     Name: Casandra [facility] Number: [id number]    Therapy Diagnosis:   Encounter Diagnoses   Name Primary?    Decreased functional mobility and endurance Yes    Decreased range of motion of right shoulder     Proximal muscle weakness          Physician: Chris Flanagan    Visit Date: 11/10/2023    Physician Orders: PT Eval and Treat  Medical Diagnosis from Referral: Synovitis of right shoulder [M65.811], Status post arthroscopy of right shoulder [Z98.890]  Evaluation Date: 8/31/2023  Authorization Period Expiration: 8/30/2024  Plan of Care Expiration: 2/8/2024  Progress Note Due: 12/8/2023  Visit # / Visits authorized: 16/20 (+eval)  FOTO: 1/3 (last performed on 8/31/2023)     Precautions: Standard  PTA Visit #: 0/5     Time In: 1102  Time Out: 1204  Total Billable Time: 58 minutes    SUBJECTIVE     Pt reports: she is feeling pretty good, no significant changes since previous session     Compliance with Hep: Every Other Day  Response to previous treatment: no adverse reactions to treatment/updated HEP  Functional change: Better    Pain: 0/10   Worst: 5/10  Location: top of right shoulder      OBJECTIVE     Objective Measures updated at progress report unless specified otherwise.     Treatment     Gym/Equipment Access: full  Time to Complete Exercises: increased for cueing.    Casandra received the treatments listed below:        MANUAL THERAPY TECHNIQUES were applied for (8) minutes, including:    Soft tissue mobilization:   Soft tissue mobilization R biceps, coracobrachialis and deltoid   Joint mobilizations:   Glenohumeral distraction, inferior, anterior and posterior glides   Glenohumeral inferior glide in prone   Glenohumeral inferior glide with flexion, scaption and abduction   Functional dry needling: DEFERRED        THERAPEUTIC EXERCISES to develop strength, endurance, ROM, flexibility, posture, and core stabilization for  (10) minutes including:     Performed Today:     UBE - level 3, 3'/3'  Stretches   Pec stretch: 10x10s holds   Prayer stretch (2 way): 10x10s holds each       Interventions DEFERRED today     Low load long duration stretches  flexion in supine: 2x2 minutes   90/90 ER: 3# for 3 minutes           NEUROMUSCULAR RE-EDUCATION ACTIVITIES to improve Balance, Coordination, Kinesthetic, Sense, Proprioception, and Posture for (20) minutes.  The following were included:     Performed Today:     B shoulder ER: red band 3x10   Shoulder IR: 5# 3x10   Shoulder ER AROM at 90º on wall: 2x10   Prone end range lifts   Flexion 2x10  90/90 ER 2x10       Interventions DEFERRED today     Supine shoulder ER: yellow band 2x10   Supine ER isometric: yellow band 2x10                 THERAPEUTIC ACTIVITIES to improve dynamic and functional  performance for (20) minutes including:    Performed Today:     Wall slides with end range lift off : 10x each   Flexion   Scaption   Abduction   Windshield wiper 2x10   Pull aparts: red band 2x10   Diagonal pull aparts: yellow band 2x6 B            Interventions DEFERRED today     Quadruped serratus push up 20x   Plank serratus push up: 20 inch box 20x   Plank shoulder taps 3x10   Prone Series 2x10 each   Row: 10#  ITYW 0#  Chest press: 8# 3x10   Standing TYW: yellow band 2x10 each   Wall angels: 3x10   Box push ups: 3x6   Rows 7.5# 3x10   D1 Extensions 7.5# 2x10, 5# 1x10         Next Session:  Quadruped or plank serratus press   Prone TYW   Chest press   Bicep curls         Patient Education and Home Exercises      Education provided: (time included with treatment) minutes  PURPOSE: Patient educated on the impairments noted above and the effects of physical therapy intervention to improve overall condition and QOL.   EXERCISE: Patient was educated on all the above exercise prior/during/after for proper posture, positioning, and execution for safe performance with home exercise program.   STRENGTH: Patient  educated on the importance of improved core and extremity strength in order to improve alignment of the spine and extremities with static positions and dynamic movement.   POSTURE: Patient educated on postural awareness to reduce stress and maintain optimal alignment of the spine with static positions and dynamic movement      Written Home Exercises Provided: yes.  Exercises were reviewed and Casandra was able to demonstrate them prior to the end of the session.  Casandra demonstrated good  understanding of the education provided. See EMR under Patient Instructions for exercises provided during therapy sessions.    ASSESSMENT     Patient tolerated session well today. Decreased assistance required for end range lifts in prone due to improved range of motion and scapular strength. Continue to incorporate wall slides with lift off to improve functional strength, improved mobility noted     Casandra Is progressing well towards her goals.   Pt prognosis is Good.     Pt will continue to benefit from skilled outpatient physical therapy to address the deficits listed in the problem list box on initial evaluation, provide pt/family education and to maximize pt's level of independence in the home and community environment.     Pt's spiritual, cultural and educational needs considered and pt agreeable to plan of care and goals.    Anticipated Barriers for therapy: co-morbidities, chronicity of condition, motivation to improve condition, adherence to treatment plan, and coping style       Short Term Goals:  6 weeks Status  Date Met   PAIN: Pt will report worst pain of 5/10 in order to progress toward max functional ability and improve quality of life. [x] Progressing  [] Met  [] Not Met     FUNCTION: Patient will demonstrate improved function as indicated by a score of greater than or equal to 64 out of 100 on FOTO. [x] Progressing  [] Met  [] Not Met     MOBILITY: Patient will improve AROM to 50% of stated goals, listed in objective  measures above, in order to progress towards independence with functional activities.  [x] Progressing  [] Met  [] Not Met     STRENGTH: Patient will improve strength to 50% of stated goals, listed in objective measures above, in order to progress towards independence with functional activities. [x] Progressing  [] Met  [] Not Met     POSTURE: Patient will correct postural deviations in sitting and standing, to decrease pain and promote long term stability.  [x] Progressing  [] Met  [] Not Met     HEP: Patient will demonstrate independence with HEP in order to progress toward functional independence. [x] Progressing  [] Met  [] Not Met        Long Term Goals:  12 weeks Status Date Met   PAIN: Pt will report worst pain of 3/10 in order to progress toward max functional ability and improve quality of life [x] Progressing  [] Met  [] Not Met     FUNCTION: Patient will demonstrate improved function as indicated by a score of greater than or equal to 74 out of 100 on FOTO. [x] Progressing  [] Met  [] Not Met     MOBILITY: Patient will improve AROM to stated goals, listed in objective measures above, in order to return to maximal functional potential and improve quality of life.  [x] Progressing  [] Met  [] Not Met     STRENGTH: Patient will improve strength to stated goals, listed in objective measures above, in order to improve functional independence and quality of life.  [x] Progressing  [] Met  [] Not Met     Patient will return to normal ADL's, IADL's, community involvement, recreational activities, and work-related activities with less than or equal to 3/10 pain and maximal function.  [x] Progressing  [] Met  [] Not Met          PLAN     Continue Plan of Care (POC) and progress per patient tolerance. See Treatment section for exercise progression.    Asmita Miller, PT

## 2023-11-22 ENCOUNTER — OFFICE VISIT (OUTPATIENT)
Dept: RHEUMATOLOGY | Facility: CLINIC | Age: 23
End: 2023-11-22
Payer: COMMERCIAL

## 2023-11-22 DIAGNOSIS — M05.9 SEROPOSITIVE RHEUMATOID ARTHRITIS: Primary | ICD-10-CM

## 2023-11-22 DIAGNOSIS — M65.9 SYNOVITIS OF RIGHT SHOULDER: ICD-10-CM

## 2023-11-22 PROCEDURE — 99214 PR OFFICE/OUTPT VISIT, EST, LEVL IV, 30-39 MIN: ICD-10-PCS | Mod: 95,,, | Performed by: STUDENT IN AN ORGANIZED HEALTH CARE EDUCATION/TRAINING PROGRAM

## 2023-11-22 PROCEDURE — 99214 OFFICE O/P EST MOD 30 MIN: CPT | Mod: 95,,, | Performed by: STUDENT IN AN ORGANIZED HEALTH CARE EDUCATION/TRAINING PROGRAM

## 2023-11-22 NOTE — PROGRESS NOTES
[facility]   Physical Therapy Treatment Note     Name: Casandra [facility] Number: [id number]    Therapy Diagnosis:   Encounter Diagnoses   Name Primary?    Decreased functional mobility and endurance Yes    Decreased range of motion of right shoulder     Proximal muscle weakness          Physician: Chris Flanagan    Visit Date: 11/13/2023    Physician Orders: PT Eval and Treat  Medical Diagnosis from Referral: Synovitis of right shoulder [M65.811], Status post arthroscopy of right shoulder [Z98.890]  Evaluation Date: 8/31/2023  Authorization Period Expiration: 8/30/2024  Plan of Care Expiration: 2/8/2024  Progress Note Due: 12/8/2023  Visit # / Visits authorized: 19/20 (+eval)  FOTO: 1/3 (last performed on 8/31/2023)     Precautions: Standard  PTA Visit #: 0/5     Time In: 1305  Time Out: 1403  Total Billable Time: 56 minutes    SUBJECTIVE     Pt reports: she is feeling pretty good, no significant changes since previous session     Compliance with Hep: Every Other Day  Response to previous treatment: no adverse reactions to treatment/updated HEP  Functional change: Better    Pain: 0/10   Worst: 5/10  Location: top of right shoulder      OBJECTIVE     Objective Measures updated at progress report unless specified otherwise.     Treatment     Gym/Equipment Access: full  Time to Complete Exercises: increased for cueing.    Casandra received the treatments listed below:        MANUAL THERAPY TECHNIQUES were applied for (0) minutes, including:    Soft tissue mobilization:   Soft tissue mobilization R biceps, coracobrachialis and deltoid   Joint mobilizations:   Glenohumeral distraction, inferior, anterior and posterior glides   Glenohumeral inferior glide in prone   Glenohumeral inferior glide with flexion, scaption and abduction   Functional dry needling: DEFERRED        THERAPEUTIC EXERCISES to develop strength, endurance, ROM, flexibility, posture, and core stabilization for  (18) minutes including:     Performed Today:     UBE - level 3, 3'/3'  Stretches   Pec corner stretch: 10x10s holds   PROM shoulder: all directions  Half kneel rainbows: 15x B      Interventions DEFERRED today     Low load long duration stretches  flexion in supine: 2x2 minutes   90/90 ER: 3# for 3 minutes   Prayer stretch (2 way): 10x10s holds each          NEUROMUSCULAR RE-EDUCATION ACTIVITIES to improve Balance, Coordination, Kinesthetic, Sense, Proprioception, and Posture for (15) minutes.  The following were included:     Performed Today:     B shoulder ER: red band 3x10   Shoulder IR: 5# 3x10   Seated shoulder ER at 90º: red band 2x10             Interventions DEFERRED today     Supine shoulder ER: yellow band 2x10   Supine ER isometric: yellow band 2x10   Shoulder ER AROM at 90º on wall: 2x10   Prone end range lifts   Flexion 2x10  90/90 ER 2x10              THERAPEUTIC ACTIVITIES to improve dynamic and functional  performance for (23) minutes including:    Performed Today:     Wall slides with end range lift off : 10x each   Flexion   Scaption   Abduction   Windshield wiper 2x10   Push ups: 5/4/3 reps   Pull aparts: red band 2x10   Diagonal pull aparts: yellow band 2x6 B            Interventions DEFERRED today     Quadruped serratus push up 20x   Plank serratus push up: 20 inch box 20x   Plank shoulder taps 3x10   Prone Series 2x10 each   Row: 10#  ITYW 0#  Chest press: 8# 3x10   Standing TYW: yellow band 2x10 each   Wall angels: 3x10   Box push ups: 3x6   Rows 7.5# 3x10   D1 Extensions 7.5# 2x10, 5# 1x10         Next Session:  Quadruped or plank serratus press   Prone TYW   Chest press   Bicep curls         Patient Education and Home Exercises      Education provided: (time included with treatment) minutes  PURPOSE: Patient educated on the impairments noted above and the effects of physical therapy intervention to improve overall condition and QOL.   EXERCISE: Patient was educated on all the above exercise  prior/during/after for proper posture, positioning, and execution for safe performance with home exercise program.   STRENGTH: Patient educated on the importance of improved core and extremity strength in order to improve alignment of the spine and extremities with static positions and dynamic movement.   POSTURE: Patient educated on postural awareness to reduce stress and maintain optimal alignment of the spine with static positions and dynamic movement      Written Home Exercises Provided: yes.  Exercises were reviewed and Casandra was able to demonstrate them prior to the end of the session.  Casandra demonstrated good  understanding of the education provided. See EMR under Patient Instructions for exercises provided during therapy sessions.    ASSESSMENT     Patient tolerated session well today. Improved shoulder ROM noted with PROM today. Incorporated half kneel rainbows to further improve shoulder and thoracic mobility. Progressed to standard push ups from the floor to improve functional strength; significant fatigue noted with intervention but pt is able to maintain proper form throughout     Casandra Is progressing well towards her goals.   Pt prognosis is Good.     Pt will continue to benefit from skilled outpatient physical therapy to address the deficits listed in the problem list box on initial evaluation, provide pt/family education and to maximize pt's level of independence in the home and community environment.     Pt's spiritual, cultural and educational needs considered and pt agreeable to plan of care and goals.    Anticipated Barriers for therapy: co-morbidities, chronicity of condition, motivation to improve condition, adherence to treatment plan, and coping style       Short Term Goals:  6 weeks Status  Date Met   PAIN: Pt will report worst pain of 5/10 in order to progress toward max functional ability and improve quality of life. [x] Progressing  [] Met  [] Not Met     FUNCTION: Patient will  demonstrate improved function as indicated by a score of greater than or equal to 64 out of 100 on FOTO. [x] Progressing  [] Met  [] Not Met     MOBILITY: Patient will improve AROM to 50% of stated goals, listed in objective measures above, in order to progress towards independence with functional activities.  [x] Progressing  [] Met  [] Not Met     STRENGTH: Patient will improve strength to 50% of stated goals, listed in objective measures above, in order to progress towards independence with functional activities. [x] Progressing  [] Met  [] Not Met     POSTURE: Patient will correct postural deviations in sitting and standing, to decrease pain and promote long term stability.  [x] Progressing  [] Met  [] Not Met     HEP: Patient will demonstrate independence with HEP in order to progress toward functional independence. [x] Progressing  [] Met  [] Not Met        Long Term Goals:  12 weeks Status Date Met   PAIN: Pt will report worst pain of 3/10 in order to progress toward max functional ability and improve quality of life [x] Progressing  [] Met  [] Not Met     FUNCTION: Patient will demonstrate improved function as indicated by a score of greater than or equal to 74 out of 100 on FOTO. [x] Progressing  [] Met  [] Not Met     MOBILITY: Patient will improve AROM to stated goals, listed in objective measures above, in order to return to maximal functional potential and improve quality of life.  [x] Progressing  [] Met  [] Not Met     STRENGTH: Patient will improve strength to stated goals, listed in objective measures above, in order to improve functional independence and quality of life.  [x] Progressing  [] Met  [] Not Met     Patient will return to normal ADL's, IADL's, community involvement, recreational activities, and work-related activities with less than or equal to 3/10 pain and maximal function.  [x] Progressing  [] Met  [] Not Met          PLAN     Continue Plan of Care (POC) and progress per patient  tolerance. See Treatment section for exercise progression.    Asmita Miller, PT

## 2023-11-22 NOTE — PROGRESS NOTES
[facility]   Physical Therapy Treatment Note     Name: Casandra [facility] Number: [id number]    Therapy Diagnosis:   Encounter Diagnoses   Name Primary?    Decreased functional mobility and endurance Yes    Decreased range of motion of right shoulder     Proximal muscle weakness          Physician: Chris Flanagan    Visit Date: 11/15/2023    Physician Orders: PT Eval and Treat  Medical Diagnosis from Referral: Synovitis of right shoulder [M65.811], Status post arthroscopy of right shoulder [Z98.890]  Evaluation Date: 8/31/2023  Authorization Period Expiration: 8/30/2024  Plan of Care Expiration: 2/8/2024  Progress Note Due: 12/8/2023  Visit # / Visits authorized: 20/20 (+eval)  FOTO: 1/3 (last performed on 8/31/2023)     Precautions: Standard  PTA Visit #: 0/5     Time In: 1015  Time Out: 1118  Total Billable Time: 56 minutes    SUBJECTIVE     Pt reports: she is feeling pretty good, no significant changes since previous session     Compliance with Hep: Every Other Day  Response to previous treatment: no adverse reactions to treatment/updated HEP  Functional change: Better    Pain: 0/10   Worst: 5/10  Location: top of right shoulder      OBJECTIVE     Objective Measures updated at progress report unless specified otherwise.     Treatment     Gym/Equipment Access: full  Time to Complete Exercises: increased for cueing.    Casandra received the treatments listed below:        MANUAL THERAPY TECHNIQUES were applied for (0) minutes, including:    Soft tissue mobilization:   Soft tissue mobilization R biceps, coracobrachialis and deltoid   Joint mobilizations:   Glenohumeral distraction, inferior, anterior and posterior glides   Glenohumeral inferior glide in prone   Glenohumeral inferior glide with flexion, scaption and abduction   Functional dry needling: DEFERRED        THERAPEUTIC EXERCISES to develop strength, endurance, ROM, flexibility, posture, and core stabilization for  (18) minutes including:     Performed Today:     UBE - level 3, 3'/3'  Stretches   Pec corner stretch: 10x10s holds   PROM shoulder: all directions  Half kneel rainbows: 15x B   Lat ROM on wall: 2x10      Interventions DEFERRED today     Low load long duration stretches  flexion in supine: 2x2 minutes   90/90 ER: 3# for 3 minutes   Prayer stretch (2 way): 10x10s holds each          NEUROMUSCULAR RE-EDUCATION ACTIVITIES to improve Balance, Coordination, Kinesthetic, Sense, Proprioception, and Posture for (15) minutes.  The following were included:     Performed Today:     B shoulder ER: red band 3x10   Shoulder IR: 5# 3x10   Seated shoulder ER at 90º: red band 2x10             Interventions DEFERRED today     Supine shoulder ER: yellow band 2x10   Supine ER isometric: yellow band 2x10   Shoulder ER AROM at 90º on wall: 2x10   Prone end range lifts   Flexion 2x10  90/90 ER 2x10              THERAPEUTIC ACTIVITIES to improve dynamic and functional  performance for (23) minutes including:    Performed Today:     Wall slides with end range lift off : 10x each   Flexion   Scaption   Abduction   Windshield wiper 2x10   Pull aparts: red band 2x10   Diagonal pull aparts: yellow band 2x6 B   Incline chest press: 10# 3x10            Interventions DEFERRED today     Quadruped serratus push up 20x   Plank serratus push up: 20 inch box 20x   Plank shoulder taps 3x10   Prone Series 2x10 each   Row: 10#  ITYW 0#  Chest press: 8# 3x10   Standing TYW: yellow band 2x10 each   Wall angels: 3x10   Box push ups: 3x6   Rows 7.5# 3x10   D1 Extensions 7.5# 2x10, 5# 1x10  Push ups: 5/4/3 reps          Next Session:  Quadruped or plank serratus press   Prone TYW   Chest press   Bicep curls         Patient Education and Home Exercises      Education provided: (time included with treatment) minutes  PURPOSE: Patient educated on the impairments noted above and the effects of physical therapy intervention to improve overall condition and QOL.    EXERCISE: Patient was educated on all the above exercise prior/during/after for proper posture, positioning, and execution for safe performance with home exercise program.   STRENGTH: Patient educated on the importance of improved core and extremity strength in order to improve alignment of the spine and extremities with static positions and dynamic movement.   POSTURE: Patient educated on postural awareness to reduce stress and maintain optimal alignment of the spine with static positions and dynamic movement      Written Home Exercises Provided: yes.  Exercises were reviewed and Casandra was able to demonstrate them prior to the end of the session.  Casandra demonstrated good  understanding of the education provided. See EMR under Patient Instructions for exercises provided during therapy sessions.    ASSESSMENT     Patient tolerated session well today. Incorporated latissimus AROM on wall to improve shoulder flexion ROM latissimus muscle length. Mild improvement noted in shoulder flexion ROM following interventions.     Casandra Is progressing well towards her goals.   Pt prognosis is Good.     Pt will continue to benefit from skilled outpatient physical therapy to address the deficits listed in the problem list box on initial evaluation, provide pt/family education and to maximize pt's level of independence in the home and community environment.     Pt's spiritual, cultural and educational needs considered and pt agreeable to plan of care and goals.    Anticipated Barriers for therapy: co-morbidities, chronicity of condition, motivation to improve condition, adherence to treatment plan, and coping style       Short Term Goals:  6 weeks Status  Date Met   PAIN: Pt will report worst pain of 5/10 in order to progress toward max functional ability and improve quality of life. [x] Progressing  [] Met  [] Not Met     FUNCTION: Patient will demonstrate improved function as indicated by a score of greater than or equal to  64 out of 100 on FOTO. [x] Progressing  [] Met  [] Not Met     MOBILITY: Patient will improve AROM to 50% of stated goals, listed in objective measures above, in order to progress towards independence with functional activities.  [x] Progressing  [] Met  [] Not Met     STRENGTH: Patient will improve strength to 50% of stated goals, listed in objective measures above, in order to progress towards independence with functional activities. [x] Progressing  [] Met  [] Not Met     POSTURE: Patient will correct postural deviations in sitting and standing, to decrease pain and promote long term stability.  [x] Progressing  [] Met  [] Not Met     HEP: Patient will demonstrate independence with HEP in order to progress toward functional independence. [x] Progressing  [] Met  [] Not Met        Long Term Goals:  12 weeks Status Date Met   PAIN: Pt will report worst pain of 3/10 in order to progress toward max functional ability and improve quality of life [x] Progressing  [] Met  [] Not Met     FUNCTION: Patient will demonstrate improved function as indicated by a score of greater than or equal to 74 out of 100 on FOTO. [x] Progressing  [] Met  [] Not Met     MOBILITY: Patient will improve AROM to stated goals, listed in objective measures above, in order to return to maximal functional potential and improve quality of life.  [x] Progressing  [] Met  [] Not Met     STRENGTH: Patient will improve strength to stated goals, listed in objective measures above, in order to improve functional independence and quality of life.  [x] Progressing  [] Met  [] Not Met     Patient will return to normal ADL's, IADL's, community involvement, recreational activities, and work-related activities with less than or equal to 3/10 pain and maximal function.  [x] Progressing  [] Met  [] Not Met          PLAN     Continue Plan of Care (POC) and progress per patient tolerance. See Treatment section for exercise progression.    Asmita Miller, PT

## 2023-11-26 NOTE — PROGRESS NOTES
RHEUMATOLOGY CLINIC ESTABLISHED PATIENT TELEMED VISIT    The patient location is: Home  The chief complaint leading to consultation is:  Rheumatoid arthritis    Visit type: Audiovisual    Face to Face time with patient: 21  32 minutes of total time spent on the encounter, which includes face to face time and non-face to face time preparing to see the patient (eg, review of tests), Obtaining and/or reviewing separately obtained history, Documenting clinical information in the electronic or other health record, Independently interpreting results (not separately reported) and communicating results to the patient/family/caregiver, or Care coordination (not separately reported).     Each patient to whom he or she provides medical services by telemedicine is:  (1) informed of the relationship between the physician and patient and the respective role of any other health care provider with respect to management of the patient; and (2) notified that he or she may decline to receive medical services by telemedicine and may withdraw from such care at any time.    Reason for consult:- rheumatoid arthritis    Chief complaints, HPI, ROS, EXAM, Assessment & Plans:-    Casandra Barragan is a 23 y.o. pleasant female who presents to follow up for seropositive rheumatoid arthritis.  She is been doing very well she states with no significant joint pain or swelling.  Her shoulder is better and no longer hurting.  She is not started medication at this time.  States she would like to see how she does often medications to see if she develops any symptoms going forward.  Rheumatologic review of systems otherwise negative.  She appears well on video visit.  100% full fist formation.    Reviewed all available old and outside pertinent medical records.    All lab results personally reviewed and interpreted by me.    1. Seropositive rheumatoid arthritis    2. Synovitis of right shoulder        Problem List Items Addressed This Visit    None  Visit  Diagnoses       Seropositive rheumatoid arthritis    -  Primary    Synovitis of right shoulder                Patient following up today for seropositive rheumatoid arthritis  She is been doing well and does not have any current joint pains  Had recommended starting methotrexate 15 mg weekly and daily folic acid however she is not started this  She would like to see how she does without these medicines  Okay for the time being but if she develops any joint pain/tenderness/swelling would recommend starting treatment to prevent joint damage down the line    # No follow-ups on file.    Chronic comorbid conditions affecting medical decision making today:    Past Medical History:   Diagnosis Date    Allergy     Depression        Past Surgical History:   Procedure Laterality Date    SHOULDER ARTHROSCOPY Right 6/9/2023    Procedure: ARTHROSCOPY, SHOULDER;  Surgeon: Chris Flanagan MD;  Location: [facility] OR;  Service: Orthopedics;  Laterality: Right;    SYNOVECTOMY OF SHOULDER Right 6/9/2023    Procedure: SYNOVECTOMY, SHOULDER;  Surgeon: Chris Flanagan MD;  Location: [facility];  Service: Orthopedics;  Laterality: Right;        Social History     Tobacco Use    Smoking status: Never     Passive exposure: Never    Smokeless tobacco: Never   Substance Use Topics    Alcohol use: Yes     Comment: occ    Drug use: Never       Family History   Problem Relation Age of Onset    No Known Problems Mother     No Known Problems Father     Arthritis Maternal Grandfather        Review of patient's allergies indicates:   Allergen Reactions    Amoxicillin Rash    Clindamycin Other (See Comments)     Palm and foot skin peeling off  Palm and foot skin peeling off         Medication List with Changes/Refills   Current Medications    AMITRIPTYLINE (ELAVIL) 25 MG TABLET    Take 1 tablet (25 mg total) by mouth nightly as needed for Insomnia.    CELECOXIB (CELEBREX) 100 MG CAPSULE    Take 1 capsule (100 mg total) by mouth 2 (two)  times daily.    FOLIC ACID (FOLVITE) 1 MG TABLET    Take 1 tablet (1 mg total) by mouth once daily.    METHOTREXATE 2.5 MG TAB    TAKE 6 TABLETS BY MOUTH EVERY 7 DAYS.         Disclaimer: This note was prepared using voice recognition system and is likely to have sound alike errors and is not proofread.  Please message me with any questions.    32 minutes of total time spent on the encounter, which includes face to face time and non-face to face time preparing to see the patient (eg, review of tests), Obtaining and/or reviewing separately obtained history, Documenting clinical information in the electronic or other health record, Independently interpreting results (not separately reported) and communicating results to the patient/family/caregiver, or Care coordination (not separately reported).     Thank you for allowing me to participate in the care of Casandra Barragan.    Roscoe Castro MD

## 2023-11-28 ENCOUNTER — CLINICAL SUPPORT (OUTPATIENT)
Dept: REHABILITATION | Facility: HOSPITAL | Age: 23
End: 2023-11-28
Payer: COMMERCIAL

## 2023-11-28 DIAGNOSIS — M62.81 PROXIMAL MUSCLE WEAKNESS: ICD-10-CM

## 2023-11-28 DIAGNOSIS — M25.611 DECREASED RANGE OF MOTION OF RIGHT SHOULDER: ICD-10-CM

## 2023-11-28 DIAGNOSIS — Z74.09 DECREASED FUNCTIONAL MOBILITY AND ENDURANCE: Primary | ICD-10-CM

## 2023-11-28 PROCEDURE — 97530 THERAPEUTIC ACTIVITIES: CPT

## 2023-11-28 PROCEDURE — 97112 NEUROMUSCULAR REEDUCATION: CPT

## 2023-11-28 PROCEDURE — 97110 THERAPEUTIC EXERCISES: CPT

## 2023-11-28 NOTE — PROGRESS NOTES
[facility]   Physical Therapy Treatment Note     Name: Casandra [facility] Number: [id number]    Therapy Diagnosis:   Encounter Diagnoses   Name Primary?    Decreased functional mobility and endurance Yes    Decreased range of motion of right shoulder     Proximal muscle weakness          Physician: Chris Flanagan    Visit Date: 11/28/2023    Physician Orders: PT Eval and Treat  Medical Diagnosis from Referral: Synovitis of right shoulder [M65.811], Status post arthroscopy of right shoulder [Z98.890]  Evaluation Date: 8/31/2023  Authorization Period Expiration: 8/30/2024  Plan of Care Expiration: 2/8/2024  Progress Note Due: 12/8/2023  Visit # / Visits authorized: 20/20 (+eval)  FOTO: 1/3 (last performed on 8/31/2023)     Precautions: Standard  PTA Visit #: 0/5     Time In: 1100  Time Out: 1201  Total Billable Time: 55 minutes    SUBJECTIVE     Pt reports: she is feeling pretty good, no significant changes since previous session     Compliance with Hep: Every Other Day  Response to previous treatment: no adverse reactions to treatment/updated HEP  Functional change: Better    Pain: 0/10   Worst: 5/10  Location: top of right shoulder      OBJECTIVE     Objective Measures updated at progress report unless specified otherwise.     Treatment     Gym/Equipment Access: full  Time to Complete Exercises: increased for cueing.    Casandra received the treatments listed below:        MANUAL THERAPY TECHNIQUES were applied for (0) minutes, including:    Soft tissue mobilization:   Soft tissue mobilization R biceps, coracobrachialis and deltoid   Joint mobilizations:   Glenohumeral distraction, inferior, anterior and posterior glides   Glenohumeral inferior glide in prone   Glenohumeral inferior glide with flexion, scaption and abduction   Functional dry needling: DEFERRED        THERAPEUTIC EXERCISES to develop strength, endurance, ROM, flexibility, posture, and core stabilization for  (20) minutes including:     Performed Today:     UBE - level 3, 3'/3'  Stretches   Pec corner stretch: 10x10s holds   Lat table stretch: 3 minutes x 10s holds   PROM shoulder: all directions  Half kneel rainbows: 20x B        Interventions DEFERRED today     Low load long duration stretches  flexion in supine: 2x2 minutes   90/90 ER: 3# for 3 minutes   Prayer stretch (2 way): 10x10s holds each          NEUROMUSCULAR RE-EDUCATION ACTIVITIES to improve Balance, Coordination, Kinesthetic, Sense, Proprioception, and Posture for (10) minutes.  The following were included:     Performed Today:     B shoulder ER: green band 3x10   Shoulder IR: 5# 3x10              Interventions DEFERRED today     Supine shoulder ER: yellow band 2x10   Supine ER isometric: yellow band 2x10   Shoulder ER AROM at 90º on wall: 2x10   Prone end range lifts   Flexion 2x10  90/90 ER 2x10  Seated shoulder ER at 90º: red band 2x10            THERAPEUTIC ACTIVITIES to improve dynamic and functional  performance for (25) minutes including:    Performed Today:     Flexion to 90º 3# 3x10   Abduction to 90º 3# 3x10   Standing TYW   Single arm serratus press: 20 inch box 3x5   Standing TYW: yellow band 2x10   Shoulder extensions 10# 3x10                Interventions DEFERRED today       D1 Extensions 7.5# 2x10, 5# 1x10  Push ups: 5/4/3 reps   Incline chest press: 10# 3x10   Windshield wiper 2x10   Pull aparts: red band 2x10   Diagonal pull aparts: yellow band 2x6 B          Next Session:  Quadruped or plank serratus press   Prone TYW   Chest press   Bicep curls         Patient Education and Home Exercises      Education provided: (time included with treatment) minutes  PURPOSE: Patient educated on the impairments noted above and the effects of physical therapy intervention to improve overall condition and QOL.   EXERCISE: Patient was educated on all the above exercise prior/during/after for proper posture, positioning, and execution for safe performance  with home exercise program.   STRENGTH: Patient educated on the importance of improved core and extremity strength in order to improve alignment of the spine and extremities with static positions and dynamic movement.   POSTURE: Patient educated on postural awareness to reduce stress and maintain optimal alignment of the spine with static positions and dynamic movement      Written Home Exercises Provided: yes.  Exercises were reviewed and Casandra was able to demonstrate them prior to the end of the session.  Casandra demonstrated good  understanding of the education provided. See EMR under Patient Instructions for exercises provided during therapy sessions.    ASSESSMENT     Patient tolerated session well today. Progressed to single arm serratus press to improve functional strength and stability; pt demonstrates good form but notes significant fatigue with movement. Incorporated standing TYW with improved range of movement compared to when intervention was performed last.     Casandra Is progressing well towards her goals.   Pt prognosis is Good.     Pt will continue to benefit from skilled outpatient physical therapy to address the deficits listed in the problem list box on initial evaluation, provide pt/family education and to maximize pt's level of independence in the home and community environment.     Pt's spiritual, cultural and educational needs considered and pt agreeable to plan of care and goals.    Anticipated Barriers for therapy: co-morbidities, chronicity of condition, motivation to improve condition, adherence to treatment plan, and coping style       Short Term Goals:  6 weeks Status  Date Met   PAIN: Pt will report worst pain of 5/10 in order to progress toward max functional ability and improve quality of life. [x] Progressing  [] Met  [] Not Met     FUNCTION: Patient will demonstrate improved function as indicated by a score of greater than or equal to 64 out of 100 on FOTO. [x] Progressing  []  Met  [] Not Met     MOBILITY: Patient will improve AROM to 50% of stated goals, listed in objective measures above, in order to progress towards independence with functional activities.  [x] Progressing  [] Met  [] Not Met     STRENGTH: Patient will improve strength to 50% of stated goals, listed in objective measures above, in order to progress towards independence with functional activities. [x] Progressing  [] Met  [] Not Met     POSTURE: Patient will correct postural deviations in sitting and standing, to decrease pain and promote long term stability.  [x] Progressing  [] Met  [] Not Met     HEP: Patient will demonstrate independence with HEP in order to progress toward functional independence. [x] Progressing  [] Met  [] Not Met        Long Term Goals:  12 weeks Status Date Met   PAIN: Pt will report worst pain of 3/10 in order to progress toward max functional ability and improve quality of life [x] Progressing  [] Met  [] Not Met     FUNCTION: Patient will demonstrate improved function as indicated by a score of greater than or equal to 74 out of 100 on FOTO. [x] Progressing  [] Met  [] Not Met     MOBILITY: Patient will improve AROM to stated goals, listed in objective measures above, in order to return to maximal functional potential and improve quality of life.  [x] Progressing  [] Met  [] Not Met     STRENGTH: Patient will improve strength to stated goals, listed in objective measures above, in order to improve functional independence and quality of life.  [x] Progressing  [] Met  [] Not Met     Patient will return to normal ADL's, IADL's, community involvement, recreational activities, and work-related activities with less than or equal to 3/10 pain and maximal function.  [x] Progressing  [] Met  [] Not Met          PLAN     Continue Plan of Care (POC) and progress per patient tolerance. See Treatment section for exercise progression.    Asmita Miller, PT

## 2023-11-30 ENCOUNTER — CLINICAL SUPPORT (OUTPATIENT)
Dept: REHABILITATION | Facility: HOSPITAL | Age: 23
End: 2023-11-30
Payer: COMMERCIAL

## 2023-11-30 DIAGNOSIS — M62.81 PROXIMAL MUSCLE WEAKNESS: ICD-10-CM

## 2023-11-30 DIAGNOSIS — M25.611 DECREASED RANGE OF MOTION OF RIGHT SHOULDER: ICD-10-CM

## 2023-11-30 DIAGNOSIS — Z74.09 DECREASED FUNCTIONAL MOBILITY AND ENDURANCE: Primary | ICD-10-CM

## 2023-11-30 PROCEDURE — 97112 NEUROMUSCULAR REEDUCATION: CPT

## 2023-11-30 PROCEDURE — 97140 MANUAL THERAPY 1/> REGIONS: CPT

## 2023-11-30 NOTE — PROGRESS NOTES
[facility]   Physical Therapy Treatment Note     Name: Casandra [facility] Number: [id number]    Therapy Diagnosis:   Encounter Diagnoses   Name Primary?    Decreased functional mobility and endurance Yes    Decreased range of motion of right shoulder     Proximal muscle weakness          Physician: Chris Flanagan    Visit Date: 11/30/2023    Physician Orders: PT Eval and Treat  Medical Diagnosis from Referral: Synovitis of right shoulder [M65.811], Status post arthroscopy of right shoulder [Z98.890]  Evaluation Date: 8/31/2023  Authorization Period Expiration: 8/30/2024  Plan of Care Expiration: 2/8/2024  Progress Note Due: 12/8/2023  Visit # / Visits authorized: 22 /20 (+eval)  FOTO: 1/3 (last performed on 8/31/2023)     Precautions: Standard  PTA Visit #: 0/5     Time In: 1100  Time Out: 82312  Total Billable Time: 65 minutes    SUBJECTIVE     Pt reports: she is feeling pretty good, no significant changes since previous session     Compliance with Hep: Every Other Day  Response to previous treatment: no adverse reactions to treatment/updated HEP  Functional change: Better    Pain: 0/10   Worst: 5/10  Location: top of right shoulder      OBJECTIVE     Objective Measures updated at progress report unless specified otherwise.     Treatment     Gym/Equipment Access: full  Time to Complete Exercises: increased for cueing.    Casandra received the treatments listed below:        MANUAL THERAPY TECHNIQUES were applied for (0) minutes, including:    Soft tissue mobilization:   Soft tissue mobilization R biceps, coracobrachialis and deltoid   Joint mobilizations:   Glenohumeral distraction, inferior, anterior and posterior glides   Glenohumeral inferior glide in prone   Glenohumeral inferior glide with flexion, scaption and abduction   Functional dry needling: DEFERRED        THERAPEUTIC EXERCISES to develop strength, endurance, ROM, flexibility, posture, and core stabilization for  (20) minutes including:     Performed Today:     UBE - level 3, 3'/3'  Stretches   Pec corner stretch: 10x10s holds   Lat table stretch: 3 minutes x 10s holds   PROM shoulder: all directions  Half kneel rainbows: 20x B        Interventions DEFERRED today     Low load long duration stretches  flexion in supine: 2x2 minutes   90/90 ER: 3# for 3 minutes   Prayer stretch (2 way): 10x10s holds each          NEUROMUSCULAR RE-EDUCATION ACTIVITIES to improve Balance, Coordination, Kinesthetic, Sense, Proprioception, and Posture for (45) minutes.  The following were included:     Performed Today:     Prone Marley Series, 5s x20 each  - Retractions #3  - + Extension #3  - + Reverse Scaption #3  - +Horizontal Abudciton #0 (fatigued)    Sidelying external rotation #3 3x20 reps    Push Plus Series  - Serratus push up 3x25  - Shoulder Taps 3x25  - Alternating shoulder raise 3x25             Interventions DEFERRED today     Supine shoulder ER: yellow band 2x10   Supine ER isometric: yellow band 2x10   Shoulder ER AROM at 90º on wall: 2x10   Prone end range lifts   Flexion 2x10  90/90 ER 2x10  Seated shoulder ER at 90º: red band 2x10            THERAPEUTIC ACTIVITIES to improve dynamic and functional  performance for (00) minutes including:    Performed Today:                    Interventions DEFERRED today     Flexion to 90º 3# 3x10   Abduction to 90º 3# 3x10   Standing TYW   Single arm serratus press: 20 inch box 3x5   Standing TYW: yellow band 2x10   Shoulder extensions 10# 3x10   D1 Extensions 7.5# 2x10, 5# 1x10  Push ups: 5/4/3 reps   Incline chest press: 10# 3x10   Windshield wiper 2x10   Pull aparts: red band 2x10   Diagonal pull aparts: yellow band 2x6 B        Next Session:       Patient Education and Home Exercises      Education provided: (time included with treatment) minutes  PURPOSE: Patient educated on the impairments noted above and the effects of physical therapy intervention to improve overall condition and  QOL.   EXERCISE: Patient was educated on all the above exercise prior/during/after for proper posture, positioning, and execution for safe performance with home exercise program.   STRENGTH: Patient educated on the importance of improved core and extremity strength in order to improve alignment of the spine and extremities with static positions and dynamic movement.   POSTURE: Patient educated on postural awareness to reduce stress and maintain optimal alignment of the spine with static positions and dynamic movement      Written Home Exercises Provided: yes.  Exercises were reviewed and Casandra was able to demonstrate them prior to the end of the session.  Casandra demonstrated good  understanding of the education provided. See EMR under Patient Instructions for exercises provided during therapy sessions.    ASSESSMENT     Casandra Barragan tolerated Physical Therapy  session well with minimal  complaints of pain or discomfort.  Objective findings are improving with pain, endurance.  Casandra Barragan continues to have difficulty with heavy endurance based exercises.  Therapy exercises were reviewed by revisiting exercises given from previous home exercise program while adding weight to scapular thoracic endurance training exercises to challenge motor control.  Handouts were not issued during today's visit. Casandra demonstrated good understanding of new exercises and will continue to progress at home until next follow-up.      Casandra Is progressing well towards her goals.   Pt prognosis is Good.     Pt will continue to benefit from skilled outpatient physical therapy to address the deficits listed in the problem list box on initial evaluation, provide pt/family education and to maximize pt's level of independence in the home and community environment.     Pt's spiritual, cultural and educational needs considered and pt agreeable to plan of care and goals.    Anticipated Barriers for therapy: co-morbidities, chronicity of  condition, motivation to improve condition, adherence to treatment plan, and coping style       Short Term Goals:  6 weeks Status  Date Met   PAIN: Pt will report worst pain of 5/10 in order to progress toward max functional ability and improve quality of life. [x] Progressing  [] Met  [] Not Met     FUNCTION: Patient will demonstrate improved function as indicated by a score of greater than or equal to 64 out of 100 on FOTO. [x] Progressing  [] Met  [] Not Met     MOBILITY: Patient will improve AROM to 50% of stated goals, listed in objective measures above, in order to progress towards independence with functional activities.  [x] Progressing  [] Met  [] Not Met     STRENGTH: Patient will improve strength to 50% of stated goals, listed in objective measures above, in order to progress towards independence with functional activities. [x] Progressing  [] Met  [] Not Met     POSTURE: Patient will correct postural deviations in sitting and standing, to decrease pain and promote long term stability.  [x] Progressing  [] Met  [] Not Met     HEP: Patient will demonstrate independence with HEP in order to progress toward functional independence. [x] Progressing  [] Met  [] Not Met        Long Term Goals:  12 weeks Status Date Met   PAIN: Pt will report worst pain of 3/10 in order to progress toward max functional ability and improve quality of life [x] Progressing  [] Met  [] Not Met     FUNCTION: Patient will demonstrate improved function as indicated by a score of greater than or equal to 74 out of 100 on FOTO. [x] Progressing  [] Met  [] Not Met     MOBILITY: Patient will improve AROM to stated goals, listed in objective measures above, in order to return to maximal functional potential and improve quality of life.  [x] Progressing  [] Met  [] Not Met     STRENGTH: Patient will improve strength to stated goals, listed in objective measures above, in order to improve functional independence and quality of life.  [x]  Progressing  [] Met  [] Not Met     Patient will return to normal ADL's, IADL's, community involvement, recreational activities, and work-related activities with less than or equal to 3/10 pain and maximal function.  [x] Progressing  [] Met  [] Not Met          PLAN     Continue Plan of Care (POC) and progress per patient tolerance. See Treatment section for exercise progression.    Genia Fagan, PT

## 2023-12-04 ENCOUNTER — CLINICAL SUPPORT (OUTPATIENT)
Dept: REHABILITATION | Facility: HOSPITAL | Age: 23
End: 2023-12-04
Payer: COMMERCIAL

## 2023-12-04 DIAGNOSIS — M25.611 DECREASED RANGE OF MOTION OF RIGHT SHOULDER: ICD-10-CM

## 2023-12-04 DIAGNOSIS — Z74.09 DECREASED FUNCTIONAL MOBILITY AND ENDURANCE: Primary | ICD-10-CM

## 2023-12-04 DIAGNOSIS — M62.81 PROXIMAL MUSCLE WEAKNESS: ICD-10-CM

## 2023-12-04 PROCEDURE — 97110 THERAPEUTIC EXERCISES: CPT

## 2023-12-04 PROCEDURE — 97530 THERAPEUTIC ACTIVITIES: CPT

## 2023-12-04 PROCEDURE — 97112 NEUROMUSCULAR REEDUCATION: CPT

## 2023-12-04 NOTE — PROGRESS NOTES
[facility]   Physical Therapy Treatment Note     Name: Casandra [facility] Number: [id number]    Therapy Diagnosis:   Encounter Diagnoses   Name Primary?    Decreased functional mobility and endurance Yes    Decreased range of motion of right shoulder     Proximal muscle weakness          Physician: Chris Flanagan    Visit Date: 12/4/2023    Physician Orders: PT Eval and Treat  Medical Diagnosis from Referral: Synovitis of right shoulder [M65.811], Status post arthroscopy of right shoulder [Z98.890]  Evaluation Date: 8/31/2023  Authorization Period Expiration: 8/30/2024  Plan of Care Expiration: 2/8/2024  Progress Note Due: 12/8/2023  Visit # / Visits authorized: 24 /20 (+eval)  FOTO: 1/3 (last performed on 8/31/2023)     Precautions: Standard  PTA Visit #: 0/5     Time In: 1505  Time Out: 1608  Total Billable Time: 58 minutes    SUBJECTIVE     Pt reports: she is feeling pretty good, no significant changes since previous session.     Compliance with Hep: Every Other Day  Response to previous treatment: no adverse reactions to treatment/updated HEP  Functional change: Better    Pain: 0/10   Worst: 5/10  Location: top of right shoulder      OBJECTIVE     Objective Measures updated at progress report unless specified otherwise.     Treatment     Gym/Equipment Access: full  Time to Complete Exercises: increased for cueing.    Casandra received the treatments listed below:        MANUAL THERAPY TECHNIQUES were applied for (0) minutes, including:    Soft tissue mobilization:   Soft tissue mobilization R biceps, coracobrachialis and deltoid   Joint mobilizations:   Glenohumeral distraction, inferior, anterior and posterior glides   Glenohumeral inferior glide in prone   Glenohumeral inferior glide with flexion, scaption and abduction   Functional dry needling: DEFERRED        THERAPEUTIC EXERCISES to develop strength, endurance, ROM, flexibility, posture, and core stabilization for  (15) minutes including:     Performed Today:     UBE - level 3, 3'/3'  Stretches   Pec corner stretch: 10x10s holds   Lat table stretch: 3 minutes x 10s holds   PROM shoulder: all directions  Half kneel rainbows: 20x B        Interventions DEFERRED today               NEUROMUSCULAR RE-EDUCATION ACTIVITIES to improve Balance, Coordination, Kinesthetic, Sense, Proprioception, and Posture for (20) minutes.  The following were included:     Performed Today:     Prone TYWI 3x10 each   I: 3#   TYW: 0#   Shoulder IR: 7.5# 3x10   Sidelying external rotation #3   Supine shoulder ER at 90º: red band 3x10      Interventions DEFERRED today                 THERAPEUTIC ACTIVITIES to improve dynamic and functional  performance for (23) minutes including:    Performed Today:     Flexion to 90º 3# 3x10   Abduction to 90º 3# 3x10   D1 Extensions 5# 3x10,  chest press: 10# 3x10   Pull aparts: red band 2x10   Diagonal pull aparts: red band 2x10 B      Interventions DEFERRED today              Next Session:       Patient Education and Home Exercises      Education provided: (time included with treatment) minutes  PURPOSE: Patient educated on the impairments noted above and the effects of physical therapy intervention to improve overall condition and QOL.   EXERCISE: Patient was educated on all the above exercise prior/during/after for proper posture, positioning, and execution for safe performance with home exercise program.   STRENGTH: Patient educated on the importance of improved core and extremity strength in order to improve alignment of the spine and extremities with static positions and dynamic movement.   POSTURE: Patient educated on postural awareness to reduce stress and maintain optimal alignment of the spine with static positions and dynamic movement      Written Home Exercises Provided: yes.  Exercises were reviewed and Casandra was able to demonstrate them prior to the end of the session.  Casandra demonstrated good   understanding of the education provided. See EMR under Patient Instructions for exercises provided during therapy sessions.    ASSESSMENT     Casandra Barragan tolerated Physical Therapy  session well with minimal  complaints of pain or discomfort.  Casandra Barragan continues to have difficulty with strength above 90º of elevation and heavy endurance based exercises.  Cueing required to reduce shoulder hiking with prone Y and W as well as pull aparts     Casandra Is progressing well towards her goals.   Pt prognosis is Good.     Pt will continue to benefit from skilled outpatient physical therapy to address the deficits listed in the problem list box on initial evaluation, provide pt/family education and to maximize pt's level of independence in the home and community environment.     Pt's spiritual, cultural and educational needs considered and pt agreeable to plan of care and goals.    Anticipated Barriers for therapy: co-morbidities, chronicity of condition, motivation to improve condition, adherence to treatment plan, and coping style       Short Term Goals:  6 weeks Status  Date Met   PAIN: Pt will report worst pain of 5/10 in order to progress toward max functional ability and improve quality of life. [x] Progressing  [] Met  [] Not Met     FUNCTION: Patient will demonstrate improved function as indicated by a score of greater than or equal to 64 out of 100 on FOTO. [x] Progressing  [] Met  [] Not Met     MOBILITY: Patient will improve AROM to 50% of stated goals, listed in objective measures above, in order to progress towards independence with functional activities.  [x] Progressing  [] Met  [] Not Met     STRENGTH: Patient will improve strength to 50% of stated goals, listed in objective measures above, in order to progress towards independence with functional activities. [x] Progressing  [] Met  [] Not Met     POSTURE: Patient will correct postural deviations in sitting and standing, to decrease pain and promote  long term stability.  [x] Progressing  [] Met  [] Not Met     HEP: Patient will demonstrate independence with HEP in order to progress toward functional independence. [x] Progressing  [] Met  [] Not Met        Long Term Goals:  12 weeks Status Date Met   PAIN: Pt will report worst pain of 3/10 in order to progress toward max functional ability and improve quality of life [x] Progressing  [] Met  [] Not Met     FUNCTION: Patient will demonstrate improved function as indicated by a score of greater than or equal to 74 out of 100 on FOTO. [x] Progressing  [] Met  [] Not Met     MOBILITY: Patient will improve AROM to stated goals, listed in objective measures above, in order to return to maximal functional potential and improve quality of life.  [x] Progressing  [] Met  [] Not Met     STRENGTH: Patient will improve strength to stated goals, listed in objective measures above, in order to improve functional independence and quality of life.  [x] Progressing  [] Met  [] Not Met     Patient will return to normal ADL's, IADL's, community involvement, recreational activities, and work-related activities with less than or equal to 3/10 pain and maximal function.  [x] Progressing  [] Met  [] Not Met          PLAN     Continue Plan of Care (POC) and progress per patient tolerance. See Treatment section for exercise progression.    Asmita Miller, PT

## 2023-12-12 ENCOUNTER — CLINICAL SUPPORT (OUTPATIENT)
Dept: REHABILITATION | Facility: HOSPITAL | Age: 23
End: 2023-12-12
Payer: COMMERCIAL

## 2023-12-12 DIAGNOSIS — Z74.09 DECREASED FUNCTIONAL MOBILITY AND ENDURANCE: Primary | ICD-10-CM

## 2023-12-12 DIAGNOSIS — M25.611 DECREASED RANGE OF MOTION OF RIGHT SHOULDER: ICD-10-CM

## 2023-12-12 DIAGNOSIS — M62.81 PROXIMAL MUSCLE WEAKNESS: ICD-10-CM

## 2023-12-12 PROCEDURE — 97530 THERAPEUTIC ACTIVITIES: CPT

## 2023-12-12 PROCEDURE — 97110 THERAPEUTIC EXERCISES: CPT

## 2023-12-23 NOTE — PROGRESS NOTES
[facility]   Physical Therapy Treatment Note     Name: Casandra [facility] Number: [id number]    Therapy Diagnosis:   Encounter Diagnoses   Name Primary?    Decreased functional mobility and endurance Yes    Decreased range of motion of right shoulder     Proximal muscle weakness          Physician: Chris Flanagan    Visit Date: 12/12/2023    Physician Orders: PT Eval and Treat  Medical Diagnosis from Referral: Synovitis of right shoulder [M65.811], Status post arthroscopy of right shoulder [Z98.890]  Evaluation Date: 8/31/2023  Authorization Period Expiration: 8/30/2024  Plan of Care Expiration: 2/8/2024  Progress Note Due: 12/8/2023  Visit # / Visits authorized: 24 /20 (+eval)  FOTO: 1/3 (last performed on 8/31/2023)     Precautions: Standard  PTA Visit #: 0/5     Time In: 1000  Time Out: 1105  Total Billable Time: 56 minutes    SUBJECTIVE     Pt reports: she is feeling pretty good, no significant changes since previous session.     Compliance with Hep: Every Other Day  Response to previous treatment: no adverse reactions to treatment/updated HEP  Functional change: Better    Pain: 0/10   Worst: 5/10  Location: top of right shoulder      OBJECTIVE     Objective Measures updated at progress report unless specified otherwise.     Treatment     Gym/Equipment Access: full  Time to Complete Exercises: increased for cueing.    Casandra received the treatments listed below:        MANUAL THERAPY TECHNIQUES were applied for (0) minutes, including:    Soft tissue mobilization:   Soft tissue mobilization R biceps, coracobrachialis and deltoid   Joint mobilizations:   Glenohumeral distraction, inferior, anterior and posterior glides   Glenohumeral inferior glide in prone   Glenohumeral inferior glide with flexion, scaption and abduction   Functional dry needling: DEFERRED        THERAPEUTIC EXERCISES to develop strength, endurance, ROM, flexibility, posture, and core stabilization for  (15) minutes including:     Performed Today:     UBE - level 3, 3'/3'  Stretches   Pec corner stretch: 10x10s holds   Lat table stretch: 3 minutes x 10s holds   PROM shoulder: all directions  Half kneel rainbows: 20x B        Interventions DEFERRED today               NEUROMUSCULAR RE-EDUCATION ACTIVITIES to improve Balance, Coordination, Kinesthetic, Sense, Proprioception, and Posture for (22) minutes.  The following were included:     Performed Today:     Prone TYWI 3x10 each   I: 3#   TYW: 0#   Shoulder IR: 7.5# 3x10   Sidelying external rotation #3   Supine shoulder ER at 90º: red band 3x10      Interventions DEFERRED today                 THERAPEUTIC ACTIVITIES to improve dynamic and functional  performance for (23) minutes including:    Performed Today:     Flexion to 90º 3# 3x10   Abduction to 90º 3# 3x10   D1 Extensions 5# 3x10,  chest press: 10# 3x10   Pull aparts: red band 2x10   Diagonal pull aparts: red band 2x10 B      Interventions DEFERRED today              Next Session:       Patient Education and Home Exercises      Education provided: (time included with treatment) minutes  PURPOSE: Patient educated on the impairments noted above and the effects of physical therapy intervention to improve overall condition and QOL.   EXERCISE: Patient was educated on all the above exercise prior/during/after for proper posture, positioning, and execution for safe performance with home exercise program.   STRENGTH: Patient educated on the importance of improved core and extremity strength in order to improve alignment of the spine and extremities with static positions and dynamic movement.   POSTURE: Patient educated on postural awareness to reduce stress and maintain optimal alignment of the spine with static positions and dynamic movement      Written Home Exercises Provided: yes.  Exercises were reviewed and Casandra was able to demonstrate them prior to the end of the session.  Casandra demonstrated good   understanding of the education provided. See EMR under Patient Instructions for exercises provided during therapy sessions.    ASSESSMENT     Casandra Barragan tolerated Physical Therapy  session well with minimal  complaints of pain or discomfort.  Casandra Barragan continues to have difficulty with strength above 90º of elevation and heavy endurance based exercises.  Cueing required to reduce shoulder hiking with prone Y and W as well as pull aparts     Casandra Is progressing well towards her goals.   Pt prognosis is Good.     Pt will continue to benefit from skilled outpatient physical therapy to address the deficits listed in the problem list box on initial evaluation, provide pt/family education and to maximize pt's level of independence in the home and community environment.     Pt's spiritual, cultural and educational needs considered and pt agreeable to plan of care and goals.    Anticipated Barriers for therapy: co-morbidities, chronicity of condition, motivation to improve condition, adherence to treatment plan, and coping style       Short Term Goals:  6 weeks Status  Date Met   PAIN: Pt will report worst pain of 5/10 in order to progress toward max functional ability and improve quality of life. [x] Progressing  [] Met  [] Not Met     FUNCTION: Patient will demonstrate improved function as indicated by a score of greater than or equal to 64 out of 100 on FOTO. [x] Progressing  [] Met  [] Not Met     MOBILITY: Patient will improve AROM to 50% of stated goals, listed in objective measures above, in order to progress towards independence with functional activities.  [x] Progressing  [] Met  [] Not Met     STRENGTH: Patient will improve strength to 50% of stated goals, listed in objective measures above, in order to progress towards independence with functional activities. [x] Progressing  [] Met  [] Not Met     POSTURE: Patient will correct postural deviations in sitting and standing, to decrease pain and promote  long term stability.  [x] Progressing  [] Met  [] Not Met     HEP: Patient will demonstrate independence with HEP in order to progress toward functional independence. [x] Progressing  [] Met  [] Not Met        Long Term Goals:  12 weeks Status Date Met   PAIN: Pt will report worst pain of 3/10 in order to progress toward max functional ability and improve quality of life [x] Progressing  [] Met  [] Not Met     FUNCTION: Patient will demonstrate improved function as indicated by a score of greater than or equal to 74 out of 100 on FOTO. [x] Progressing  [] Met  [] Not Met     MOBILITY: Patient will improve AROM to stated goals, listed in objective measures above, in order to return to maximal functional potential and improve quality of life.  [x] Progressing  [] Met  [] Not Met     STRENGTH: Patient will improve strength to stated goals, listed in objective measures above, in order to improve functional independence and quality of life.  [x] Progressing  [] Met  [] Not Met     Patient will return to normal ADL's, IADL's, community involvement, recreational activities, and work-related activities with less than or equal to 3/10 pain and maximal function.  [x] Progressing  [] Met  [] Not Met          PLAN     Continue Plan of Care (POC) and progress per patient tolerance. See Treatment section for exercise progression.    Asmita Miller, PT

## 2024-02-09 ENCOUNTER — CLINICAL SUPPORT (OUTPATIENT)
Dept: REHABILITATION | Facility: HOSPITAL | Age: 24
End: 2024-02-09
Payer: COMMERCIAL

## 2024-02-09 DIAGNOSIS — M62.81 PROXIMAL MUSCLE WEAKNESS: ICD-10-CM

## 2024-02-09 DIAGNOSIS — M25.611 DECREASED RANGE OF MOTION OF RIGHT SHOULDER: ICD-10-CM

## 2024-02-09 DIAGNOSIS — Z74.09 DECREASED FUNCTIONAL MOBILITY AND ENDURANCE: Primary | ICD-10-CM

## 2024-02-09 PROCEDURE — 97530 THERAPEUTIC ACTIVITIES: CPT

## 2024-02-09 PROCEDURE — 97112 NEUROMUSCULAR REEDUCATION: CPT

## 2024-02-09 PROCEDURE — 97110 THERAPEUTIC EXERCISES: CPT

## 2024-02-19 NOTE — PROGRESS NOTES
[facility]   Physical Therapy Treatment Note / POC Update      Name: Casandra Barragan  [facility] Number: [id number]    Therapy Diagnosis:   Encounter Diagnoses   Name Primary?    Decreased functional mobility and endurance Yes    Decreased range of motion of right shoulder     Proximal muscle weakness          Physician: Chris Flanagan    Visit Date: 2/9/2024    Physician Orders: PT Eval and Treat  Medical Diagnosis from Referral: Synovitis of right shoulder [M65.811], Status post arthroscopy of right shoulder [Z98.890]  Evaluation Date: 8/31/2023  Authorization Period Expiration: 8/30/2024  Plan of Care Expiration: 2/8/2024  Progress Note Due: 12/8/2023  Visit # / Visits authorized: 24 /20 (+eval)  FOTO: 1/3 (last performed on 8/31/2023)     Precautions: Standard  PTA Visit #: 0/5     Time In: 0905  Time Out: 1010  Total Billable Time: 58 minutes    SUBJECTIVE     Pt reports: she has been feeling good with minimal complaints of shoulder discomfort. States she has not been working out much. Wants to be able to return to higher level tumbling     Compliance with Hep: Every Other Day  Response to previous treatment: no adverse reactions to treatment/updated HEP  Functional change: Better    Pain: 0/10   Worst: 5/10  Location: top of right shoulder      OBJECTIVE     Objective Measures updated at progress report unless specified otherwise.    RANGE OF MOTION:      Shoulder AROM/PROM Right  (Eval) Right  2/9/2023 Left Pain/Dysfunction with Movement Goal   Shoulder Flexion (180º) 132/140 175 180 Pain free 170    Shoulder Abduction (180º) 112/123 175 180 Pain free 170   Shoulder Extension (60º) 64 75 75 Pain free 75   Shoulder ER  at 90º (90º) 83 90 90 Pain free 90   Shoulder IR at 90º (70º) 41 70 73 Pain free 70   Functional ER T2  T5 T6 Pain free T6   Functional IR Inferior angle of scapula Inferior angle of scapula superior angle of scapula Pain free           U/E MMT Right Left  Pain/Dysfunction with Movement Goal   Shoulder Flexion 4+/5 4+/5   4+/5 B   Shoulder Extension 4+/5 5/5   4+/5 B   Shoulder Abduction 4/5 4+/5   4+/5 B   Shoulder IR 5/5 5/5   4+/5 B   Shoulder ER 4+/5 4+/5   4+/5 B   Serratus Anterior 4+/5 5/5   4+/5 B   Middle Trapezius 4/5 4/5   4+/5 B   Lower Trapezius 4/5 4/5   4+/5 B   Elbow Flexion  5/5 5/5   5/5 B   Elbow Extension 5/5 5/5   5/5 B         Treatment     Gym/Equipment Access: full  Time to Complete Exercises: increased for cueingTobias Guajardo received the treatments listed below:        MANUAL THERAPY TECHNIQUES were applied for (0) minutes, including:    Soft tissue mobilization:   Soft tissue mobilization R biceps, coracobrachialis and deltoid   Joint mobilizations:   Glenohumeral distraction, inferior, anterior and posterior glides   Glenohumeral inferior glide in prone   Glenohumeral inferior glide with flexion, scaption and abduction   Functional dry needling: DEFERRED        THERAPEUTIC EXERCISES to develop strength, endurance, ROM, flexibility, posture, and core stabilization for (20) minutes including:     Performed Today:     UBE - level 3, 3'/3'  Stretches   Pec corner stretch: 10x10s holds   Lat table stretch: 3 minutes x 10s holds   PROM shoulder: all directions  Half kneel rainbows: 20x B        Interventions DEFERRED today               NEUROMUSCULAR RE-EDUCATION ACTIVITIES to improve Balance, Coordination, Kinesthetic, Sense, Proprioception, and Posture for (8) minutes.  The following were included:     Performed Today:     B shoulder ER: green band 3x10   Shoulder IR: 5# band 3x10      Interventions DEFERRED today     Prone TYWI 3x10 each   I: 3#   TYW: 0#   Shoulder IR: 7.5# 3x10   Sidelying external rotation #3   Supine shoulder ER at 90º: red band 3x10             THERAPEUTIC ACTIVITIES to improve dynamic and functional  performance for (30) minutes including:    Performed Today:     Rows: 10# 3x10   Shoulder extensions 7.5# 3x10   Flexion to  90º 3# 3x10   Abduction to 90º 3# 3x10   D1 Extensions 5# 3x10,  chest press: 10# 3x10   Pull aparts: green band 2x10   Diagonal pull aparts: green band 2x10 B      Interventions DEFERRED today              Next Session:       Patient Education and Home Exercises      Education provided: (time included with treatment) minutes  PURPOSE: Patient educated on the impairments noted above and the effects of physical therapy intervention to improve overall condition and QOL.   EXERCISE: Patient was educated on all the above exercise prior/during/after for proper posture, positioning, and execution for safe performance with home exercise program.   STRENGTH: Patient educated on the importance of improved core and extremity strength in order to improve alignment of the spine and extremities with static positions and dynamic movement.   POSTURE: Patient educated on postural awareness to reduce stress and maintain optimal alignment of the spine with static positions and dynamic movement      Written Home Exercises Provided: yes.  Exercises were reviewed and Casandra was able to demonstrate them prior to the end of the session.  Casandra demonstrated good  understanding of the education provided. See EMR under Patient Instructions for exercises provided during therapy sessions.    ASSESSMENT     Casandra Barragan tolerated Physical Therapy  session well with minimal  complaints of pain or discomfort. Regressed interventions today due to it having been over a month since the pt exercised. An assessment was completed today with improvements noted in shoulder range of motion and gross upper extremity strength compared to prior assessment; please see exam for details.  The above impairments and functional deficits will continue to be addressed over the course of this plan of care. Casandra was educated on continued progression of PT, expectations for the duration of care, updates on goals set at initial evaluation, and home exercise  program.  Casandra will continue to benefit from physical therapy in order to further address goals, maximize function and quality of life.     Casandra Is progressing well towards her goals.   Pt prognosis is Good.     Pt will continue to benefit from skilled outpatient physical therapy to address the deficits listed in the problem list box on initial evaluation, provide pt/family education and to maximize pt's level of independence in the home and community environment.     Pt's spiritual, cultural and educational needs considered and pt agreeable to plan of care and goals.    Anticipated Barriers for therapy: co-morbidities, chronicity of condition, motivation to improve condition, adherence to treatment plan, and coping style       Short Term Goals:  6 weeks Status  Date Met   PAIN: Pt will report worst pain of 5/10 in order to progress toward max functional ability and improve quality of life. [x] Progressing  [] Met  [] Not Met     FUNCTION: Patient will demonstrate improved function as indicated by a score of greater than or equal to 64 out of 100 on FOTO. [x] Progressing  [] Met  [] Not Met     MOBILITY: Patient will improve AROM to 50% of stated goals, listed in objective measures above, in order to progress towards independence with functional activities.  [x] Progressing  [] Met  [] Not Met     STRENGTH: Patient will improve strength to 50% of stated goals, listed in objective measures above, in order to progress towards independence with functional activities. [x] Progressing  [] Met  [] Not Met     POSTURE: Patient will correct postural deviations in sitting and standing, to decrease pain and promote long term stability.  [x] Progressing  [] Met  [] Not Met     HEP: Patient will demonstrate independence with HEP in order to progress toward functional independence. [x] Progressing  [] Met  [] Not Met        Long Term Goals:  12 weeks Status Date Met   PAIN: Pt will report worst pain of 3/10 in order to  progress toward max functional ability and improve quality of life [x] Progressing  [] Met  [] Not Met     FUNCTION: Patient will demonstrate improved function as indicated by a score of greater than or equal to 74 out of 100 on FOTO. [x] Progressing  [] Met  [] Not Met     MOBILITY: Patient will improve AROM to stated goals, listed in objective measures above, in order to return to maximal functional potential and improve quality of life.  [x] Progressing  [] Met  [] Not Met     STRENGTH: Patient will improve strength to stated goals, listed in objective measures above, in order to improve functional independence and quality of life.  [x] Progressing  [] Met  [] Not Met     Patient will return to normal ADL's, IADL's, community involvement, recreational activities, and work-related activities with less than or equal to 3/10 pain and maximal function.  [x] Progressing  [] Met  [] Not Met          PLAN     Continue Plan of Care (POC) and progress per patient tolerance. See Treatment section for exercise progression.    Asmita Miller, PT

## 2024-02-23 ENCOUNTER — CLINICAL SUPPORT (OUTPATIENT)
Dept: REHABILITATION | Facility: HOSPITAL | Age: 24
End: 2024-02-23
Payer: COMMERCIAL

## 2024-02-23 DIAGNOSIS — Z74.09 DECREASED FUNCTIONAL MOBILITY AND ENDURANCE: Primary | ICD-10-CM

## 2024-02-23 DIAGNOSIS — M25.611 DECREASED RANGE OF MOTION OF RIGHT SHOULDER: ICD-10-CM

## 2024-02-23 DIAGNOSIS — M62.81 PROXIMAL MUSCLE WEAKNESS: ICD-10-CM

## 2024-02-23 PROCEDURE — 97530 THERAPEUTIC ACTIVITIES: CPT

## 2024-02-23 PROCEDURE — 97112 NEUROMUSCULAR REEDUCATION: CPT

## 2024-02-23 PROCEDURE — 97110 THERAPEUTIC EXERCISES: CPT

## 2024-02-23 NOTE — PROGRESS NOTES
[facility]   Physical Therapy Treatment Note     Name: Casandra [facility] Number: [id number]    Therapy Diagnosis:   Encounter Diagnoses   Name Primary?    Decreased functional mobility and endurance Yes    Decreased range of motion of right shoulder     Proximal muscle weakness        Physician: Chris Flanagan    Visit Date: 2/23/2024    Physician Orders: PT Eval and Treat  Medical Diagnosis from Referral: Synovitis of right shoulder [M65.811], Status post arthroscopy of right shoulder [Z98.890]  Evaluation Date: 8/31/2023  Authorization Period Expiration: 8/30/2024  Plan of Care Expiration: 2/8/2024  Progress Note Due: 12/8/2023  Visit # / Visits authorized: 2/10 (+25 from 2023)  FOTO: 1/3 (last performed on 8/31/2023)     Precautions: Standard  PTA Visit #: 0/5     Time In: 1233  Time Out: 1338  Total Billable Time: 60 minutes    SUBJECTIVE     Pt reports: her right shoulder is feeling pretty good. Feels that she has been more limited by her left shoulder. States she has periods where she has no pain and then other times where she has sharp pain and has difficulty lifting her arm     Compliance with Hep: Every Other Day  Response to previous treatment: no adverse reactions to treatment/updated HEP  Functional change: Better    Pain: 0/10   Worst: 5/10  Location: top of right shoulder      OBJECTIVE     Objective Measures updated at progress report unless specified otherwise.      Treatment     Gym/Equipment Access: full  Time to Complete Exercises: increased for cueing.    Casandra received the treatments listed below:        MANUAL THERAPY TECHNIQUES were applied for (0) minutes, including:    Soft tissue mobilization:   Soft tissue mobilization R biceps, coracobrachialis and deltoid   Joint mobilizations:   Glenohumeral distraction, inferior, anterior and posterior glides   Glenohumeral inferior glide in prone   Glenohumeral inferior glide with flexion, scaption and  abduction   Functional dry needling: DEFERRED        THERAPEUTIC EXERCISES to develop strength, endurance, ROM, flexibility, posture, and core stabilization for (12) minutes including:     Performed Today:     UBE - level 3, 3'/3'  Stretches   Pec corner stretch: 10x10s holds   Lat table stretch: 3 minutes x 10s holds        Interventions DEFERRED today     PROM shoulder: all directions  Half kneel rainbows: 20x B           NEUROMUSCULAR RE-EDUCATION ACTIVITIES to improve Balance, Coordination, Kinesthetic, Sense, Proprioception, and Posture for (23) minutes.  The following were included:     Performed Today:     B shoulder ER: green band 3x10   Shoulder IR: 5# band 3x10   Box Plank: 4x30s   Prone 90/90 shoulder ER: 2x10   Prone T 2x10 with PT assist   Prone Y 2x5 with PT assist    Interventions DEFERRED today     Prone TYWI 3x10 each   I: 3#   TYW: 0#   Shoulder IR: 7.5# 3x10   Sidelying external rotation #3   Supine shoulder ER at 90º: red band 3x10             THERAPEUTIC ACTIVITIES to improve dynamic and functional  performance for (25) minutes including:    Performed Today:     Rows: 10# 3x10   Shoulder extensions 7.5# 3x10   Flexion to 90º 3# 3x10 on R   Abduction to 90º 3# 3x10 on R   Half kneel landmine press: 3x10 B with bar   D1 Extensions 5# 3x10,  chest press: 10# 3x10   Pull aparts: green band 2x10      Interventions DEFERRED today              Next Session:       Patient Education and Home Exercises      Education provided: (time included with treatment) minutes  PURPOSE: Patient educated on the impairments noted above and the effects of physical therapy intervention to improve overall condition and QOL.   EXERCISE: Patient was educated on all the above exercise prior/during/after for proper posture, positioning, and execution for safe performance with home exercise program.   STRENGTH: Patient educated on the importance of improved core and extremity strength in order to improve alignment of the spine  and extremities with static positions and dynamic movement.   POSTURE: Patient educated on postural awareness to reduce stress and maintain optimal alignment of the spine with static positions and dynamic movement      Written Home Exercises Provided: yes.  Exercises were reviewed and Casandra was able to demonstrate them prior to the end of the session.  Casandra demonstrated good  understanding of the education provided. See EMR under Patient Instructions for exercises provided during therapy sessions.    ASSESSMENT     Casandra Barragan tolerated Physical Therapy  session well with minimal  complaints of pain or discomfort. Progressed to previously performed interventions today; however, some were limited by left shoulder pain, such as with planks, pull aparts and B shoulder ER. [employer] landmine overhead press which pt was able to perform B with no adverse symptoms.     Casandra Is progressing well towards her goals.   Pt prognosis is Good.     Pt will continue to benefit from skilled outpatient physical therapy to address the deficits listed in the problem list box on initial evaluation, provide pt/family education and to maximize pt's level of independence in the home and community environment.     Pt's spiritual, cultural and educational needs considered and pt agreeable to plan of care and goals.    Anticipated Barriers for therapy: co-morbidities, chronicity of condition, motivation to improve condition, adherence to treatment plan, and coping style       Short Term Goals:  6 weeks Status  Date Met   PAIN: Pt will report worst pain of 5/10 in order to progress toward max functional ability and improve quality of life. [x] Progressing  [] Met  [] Not Met     FUNCTION: Patient will demonstrate improved function as indicated by a score of greater than or equal to 64 out of 100 on FOTO. [x] Progressing  [] Met  [] Not Met     MOBILITY: Patient will improve AROM to 50% of stated goals, listed in objective measures  above, in order to progress towards independence with functional activities.  [x] Progressing  [] Met  [] Not Met     STRENGTH: Patient will improve strength to 50% of stated goals, listed in objective measures above, in order to progress towards independence with functional activities. [x] Progressing  [] Met  [] Not Met     POSTURE: Patient will correct postural deviations in sitting and standing, to decrease pain and promote long term stability.  [x] Progressing  [] Met  [] Not Met     HEP: Patient will demonstrate independence with HEP in order to progress toward functional independence. [x] Progressing  [] Met  [] Not Met        Long Term Goals:  12 weeks Status Date Met   PAIN: Pt will report worst pain of 3/10 in order to progress toward max functional ability and improve quality of life [x] Progressing  [] Met  [] Not Met     FUNCTION: Patient will demonstrate improved function as indicated by a score of greater than or equal to 74 out of 100 on FOTO. [x] Progressing  [] Met  [] Not Met     MOBILITY: Patient will improve AROM to stated goals, listed in objective measures above, in order to return to maximal functional potential and improve quality of life.  [x] Progressing  [] Met  [] Not Met     STRENGTH: Patient will improve strength to stated goals, listed in objective measures above, in order to improve functional independence and quality of life.  [x] Progressing  [] Met  [] Not Met     Patient will return to normal ADL's, IADL's, community involvement, recreational activities, and work-related activities with less than or equal to 3/10 pain and maximal function.  [x] Progressing  [] Met  [] Not Met          PLAN     Continue Plan of Care (POC) and progress per patient tolerance. See Treatment section for exercise progression.    Asmita Miller, PT

## 2024-04-12 ENCOUNTER — CLINICAL SUPPORT (OUTPATIENT)
Dept: REHABILITATION | Facility: HOSPITAL | Age: 24
End: 2024-04-12
Payer: COMMERCIAL

## 2024-04-12 DIAGNOSIS — M25.611 DECREASED RANGE OF MOTION OF RIGHT SHOULDER: ICD-10-CM

## 2024-04-12 DIAGNOSIS — Z74.09 DECREASED FUNCTIONAL MOBILITY AND ENDURANCE: Primary | ICD-10-CM

## 2024-04-12 DIAGNOSIS — M62.81 PROXIMAL MUSCLE WEAKNESS: ICD-10-CM

## 2024-04-12 PROCEDURE — 97112 NEUROMUSCULAR REEDUCATION: CPT

## 2024-04-12 PROCEDURE — 97140 MANUAL THERAPY 1/> REGIONS: CPT

## 2024-04-12 PROCEDURE — 97110 THERAPEUTIC EXERCISES: CPT

## 2024-04-19 ENCOUNTER — CLINICAL SUPPORT (OUTPATIENT)
Dept: REHABILITATION | Facility: HOSPITAL | Age: 24
End: 2024-04-19
Payer: COMMERCIAL

## 2024-04-19 DIAGNOSIS — M62.81 PROXIMAL MUSCLE WEAKNESS: ICD-10-CM

## 2024-04-19 DIAGNOSIS — Z74.09 DECREASED FUNCTIONAL MOBILITY AND ENDURANCE: Primary | ICD-10-CM

## 2024-04-19 DIAGNOSIS — M25.611 DECREASED RANGE OF MOTION OF RIGHT SHOULDER: ICD-10-CM

## 2024-04-19 PROCEDURE — 97112 NEUROMUSCULAR REEDUCATION: CPT

## 2024-04-19 PROCEDURE — 97110 THERAPEUTIC EXERCISES: CPT

## 2024-04-19 PROCEDURE — 97012 MECHANICAL TRACTION THERAPY: CPT

## 2024-04-19 PROCEDURE — 97140 MANUAL THERAPY 1/> REGIONS: CPT | Mod: 59

## 2024-04-19 NOTE — PROGRESS NOTES
Orthopaedics  Post-operative follow-up    Procedure Performed:  1. Right shoulder diagnostic arthroscopy with complete synovectomy    Date of Surgery: 6/9/23    Subjective: Casandra Barragan is now approximately 10.5 months out from her shoulder surgery. She reports the shoulder had been doing pretty well until about 2 months ago. At that point she began to have increased pain and stiffness. She does not take any oral medication for rheumatoid arthritis because she was taken off as they no longer think she has true RA.    Exam:  Incision sites healed, C/D/I  No swelling or bruising noted  Fluid ROM with no crepitus  Upright Active ROM: , , ER 50  Cuff strength intact    Axillary nerve sensation and motor intact  Motor and sensory intact distally  Strong radial pulse, fingers warm and well perfused    Imaging:  X-ray Shoulder 2 or More Views Right  Narrative: EXAM: XR SHOULDER COMPLETE 2 OR MORE VIEWS RIGHT    CLINICAL HISTORY:  [M25.511]-Pain in right shoulder.    TECHNIQUE: Right shoulder x-ray 4 views    FINDINGS: Glenohumeral and acromioclavicular alignment is normal. No acute fracture identified.  Stable mild to moderate glenohumeral joint space loss with erosions of the humeral head consistent with inflammatory arthritis.  No significant acromioclavicular arthritic change.  No evidence of rotator cuff or bursal calcium deposition.  Impression:  No acute abnormality identified in the right shoulder.  Chronic findings, as above.    Finalized on: 4/24/2024 3:05 PM By:  Diego Jones MD  [facility]# [id number]      2024-04-24 15:[id number]    [facility]        Physician Read: known cortical defects visualized, decreased joint space consistent with osteoarthritis    Impression:  10.5 months s/p right shoulder diagnostic arthroscopy with complete synovectomy - increasing pain and stiffness    Plan:  Discussed diagnosis and treatment options with her today. She has recurrent pain and stiffness in the right shoulder. XR show  no significant worsening of appearance. She is only 23 and has significant degenerative changes. She has limited options moving forward to treat her arthritis if we cannot find and treat an underlying diagnosis.  She was taken off RA medication recently.  I would like her to start dedicated oral NSAID. Prescription written for meloxicam.  Ref to Dr. BURROWS For second opinion regarding underlying rheumatic disease.     Follow-up as needed           Chris Flanagan MD    I, Mario Cortes, acted as a scribe for Chris Flanagan MD for the duration of this office visit.

## 2024-04-24 ENCOUNTER — HOSPITAL ENCOUNTER (OUTPATIENT)
Dept: RADIOLOGY | Facility: HOSPITAL | Age: 24
Discharge: HOME | End: 2024-04-24
Attending: STUDENT IN AN ORGANIZED HEALTH CARE EDUCATION/TRAINING PROGRAM
Payer: COMMERCIAL

## 2024-04-24 ENCOUNTER — OFFICE VISIT (OUTPATIENT)
Dept: SPORTS MEDICINE | Facility: CLINIC | Age: 24
End: 2024-04-24
Payer: COMMERCIAL

## 2024-04-24 VITALS — WEIGHT: 138 LBS | HEIGHT: 63 IN | RESPIRATION RATE: 17 BRPM | BODY MASS INDEX: 24.45 KG/M2

## 2024-04-24 DIAGNOSIS — G89.29 CHRONIC RIGHT SHOULDER PAIN: Primary | ICD-10-CM

## 2024-04-24 DIAGNOSIS — Z98.890 STATUS POST ARTHROSCOPY OF RIGHT SHOULDER: ICD-10-CM

## 2024-04-24 DIAGNOSIS — M25.511 RIGHT SHOULDER PAIN, UNSPECIFIED CHRONICITY: ICD-10-CM

## 2024-04-24 DIAGNOSIS — M25.511 CHRONIC RIGHT SHOULDER PAIN: Primary | ICD-10-CM

## 2024-04-24 DIAGNOSIS — M65.9 SYNOVITIS OF RIGHT SHOULDER: ICD-10-CM

## 2024-04-24 PROCEDURE — 73030 X-RAY EXAM OF SHOULDER: CPT | Mod: 26,RT,, | Performed by: RADIOLOGY

## 2024-04-24 PROCEDURE — 99214 OFFICE O/P EST MOD 30 MIN: CPT | Mod: S$GLB,,, | Performed by: STUDENT IN AN ORGANIZED HEALTH CARE EDUCATION/TRAINING PROGRAM

## 2024-04-24 PROCEDURE — 73030 X-RAY EXAM OF SHOULDER: CPT | Mod: TC,RT

## 2024-04-24 PROCEDURE — 99999 PR PBB SHADOW E&M-EST. PATIENT-LVL III: CPT | Mod: PBBFAC,,, | Performed by: STUDENT IN AN ORGANIZED HEALTH CARE EDUCATION/TRAINING PROGRAM

## 2024-04-24 RX ORDER — MELOXICAM 15 MG/1
15 TABLET ORAL DAILY
Qty: 60 TABLET | Refills: 1 | Status: SHIPPED | OUTPATIENT
Start: 2024-04-24

## 2024-04-25 ENCOUNTER — LAB VISIT (OUTPATIENT)
Dept: LAB | Facility: HOSPITAL | Age: 24
End: 2024-04-25
Attending: INTERNAL MEDICINE
Payer: COMMERCIAL

## 2024-04-25 ENCOUNTER — OFFICE VISIT (OUTPATIENT)
Dept: RHEUMATOLOGY | Facility: CLINIC | Age: 24
End: 2024-04-25
Payer: COMMERCIAL

## 2024-04-25 VITALS
WEIGHT: 139.56 LBS | SYSTOLIC BLOOD PRESSURE: 109 MMHG | BODY MASS INDEX: 24.72 KG/M2 | DIASTOLIC BLOOD PRESSURE: 71 MMHG | HEART RATE: 100 BPM

## 2024-04-25 DIAGNOSIS — Z98.890 STATUS POST ARTHROSCOPY OF RIGHT SHOULDER: ICD-10-CM

## 2024-04-25 DIAGNOSIS — M05.9 SEROPOSITIVE RHEUMATOID ARTHRITIS: ICD-10-CM

## 2024-04-25 DIAGNOSIS — M65.9 SYNOVITIS OF RIGHT SHOULDER: ICD-10-CM

## 2024-04-25 DIAGNOSIS — Z79.899 DRUG-INDUCED IMMUNODEFICIENCY: ICD-10-CM

## 2024-04-25 DIAGNOSIS — M25.511 CHRONIC RIGHT SHOULDER PAIN: ICD-10-CM

## 2024-04-25 DIAGNOSIS — D84.821 DRUG-INDUCED IMMUNODEFICIENCY: ICD-10-CM

## 2024-04-25 DIAGNOSIS — Z51.81 ENCOUNTER FOR MEDICATION MONITORING: ICD-10-CM

## 2024-04-25 DIAGNOSIS — G89.29 CHRONIC RIGHT SHOULDER PAIN: ICD-10-CM

## 2024-04-25 DIAGNOSIS — M05.9 SEROPOSITIVE RHEUMATOID ARTHRITIS: Primary | ICD-10-CM

## 2024-04-25 LAB
ALBUMIN SERPL BCP-MCNC: 3.5 G/DL (ref 3.5–5.2)
ALP SERPL-CCNC: 68 U/L (ref 55–135)
ALT SERPL W/O P-5'-P-CCNC: 46 U/L (ref 10–44)
ANION GAP SERPL CALC-SCNC: 6 MMOL/L (ref 8–16)
AST SERPL-CCNC: 53 U/L (ref 10–40)
BASOPHILS # BLD AUTO: 0.02 K/UL (ref 0–0.2)
BASOPHILS NFR BLD: 0.3 % (ref 0–1.9)
BILIRUB SERPL-MCNC: 0.2 MG/DL (ref 0.1–1)
BUN SERPL-MCNC: 10 MG/DL (ref 6–20)
CALCIUM SERPL-MCNC: 9.7 MG/DL (ref 8.7–10.5)
CCP AB SER IA-ACNC: 44.6 U/ML
CHLORIDE SERPL-SCNC: 110 MMOL/L (ref 95–110)
CO2 SERPL-SCNC: 26 MMOL/L (ref 23–29)
CREAT SERPL-MCNC: 0.9 MG/DL (ref 0.5–1.4)
CRP SERPL-MCNC: 44.5 MG/L (ref 0–8.2)
DIFFERENTIAL METHOD BLD: ABNORMAL
EOSINOPHIL # BLD AUTO: 0.1 K/UL (ref 0–0.5)
EOSINOPHIL NFR BLD: 1.3 % (ref 0–8)
ERYTHROCYTE [DISTWIDTH] IN BLOOD BY AUTOMATED COUNT: 12.5 % (ref 11.5–14.5)
ERYTHROCYTE [SEDIMENTATION RATE] IN BLOOD BY PHOTOMETRIC METHOD: 35 MM/HR (ref 0–36)
EST. GFR  (NO RACE VARIABLE): >60 ML/MIN/1.73 M^2
GLUCOSE SERPL-MCNC: 92 MG/DL (ref 70–110)
HCT VFR BLD AUTO: 37.2 % (ref 37–48.5)
HGB BLD-MCNC: 12.2 G/DL (ref 12–16)
IMM GRANULOCYTES # BLD AUTO: 0.01 K/UL (ref 0–0.04)
IMM GRANULOCYTES NFR BLD AUTO: 0.2 % (ref 0–0.5)
LYMPHOCYTES # BLD AUTO: 2.2 K/UL (ref 1–4.8)
LYMPHOCYTES NFR BLD: 35.5 % (ref 18–48)
MCH RBC QN AUTO: 25.2 PG (ref 27–31)
MCHC RBC AUTO-ENTMCNC: 32.8 G/DL (ref 32–36)
MCV RBC AUTO: 77 FL (ref 82–98)
MONOCYTES # BLD AUTO: 0.4 K/UL (ref 0.3–1)
MONOCYTES NFR BLD: 6.4 % (ref 4–15)
NEUTROPHILS # BLD AUTO: 3.5 K/UL (ref 1.8–7.7)
NEUTROPHILS NFR BLD: 56.3 % (ref 38–73)
NRBC BLD-RTO: 0 /100 WBC
PLATELET # BLD AUTO: 411 K/UL (ref 150–450)
PMV BLD AUTO: 9.3 FL (ref 9.2–12.9)
POTASSIUM SERPL-SCNC: 4.3 MMOL/L (ref 3.5–5.1)
PROT SERPL-MCNC: 7.8 G/DL (ref 6–8.4)
RBC # BLD AUTO: 4.85 M/UL (ref 4–5.4)
RHEUMATOID FACT SERPL-ACNC: 242 IU/ML (ref 0–15)
SODIUM SERPL-SCNC: 142 MMOL/L (ref 136–145)
WBC # BLD AUTO: 6.22 K/UL (ref 3.9–12.7)

## 2024-04-25 PROCEDURE — 3078F DIAST BP <80 MM HG: CPT | Mod: CPTII,S$GLB,, | Performed by: INTERNAL MEDICINE

## 2024-04-25 PROCEDURE — 1159F MED LIST DOCD IN RCRD: CPT | Mod: CPTII,S$GLB,, | Performed by: INTERNAL MEDICINE

## 2024-04-25 PROCEDURE — 86431 RHEUMATOID FACTOR QUANT: CPT | Performed by: INTERNAL MEDICINE

## 2024-04-25 PROCEDURE — 86140 C-REACTIVE PROTEIN: CPT | Performed by: INTERNAL MEDICINE

## 2024-04-25 PROCEDURE — 86200 CCP ANTIBODY: CPT | Performed by: INTERNAL MEDICINE

## 2024-04-25 PROCEDURE — 3008F BODY MASS INDEX DOCD: CPT | Mod: CPTII,S$GLB,, | Performed by: INTERNAL MEDICINE

## 2024-04-25 PROCEDURE — 85652 RBC SED RATE AUTOMATED: CPT | Performed by: INTERNAL MEDICINE

## 2024-04-25 PROCEDURE — 36415 COLL VENOUS BLD VENIPUNCTURE: CPT | Performed by: INTERNAL MEDICINE

## 2024-04-25 PROCEDURE — 3074F SYST BP LT 130 MM HG: CPT | Mod: CPTII,S$GLB,, | Performed by: INTERNAL MEDICINE

## 2024-04-25 PROCEDURE — 86038 ANTINUCLEAR ANTIBODIES: CPT | Performed by: INTERNAL MEDICINE

## 2024-04-25 PROCEDURE — 99215 OFFICE O/P EST HI 40 MIN: CPT | Mod: S$GLB,,, | Performed by: INTERNAL MEDICINE

## 2024-04-25 PROCEDURE — 99999 PR PBB SHADOW E&M-EST. PATIENT-LVL IV: CPT | Mod: PBBFAC,,, | Performed by: INTERNAL MEDICINE

## 2024-04-25 PROCEDURE — 85025 COMPLETE CBC W/AUTO DIFF WBC: CPT | Performed by: INTERNAL MEDICINE

## 2024-04-25 PROCEDURE — 80053 COMPREHEN METABOLIC PANEL: CPT | Performed by: INTERNAL MEDICINE

## 2024-04-25 PROCEDURE — 1160F RVW MEDS BY RX/DR IN RCRD: CPT | Mod: CPTII,S$GLB,, | Performed by: INTERNAL MEDICINE

## 2024-04-25 RX ORDER — ADALIMUMAB 40MG/0.4ML
40 KIT SUBCUTANEOUS
Qty: 2 PEN | Refills: 11 | Status: ACTIVE | OUTPATIENT
Start: 2024-04-25 | End: 2024-05-02 | Stop reason: ALTCHOICE

## 2024-04-25 RX ORDER — METHOTREXATE 2.5 MG/1
10 TABLET ORAL
Qty: 16 TABLET | Refills: 2 | Status: SHIPPED | OUTPATIENT
Start: 2024-04-25 | End: 2024-04-25 | Stop reason: ALTCHOICE

## 2024-04-25 RX ORDER — FOLIC ACID 1 MG/1
1 TABLET ORAL DAILY
Qty: 90 TABLET | Refills: 3 | Status: SHIPPED | OUTPATIENT
Start: 2024-04-25 | End: 2024-04-25

## 2024-04-25 NOTE — PATIENT INSTRUCTIONS
I have sent your prescription to specialty pharmacy to get approval from your insurance company. Once approved, they will contact you to ship you the medication.Please call them at [phone].

## 2024-04-25 NOTE — PROGRESS NOTES
RHEUMATOLOGY CLINIC FOLLOW UP VISIT  Chief complaints, HPI, ROS, EXAM, Assessment & Plans:-  Casandra Caballero a 23 y.o. pleasant female comes in for  WORSENING PAIN.  Seropositive nonerosive atypical rheumatoid arthritis initially presenting as right shoulder synovitis status post synovectomy.  She was doing well for a month after the surgery and started having worsening pain.  Recently evaluated by orthopedic surgeon and was referred here for worsening symptoms..    Severe worsening right shoulder pain associated with stiffness lasting all day.  Mild right sternoclavicular pain as well.  No photosensitive malar rash, sicca syndrome or Raynaud's phenomenon.  Rheumatological review of system negative otherwise.    Physical examination shows significant tenderness and restricted range of motion of right shoulder.  Mild tenderness of right 2nd MCP and 2nd PIP with mild swelling.  Mild tenderness of right TMJ.  Mild tenderness of right sternoclavicular joint.  Other joints were unremarkable.  1. Seropositive rheumatoid arthritis    2. Drug-induced immunodeficiency    3. Encounter for medication monitoring    4. Chronic right shoulder pain    5. Status post arthroscopy of right shoulder    6. Synovitis of right shoulder      Problem List Items Addressed This Visit       Seropositive rheumatoid arthritis - Primary    Relevant Medications    adalimumab (HUMIRA,CF, PEN) 40 mg/0.4 mL PnKt    Other Relevant Orders    CBC Auto Differential    Comprehensive Metabolic Panel    C-Reactive Protein    Sedimentation rate    JOANNA Screen w/Reflex    Cyclic Citrullinated Peptide Antibody, IgG    Rheumatoid Factor     Other Visit Diagnoses       Drug-induced immunodeficiency        Relevant Orders    CBC Auto Differential    Comprehensive Metabolic Panel    C-Reactive Protein    Sedimentation rate    Encounter for medication monitoring        Relevant Orders    CBC Auto Differential     Comprehensive Metabolic Panel    C-Reactive Protein    Sedimentation rate    Chronic right shoulder pain        Status post arthroscopy of right shoulder        Synovitis of right shoulder                Labs reviewed today:-   Latest Reference Range & Units Most Recent   JOANNA Screen Negative <1:80  Positive !  8/22/23 12:24   JOANNA Titer 1  1:80  8/22/23 12:24   JOANNA PATTERN 1  Speckled  8/22/23 12:24   ds DNA Ab Negative 1:10  Negative 1:10  8/22/23 12:24   Anti-SSA Antibody 0.00 - 0.99 Ratio 0.09  8/22/23 12:24   Anti-SSA Interpretation Negative  Negative  8/22/23 12:24   Anti-SSB Antibody 0.00 - 0.99 Ratio 0.06  8/22/23 12:24   Anti-SSB Interpretation Negative  Negative  8/22/23 12:24   Anti Sm Antibody 0.00 - 0.99 Ratio 0.11  8/22/23 12:24   Anti-Sm Interpretation Negative  Negative  8/22/23 12:24   Anti Sm/RNP Antibody 0.00 - 0.99 Ratio 0.10  8/22/23 12:24   Anti-Sm/RNP Interpretation Negative  Negative  8/22/23 12:24   CCP Antibodies <5.0 U/mL 22.4 (H)  8/22/23 12:24   Rheumatoid Factor 0.0 - 15.0 IU/mL 126.0 (H)  8/22/23 12:24   !: Data is abnormal  (H): Data is abnormally high     Latest Reference Range & Units Most Recent   Sodium 136 - 145 mmol/L 142  4/25/24 14:09   Potassium 3.5 - 5.1 mmol/L 4.3  4/25/24 14:09   Chloride 95 - 110 mmol/L 110  4/25/24 14:09   CO2 23 - 29 mmol/L 26  4/25/24 14:09   Anion Gap 8 - 16 mmol/L 6 (L)  4/25/24 14:09   BUN 6 - 20 mg/dL 10  4/25/24 14:09   Creatinine 0.5 - 1.4 mg/dL 0.9  4/25/24 14:09   eGFR >60 mL/min/1.73 m^2 >60  4/25/24 14:09   Glucose 70 - 110 mg/dL 92  4/25/24 14:09   Calcium 8.7 - 10.5 mg/dL 9.7  4/25/24 14:09   ALP 55 - 135 U/L 68  4/25/24 14:09   PROTEIN TOTAL 6.0 - 8.4 g/dL 7.8  4/25/24 14:09   Albumin 3.5 - 5.2 g/dL 3.5  4/25/24 14:09   BILIRUBIN TOTAL 0.1 - 1.0 mg/dL 0.2  4/25/24 14:09   AST 10 - 40 U/L 53 (H)  4/25/24 14:09   ALT 10 - 44 U/L 46 (H)  4/25/24 14:09   CRP 0.0 - 8.2 mg/L 44.5 (H)  4/25/24 14:09   (L): Data is abnormally low  (H): Data is  abnormally high          Severe worsening atypical seropositive early rheumatoid arthritis with synovitis.  Synovial biopsy was characteristic of rheumatoid arthritis/ chronic inflammation except lack of fibrin deposition.    I was planning to start methotrexate.  Stat CMP today shows mildly abnormal liver enzymes.  She has not on any hepatotoxic medication.  Have taken only 1 dose of meloxicam yesterday which is unlikely to cause elevated liver enzymes.    Advised to not start methotrexate  at this time.  Start Humira.  Advised all precautions including pregnancy precautions.  On hormonal contraception to prevent pregnancy.    Drug induced immunodeficiency due to use of immunosuppressive drugs. Monitor carefully for infections. Advised to get immediate medical care if any infection. Also advised strict adherence to age appropriate vaccinations and cancer screenings with PCP.    Hold Humira if any infection  I have explained all of the above in detail and the patient understands all of the current recommendation(s). I have answered all questions to the best of my ability and to their complete satisfaction.     # Follow up in about 12 weeks (around 7/18/2024).      Disclaimer: This note was prepared using voice recognition system and is likely to have sound alike errors and is not proof read.  Please call me with any questions.

## 2024-04-26 ENCOUNTER — PATIENT MESSAGE (OUTPATIENT)
Dept: RHEUMATOLOGY | Facility: CLINIC | Age: 24
End: 2024-04-26
Payer: COMMERCIAL

## 2024-04-26 LAB — ANA SER QL IF: NORMAL

## 2024-04-29 ENCOUNTER — PATIENT MESSAGE (OUTPATIENT)
Dept: RHEUMATOLOGY | Facility: CLINIC | Age: 24
End: 2024-04-29
Payer: COMMERCIAL

## 2024-04-29 ENCOUNTER — TELEPHONE (OUTPATIENT)
Dept: RHEUMATOLOGY | Facility: CLINIC | Age: 24
End: 2024-04-29
Payer: COMMERCIAL

## 2024-04-29 NOTE — PROGRESS NOTES
[facility]   Physical Therapy Treatment Note / POC Update      Name: Casandra Barragan  [facility] Number: [id number]    Therapy Diagnosis:   Encounter Diagnoses   Name Primary?    Decreased functional mobility and endurance Yes    Decreased range of motion of right shoulder     Proximal muscle weakness        Physician: Chris Flanagan    Visit Date: 4/12/2024    Physician Orders: PT Eval and Treat  Medical Diagnosis from Referral: Synovitis of right shoulder [M65.811], Status post arthroscopy of right shoulder [Z98.890]  Evaluation Date: 8/31/2023  Authorization Period Expiration: 8/30/2024  Plan of Care Expiration: 6/12/2024   Progress Note Due: 5/12/2024  Visit # / Visits authorized: 3/10 (+25 from 2023)  FOTO: 1/3 (last performed on 8/31/2023)     Precautions: Standard  PTA Visit #: 0/5     Time In: 1335  Time Out: 1430  Total Billable Time: 47 minutes    SUBJECTIVE     Pt reports: to therapy for the first time since February. Pt states she was feeling great but within the last few days she noticed increased pain in her shoulder which has severely limited her mobility and function. States she has tried her stretches and exercises with no improvement. Has not followed up with MD    Compliance with Hep: Every Other Day  Response to previous treatment: no adverse reactions to treatment/updated HEP  Functional change: Better    Pain: 0/10   Worst: 5/10  Location: top of right shoulder      OBJECTIVE     Objective Measures updated at progress report unless specified otherwise.     RANGE OF MOTION:      Shoulder AROM/PROM Right Left Pain/Dysfunction with Movement Goal   Shoulder Flexion (180º) 110 158 Pain B  170    Shoulder Abduction (180º) 80 180 Anterior shoulder pain on R  170   Shoulder Extension (60º) 40 75 Anterior shoulder pain on R 75   Shoulder ER  at 90º (90º) 45 90   90   Shoulder IR at 90º (70º) 50 73   70   Functional ER NT T6 Anterior shoulder pain on R T6   Functional IR  NT superior angle of scapula Anterior shoulder pain on R     *cervical ROM is pain free and within functional limits         STRENGTH:   U/E MMT Right Left Pain/Dysfunction with Movement Goal   Shoulder Flexion 2+/5 4/5 Anterior shoulder pain on R 4+/5 B   Shoulder Extension 2+/5 4+/5   4+/5 B   Shoulder Abduction 2+/5 4/5 Anterior shoulder pain on R 4+/5 B   Shoulder IR (at 0º abduction) 2+/5 4/5 Anterior shoulder pain on R 4+/5 B   Shoulder ER (at 0º abduction) 2+/5 4-/5 Anterior shoulder pain on R 4+/5 B   Serratus Anterior NT 4+/5   4+/5 B   Middle Trapezius NT 4/5   4+/5 B   Lower Trapezius NT 4-/5   4+/5 B   Elbow Flexion  NT 5/5 Anterior shoulder pain on R 5/5 B   Elbow Extension NT 5/5   5/5 B            PALPATION: Muscles: Increased tone and tenderness to palpation of: right  shoulder grossly     POSTURE:  Pt presents with postural abnormalities which include:               [x] Forward Head                                [] Increased Lumbar Lordosis              [x] Rounded Shoulder                         [] Genu Recurvatum              [] Increased Thoracic Kyphosis        [] Genu Valgus              [] Trunk Deviated                              [] Pes Planus              [] Scapular Winging                          [] Other:            FUNCTIONAL MOVEMENT PATTERNS  Movement  Analysis Notes    Repeated Shoulder Flexion []Functional  [x]Dysfunctional:  [x]Painful  []Non-Painful    Unable    Plank  []Functional  [x]Dysfunctional:  [x]Painful  []Non-Painful    unable   Push Up  []Functional  [x]Dysfunctional:  [x]Painful  []Non-Painful    Unable      []Functional  []Dysfunctional:  []Painful  []Non-Painful             Treatment     Gym/Equipment Access: full  Time to Complete Exercises: increased for cueingTobias Guajardo received the treatments listed below:        MANUAL THERAPY TECHNIQUES were applied for (10) minutes, including:    Soft tissue mobilization:   Soft tissue mobilization R upper trapezius,  levator scapulae , periscapular musculature, pectorals, rotator cuff muscles, deltoid, biceps    Joint mobilizations:   Glenohumeral distractions  Functional dry needling: DEFERRED        THERAPEUTIC EXERCISES to develop strength, endurance, ROM, flexibility, posture, and core stabilization for (25) minutes including:     Performed Today:     UBE - level 3, 3'/3'  PROM shoulder: all directions  Rope and pulley- attempted   Ball roll outs- 3 minutes      Interventions DEFERRED today     Half kneel rainbows: 20x B           NEUROMUSCULAR RE-EDUCATION ACTIVITIES to improve Balance, Coordination, Kinesthetic, Sense, Proprioception, and Posture for (12) minutes.  The following were included:     Performed Today:     Shoulder isometrics- 2 minutes in each direction  Scapular retractions- attempted  Interventions DEFERRED today     Prone TYWI 3x10 each   I: 3#   TYW: 0#   Shoulder IR: 7.5# 3x10   Sidelying external rotation #3   Supine shoulder ER at 90º: red band 3x10   B shoulder ER: green band 3x10   Shoulder IR: 5# band 3x10   Box Plank: 4x30s   Prone 90/90 shoulder ER: 2x10   Prone T 2x10 with PT assist   Prone Y 2x5 with PT assist             THERAPEUTIC ACTIVITIES to improve dynamic and functional  performance for (0) minutes including:    Performed Today:          Interventions DEFERRED today     Rows: 10# 3x10   Shoulder extensions 7.5# 3x10   Flexion to 90º 3# 3x10 on R   Abduction to 90º 3# 3x10 on R   Half kneel landmine press: 3x10 B with bar   D1 Extensions 5# 3x10,  chest press: 10# 3x10   Pull aparts: green band 2x10          Next Session:       Patient Education and Home Exercises      Education provided: (time included with treatment) minutes  PURPOSE: Patient educated on the impairments noted above and the effects of physical therapy intervention to improve overall condition and QOL.   EXERCISE: Patient was educated on all the above exercise prior/during/after for proper posture, positioning, and  execution for safe performance with home exercise program.   STRENGTH: Patient educated on the importance of improved core and extremity strength in order to improve alignment of the spine and extremities with static positions and dynamic movement.   POSTURE: Patient educated on postural awareness to reduce stress and maintain optimal alignment of the spine with static positions and dynamic movement      Written Home Exercises Provided: yes.  Exercises were reviewed and Casandra was able to demonstrate them prior to the end of the session.  Casandra demonstrated good  understanding of the education provided. See EMR under Patient Instructions for exercises provided during therapy sessions.    ASSESSMENT     Casandra Barragan demonstrates very poor tolerance for session today due to insidious increase of shoulder pain. Significant time spent on manual interventions and PROM with minimal improvement. Pt was able to perform shoulder isometrics with minimal contraction and moderate increased pain. Session was ended early due to patients poor tolerance and pt was advised to make a follow-up appointment with Dr. Flanagan. A progress note was completed today with significant regression noted in objective measures compared to prior assessment; please see exam for details. Casandra continues to have difficulty with shoulder range of motion, gross upper extremity strength, functional movement patterns, and tenderness to palpation due to increased pain. Pain onset was indisious in nature and patient has been overall non-compliant with therapy as she has not reported since February. The above impairments and functional deficits will continue to be addressed over the course of this plan of care; however, the patient and I discussed the importance of increased compliance in order to justify continued physical therapy. Casandra was educated on continued progression of PT, expectations for the duration of care, updates on goals set at initial  evaluation, and home exercise program.  Casandra will continue to benefit from physical therapy in order to further address goals, maximize function and quality of life.     Casandra Is progressing well towards her goals.   Pt prognosis is Good.     Pt will continue to benefit from skilled outpatient physical therapy to address the deficits listed in the problem list box on initial evaluation, provide pt/family education and to maximize pt's level of independence in the home and community environment.     Pt's spiritual, cultural and educational needs considered and pt agreeable to plan of care and goals.    Anticipated Barriers for therapy: co-morbidities, chronicity of condition, motivation to improve condition, adherence to treatment plan, and coping style       Short Term Goals:  6 weeks Status  Date Met   PAIN: Pt will report worst pain of 5/10 in order to progress toward max functional ability and improve quality of life. [x] Progressing  [] Met  [] Not Met     FUNCTION: Patient will demonstrate improved function as indicated by a score of greater than or equal to 64 out of 100 on FOTO. [x] Progressing  [] Met  [] Not Met     MOBILITY: Patient will improve AROM to 50% of stated goals, listed in objective measures above, in order to progress towards independence with functional activities.  [x] Progressing  [] Met  [] Not Met     STRENGTH: Patient will improve strength to 50% of stated goals, listed in objective measures above, in order to progress towards independence with functional activities. [x] Progressing  [] Met  [] Not Met     POSTURE: Patient will correct postural deviations in sitting and standing, to decrease pain and promote long term stability.  [x] Progressing  [] Met  [] Not Met     HEP: Patient will demonstrate independence with HEP in order to progress toward functional independence. [x] Progressing  [] Met  [] Not Met        Long Term Goals:  12 weeks Status Date Met   PAIN: Pt will report worst  pain of 3/10 in order to progress toward max functional ability and improve quality of life [x] Progressing  [] Met  [] Not Met     FUNCTION: Patient will demonstrate improved function as indicated by a score of greater than or equal to 74 out of 100 on FOTO. [x] Progressing  [] Met  [] Not Met     MOBILITY: Patient will improve AROM to stated goals, listed in objective measures above, in order to return to maximal functional potential and improve quality of life.  [x] Progressing  [] Met  [] Not Met     STRENGTH: Patient will improve strength to stated goals, listed in objective measures above, in order to improve functional independence and quality of life.  [x] Progressing  [] Met  [] Not Met     Patient will return to normal ADL's, IADL's, community involvement, recreational activities, and work-related activities with less than or equal to 3/10 pain and maximal function.  [x] Progressing  [] Met  [] Not Met          PLAN     Updated Plan of care Certification: 4/12/2024 to 6/12/2024.    Outpatient Physical Therapy 2 times weekly for 8 weeks to include any combination of the following interventions: virtual visits, dry needling, modalities, electrical stimulation (IFC, Pre-Mod, Attended with Functional Dry Needling), Cervical/Lumbar Traction, Gait Training, Manual Therapy, Neuromuscular Re-ed, Patient Education, Self Care, Therapeutic Exercise, and Therapeutic Activites     Asmita Miller PT

## 2024-04-29 NOTE — TELEPHONE ENCOUNTER
Clinical Pharmacy Progress Note: Medication Education     Patient was counseled by pharmacist on new medication Humira and its indications, side effects, and reasons for taking this medication.   - Educated patient on mechanism of action, frequency and route of administration, onset of action, and safety profile of Humira.   - Encouraged pt to practice proper hand hygiene considering increased risk of infection with biologics. Pt to make us aware if she ever experiences s/sx of an infection including fever, chills, URI symptoms or UTI symptoms.   - Reviewed lack of data of data in pregnancy and possible risks. Emphasized importance of using protection during Humira therapy. Pt currently on contraception and has no plans for children in the near future. Advised to inform MDO should she become pregnant while on Humira.   - Labs up to date: CBC/CMP ; TB; Hep B completed 8/2023.     - Discussed importance of immunizations considering the increased risk of infections.    - Advised pt to use sun protection and get annual skin checks considering possible increased risk of skin cancer     Patient was provided educational drug card/handout from  web site.   Patient expressed understanding and had no further questions.      Thank you for allowing us to participate in this patient's care.    Kristi Toro, PharmD  Ambulatory Clinical Pharmacist- Rheumatology

## 2024-04-29 NOTE — PROGRESS NOTES
[facility]   Physical Therapy Treatment Note     Name: Casandra [facility] Number: [id number]    Therapy Diagnosis:   Encounter Diagnoses   Name Primary?    Decreased functional mobility and endurance Yes    Decreased range of motion of right shoulder     Proximal muscle weakness        Physician: Chris Flanagan    Visit Date: 4/19/2024    Physician Orders: PT Eval and Treat  Medical Diagnosis from Referral: Synovitis of right shoulder [M65.811], Status post arthroscopy of right shoulder [Z98.890]  Evaluation Date: 8/31/2023  Authorization Period Expiration: 8/30/2024  Plan of Care Expiration: 6/12/2024   Progress Note Due: 5/12/2024  Visit # / Visits authorized: 3/10 (+25 from 2023)  FOTO: 1/3 (last performed on 8/31/2023)     Precautions: Standard  PTA Visit #: 0/5     Time In: 1005  Time Out: 1100  Total Billable Time: 50 minutes    SUBJECTIVE     Pt reports: no significant changes since previous visit. She continues to have significant pain and functional limitations with the shoulder. She has schedule a follow-up visit with Dr. Flanagan this Wednesday     Compliance with Hep: Every Other Day  Response to previous treatment: no adverse reactions to treatment/updated HEP  Functional change: Better    Pain: 0/10   Worst: 5/10  Location: top of right shoulder      OBJECTIVE     Objective Measures updated at progress report unless specified otherwise.         Treatment     Gym/Equipment Access: full  Time to Complete Exercises: increased for cueing.    Casandra received the treatments listed below:        MANUAL THERAPY TECHNIQUES were applied for (10) minutes, including:    Soft tissue mobilization:   Soft tissue mobilization R upper trapezius, levator scapulae , periscapular musculature, pectorals, rotator cuff muscles, deltoid, biceps    Joint mobilizations:   Glenohumeral distractions  Cervical distraction  Functional dry needling: DEFERRED        THERAPEUTIC EXERCISES to  develop strength, endurance, ROM, flexibility, posture, and core stabilization for (15) minutes including:     Performed Today:     UBE - level 3, 3'/3'  PROM shoulder: all directions     Interventions DEFERRED today     Half kneel rainbows: 20x B   Ball roll outs- 3 minutes           NEUROMUSCULAR RE-EDUCATION ACTIVITIES to improve Balance, Coordination, Kinesthetic, Sense, Proprioception, and Posture for (15) minutes.  The following were included:     Performed Today:     Shoulder isometrics- 2 minutes in each direction  Supine scapular retraction isometrics- 2 minutes  Interventions DEFERRED today     Prone TYWI 3x10 each   I: 3#   TYW: 0#   Shoulder IR: 7.5# 3x10   Sidelying external rotation #3   Supine shoulder ER at 90º: red band 3x10   B shoulder ER: green band 3x10   Shoulder IR: 5# band 3x10   Box Plank: 4x30s   Prone 90/90 shoulder ER: 2x10   Prone T 2x10 with PT assist   Prone Y 2x5 with PT assist             THERAPEUTIC ACTIVITIES to improve dynamic and functional  performance for (0) minutes including:    Performed Today:          Interventions DEFERRED today     Rows: 10# 3x10   Shoulder extensions 7.5# 3x10   Flexion to 90º 3# 3x10 on R   Abduction to 90º 3# 3x10 on R   Half kneel landmine press: 3x10 B with bar   D1 Extensions 5# 3x10,  chest press: 10# 3x10   Pull aparts: green band 2x10            The following Modalities were applied for (10) minutes after being cleared for all contraindications with the following parameters    Modality Location Details   Cervical Traction Cervical spine 15# 1 minute on: 1 minute off         Next Session:       Patient Education and Home Exercises      Education provided: (time included with treatment) minutes  PURPOSE: Patient educated on the impairments noted above and the effects of physical therapy intervention to improve overall condition and QOL.   EXERCISE: Patient was educated on all the above exercise prior/during/after for proper posture, positioning,  and execution for safe performance with home exercise program.   STRENGTH: Patient educated on the importance of improved core and extremity strength in order to improve alignment of the spine and extremities with static positions and dynamic movement.   POSTURE: Patient educated on postural awareness to reduce stress and maintain optimal alignment of the spine with static positions and dynamic movement      Written Home Exercises Provided: yes.  Exercises were reviewed and Casandra was able to demonstrate them prior to the end of the session.  Casandra demonstrated good  understanding of the education provided. See EMR under Patient Instructions for exercises provided during therapy sessions.    ASSESSMENT     Casandra Barragan continues to demonstrate poor tolerance for session. Spent significant time with manual interventions and PROM; mild improvement in shoulder PROM compared to previous session. Mild improvement in tolerance for shoulder isometrics. Added supine scapular retractions with minimal discomfort noted. Pt reports radiating symptoms into the forearm towards end of session. Symptoms are relieved with cervical distraction. Incorporated cervical traction for 10 minutes with complete resolution of symptoms     Casandra Is progressing well towards her goals.   Pt prognosis is Good.     Pt will continue to benefit from skilled outpatient physical therapy to address the deficits listed in the problem list box on initial evaluation, provide pt/family education and to maximize pt's level of independence in the home and community environment.     Pt's spiritual, cultural and educational needs considered and pt agreeable to plan of care and goals.    Anticipated Barriers for therapy: co-morbidities, chronicity of condition, motivation to improve condition, adherence to treatment plan, and coping style       Short Term Goals:  6 weeks Status  Date Met   PAIN: Pt will report worst pain of 5/10 in order to progress toward  max functional ability and improve quality of life. [x] Progressing  [] Met  [] Not Met     FUNCTION: Patient will demonstrate improved function as indicated by a score of greater than or equal to 64 out of 100 on FOTO. [x] Progressing  [] Met  [] Not Met     MOBILITY: Patient will improve AROM to 50% of stated goals, listed in objective measures above, in order to progress towards independence with functional activities.  [x] Progressing  [] Met  [] Not Met     STRENGTH: Patient will improve strength to 50% of stated goals, listed in objective measures above, in order to progress towards independence with functional activities. [x] Progressing  [] Met  [] Not Met     POSTURE: Patient will correct postural deviations in sitting and standing, to decrease pain and promote long term stability.  [x] Progressing  [] Met  [] Not Met     HEP: Patient will demonstrate independence with HEP in order to progress toward functional independence. [x] Progressing  [] Met  [] Not Met        Long Term Goals:  12 weeks Status Date Met   PAIN: Pt will report worst pain of 3/10 in order to progress toward max functional ability and improve quality of life [x] Progressing  [] Met  [] Not Met     FUNCTION: Patient will demonstrate improved function as indicated by a score of greater than or equal to 74 out of 100 on FOTO. [x] Progressing  [] Met  [] Not Met     MOBILITY: Patient will improve AROM to stated goals, listed in objective measures above, in order to return to maximal functional potential and improve quality of life.  [x] Progressing  [] Met  [] Not Met     STRENGTH: Patient will improve strength to stated goals, listed in objective measures above, in order to improve functional independence and quality of life.  [x] Progressing  [] Met  [] Not Met     Patient will return to normal ADL's, IADL's, community involvement, recreational activities, and work-related activities with less than or equal to 3/10 pain and maximal  function.  [x] Progressing  [] Met  [] Not Met          PLAN     Updated Plan of care Certification: 4/19/2024 to 6/12/2024.    Outpatient Physical Therapy 2 times weekly for 8 weeks to include any combination of the following interventions: virtual visits, dry needling, modalities, electrical stimulation (IFC, Pre-Mod, Attended with Functional Dry Needling), Cervical/Lumbar Traction, Gait Training, Manual Therapy, Neuromuscular Re-ed, Patient Education, Self Care, Therapeutic Exercise, and Therapeutic Activites     Asmita Miller, PT

## 2024-05-02 ENCOUNTER — TELEPHONE (OUTPATIENT)
Dept: RHEUMATOLOGY | Facility: CLINIC | Age: 24
End: 2024-05-02
Payer: COMMERCIAL

## 2024-05-02 DIAGNOSIS — M05.9 SEROPOSITIVE RHEUMATOID ARTHRITIS: Primary | ICD-10-CM

## 2024-05-02 RX ORDER — ADALIMUMAB-ADAZ 40 MG/.4ML
40 INJECTION, SOLUTION SUBCUTANEOUS
Qty: 2.4 ML | Refills: 3 | Status: ACTIVE | OUTPATIENT
Start: 2024-05-02 | End: 2024-06-14 | Stop reason: SINTOL

## 2024-05-02 NOTE — TELEPHONE ENCOUNTER
----- Message from Albertina Hall PharmD sent at 5/2/2024 11:05 AM CDT -----  Regarding: Humira biosimilar  Good-morning Dr. Colon and Kristi,    As of 04/01/24 [facility] no longer covers Humira (adalimumab). Adalimumab-ADAZ and Hyrimoz are biosimilars of Humira with the same dosing regimen of 40 mg every 14 days. Would you be agreeable to switching Ms. Barragan to one of the preferred alternates?     Kind regards,    Albertina Hall, Pharm.D., [facility]  Clinical Pharmacist - [facility]

## 2024-05-03 ENCOUNTER — CLINICAL SUPPORT (OUTPATIENT)
Dept: RHEUMATOLOGY | Facility: CLINIC | Age: 24
End: 2024-05-03
Payer: COMMERCIAL

## 2024-05-03 DIAGNOSIS — M05.9 SEROPOSITIVE RHEUMATOID ARTHRITIS: Primary | ICD-10-CM

## 2024-05-03 NOTE — PROGRESS NOTES
Clinical Pharmacy Progress Note: Medication Education     Patient was counseled by pharmacist on new medication Hyrimoz and its indications, side effects, and reasons for taking this medication.   - Educated patient on mechanism of action, frequency and route of administration, onset of action, and safety profile of Hyrimoz.   - Encouraged pt to practice proper hand hygiene considering increased risk of infection with biologics. Pt to make us aware if she** ever experiences s/sx of an infection including fever, chills, URI symptoms or UTI symptoms.   - Reviewed lack of data of data in pregnancy and possible risks. Emphasized importance of using protection during Humira therapy. Pt currently on contraception (Depo-medrol) and has no plans for children in the near future.   - Labs up to date: CBC/CMP ; TB; Hep B.   - Discussed importance of immunizations considering the increased risk of infections.  - Pt to avoid live vaccines and uncooked/ raw seafood such as raw oysters or sushi.   - Advised pt to use sun protection and get annual skin checks considering possible increased risk of skin cancer   - Recommended against use of NSAIDs including motrin,ibuprofen, advil, aleve etc     Patient was provided educational drug card/handout via Portal.   Patient expressed understanding and had no further questions.      Thank you for allowing us to participate in this patient's care.    Kristi Toro, PharmD  Ambulatory Clinical Pharmacist- Rheumatology

## 2024-05-06 ENCOUNTER — PATIENT MESSAGE (OUTPATIENT)
Dept: RHEUMATOLOGY | Facility: CLINIC | Age: 24
End: 2024-05-06
Payer: COMMERCIAL

## 2024-05-07 ENCOUNTER — TELEPHONE (OUTPATIENT)
Dept: INTERNAL MEDICINE | Facility: CLINIC | Age: 24
End: 2024-05-07
Payer: COMMERCIAL

## 2024-05-07 ENCOUNTER — PATIENT MESSAGE (OUTPATIENT)
Dept: RHEUMATOLOGY | Facility: CLINIC | Age: 24
End: 2024-05-07
Payer: COMMERCIAL

## 2024-05-07 ENCOUNTER — TELEPHONE (OUTPATIENT)
Dept: RHEUMATOLOGY | Facility: CLINIC | Age: 24
End: 2024-05-07
Payer: COMMERCIAL

## 2024-05-07 NOTE — TELEPHONE ENCOUNTER
----- Message from Katia Dawson sent at 5/7/2024 11:36 AM CDT -----  Contact: Casandra  Patient is calling to receive a call back at .[phone]. Reports Dr WEBER filled out a handicap form however, she has a [place] plate[place]license and is needing a different form signed for the [place] state Has the document on email and is inquiring on if there's an email she can send it to

## 2024-05-07 NOTE — TELEPHONE ENCOUNTER
----- Message from Navin Miller sent at 5/7/2024  1:30 PM CDT -----  Contact: [phone]  Patient called in requesting a call back for Stacy Pratt injection training, 12:30 not 11:30, please call back  [phone]

## 2024-05-07 NOTE — TELEPHONE ENCOUNTER
Follow -up visit      HISTORY OF PRESENT ILLNESS:  This patient is seen with concerns about recurrence problems with pain in the sides of her left great toe.  She has had a history of issues on the medial side of her right but lately without much concern.  She has had border excisions on this toe some time ago.  She does feel on her left great toe she has been cutting the area back as best she can.    Visit Vitals  [facility] 09/03/2019       Current Outpatient Medications   Medication Sig Dispense Refill   • cefdinir (OMNICEF) 300 MG capsule Take 1 capsule by mouth 2 times daily. 20 capsule 0   • norgestimate-ethinyl estradiol, triphasic, (TRI-GLORIA) 0.18/0.215/0.25 MG-35 MCG tablet Take 1 tablet by mouth daily. 84 tablet 3   • ivermectin 2 % cream Apply to face nightly at bedtime. 30 g 11   • ivermectin (SOOLANTRA) 1 % cream Apply to face nightly at bedtime. 30 g 11   • metroNIDAZOLE (METROGEL) 0.75 % gel Apply topically 2 times daily. 45 g 0   • augmented betamethasone dipropionate (DIPROLENE) 0.05 % ointment Please apply BID to affected areas for up to 2 weeks. 30 g 0     No current facility-administered medications for this visit.        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  In the proximal nail fold on the medial border looks like she has some recurrence of toenail.  She has attempted cutting nail back as far as possible but the portion she cannot get is still problematic.  Her pulses do remain good for DP and PT.  The medial side of her right great toenail has a tendency to ingrow but not problematic or infected or concerning at this time.  Pulses are good for DP and PT.                    .   Message sent to the wrong pool.//ddw         Assessment :  Recurrence onychocryptosis with paronychia medial border left great toe    plan :    I advised the patient from the appearance of her and I would recommend removing the portion that is recurrent..  I did review the procedure for excision of nail and nail matrix again with this patient and we did opt to perform this today on the medial border of her left great toe as follows:.Attention was directed to the left great digit. A total of 3 cc of 0.5% Marcaine and 1% Xylocaine plain were utilized to anesthetize the digit. The digit was prepped with chlorhexidine. Coban was utilized to exsanguinate the digit and a Penrose drain was used as a digital tourniquet. Attention was then directed to the medial side of the digit where a #61 blade was run under the proximal nail fold. Next an English anvil was used to split the nail along the border to be excised. This was run as far proximal as possible. The cut was then completed with a #61 blade. The offensive border of nail was clamped with a hemostat and rotated out of the nail groove completely. With exposure of the nail matrix at this time, several applications of phenol at 30 second intervals were applied to the matrix . A total of 3 applications were applied to the proximal nail fold. After doing so, alcohol was utilized to irrigate the nail groove. Triple antibiotic ointment and a sterile bandage was applied to the digit. The digital tourniquet was released and hyperemia to the digit was noted. Patient tolerated the procedure well and was given written and oral postoperative instructions. We will followup with this patient in one week

## 2024-05-14 ENCOUNTER — TELEPHONE (OUTPATIENT)
Dept: RHEUMATOLOGY | Facility: CLINIC | Age: 24
End: 2024-05-14
Payer: COMMERCIAL

## 2024-05-14 DIAGNOSIS — M05.9 SEROPOSITIVE RHEUMATOID ARTHRITIS: Primary | ICD-10-CM

## 2024-05-14 NOTE — TELEPHONE ENCOUNTER
----- Message from Mischa Mendel, MA sent at 5/13/2024 12:18 PM CDT -----  Regarding: Message  Contact: [phone]  See message below please.  ----- Message -----  From: Diann Atkins MA  Sent: 5/13/2024  11:53 AM CDT  To: On Rheumatology Clinical Support    Pt called  .                Pt is requesting a call back to speak with Ms. Kristi Toro.

## 2024-05-21 ENCOUNTER — TELEPHONE (OUTPATIENT)
Dept: RHEUMATOLOGY | Facility: CLINIC | Age: 24
End: 2024-05-21
Payer: COMMERCIAL

## 2024-05-21 NOTE — TELEPHONE ENCOUNTER
----- Message from Kristi Toro PharmD sent at 5/21/2024  2:41 PM CDT -----  Call re: Hyrimoz injection training

## 2024-06-04 ENCOUNTER — LAB VISIT (OUTPATIENT)
Dept: LAB | Facility: HOSPITAL | Age: 24
End: 2024-06-04
Attending: INTERNAL MEDICINE
Payer: COMMERCIAL

## 2024-06-04 DIAGNOSIS — Z79.899 DRUG-INDUCED IMMUNODEFICIENCY: ICD-10-CM

## 2024-06-04 DIAGNOSIS — D84.821 DRUG-INDUCED IMMUNODEFICIENCY: ICD-10-CM

## 2024-06-04 DIAGNOSIS — Z51.81 ENCOUNTER FOR MEDICATION MONITORING: ICD-10-CM

## 2024-06-04 DIAGNOSIS — M05.9 SEROPOSITIVE RHEUMATOID ARTHRITIS: ICD-10-CM

## 2024-06-04 LAB
ALBUMIN SERPL BCP-MCNC: 3.9 G/DL (ref 3.5–5.2)
ALP SERPL-CCNC: 92 U/L (ref 55–135)
ALT SERPL W/O P-5'-P-CCNC: 35 U/L (ref 10–44)
ANION GAP SERPL CALC-SCNC: 7 MMOL/L (ref 8–16)
AST SERPL-CCNC: 25 U/L (ref 10–40)
BASOPHILS # BLD AUTO: 0.06 K/UL (ref 0–0.2)
BASOPHILS NFR BLD: 1 % (ref 0–1.9)
BILIRUB SERPL-MCNC: 0.7 MG/DL (ref 0.1–1)
BUN SERPL-MCNC: 14 MG/DL (ref 6–20)
CALCIUM SERPL-MCNC: 10.1 MG/DL (ref 8.7–10.5)
CHLORIDE SERPL-SCNC: 107 MMOL/L (ref 95–110)
CO2 SERPL-SCNC: 25 MMOL/L (ref 23–29)
CREAT SERPL-MCNC: 0.9 MG/DL (ref 0.5–1.4)
CRP SERPL-MCNC: 18.8 MG/L (ref 0–8.2)
DIFFERENTIAL METHOD BLD: ABNORMAL
EOSINOPHIL # BLD AUTO: 0.1 K/UL (ref 0–0.5)
EOSINOPHIL NFR BLD: 1.6 % (ref 0–8)
ERYTHROCYTE [DISTWIDTH] IN BLOOD BY AUTOMATED COUNT: 14.6 % (ref 11.5–14.5)
ERYTHROCYTE [SEDIMENTATION RATE] IN BLOOD BY PHOTOMETRIC METHOD: 109 MM/HR (ref 0–36)
EST. GFR  (NO RACE VARIABLE): >60 ML/MIN/1.73 M^2
GLUCOSE SERPL-MCNC: 87 MG/DL (ref 70–110)
HCT VFR BLD AUTO: 39.6 % (ref 37–48.5)
HGB BLD-MCNC: 12.8 G/DL (ref 12–16)
IMM GRANULOCYTES # BLD AUTO: 0.02 K/UL (ref 0–0.04)
IMM GRANULOCYTES NFR BLD AUTO: 0.3 % (ref 0–0.5)
LYMPHOCYTES # BLD AUTO: 2.6 K/UL (ref 1–4.8)
LYMPHOCYTES NFR BLD: 45.2 % (ref 18–48)
MCH RBC QN AUTO: 24.6 PG (ref 27–31)
MCHC RBC AUTO-ENTMCNC: 32.3 G/DL (ref 32–36)
MCV RBC AUTO: 76 FL (ref 82–98)
MONOCYTES # BLD AUTO: 0.3 K/UL (ref 0.3–1)
MONOCYTES NFR BLD: 5 % (ref 4–15)
NEUTROPHILS # BLD AUTO: 2.7 K/UL (ref 1.8–7.7)
NEUTROPHILS NFR BLD: 46.9 % (ref 38–73)
NRBC BLD-RTO: 0 /100 WBC
PLATELET # BLD AUTO: 379 K/UL (ref 150–450)
PMV BLD AUTO: 9.6 FL (ref 9.2–12.9)
POTASSIUM SERPL-SCNC: 4.3 MMOL/L (ref 3.5–5.1)
PROT SERPL-MCNC: 8.4 G/DL (ref 6–8.4)
RBC # BLD AUTO: 5.2 M/UL (ref 4–5.4)
SODIUM SERPL-SCNC: 139 MMOL/L (ref 136–145)
WBC # BLD AUTO: 5.78 K/UL (ref 3.9–12.7)

## 2024-06-04 PROCEDURE — 36415 COLL VENOUS BLD VENIPUNCTURE: CPT | Performed by: INTERNAL MEDICINE

## 2024-06-04 PROCEDURE — 85025 COMPLETE CBC W/AUTO DIFF WBC: CPT | Performed by: INTERNAL MEDICINE

## 2024-06-04 PROCEDURE — 80053 COMPREHEN METABOLIC PANEL: CPT | Performed by: INTERNAL MEDICINE

## 2024-06-04 PROCEDURE — 85652 RBC SED RATE AUTOMATED: CPT | Performed by: INTERNAL MEDICINE

## 2024-06-04 PROCEDURE — 86140 C-REACTIVE PROTEIN: CPT | Performed by: INTERNAL MEDICINE

## 2024-06-05 ENCOUNTER — TELEPHONE (OUTPATIENT)
Dept: RHEUMATOLOGY | Facility: CLINIC | Age: 24
End: 2024-06-05
Payer: COMMERCIAL

## 2024-06-05 NOTE — TELEPHONE ENCOUNTER
----- Message from Rufino Colon MD sent at 6/5/2024  9:18 AM CDT -----  Looks like falsely elevated ESR.  Repeat ESR and CRP in 2 weeks.

## 2024-06-14 RX ORDER — UPADACITINIB 15 MG/1
15 TABLET, EXTENDED RELEASE ORAL DAILY
Qty: 30 TABLET | Refills: 11 | Status: ACTIVE | OUTPATIENT
Start: 2024-06-14

## 2024-06-17 ENCOUNTER — TELEPHONE (OUTPATIENT)
Dept: RHEUMATOLOGY | Facility: CLINIC | Age: 24
End: 2024-06-17
Payer: COMMERCIAL

## 2024-06-17 NOTE — TELEPHONE ENCOUNTER
Outgoing call to  patient on new medication, Rinvoq.     Clinical Pharmacy Progress Note: Medication Education     Patient was counseled by pharmacist on new medication Rinvoq and its indications, side effects, and reasons for taking this medication.   - Educated patient on mechanism of action, frequency and route of administration, onset of action, and safety profile of Rinvoq.   - Encouraged pt to practice proper hand hygiene considering increased risk of infection with biologics. Pt to make us aware if she ever experiences s/sx of an infection including fever, chills, URI symptoms or UTI symptoms.   - Reviewed lack of data of data in pregnancy and possible risks. Emphasized importance of using protection during Rinvoq therapy. Pt currently on contraception (DepoProvera injection) and has no plans for children in the near future Rinvoq.   - Labs up to date: CBC/CMP ; TB; Hep B  -  - Discussed importance of immunizations considering the increased risk of infections. Recommended tetanus booster, PCV20, and COVID vaccine.   - Pt to avoid live vaccines and uncooked/ raw seafood such as raw oysters or sushi.   - Advised pt to use sun protection and get annual skin checks considering possible increased risk of skin cancer   - Recommended against use of NSAIDs including motrin,ibuprofen, advil, aleve etc as patient is taking meloxicam.     Patient was provided educational drug card/handout via [facility].   Patient expressed understanding and had no further questions.      Thank you for allowing us to participate in this patient's care.    Kristi Toro, PharmD  Ambulatory Clinical Pharmacist- Rheumatology

## 2024-06-19 ENCOUNTER — LAB VISIT (OUTPATIENT)
Dept: LAB | Facility: HOSPITAL | Age: 24
End: 2024-06-19
Attending: INTERNAL MEDICINE
Payer: COMMERCIAL

## 2024-06-19 DIAGNOSIS — Z51.81 ENCOUNTER FOR MEDICATION MONITORING: ICD-10-CM

## 2024-06-19 DIAGNOSIS — M05.9 SEROPOSITIVE RHEUMATOID ARTHRITIS: ICD-10-CM

## 2024-06-19 DIAGNOSIS — D84.821 DRUG-INDUCED IMMUNODEFICIENCY: ICD-10-CM

## 2024-06-19 DIAGNOSIS — Z79.899 DRUG-INDUCED IMMUNODEFICIENCY: ICD-10-CM

## 2024-06-19 LAB
CRP SERPL-MCNC: 22.8 MG/L (ref 0–8.2)
ERYTHROCYTE [SEDIMENTATION RATE] IN BLOOD BY WESTERGREN METHOD: 22 MM/HR (ref 0–20)

## 2024-06-19 PROCEDURE — 36415 COLL VENOUS BLD VENIPUNCTURE: CPT | Performed by: INTERNAL MEDICINE

## 2024-06-19 PROCEDURE — 86140 C-REACTIVE PROTEIN: CPT | Performed by: INTERNAL MEDICINE

## 2024-06-19 PROCEDURE — 85651 RBC SED RATE NONAUTOMATED: CPT | Performed by: INTERNAL MEDICINE

## 2024-08-06 ENCOUNTER — OFFICE VISIT (OUTPATIENT)
Dept: RHEUMATOLOGY | Facility: CLINIC | Age: 24
End: 2024-08-06
Payer: COMMERCIAL

## 2024-08-06 ENCOUNTER — LAB VISIT (OUTPATIENT)
Dept: LAB | Facility: HOSPITAL | Age: 24
End: 2024-08-06
Attending: INTERNAL MEDICINE
Payer: COMMERCIAL

## 2024-08-06 VITALS
HEIGHT: 63 IN | HEART RATE: 95 BPM | DIASTOLIC BLOOD PRESSURE: 67 MMHG | WEIGHT: 138.44 LBS | SYSTOLIC BLOOD PRESSURE: 112 MMHG | BODY MASS INDEX: 24.53 KG/M2

## 2024-08-06 DIAGNOSIS — Z79.899 DRUG-INDUCED IMMUNODEFICIENCY: ICD-10-CM

## 2024-08-06 DIAGNOSIS — Z51.81 ENCOUNTER FOR MEDICATION MONITORING: ICD-10-CM

## 2024-08-06 DIAGNOSIS — D84.821 DRUG-INDUCED IMMUNODEFICIENCY: ICD-10-CM

## 2024-08-06 DIAGNOSIS — M05.9 SEROPOSITIVE RHEUMATOID ARTHRITIS: ICD-10-CM

## 2024-08-06 DIAGNOSIS — M05.9 SEROPOSITIVE RHEUMATOID ARTHRITIS: Primary | ICD-10-CM

## 2024-08-06 DIAGNOSIS — M54.9 MUSCULOSKELETAL BACK PAIN: ICD-10-CM

## 2024-08-06 LAB
ALBUMIN SERPL BCP-MCNC: 4.1 G/DL (ref 3.5–5.2)
ALP SERPL-CCNC: 73 U/L (ref 55–135)
ALT SERPL W/O P-5'-P-CCNC: 17 U/L (ref 10–44)
ANION GAP SERPL CALC-SCNC: 8 MMOL/L (ref 8–16)
AST SERPL-CCNC: 20 U/L (ref 10–40)
BASOPHILS # BLD AUTO: 0.02 K/UL (ref 0–0.2)
BASOPHILS NFR BLD: 0.3 % (ref 0–1.9)
BILIRUB SERPL-MCNC: 0.5 MG/DL (ref 0.1–1)
BUN SERPL-MCNC: 13 MG/DL (ref 6–20)
CALCIUM SERPL-MCNC: 9.4 MG/DL (ref 8.7–10.5)
CHLORIDE SERPL-SCNC: 110 MMOL/L (ref 95–110)
CO2 SERPL-SCNC: 21 MMOL/L (ref 23–29)
CREAT SERPL-MCNC: 0.8 MG/DL (ref 0.5–1.4)
CRP SERPL-MCNC: 0.7 MG/L (ref 0–8.2)
DIFFERENTIAL METHOD BLD: ABNORMAL
EOSINOPHIL # BLD AUTO: 0.1 K/UL (ref 0–0.5)
EOSINOPHIL NFR BLD: 0.8 % (ref 0–8)
ERYTHROCYTE [DISTWIDTH] IN BLOOD BY AUTOMATED COUNT: 16 % (ref 11.5–14.5)
ERYTHROCYTE [SEDIMENTATION RATE] IN BLOOD BY PHOTOMETRIC METHOD: 29 MM/HR (ref 0–36)
EST. GFR  (NO RACE VARIABLE): >60 ML/MIN/1.73 M^2
GLUCOSE SERPL-MCNC: 87 MG/DL (ref 70–110)
HCT VFR BLD AUTO: 37.4 % (ref 37–48.5)
HGB BLD-MCNC: 12.4 G/DL (ref 12–16)
IMM GRANULOCYTES # BLD AUTO: 0.02 K/UL (ref 0–0.04)
IMM GRANULOCYTES NFR BLD AUTO: 0.3 % (ref 0–0.5)
LYMPHOCYTES # BLD AUTO: 3.7 K/UL (ref 1–4.8)
LYMPHOCYTES NFR BLD: 61.7 % (ref 18–48)
MCH RBC QN AUTO: 24.9 PG (ref 27–31)
MCHC RBC AUTO-ENTMCNC: 33.2 G/DL (ref 32–36)
MCV RBC AUTO: 75 FL (ref 82–98)
MONOCYTES # BLD AUTO: 0.3 K/UL (ref 0.3–1)
MONOCYTES NFR BLD: 4.3 % (ref 4–15)
NEUTROPHILS # BLD AUTO: 2 K/UL (ref 1.8–7.7)
NEUTROPHILS NFR BLD: 32.6 % (ref 38–73)
NRBC BLD-RTO: 0 /100 WBC
PLATELET # BLD AUTO: 322 K/UL (ref 150–450)
PMV BLD AUTO: 9.3 FL (ref 9.2–12.9)
POTASSIUM SERPL-SCNC: 4 MMOL/L (ref 3.5–5.1)
PROT SERPL-MCNC: 7.4 G/DL (ref 6–8.4)
RBC # BLD AUTO: 4.97 M/UL (ref 4–5.4)
SODIUM SERPL-SCNC: 139 MMOL/L (ref 136–145)
WBC # BLD AUTO: 6.06 K/UL (ref 3.9–12.7)

## 2024-08-06 PROCEDURE — 99999 PR PBB SHADOW E&M-EST. PATIENT-LVL IV: CPT | Mod: PBBFAC,,, | Performed by: INTERNAL MEDICINE

## 2024-08-06 PROCEDURE — 1159F MED LIST DOCD IN RCRD: CPT | Mod: CPTII,S$GLB,, | Performed by: INTERNAL MEDICINE

## 2024-08-06 PROCEDURE — 36415 COLL VENOUS BLD VENIPUNCTURE: CPT | Performed by: INTERNAL MEDICINE

## 2024-08-06 PROCEDURE — 3078F DIAST BP <80 MM HG: CPT | Mod: CPTII,S$GLB,, | Performed by: INTERNAL MEDICINE

## 2024-08-06 PROCEDURE — 1160F RVW MEDS BY RX/DR IN RCRD: CPT | Mod: CPTII,S$GLB,, | Performed by: INTERNAL MEDICINE

## 2024-08-06 PROCEDURE — 3008F BODY MASS INDEX DOCD: CPT | Mod: CPTII,S$GLB,, | Performed by: INTERNAL MEDICINE

## 2024-08-06 PROCEDURE — 85025 COMPLETE CBC W/AUTO DIFF WBC: CPT | Performed by: INTERNAL MEDICINE

## 2024-08-06 PROCEDURE — 99215 OFFICE O/P EST HI 40 MIN: CPT | Mod: S$GLB,,, | Performed by: INTERNAL MEDICINE

## 2024-08-06 PROCEDURE — 86140 C-REACTIVE PROTEIN: CPT | Performed by: INTERNAL MEDICINE

## 2024-08-06 PROCEDURE — 85652 RBC SED RATE AUTOMATED: CPT | Performed by: INTERNAL MEDICINE

## 2024-08-06 PROCEDURE — 80053 COMPREHEN METABOLIC PANEL: CPT | Performed by: INTERNAL MEDICINE

## 2024-08-06 PROCEDURE — 3074F SYST BP LT 130 MM HG: CPT | Mod: CPTII,S$GLB,, | Performed by: INTERNAL MEDICINE

## 2024-08-06 RX ORDER — GLYCOPYRROLATE 2 MG/1
TABLET ORAL
COMMUNITY
Start: 2024-07-11

## 2024-08-06 RX ORDER — ERGOCALCIFEROL 1.25 MG/1
CAPSULE ORAL
COMMUNITY
Start: 2024-07-11

## 2024-08-06 RX ORDER — PIMECROLIMUS 10 MG/G
CREAM TOPICAL
COMMUNITY
Start: 2024-07-12

## 2024-08-06 RX ORDER — MINOXIDIL 2.5 MG/1
2.5 TABLET ORAL
COMMUNITY
Start: 2024-06-17

## 2024-09-03 ENCOUNTER — TELEPHONE (OUTPATIENT)
Dept: RHEUMATOLOGY | Facility: CLINIC | Age: 24
End: 2024-09-03
Payer: COMMERCIAL

## 2024-09-03 NOTE — TELEPHONE ENCOUNTER
Outgoing call discussing abdominal pain patient has experienced x a few weeks. Report after eating. Sometimes nausea, no vomiting. Intermittent diarrhea, normal color (no black/tarry stools). Patient due to get a CT scan through [facility] and f/u with us with results. Follow up message sent to patient via [facility] patient portal.

## 2024-09-18 RX ORDER — MELOXICAM 15 MG/1
15 TABLET ORAL
Qty: 30 TABLET | Refills: 3 | Status: SHIPPED | OUTPATIENT
Start: 2024-09-18

## 2024-11-04 ENCOUNTER — TELEPHONE (OUTPATIENT)
Dept: RHEUMATOLOGY | Facility: CLINIC | Age: 24
End: 2024-11-04
Payer: COMMERCIAL

## 2024-11-04 NOTE — TELEPHONE ENCOUNTER
----- Message from Stefan sent at 11/4/2024  4:54 PM CST -----  Contact: Casandra  Type:  Patient Requesting a call back     Who Called:Casandra  What is the call back request regarding?:Requesting a call back in regards to having her lab orders sent to a lab  in [place]   Would the patient rather a call back or a response via [facility]?call  Best Call Back Number:[phone]   Additional Information:   Clinic Action Needed:Yes, Please call patient [phone] (home)   Reason for Call:Patient calling stating he received a lung cancer screening reminder by mail for inexio. Patient had last screening 12/14/19 see [facility].  Stating letter is stating to have his primary MD order.    Alma Hammonds RN  [facility]

## 2024-11-06 ENCOUNTER — TELEPHONE (OUTPATIENT)
Dept: RHEUMATOLOGY | Facility: CLINIC | Age: 24
End: 2024-11-06
Payer: COMMERCIAL

## 2024-11-06 NOTE — TELEPHONE ENCOUNTER
----- Message from Patricia sent at 11/6/2024  4:36 PM CST -----  Contact: [phone]  Pt is out of town and would like to get her labs done close to where she is located. She would like to get them done at [facility] @ fax # [phone]. Please call her when faxed so she will know when she can schedule to go get her labs.

## 2024-11-07 ENCOUNTER — PATIENT MESSAGE (OUTPATIENT)
Dept: RHEUMATOLOGY | Facility: CLINIC | Age: 24
End: 2024-11-07
Payer: COMMERCIAL

## 2024-11-07 DIAGNOSIS — Z79.899 DRUG-INDUCED IMMUNODEFICIENCY: ICD-10-CM

## 2024-11-07 DIAGNOSIS — Z51.81 ENCOUNTER FOR MEDICATION MONITORING: ICD-10-CM

## 2024-11-07 DIAGNOSIS — M05.9 SEROPOSITIVE RHEUMATOID ARTHRITIS: Primary | ICD-10-CM

## 2024-11-07 DIAGNOSIS — D84.821 DRUG-INDUCED IMMUNODEFICIENCY: ICD-10-CM

## 2024-11-12 ENCOUNTER — PATIENT MESSAGE (OUTPATIENT)
Dept: RHEUMATOLOGY | Facility: CLINIC | Age: 24
End: 2024-11-12

## 2024-11-12 ENCOUNTER — OFFICE VISIT (OUTPATIENT)
Dept: RHEUMATOLOGY | Facility: CLINIC | Age: 24
End: 2024-11-12
Payer: COMMERCIAL

## 2024-11-12 DIAGNOSIS — D84.821 DRUG-INDUCED IMMUNODEFICIENCY: ICD-10-CM

## 2024-11-12 DIAGNOSIS — Z79.899 USE OF MEDICATION WITH TERATOGENIC POTENTIAL IN FEMALE OF REPRODUCTIVE AGE: ICD-10-CM

## 2024-11-12 DIAGNOSIS — M05.9 SEROPOSITIVE RHEUMATOID ARTHRITIS: Primary | ICD-10-CM

## 2024-11-12 DIAGNOSIS — Z79.899 DRUG-INDUCED IMMUNODEFICIENCY: ICD-10-CM

## 2024-11-12 DIAGNOSIS — Z51.81 ENCOUNTER FOR MEDICATION MONITORING: ICD-10-CM

## 2024-11-12 PROCEDURE — G2211 COMPLEX E/M VISIT ADD ON: HCPCS | Mod: 95,,, | Performed by: INTERNAL MEDICINE

## 2024-11-12 PROCEDURE — 1159F MED LIST DOCD IN RCRD: CPT | Mod: CPTII,95,, | Performed by: INTERNAL MEDICINE

## 2024-11-12 PROCEDURE — 99214 OFFICE O/P EST MOD 30 MIN: CPT | Mod: 95,,, | Performed by: INTERNAL MEDICINE

## 2024-11-12 PROCEDURE — 1160F RVW MEDS BY RX/DR IN RCRD: CPT | Mod: CPTII,95,, | Performed by: INTERNAL MEDICINE

## 2024-11-12 NOTE — PROGRESS NOTES
RHEUMATOLOGY FOLLOW UP - TELE VISIT     The patient location is: [place]  The chief complaint leading to consultation is:  Follow-up for rheumatoid arthritis  Visit type: Virtual visit with synchronous audio and video  Total time spent with patient:  15 minutes  Each patient to whom he or she provides medical services by telemedicine is:  (1) informed of the relationship between the physician and patient and the respective role of any other health care provider with respect to management of the patient; and (2) notified that he or she may decline to receive medical services by telemedicine and may withdraw from such care at any time.    Chief complaints, HPI, ROS, EXAM, Assessment & Plans:-  Casandra Caballero a 24 y.o. pleasant female comes in for  follow-up visit.  Seropositive nonerosive atypical rheumatoid arthritis initially presenting as right shoulder synovitis status post synovectomy.  She was doing well for a month after the surgery and started having worsening pain.    Established care with us for synovitis.  Workup showed severe rheumatoid arthritis with atypical presentation and was started on Rinvoq.   80% resolution of right shoulder pain and stiffness.  No other significant pain today.  Rheumatological review of system negative otherwise.    Physical examination shows 100% fist formation bilateral hands.  1. Seropositive rheumatoid arthritis    2. Drug-induced immunodeficiency    3. Encounter for medication monitoring    4. Use of medication with teratogenic potential in female of reproductive age      Problem List Items Addressed This Visit       Drug-induced immunodeficiency    Encounter for medication monitoring    Seropositive rheumatoid arthritis - Primary    Use of medication with teratogenic potential in female of reproductive age       Labs reviewed today:-   Latest Reference Range & Units Most Recent   JOANNA Screen Negative <1:80  Positive !  8/22/23 12:24   JOANNA Titer 1  1:80  8/22/23 12:24   JOANNA  PATTERN 1  Speckled  8/22/23 12:24   ds DNA Ab Negative 1:10  Negative 1:10  8/22/23 12:24   Anti-SSA Antibody 0.00 - 0.99 Ratio 0.09  8/22/23 12:24   Anti-SSA Interpretation Negative  Negative  8/22/23 12:24   Anti-SSB Antibody 0.00 - 0.99 Ratio 0.06  8/22/23 12:24   Anti-SSB Interpretation Negative  Negative  8/22/23 12:24   Anti Sm Antibody 0.00 - 0.99 Ratio 0.11  8/22/23 12:24   Anti-Sm Interpretation Negative  Negative  8/22/23 12:24   Anti Sm/RNP Antibody 0.00 - 0.99 Ratio 0.10  8/22/23 12:24   Anti-Sm/RNP Interpretation Negative  Negative  8/22/23 12:24   CCP Antibodies <5.0 U/mL 22.4 (H)  8/22/23 12:24   Rheumatoid Factor 0.0 - 15.0 IU/mL 126.0 (H)  8/22/23 12:24   !: Data is abnormal  (H): Data is abnormally high       Latest Reference Range & Units 08/06/24 14:33   WBC 3.90 - 12.70 K/uL 6.06   RBC 4.00 - 5.40 M/uL 4.97   Hemoglobin 12.0 - 16.0 g/dL 12.4   Hematocrit 37.0 - 48.5 % 37.4   MCV 82 - 98 fL 75 (L)   MCH 27.0 - 31.0 pg 24.9 (L)   MCHC 32.0 - 36.0 g/dL 33.2   RDW 11.5 - 14.5 % 16.0 (H)   Platelet Count 150 - 450 K/uL 322   MPV 9.2 - 12.9 fL 9.3   Gran % 38.0 - 73.0 % 32.6 (L)   Lymph % 18.0 - 48.0 % 61.7 (H)   Mono % 4.0 - 15.0 % 4.3   Eos % 0.0 - 8.0 % 0.8   Basophil % 0.0 - 1.9 % 0.3   Immature Granulocytes 0.0 - 0.5 % 0.3   Gran # (ANC) 1.8 - 7.7 K/uL 2.0   Lymph # 1.0 - 4.8 K/uL 3.7   Mono # 0.3 - 1.0 K/uL 0.3   Eos # 0.0 - 0.5 K/uL 0.1   Baso # 0.00 - 0.20 K/uL 0.02   Immature Grans (Abs) 0.00 - 0.04 K/uL 0.02   nRBC 0 /100 WBC 0   Differential Method  Automated   (L): Data is abnormally low  (H): Data is abnormally high      Seropositive rheumatoid arthritis with synovitis showing excellent improvement on Rinvoq.   80% improvement on Rinvoq. Continue.    Drug induced immunodeficiency due to use of immunosuppressive drugs. Monitor carefully for infections. Advised to get immediate medical care if any infection. Also advised strict adherence to age appropriate vaccinations and cancer  screenings with PCP.  Hold rinvoq if any infection  I have explained all of the above in detail and the patient understands all of the current recommendation(s). I have answered all questions to the best of my ability and to their complete satisfaction.     # Follow up in about 6 months (around 5/12/2025).      Disclaimer: This note was prepared using voice recognition system and is likely to have sound alike errors and is not proof read.  Please call me with any questions.    Visit today included increased complexity associated with the care of the episodic problem rheumatoid arthritis addressed and managing the longitudinal care of the patient due to the serious and/or complex managed problem(s) drug-induced immunodeficiency, medication monitoring encounter.

## 2024-12-10 ENCOUNTER — PATIENT MESSAGE (OUTPATIENT)
Dept: RHEUMATOLOGY | Facility: CLINIC | Age: 24
End: 2024-12-10
Payer: COMMERCIAL

## 2025-05-08 ENCOUNTER — LAB VISIT (OUTPATIENT)
Dept: LAB | Facility: HOSPITAL | Age: 25
End: 2025-05-08
Attending: INTERNAL MEDICINE
Payer: COMMERCIAL

## 2025-05-08 DIAGNOSIS — Z51.81 ENCOUNTER FOR MEDICATION MONITORING: ICD-10-CM

## 2025-05-08 DIAGNOSIS — Z79.899 DRUG-INDUCED IMMUNODEFICIENCY: ICD-10-CM

## 2025-05-08 DIAGNOSIS — M05.9 SEROPOSITIVE RHEUMATOID ARTHRITIS: ICD-10-CM

## 2025-05-08 DIAGNOSIS — D84.821 DRUG-INDUCED IMMUNODEFICIENCY: ICD-10-CM

## 2025-05-08 LAB
ABSOLUTE EOSINOPHIL (OHS): 0.06 K/UL
ABSOLUTE MONOCYTE (OHS): 0.44 K/UL (ref 0.3–1)
ABSOLUTE NEUTROPHIL COUNT (OHS): 3.35 K/UL (ref 1.8–7.7)
ALBUMIN SERPL BCP-MCNC: 3.7 G/DL (ref 3.5–5.2)
ALP SERPL-CCNC: 72 UNIT/L (ref 40–150)
ALT SERPL W/O P-5'-P-CCNC: 32 UNIT/L (ref 10–44)
ANION GAP (OHS): 8 MMOL/L (ref 8–16)
AST SERPL-CCNC: 22 UNIT/L (ref 11–45)
BASOPHILS # BLD AUTO: 0.03 K/UL
BASOPHILS NFR BLD AUTO: 0.5 %
BILIRUB SERPL-MCNC: 0.2 MG/DL (ref 0.1–1)
BUN SERPL-MCNC: 12 MG/DL (ref 6–20)
CALCIUM SERPL-MCNC: 9.4 MG/DL (ref 8.7–10.5)
CHLORIDE SERPL-SCNC: 109 MMOL/L (ref 95–110)
CO2 SERPL-SCNC: 23 MMOL/L (ref 23–29)
CREAT SERPL-MCNC: 0.8 MG/DL (ref 0.5–1.4)
CRP SERPL-MCNC: 6 MG/L
ERYTHROCYTE [DISTWIDTH] IN BLOOD BY AUTOMATED COUNT: 13.6 % (ref 11.5–14.5)
ERYTHROCYTE [SEDIMENTATION RATE] IN BLOOD: 11 MM/HR
GFR SERPLBLD CREATININE-BSD FMLA CKD-EPI: >60 ML/MIN/1.73/M2
GLUCOSE SERPL-MCNC: 77 MG/DL (ref 70–110)
HCT VFR BLD AUTO: 37.6 % (ref 37–48.5)
HGB BLD-MCNC: 12.1 GM/DL (ref 12–16)
IMM GRANULOCYTES # BLD AUTO: 0.01 K/UL (ref 0–0.04)
IMM GRANULOCYTES NFR BLD AUTO: 0.2 % (ref 0–0.5)
LYMPHOCYTES # BLD AUTO: 2.47 K/UL (ref 1–4.8)
MCH RBC QN AUTO: 25.6 PG (ref 27–31)
MCHC RBC AUTO-ENTMCNC: 32.2 G/DL (ref 32–36)
MCV RBC AUTO: 80 FL (ref 82–98)
NUCLEATED RBC (/100WBC) (OHS): 0 /100 WBC
PLATELET # BLD AUTO: 300 K/UL (ref 150–450)
PMV BLD AUTO: 10.4 FL (ref 9.2–12.9)
POTASSIUM SERPL-SCNC: 3.9 MMOL/L (ref 3.5–5.1)
PROT SERPL-MCNC: 7.5 GM/DL (ref 6–8.4)
RBC # BLD AUTO: 4.73 M/UL (ref 4–5.4)
RELATIVE EOSINOPHIL (OHS): 0.9 %
RELATIVE LYMPHOCYTE (OHS): 38.8 % (ref 18–48)
RELATIVE MONOCYTE (OHS): 6.9 % (ref 4–15)
RELATIVE NEUTROPHIL (OHS): 52.7 % (ref 38–73)
SODIUM SERPL-SCNC: 140 MMOL/L (ref 136–145)
WBC # BLD AUTO: 6.36 K/UL (ref 3.9–12.7)

## 2025-05-08 PROCEDURE — 80053 COMPREHEN METABOLIC PANEL: CPT

## 2025-05-08 PROCEDURE — 85025 COMPLETE CBC W/AUTO DIFF WBC: CPT

## 2025-05-08 PROCEDURE — 85652 RBC SED RATE AUTOMATED: CPT

## 2025-05-08 PROCEDURE — 36415 COLL VENOUS BLD VENIPUNCTURE: CPT

## 2025-05-08 PROCEDURE — 86140 C-REACTIVE PROTEIN: CPT

## 2025-05-13 ENCOUNTER — OFFICE VISIT (OUTPATIENT)
Dept: RHEUMATOLOGY | Facility: CLINIC | Age: 25
End: 2025-05-13
Payer: COMMERCIAL

## 2025-05-13 ENCOUNTER — PATIENT MESSAGE (OUTPATIENT)
Dept: RHEUMATOLOGY | Facility: CLINIC | Age: 25
End: 2025-05-13

## 2025-05-13 DIAGNOSIS — Z79.899 USE OF MEDICATION WITH TERATOGENIC POTENTIAL IN FEMALE OF REPRODUCTIVE AGE: ICD-10-CM

## 2025-05-13 DIAGNOSIS — Z51.81 ENCOUNTER FOR MEDICATION MONITORING: ICD-10-CM

## 2025-05-13 DIAGNOSIS — M25.511 CHRONIC RIGHT SHOULDER PAIN: ICD-10-CM

## 2025-05-13 DIAGNOSIS — Z79.899 DRUG-INDUCED IMMUNODEFICIENCY: ICD-10-CM

## 2025-05-13 DIAGNOSIS — D84.821 DRUG-INDUCED IMMUNODEFICIENCY: ICD-10-CM

## 2025-05-13 DIAGNOSIS — G89.29 CHRONIC RIGHT SHOULDER PAIN: ICD-10-CM

## 2025-05-13 DIAGNOSIS — M05.9 SEROPOSITIVE RHEUMATOID ARTHRITIS: Primary | ICD-10-CM

## 2025-05-13 PROCEDURE — G2211 COMPLEX E/M VISIT ADD ON: HCPCS | Mod: 95,,, | Performed by: INTERNAL MEDICINE

## 2025-05-13 PROCEDURE — 1159F MED LIST DOCD IN RCRD: CPT | Mod: CPTII,95,, | Performed by: INTERNAL MEDICINE

## 2025-05-13 PROCEDURE — 1160F RVW MEDS BY RX/DR IN RCRD: CPT | Mod: CPTII,95,, | Performed by: INTERNAL MEDICINE

## 2025-05-13 PROCEDURE — 98006 SYNCH AUDIO-VIDEO EST MOD 30: CPT | Mod: 95,,, | Performed by: INTERNAL MEDICINE

## 2025-05-13 NOTE — PROGRESS NOTES
RHEUMATOLOGY FOLLOW UP - TELE VISIT     The patient location is: [place]  The chief complaint leading to consultation is:  Follow-up for rheumatoid arthritis  Visit type: Virtual visit with synchronous audio and video  Total time spent with patient:  15 minutes  Each patient to whom he or she provides medical services by telemedicine is:  (1) informed of the relationship between the physician and patient and the respective role of any other health care provider with respect to management of the patient; and (2) notified that he or she may decline to receive medical services by telemedicine and may withdraw from such care at any time.    Chief complaints, HPI, ROS, EXAM, Assessment & Plans:-  Casandra Caballero a 24 y.o. pleasant female comes in for  follow-up visit.  Seropositive nonerosive atypical rheumatoid arthritis initially presenting as right shoulder synovitis status post synovectomy.  She was doing well for a month after the surgery and started having worsening pain.    Established care with us for synovitis.  Workup showed severe rheumatoid arthritis with atypical presentation and was started on Rinvoq.   Had significant improvement of right shoulder pain along with other small joint pain and she started Rinvoq for the past couple of months she has been noticing worsening pain of right shoulder.  Denies any flare of other joints.    Rheumatological review of system negative otherwise.    Physical examination shows 100% fist formation bilateral hands.  Significant restricted range of motion of right shoulder due to pain.  1. Seropositive rheumatoid arthritis    2. Drug-induced immunodeficiency    3. Encounter for medication monitoring    4. Use of medication with teratogenic potential in female of reproductive age    5. Chronic right shoulder pain      Problem List Items Addressed This Visit       Chronic right shoulder pain    Relevant Orders    MRI Shoulder With Contrast Right    Pregnancy, urine rapid     Drug-induced immunodeficiency    Encounter for medication monitoring    Seropositive rheumatoid arthritis - Primary    Relevant Orders    MRI Shoulder With Contrast Right    Pregnancy, urine rapid    Use of medication with teratogenic potential in female of reproductive age       Labs reviewed today:-   Latest Reference Range & Units Most Recent   JOANNA Screen Negative <1:80  Positive !  8/22/23 12:24   JOANNA Titer 1  1:80  8/22/23 12:24   JOANNA PATTERN 1  Speckled  8/22/23 12:24   ds DNA Ab Negative 1:10  Negative 1:10  8/22/23 12:24   Anti-SSA Antibody 0.00 - 0.99 Ratio 0.09  8/22/23 12:24   Anti-SSA Interpretation Negative  Negative  8/22/23 12:24   Anti-SSB Antibody 0.00 - 0.99 Ratio 0.06  8/22/23 12:24   Anti-SSB Interpretation Negative  Negative  8/22/23 12:24   Anti Sm Antibody 0.00 - 0.99 Ratio 0.11  8/22/23 12:24   Anti-Sm Interpretation Negative  Negative  8/22/23 12:24   Anti Sm/RNP Antibody 0.00 - 0.99 Ratio 0.10  8/22/23 12:24   Anti-Sm/RNP Interpretation Negative  Negative  8/22/23 12:24   CCP Antibodies <5.0 U/mL 22.4 (H)  8/22/23 12:24   Rheumatoid Factor 0.0 - 15.0 IU/mL 126.0 (H)  8/22/23 12:24   !: Data is abnormal  (H): Data is abnormally high       Latest Reference Range & Units 08/06/24 14:33   WBC 3.90 - 12.70 K/uL 6.06   RBC 4.00 - 5.40 M/uL 4.97   Hemoglobin 12.0 - 16.0 g/dL 12.4   Hematocrit 37.0 - 48.5 % 37.4   MCV 82 - 98 fL 75 (L)   MCH 27.0 - 31.0 pg 24.9 (L)   MCHC 32.0 - 36.0 g/dL 33.2   RDW 11.5 - 14.5 % 16.0 (H)   Platelet Count 150 - 450 K/uL 322   MPV 9.2 - 12.9 fL 9.3   Gran % 38.0 - 73.0 % 32.6 (L)   Lymph % 18.0 - 48.0 % 61.7 (H)   Mono % 4.0 - 15.0 % 4.3   Eos % 0.0 - 8.0 % 0.8   Basophil % 0.0 - 1.9 % 0.3   Immature Granulocytes 0.0 - 0.5 % 0.3   Gran # (ANC) 1.8 - 7.7 K/uL 2.0   Lymph # 1.0 - 4.8 K/uL 3.7   Mono # 0.3 - 1.0 K/uL 0.3   Eos # 0.0 - 0.5 K/uL 0.1   Baso # 0.00 - 0.20 K/uL 0.02   Immature Grans (Abs) 0.00 - 0.04 K/uL 0.02   nRBC 0 /100 WBC 0   Differential Method   Automated   (L): Data is abnormally low  (H): Data is abnormally high      Seropositive rheumatoid arthritis with synovitis on Rinvoq.  Worsening right shoulder pain with stiffness.  No swelling of other joints.  MRI right shoulder with contrast to look for any active synovitis.  Continue Rinvoq.   Drug induced immunodeficiency due to use of immunosuppressive drugs. Monitor carefully for infections. Advised to get immediate medical care if any infection. Also advised strict adherence to age appropriate vaccinations and cancer screenings with PCP.  Hold rinvoq if any infection  I have explained all of the above in detail and the patient understands all of the current recommendation(s). I have answered all questions to the best of my ability and to their complete satisfaction.     # Follow up in about 6 months (around 11/13/2025).      Disclaimer: This note was prepared using voice recognition system and is likely to have sound alike errors and is not proof read.  Please call me with any questions.    Visit today included increased complexity associated with the care of the episodic problem rheumatoid arthritis addressed and managing the longitudinal care of the patient due to the serious and/or complex managed problem(s) drug-induced immunodeficiency, medication monitoring encounter.

## 2025-05-21 NOTE — TELEPHONE ENCOUNTER
My Chart message not ready.  Left patient a message on her VM with next appt information and to schedule MRI

## 2025-06-04 ENCOUNTER — TELEPHONE (OUTPATIENT)
Dept: RHEUMATOLOGY | Facility: CLINIC | Age: 25
End: 2025-06-04
Payer: COMMERCIAL

## 2025-06-04 DIAGNOSIS — G89.29 CHRONIC RIGHT SHOULDER PAIN: Primary | ICD-10-CM

## 2025-06-04 DIAGNOSIS — M25.511 CHRONIC RIGHT SHOULDER PAIN: Primary | ICD-10-CM

## 2025-06-06 ENCOUNTER — TELEPHONE (OUTPATIENT)
Dept: RHEUMATOLOGY | Facility: CLINIC | Age: 25
End: 2025-06-06
Payer: COMMERCIAL

## 2025-06-09 ENCOUNTER — TELEPHONE (OUTPATIENT)
Dept: RHEUMATOLOGY | Facility: CLINIC | Age: 25
End: 2025-06-09
Payer: COMMERCIAL

## 2025-06-09 NOTE — TELEPHONE ENCOUNTER
Copied from CRM #[id number]. Topic: Appointments - Amb Referral  >> Jun 9, 2025  4:46 PM Kelsey wrote:  Type:  MRI order    Who Called: Casandra Barragan   She is calling in regards to get a new order for: MRI Shoulder With Contrast Right  The facility said they need the order with 's signature  Would the patient rather a call back or a response via [facility]? Call back  Best Call Back Number: [phone]  Thanks!

## 2025-06-09 NOTE — TELEPHONE ENCOUNTER
Left message on VM, will fax signed order to [phone] but they should accept orders that are electronically signed

## 2025-06-25 ENCOUNTER — TELEPHONE (OUTPATIENT)
Dept: RHEUMATOLOGY | Facility: CLINIC | Age: 25
End: 2025-06-25
Payer: COMMERCIAL

## 2025-06-25 DIAGNOSIS — Z98.890 STATUS POST ARTHROSCOPY OF RIGHT SHOULDER: ICD-10-CM

## 2025-06-25 DIAGNOSIS — G89.29 CHRONIC RIGHT SHOULDER PAIN: Primary | ICD-10-CM

## 2025-06-25 DIAGNOSIS — M05.9 SEROPOSITIVE RHEUMATOID ARTHRITIS: ICD-10-CM

## 2025-06-25 DIAGNOSIS — M25.511 CHRONIC RIGHT SHOULDER PAIN: Primary | ICD-10-CM

## 2025-06-25 NOTE — TELEPHONE ENCOUNTER
[facility] called and stated that the external MRI order for the right should should be right shoulder with and without contrast. Can you place new order please and I will fax over

## 2025-06-25 NOTE — TELEPHONE ENCOUNTER
Copied from CRM #[id number]. Topic: General Inquiry - Patient Advice  >> Jun 25, 2025 12:25 PM Navin wrote:  Type:  Needs Medical Advice    Who Called: [facility] [phone]  Symptoms (please be specific): need to talk to the nurse about getting an revised MRI ORDER , it need to say with and with out contrast   How long has patient had these symptoms:    Pharmacy name and phone #:    Would the patient rather a call back or a response via [facility]? Call back  Best Call Back Number: [phone]  Additional Information: [id number]

## 2025-07-02 ENCOUNTER — TELEPHONE (OUTPATIENT)
Dept: RHEUMATOLOGY | Facility: CLINIC | Age: 25
End: 2025-07-02
Payer: COMMERCIAL

## 2025-07-02 DIAGNOSIS — R59.0 LYMPHADENOPATHY, AXILLARY: Primary | ICD-10-CM

## 2025-07-02 NOTE — TELEPHONE ENCOUNTER
Copied from CRM #[id number]. Topic: General Inquiry - Test Results  >> Jul 2, 2025  1:21 PM Cecilia wrote:  ..Type:  Patient Requesting Call    Who Called:   Does the patient know what this is regarding?: discuss test results  Would the patient rather a call back or a response via [facility]?  call  Best Call Back Number: .[phone] (home)  Additional Information:

## 2025-07-02 NOTE — TELEPHONE ENCOUNTER
Patient aware that MRI still not received from external imaging center.  Confirmed our fax number with patient.

## 2025-07-02 NOTE — TELEPHONE ENCOUNTER
Hold rinvoq for now. Schedule visit in first or second week of August to discuss alternate therapies and CT scan results . Thanks.

## 2025-07-02 NOTE — TELEPHONE ENCOUNTER
Copied from CRM #[id number]. Topic: General Inquiry - Return Call  >> Jul 2, 2025  1:37 PM Med Assistant Katie wrote:  Type:  Patient Returning Call    Who Called: Casandra  Who Left Message for Patient: Nena  Does the patient know what this is regarding?: No  Would the patient rather a call back or a response via [facility]?  Call  Best Call Back Number: [phone]  Additional Information:

## 2025-07-02 NOTE — TELEPHONE ENCOUNTER
C/o issues with vaginal yeast infection and Seborrheic dermatitis that dermatologist states it is due to overgrowth of yeast.      She states that she holds the rinvoq when she is prescribed medications for yeast and/or antibiotics but the yeast infection seems to be reoccuring so she has to stop and restart rinvoq due to the infections.      Should she continue Rinvoq or explore alternative medication?

## 2025-07-02 NOTE — TELEPHONE ENCOUNTER
Shoulder MRI shows severe recurrence of inflammation in the shoulder joint . Continue  rinvoq. I have reached out to  to check whether they could have her shoulder drained .   MRI also showed some lymph node enlargement. Could be related to shoulder inflammation. But it has to be worked up with CT scan. Please schedule CT scan.

## 2025-07-08 ENCOUNTER — TELEPHONE (OUTPATIENT)
Dept: SPORTS MEDICINE | Facility: CLINIC | Age: 25
End: 2025-07-08
Payer: COMMERCIAL

## 2025-07-08 ENCOUNTER — TELEPHONE (OUTPATIENT)
Dept: RHEUMATOLOGY | Facility: CLINIC | Age: 25
End: 2025-07-08
Payer: COMMERCIAL

## 2025-07-08 NOTE — TELEPHONE ENCOUNTER
Spoke to patient. Advised that Dr. Flanagan would need a physical copy of the MRI disc to review and be able to make recommendations. Patient stated she would get disc mailed to a friend in town who would drop it off so we could upload it into the system for review. Once we have received disc we will plan to schedule an in-person or virtual f/u to review the results and make any recommendations from there. Patient voiced understanding and was in agreement with plan moving forward. YOVANY        Copied from CRM #[id number]. Topic: General Inquiry - Patient Advice  >> Jul 8, 2025 12:42 PM Raghav wrote:  Who called:self     What is the request in detail:pt is calling in regards of checking the status of her mri result she would like a call back      Can the clinic reply by [facility]?     Would the patient rather a call back or a response via [facility]?callback      Best call back number:.[phone]       Additional Information:

## 2025-07-08 NOTE — TELEPHONE ENCOUNTER
Copied from CRM #[id number]. Topic: General Inquiry - Return Call  >> Jul 8, 2025 12:51 PM Caitlyn wrote:  .Type:  Patient Requesting Call    Who Called:Casandra  Does the patient know what this is regarding?:pt needs a nurse to reach back out to her regarding needing a signature for her clearance for surgery  Would the patient rather a call back or a response via [facility]? Call back  Best Call Back Number:.[phone]   Additional Information:

## 2025-08-04 ENCOUNTER — OFFICE VISIT (OUTPATIENT)
Dept: RHEUMATOLOGY | Facility: CLINIC | Age: 25
End: 2025-08-04
Payer: COMMERCIAL

## 2025-08-04 DIAGNOSIS — M65.911 SYNOVITIS OF RIGHT SHOULDER: Primary | ICD-10-CM

## 2025-08-04 DIAGNOSIS — M05.9 SEROPOSITIVE RHEUMATOID ARTHRITIS: ICD-10-CM

## 2025-08-04 DIAGNOSIS — Z79.899 DRUG-INDUCED IMMUNODEFICIENCY: ICD-10-CM

## 2025-08-04 DIAGNOSIS — D84.821 DRUG-INDUCED IMMUNODEFICIENCY: ICD-10-CM

## 2025-08-04 DIAGNOSIS — Z51.81 ENCOUNTER FOR MEDICATION MONITORING: ICD-10-CM

## 2025-08-04 PROCEDURE — 98006 SYNCH AUDIO-VIDEO EST MOD 30: CPT | Mod: 95,,, | Performed by: INTERNAL MEDICINE

## 2025-08-04 PROCEDURE — G2211 COMPLEX E/M VISIT ADD ON: HCPCS | Mod: 95,,, | Performed by: INTERNAL MEDICINE

## 2025-08-04 RX ORDER — PREDNISONE 20 MG/1
20 TABLET ORAL DAILY
Qty: 30 TABLET | Refills: 0 | Status: SHIPPED | OUTPATIENT
Start: 2025-08-04

## 2025-08-04 NOTE — PROGRESS NOTES
Called and spoke with patient she stated that she did have [facility] fax us the CT scan report. Awaiting that to come through. _DD

## 2025-08-04 NOTE — PROGRESS NOTES
RHEUMATOLOGY FOLLOW UP - TELE VISIT     The patient location is: [place]  The chief complaint leading to consultation is:  Severe worsening right shoulder pain  Visit type: Virtual visit with synchronous audio and video  Total time spent with patient:  15 minutes  Each patient to whom he or she provides medical services by telemedicine is:  (1) informed of the relationship between the physician and patient and the respective role of any other health care provider with respect to management of the patient; and (2) notified that he or she may decline to receive medical services by telemedicine and may withdraw from such care at any time.    Chief complaints, HPI, ROS, EXAM, Assessment & Plans:-  Casandra Caballero a 24 y.o. pleasant female comes in for  follow-up visit.  Seropositive nonerosive atypical rheumatoid arthritis initially presenting as right shoulder synovitis status post synovectomy.  Rinvoq discontinued due to recurrent infections.  Denies any significant improvement of right shoulder pain while on Rinvoq recently.  Severe worsening pain associated with significant swelling, stiffness and restricted range of motion of right shoulder.  Denies any pain or swelling of other joints including small and large joints.  Rheumatological review of system negative otherwise.    Physical examination shows 100% fist formation bilateral hands.  Significant restricted range of motion of right shoulder due to pain.  1. Synovitis of right shoulder    2. Seropositive rheumatoid arthritis    3. Drug-induced immunodeficiency    4. Encounter for medication monitoring      Problem List Items Addressed This Visit       Drug-induced immunodeficiency    Encounter for medication monitoring    Seropositive rheumatoid arthritis    Relevant Medications    predniSONE (DELTASONE) 20 MG tablet     Other Visit Diagnoses         Synovitis of right shoulder    -  Primary    Relevant Medications    predniSONE (DELTASONE) 20 MG tablet             Labs reviewed today:-   Latest Reference Range & Units Most Recent   JOANNA Screen Negative <1:80  Positive !  8/22/23 12:24   JOANNA Titer 1  1:80  8/22/23 12:24   JOANNA PATTERN 1  Speckled  8/22/23 12:24   ds DNA Ab Negative 1:10  Negative 1:10  8/22/23 12:24   Anti-SSA Antibody 0.00 - 0.99 Ratio 0.09  8/22/23 12:24   Anti-SSA Interpretation Negative  Negative  8/22/23 12:24   Anti-SSB Antibody 0.00 - 0.99 Ratio 0.06  8/22/23 12:24   Anti-SSB Interpretation Negative  Negative  8/22/23 12:24   Anti Sm Antibody 0.00 - 0.99 Ratio 0.11  8/22/23 12:24   Anti-Sm Interpretation Negative  Negative  8/22/23 12:24   Anti Sm/RNP Antibody 0.00 - 0.99 Ratio 0.10  8/22/23 12:24   Anti-Sm/RNP Interpretation Negative  Negative  8/22/23 12:24   CCP Antibodies <5.0 U/mL 22.4 (H)  8/22/23 12:24   Rheumatoid Factor 0.0 - 15.0 IU/mL 126.0 (H)  8/22/23 12:24   !: Data is abnormal  (H): Data is abnormally high       Latest Reference Range & Units 05/08/25 16:46   WBC 3.90 - 12.70 K/uL 6.36   RBC 4.00 - 5.40 M/uL 4.73   Hemoglobin 12.0 - 16.0 gm/dL 12.1   Hematocrit 37.0 - 48.5 % 37.6   MCV 82 - 98 fL 80 (L)   MCH 27.0 - 31.0 pg 25.6 (L)   MCHC 32.0 - 36.0 g/dL 32.2   RDW 11.5 - 14.5 % 13.6   Platelet Count 150 - 450 K/uL 300   MPV 9.2 - 12.9 fL 10.4   Neut % 38 - 73 % 52.7   Lymph % 18 - 48 % 38.8   Mono % 4 - 15 % 6.9   Eos % <=8 % 0.9   Basophil % <=1.9 % 0.5   Immature Granulocytes 0.0 - 0.5 % 0.2   Gran # (ANC) 1.8 - 7.7 K/uL 3.35   Lymph # 1 - 4.8 K/uL 2.47   Mono # 0.3 - 1 K/uL 0.44   Eos # <=0.5 K/uL 0.06   Baso # <=0.2 K/uL 0.03   Immature Grans (Abs) 0.00 - 0.04 K/uL 0.01   nRBC <=0 /100 WBC 0   Sed Rate <=36 mm/hr 11   (L): Data is abnormally low    MRI right shoulder 07/2025:-        Severe worsening right shoulder pain with MRI showing significant synovitis and other findings raising possibility of recurrence of neoplasm.  I have sent a message to her orthopedic specialist  to get his opinion on the recent MRI   .  Start high-dose prednisone in the interim.  Other joints remains uninvolved.  Recurrent infections with Rinvoq.  Discontinued.  Awaiting results of CT chest abdomen pelvis which was done for lymph node swelling noted on MRI- ?  Lymphoma.  I have explained all of the above in detail and the patient understands all of the current recommendation(s). I have answered all questions to the best of my ability and to their complete satisfaction.     # Follow up in about 6 weeks (around 9/15/2025).      Disclaimer: This note was prepared using voice recognition system and is likely to have sound alike errors and is not proof read.  Please call me with any questions.                                Answers submitted by the patient for this visit:  Rheumatology Questionnaire (Submitted on 8/4/2025)  fever: No  eye redness: No  mouth sores: No  headaches: No  shortness of breath: No  chest pain: No  trouble swallowing: No  diarrhea: No  constipation: No  unexpected weight change: No  genital sore: No  dysuria: No  During the last 3 days, have you had a skin rash?: No  Bruises or bleeds easily: No  cough: No

## 2025-08-05 ENCOUNTER — TELEPHONE (OUTPATIENT)
Dept: RHEUMATOLOGY | Facility: CLINIC | Age: 25
End: 2025-08-05
Payer: COMMERCIAL

## 2025-08-05 NOTE — TELEPHONE ENCOUNTER
Employer is ordering her some ergonomic equipment for work and she may need some documentation that she has arthritis.  Patient will provide them with clinic note from visit with Dr WEBER yesterday and let us know if she needs anything additional.      She is also inquiring about follow up with Dr Flanagan in regards to abnormal MRI or should she get a  referral to see an orthopedic

## 2025-08-05 NOTE — TELEPHONE ENCOUNTER
Copied from CRM #[id number]. Topic: General Inquiry - Patient Advice  >> Aug 5, 2025  2:46 PM Jose wrote:  Type:  paper work     Who Called: Casandra   Symptoms (please be specific): letter/ accomodation for work   How long has patient had these symptoms:    Pharmacy name and phone #:    Would the patient rather a call back or a response via [facility]? Call back   Best Call Back Number: [phone]   Additional Information: pt is also requesting status of MRI

## 2025-08-11 ENCOUNTER — TELEPHONE (OUTPATIENT)
Dept: SPORTS MEDICINE | Facility: CLINIC | Age: 25
End: 2025-08-11
Payer: COMMERCIAL

## 2025-08-12 ENCOUNTER — PATIENT MESSAGE (OUTPATIENT)
Dept: SPORTS MEDICINE | Facility: CLINIC | Age: 25
End: 2025-08-12
Payer: COMMERCIAL

## 2025-08-13 ENCOUNTER — PATIENT MESSAGE (OUTPATIENT)
Dept: RHEUMATOLOGY | Facility: CLINIC | Age: 25
End: 2025-08-13
Payer: COMMERCIAL

## 2025-08-15 ENCOUNTER — TELEPHONE (OUTPATIENT)
Dept: RHEUMATOLOGY | Facility: CLINIC | Age: 25
End: 2025-08-15
Payer: COMMERCIAL

## 2025-08-15 RX ORDER — HEPARIN 100 UNIT/ML
500 SYRINGE INTRAVENOUS
OUTPATIENT
Start: 2025-08-18

## 2025-08-15 RX ORDER — DIPHENHYDRAMINE HYDROCHLORIDE 50 MG/ML
25 INJECTION, SOLUTION INTRAMUSCULAR; INTRAVENOUS
OUTPATIENT
Start: 2025-08-18

## 2025-08-15 RX ORDER — METHYLPREDNISOLONE SOD SUCC 125 MG
40 VIAL (EA) INJECTION
OUTPATIENT
Start: 2025-08-18

## 2025-08-15 RX ORDER — EPINEPHRINE 0.3 MG/.3ML
0.3 INJECTION SUBCUTANEOUS ONCE AS NEEDED
OUTPATIENT
Start: 2025-08-18

## 2025-08-15 RX ORDER — SODIUM CHLORIDE 0.9 % (FLUSH) 0.9 %
10 SYRINGE (ML) INJECTION
OUTPATIENT
Start: 2025-08-18

## 2025-08-15 RX ORDER — DIPHENHYDRAMINE HYDROCHLORIDE 50 MG/ML
50 INJECTION, SOLUTION INTRAMUSCULAR; INTRAVENOUS ONCE AS NEEDED
OUTPATIENT
Start: 2025-08-18

## 2025-08-15 RX ORDER — ACETAMINOPHEN 325 MG/1
650 TABLET ORAL
OUTPATIENT
Start: 2025-08-18

## 2025-08-15 RX ORDER — IPRATROPIUM BROMIDE AND ALBUTEROL SULFATE 2.5; .5 MG/3ML; MG/3ML
3 SOLUTION RESPIRATORY (INHALATION)
OUTPATIENT
Start: 2025-08-18

## 2025-08-19 ENCOUNTER — TELEPHONE (OUTPATIENT)
Dept: RHEUMATOLOGY | Facility: CLINIC | Age: 25
End: 2025-08-19
Payer: COMMERCIAL

## 2025-08-20 ENCOUNTER — TELEPHONE (OUTPATIENT)
Dept: INFUSION THERAPY | Facility: HOSPITAL | Age: 25
End: 2025-08-20
Payer: COMMERCIAL

## 2025-08-25 ENCOUNTER — TELEPHONE (OUTPATIENT)
Dept: RHEUMATOLOGY | Facility: CLINIC | Age: 25
End: 2025-08-25
Payer: COMMERCIAL

## 2025-08-26 ENCOUNTER — TELEPHONE (OUTPATIENT)
Dept: RHEUMATOLOGY | Facility: CLINIC | Age: 25
End: 2025-08-26
Payer: COMMERCIAL

## 2025-08-29 DIAGNOSIS — M05.9 SEROPOSITIVE RHEUMATOID ARTHRITIS: ICD-10-CM

## 2025-08-29 DIAGNOSIS — M65.911 SYNOVITIS OF RIGHT SHOULDER: ICD-10-CM

## 2025-09-02 RX ORDER — PREDNISONE 20 MG/1
20 TABLET ORAL DAILY
Qty: 30 TABLET | Refills: 0 | Status: SHIPPED | OUTPATIENT
Start: 2025-09-02

## (undated) DEVICE — UNDERGLOVES BIOGEL PI SZ 7 LF

## (undated) DEVICE — GLOVE BIOGEL ORTHOPEDIC 7.5

## (undated) DEVICE — SOL IRR NACL .9% 3000ML

## (undated) DEVICE — BLADE COOLCUT EXCALIBER 4X13

## (undated) DEVICE — TUBING SUCTION STRAIGHT .25X20

## (undated) DEVICE — COVER CAMERA OPERATING ROOM

## (undated) DEVICE — NDL SPINAL 18GX3.5 SPINOCAN

## (undated) DEVICE — PROBE MULTI PORT RF 90 DEGREE

## (undated) DEVICE — TUBING PUMP ARTHROSCOPY STRL

## (undated) DEVICE — GLOVE SURGICAL LATEX SZ 7

## (undated) DEVICE — DRAPE STERI U-SHAPED 47X51IN

## (undated) DEVICE — DRAPE HIP PCH 112X137X89IN

## (undated) DEVICE — DRAPE U SPLIT SHEET 54X76IN

## (undated) DEVICE — STOCKINETTE TUBULAR 2PL 6 X 4

## (undated) DEVICE — GAUZE SPONGE 4X4 12PLY

## (undated) DEVICE — SET CASSETTE TUBE DW OUTFLOW

## (undated) DEVICE — KIT TRIMANO

## (undated) DEVICE — GOWN SMARTGOWN LVL4 X-LONG XL

## (undated) DEVICE — MANIFOLD 4 PORT

## (undated) DEVICE — APPLICATOR CHLORAPREP ORN 26ML

## (undated) DEVICE — ELECTRODE REM PLYHSV RETURN 9

## (undated) DEVICE — COVER PROXIMA MAYO STAND

## (undated) DEVICE — GLOVE SURG BIOGEL LATEX SZ 7.5

## (undated) DEVICE — SUPPORT ULNA NERVE PROTECTOR

## (undated) DEVICE — DRAPE THREE-QTR REINF 53X77IN

## (undated) DEVICE — COVER LIGHT HANDLE 80/CA

## (undated) DEVICE — UNDERGLOVES BIOGEL PI SIZE 8

## (undated) DEVICE — SLING ARM ULTRA III PAD MED

## (undated) DEVICE — PAD ABD 8X10 STERILE

## (undated) DEVICE — BNDG COFLEX FOAM LF2 ST 4X5YD

## (undated) DEVICE — PACK BASIC SETUP SC BR

## (undated) DEVICE — DRESSING XEROFORM FOIL PK 1X8

## (undated) DEVICE — DRAPE INCISE IOBAN 2 23X17IN

## (undated) DEVICE — GOWN POLY REINF BRTH SLV XL

## (undated) DEVICE — TOWEL OR DISP STRL BLUE 4/PK

## (undated) DEVICE — TAPE SURGICAL MICROFOAM 4IN

## (undated) DEVICE — SUT ETHILON 3/0 18IN PS-1